# Patient Record
Sex: MALE | Race: WHITE | NOT HISPANIC OR LATINO | Employment: FULL TIME | URBAN - METROPOLITAN AREA
[De-identification: names, ages, dates, MRNs, and addresses within clinical notes are randomized per-mention and may not be internally consistent; named-entity substitution may affect disease eponyms.]

---

## 2020-11-30 ENCOUNTER — OFFICE VISIT - HEALTHEAST (OUTPATIENT)
Dept: TELEHEALTH | Facility: CLINIC | Age: 50
End: 2020-11-30

## 2020-12-16 ENCOUNTER — OFFICE VISIT - HEALTHEAST (OUTPATIENT)
Dept: FAMILY MEDICINE | Facility: CLINIC | Age: 50
End: 2020-12-16

## 2020-12-16 ENCOUNTER — COMMUNICATION - HEALTHEAST (OUTPATIENT)
Dept: FAMILY MEDICINE | Facility: CLINIC | Age: 50
End: 2020-12-16

## 2020-12-16 DIAGNOSIS — I10 HYPERTENSION, UNSPECIFIED TYPE: ICD-10-CM

## 2020-12-16 DIAGNOSIS — Z23 NEED FOR VACCINATION: ICD-10-CM

## 2020-12-16 DIAGNOSIS — D12.6 BENIGN NEOPLASM OF COLON, UNSPECIFIED PART OF COLON: ICD-10-CM

## 2020-12-16 DIAGNOSIS — K76.0 FATTY LIVER: ICD-10-CM

## 2020-12-16 DIAGNOSIS — R06.02 SHORTNESS OF BREATH: ICD-10-CM

## 2020-12-16 DIAGNOSIS — N52.9 MALE ERECTILE DISORDER: ICD-10-CM

## 2020-12-16 DIAGNOSIS — F41.1 ANXIETY STATE: ICD-10-CM

## 2020-12-16 DIAGNOSIS — E11.69 TYPE 2 DIABETES MELLITUS WITH OTHER SPECIFIED COMPLICATION, WITHOUT LONG-TERM CURRENT USE OF INSULIN (H): ICD-10-CM

## 2020-12-16 DIAGNOSIS — E78.5 DYSLIPIDEMIA: ICD-10-CM

## 2020-12-16 DIAGNOSIS — K62.89 ANAL OR RECTAL PAIN: ICD-10-CM

## 2020-12-16 LAB
ANION GAP SERPL CALCULATED.3IONS-SCNC: 14 MMOL/L (ref 5–18)
BUN SERPL-MCNC: 10 MG/DL (ref 8–22)
CALCIUM SERPL-MCNC: 9.8 MG/DL (ref 8.5–10.5)
CHLORIDE BLD-SCNC: 94 MMOL/L (ref 98–107)
CHOLEST SERPL-MCNC: 257 MG/DL
CO2 SERPL-SCNC: 26 MMOL/L (ref 22–31)
CREAT SERPL-MCNC: 1.05 MG/DL (ref 0.7–1.3)
FASTING STATUS PATIENT QL REPORTED: YES
GFR SERPL CREATININE-BSD FRML MDRD: >60 ML/MIN/1.73M2
GLUCOSE BLD-MCNC: 345 MG/DL (ref 70–125)
HBA1C MFR BLD: 11.9 %
HDLC SERPL-MCNC: 42 MG/DL
LDLC SERPL CALC-MCNC: 122 MG/DL
LDLC SERPL CALC-MCNC: ABNORMAL MG/DL
POTASSIUM BLD-SCNC: 4 MMOL/L (ref 3.5–5)
SODIUM SERPL-SCNC: 134 MMOL/L (ref 136–145)
TRIGL SERPL-MCNC: 1063 MG/DL

## 2020-12-16 ASSESSMENT — PATIENT HEALTH QUESTIONNAIRE - PHQ9: SUM OF ALL RESPONSES TO PHQ QUESTIONS 1-9: 23

## 2020-12-18 ENCOUNTER — COMMUNICATION - HEALTHEAST (OUTPATIENT)
Dept: FAMILY MEDICINE | Facility: CLINIC | Age: 50
End: 2020-12-18

## 2020-12-18 ENCOUNTER — AMBULATORY - HEALTHEAST (OUTPATIENT)
Dept: FAMILY MEDICINE | Facility: CLINIC | Age: 50
End: 2020-12-18

## 2020-12-18 DIAGNOSIS — E78.5 DYSLIPIDEMIA: ICD-10-CM

## 2020-12-18 DIAGNOSIS — F41.1 ANXIETY STATE: ICD-10-CM

## 2020-12-21 ENCOUNTER — AMBULATORY - HEALTHEAST (OUTPATIENT)
Dept: FAMILY MEDICINE | Facility: CLINIC | Age: 50
End: 2020-12-21

## 2020-12-21 DIAGNOSIS — I10 HYPERTENSION, UNSPECIFIED TYPE: ICD-10-CM

## 2021-01-08 ENCOUNTER — HOSPITAL ENCOUNTER (OUTPATIENT)
Dept: CARDIOLOGY | Facility: CLINIC | Age: 51
Discharge: HOME | End: 2021-01-08
Attending: FAMILY MEDICINE

## 2021-01-08 ENCOUNTER — AMBULATORY - HEALTHEAST (OUTPATIENT)
Dept: FAMILY MEDICINE | Facility: CLINIC | Age: 51
End: 2021-01-08

## 2021-01-08 ENCOUNTER — COMMUNICATION - HEALTHEAST (OUTPATIENT)
Dept: FAMILY MEDICINE | Facility: CLINIC | Age: 51
End: 2021-01-08

## 2021-01-08 DIAGNOSIS — R06.02 SHORTNESS OF BREATH: ICD-10-CM

## 2021-01-08 DIAGNOSIS — F41.1 ANXIETY STATE: ICD-10-CM

## 2021-01-08 DIAGNOSIS — Z12.11 SCREEN FOR COLON CANCER: ICD-10-CM

## 2021-01-08 DIAGNOSIS — I10 HYPERTENSION, UNSPECIFIED TYPE: ICD-10-CM

## 2021-01-08 LAB
CV STRESS CURRENT BP HE: NORMAL
CV STRESS CURRENT HR HE: 100
CV STRESS CURRENT HR HE: 104
CV STRESS CURRENT HR HE: 105
CV STRESS CURRENT HR HE: 105
CV STRESS CURRENT HR HE: 106
CV STRESS CURRENT HR HE: 107
CV STRESS CURRENT HR HE: 109
CV STRESS CURRENT HR HE: 112
CV STRESS CURRENT HR HE: 113
CV STRESS CURRENT HR HE: 116
CV STRESS CURRENT HR HE: 117
CV STRESS CURRENT HR HE: 119
CV STRESS CURRENT HR HE: 127
CV STRESS CURRENT HR HE: 127
CV STRESS CURRENT HR HE: 128
CV STRESS CURRENT HR HE: 128
CV STRESS CURRENT HR HE: 131
CV STRESS CURRENT HR HE: 132
CV STRESS CURRENT HR HE: 137
CV STRESS CURRENT HR HE: 137
CV STRESS CURRENT HR HE: 141
CV STRESS CURRENT HR HE: 144
CV STRESS CURRENT HR HE: 144
CV STRESS CURRENT HR HE: 145
CV STRESS CURRENT HR HE: 145
CV STRESS CURRENT HR HE: 146
CV STRESS CURRENT HR HE: 94
CV STRESS DEVIATION TIME HE: NORMAL
CV STRESS ECHO PERCENT HR HE: NORMAL
CV STRESS EXERCISE STAGE HE: NORMAL
CV STRESS FINAL RESTING BP HE: NORMAL
CV STRESS FINAL RESTING HR HE: 106
CV STRESS MAX HR HE: 146
CV STRESS MAX TREADMILL GRADE HE: 14
CV STRESS MAX TREADMILL SPEED HE: 3.4
CV STRESS PEAK DIA BP HE: NORMAL
CV STRESS PEAK SYS BP HE: NORMAL
CV STRESS PHASE HE: NORMAL
CV STRESS PROTOCOL HE: NORMAL
CV STRESS RESTING PT POSITION HE: NORMAL
CV STRESS RESTING PT POSITION HE: NORMAL
CV STRESS ST DEVIATION AMOUNT HE: NORMAL
CV STRESS ST DEVIATION ELEVATION HE: NORMAL
CV STRESS ST EVELATION AMOUNT HE: NORMAL
CV STRESS TEST TYPE HE: NORMAL
CV STRESS TOTAL STAGE TIME MIN 1 HE: NORMAL
ECHO EJECTION FRACTION ESTIMATED: 60 %
RATE PRESSURE PRODUCT: NORMAL
STRESS ECHO BASELINE DIASTOLIC HE: 104
STRESS ECHO BASELINE HR: 94
STRESS ECHO BASELINE SYSTOLIC BP: 150
STRESS ECHO CALCULATED PERCENT HR: 87 %
STRESS ECHO LAST STRESS DIASTOLIC BP: 92
STRESS ECHO LAST STRESS HR: 145
STRESS ECHO LAST STRESS SYSTOLIC BP: 164
STRESS ECHO POST ESTIMATED WORKLOAD: 8.7
STRESS ECHO POST EXERCISE DUR MIN: 7
STRESS ECHO POST EXERCISE DUR SEC: 15
STRESS ECHO TARGET HR: 168

## 2021-01-12 ENCOUNTER — OFFICE VISIT - HEALTHEAST (OUTPATIENT)
Dept: FAMILY MEDICINE | Facility: CLINIC | Age: 51
End: 2021-01-12

## 2021-01-12 DIAGNOSIS — Z12.11 SCREEN FOR COLON CANCER: ICD-10-CM

## 2021-01-12 DIAGNOSIS — I10 HYPERTENSION, UNSPECIFIED TYPE: ICD-10-CM

## 2021-01-12 DIAGNOSIS — E11.69 TYPE 2 DIABETES MELLITUS WITH OTHER SPECIFIED COMPLICATION, WITHOUT LONG-TERM CURRENT USE OF INSULIN (H): ICD-10-CM

## 2021-01-12 DIAGNOSIS — F41.1 ANXIETY STATE: ICD-10-CM

## 2021-02-03 ENCOUNTER — AMBULATORY - HEALTHEAST (OUTPATIENT)
Dept: FAMILY MEDICINE | Facility: CLINIC | Age: 51
End: 2021-02-03

## 2021-02-03 ENCOUNTER — OFFICE VISIT - HEALTHEAST (OUTPATIENT)
Dept: FAMILY MEDICINE | Facility: CLINIC | Age: 51
End: 2021-02-03

## 2021-02-03 DIAGNOSIS — F41.1 ANXIETY STATE: ICD-10-CM

## 2021-02-03 DIAGNOSIS — I10 HYPERTENSION, UNSPECIFIED TYPE: ICD-10-CM

## 2021-02-14 ENCOUNTER — COMMUNICATION - HEALTHEAST (OUTPATIENT)
Dept: FAMILY MEDICINE | Facility: CLINIC | Age: 51
End: 2021-02-14

## 2021-02-14 DIAGNOSIS — E11.69 TYPE 2 DIABETES MELLITUS WITH OTHER SPECIFIED COMPLICATION, WITHOUT LONG-TERM CURRENT USE OF INSULIN (H): ICD-10-CM

## 2021-03-05 ENCOUNTER — COMMUNICATION - HEALTHEAST (OUTPATIENT)
Dept: FAMILY MEDICINE | Facility: CLINIC | Age: 51
End: 2021-03-05

## 2021-03-05 DIAGNOSIS — F41.1 ANXIETY STATE: ICD-10-CM

## 2021-03-22 ENCOUNTER — COMMUNICATION - HEALTHEAST (OUTPATIENT)
Dept: FAMILY MEDICINE | Facility: CLINIC | Age: 51
End: 2021-03-22

## 2021-03-25 ENCOUNTER — AMBULATORY - HEALTHEAST (OUTPATIENT)
Dept: NURSING | Facility: CLINIC | Age: 51
End: 2021-03-25

## 2021-04-01 ENCOUNTER — COMMUNICATION - HEALTHEAST (OUTPATIENT)
Dept: FAMILY MEDICINE | Facility: CLINIC | Age: 51
End: 2021-04-01

## 2021-04-01 DIAGNOSIS — F41.1 ANXIETY STATE: ICD-10-CM

## 2021-04-01 DIAGNOSIS — I10 HYPERTENSION, UNSPECIFIED TYPE: ICD-10-CM

## 2021-04-09 ENCOUNTER — OFFICE VISIT - HEALTHEAST (OUTPATIENT)
Dept: FAMILY MEDICINE | Facility: CLINIC | Age: 51
End: 2021-04-09

## 2021-04-09 DIAGNOSIS — R11.0 NAUSEA: ICD-10-CM

## 2021-04-09 DIAGNOSIS — I10 HYPERTENSION, UNSPECIFIED TYPE: ICD-10-CM

## 2021-04-09 DIAGNOSIS — R06.02 SHORTNESS OF BREATH: ICD-10-CM

## 2021-04-09 DIAGNOSIS — F41.1 ANXIETY STATE: ICD-10-CM

## 2021-04-12 ENCOUNTER — COMMUNICATION - HEALTHEAST (OUTPATIENT)
Dept: FAMILY MEDICINE | Facility: CLINIC | Age: 51
End: 2021-04-12

## 2021-04-12 DIAGNOSIS — E11.69 TYPE 2 DIABETES MELLITUS WITH OTHER SPECIFIED COMPLICATION, WITHOUT LONG-TERM CURRENT USE OF INSULIN (H): ICD-10-CM

## 2021-04-13 RX ORDER — METFORMIN HCL 500 MG
TABLET, EXTENDED RELEASE 24 HR ORAL
Qty: 60 TABLET | Refills: 1 | Status: SHIPPED | OUTPATIENT
Start: 2021-04-13 | End: 2021-08-13

## 2021-04-14 ENCOUNTER — COMMUNICATION - HEALTHEAST (OUTPATIENT)
Dept: FAMILY MEDICINE | Facility: CLINIC | Age: 51
End: 2021-04-14

## 2021-04-15 ENCOUNTER — AMBULATORY - HEALTHEAST (OUTPATIENT)
Dept: NURSING | Facility: CLINIC | Age: 51
End: 2021-04-15

## 2021-04-28 ENCOUNTER — COMMUNICATION - HEALTHEAST (OUTPATIENT)
Dept: FAMILY MEDICINE | Facility: CLINIC | Age: 51
End: 2021-04-28

## 2021-04-28 DIAGNOSIS — I10 HYPERTENSION, UNSPECIFIED TYPE: ICD-10-CM

## 2021-04-30 RX ORDER — LISINOPRIL 40 MG/1
TABLET ORAL
Qty: 90 TABLET | Refills: 2 | Status: SHIPPED | OUTPATIENT
Start: 2021-04-30 | End: 2022-05-01

## 2021-05-01 ENCOUNTER — COMMUNICATION - HEALTHEAST (OUTPATIENT)
Dept: FAMILY MEDICINE | Facility: CLINIC | Age: 51
End: 2021-05-01

## 2021-05-01 DIAGNOSIS — F41.1 ANXIETY STATE: ICD-10-CM

## 2021-05-03 RX ORDER — QUETIAPINE FUMARATE 25 MG/1
TABLET, FILM COATED ORAL
Qty: 90 TABLET | Refills: 0 | Status: SHIPPED | OUTPATIENT
Start: 2021-05-03 | End: 2021-08-23

## 2021-05-26 ASSESSMENT — PATIENT HEALTH QUESTIONNAIRE - PHQ9: SUM OF ALL RESPONSES TO PHQ QUESTIONS 1-9: 23

## 2021-05-31 ENCOUNTER — RECORDS - HEALTHEAST (OUTPATIENT)
Dept: ADMINISTRATIVE | Facility: CLINIC | Age: 51
End: 2021-05-31

## 2021-06-01 ENCOUNTER — RECORDS - HEALTHEAST (OUTPATIENT)
Dept: ADMINISTRATIVE | Facility: CLINIC | Age: 51
End: 2021-06-01

## 2021-06-05 VITALS
SYSTOLIC BLOOD PRESSURE: 127 MMHG | HEART RATE: 92 BPM | BODY MASS INDEX: 25.23 KG/M2 | DIASTOLIC BLOOD PRESSURE: 91 MMHG | WEIGHT: 165.9 LBS

## 2021-06-05 VITALS
HEART RATE: 113 BPM | SYSTOLIC BLOOD PRESSURE: 167 MMHG | OXYGEN SATURATION: 99 % | WEIGHT: 163.2 LBS | BODY MASS INDEX: 24.81 KG/M2 | DIASTOLIC BLOOD PRESSURE: 122 MMHG

## 2021-06-05 VITALS
HEART RATE: 96 BPM | DIASTOLIC BLOOD PRESSURE: 82 MMHG | BODY MASS INDEX: 25.36 KG/M2 | OXYGEN SATURATION: 100 % | WEIGHT: 166.8 LBS | SYSTOLIC BLOOD PRESSURE: 118 MMHG

## 2021-06-05 VITALS
DIASTOLIC BLOOD PRESSURE: 119 MMHG | OXYGEN SATURATION: 98 % | SYSTOLIC BLOOD PRESSURE: 186 MMHG | BODY MASS INDEX: 25.54 KG/M2 | WEIGHT: 168 LBS | HEART RATE: 90 BPM

## 2021-06-13 NOTE — PROGRESS NOTES
ASSESSMENT:  1. Hypertension  Suboptimal control.  - lisinopriL (PRINIVIL,ZESTRIL) 10 MG tablet; Take 1 tablet (10 mg total) by mouth daily.  Dispense: 30 tablet; Refill: 1    2. Anxiety  Longstanding history, probably generalized anxiety disorder.  - FLUoxetine (PROZAC) 20 MG capsule; Take 1 capsule daily for anxiety.  Dispense: 30 capsule; Refill: 1    3. Type 2 diabetes mellitus (H)  New diagnosis, A1c 11.9.  - Basic Metabolic Panel  - Glycosylated Hemoglobin A1c  - Lipid Cascade  - metFORMIN (GLUCOPHAGE-XR) 500 MG 24 hr tablet; Take 1 tablet before breakfast each morning for 1 week and then 2 tablets before breakfast daily for diabetes.  Dispense: 60 tablet; Refill: 1    4. Fatty liver  Presumed metabolic.    5. Proctalgia  Standing issue prior conservative management.    6. Benign neoplasm of colon, unspecified part of colon  Noted on prior colonoscopy.    7. Male erectile disorder  Probably secondary to both diabetes mellitus and hypertension as well as anxiety.    8. Dyslipidemia  Tendency towards elevated triglycerides and low HDL.    9. Shortness of breath  Unclear etiology this could be secondary to deconditioning other medical issues as above, a cardiac cause is possible.  - Echo Stress Exercise; Future    10. Need for vaccination     - Tdap vaccine,  8yo or older,  IM  - Influenza, Recombinant, Inj, Quadrivalent, PF, 18+YRS        PLAN:  1.  Tdap and influenza vaccines.  2.  Discontinue amlodipine, begin lisinopril 10 mg daily.  3.  Begin fluoxetine 20 mg daily  4.  Begin Metformin extended release 500 mg daily x1 week and then 1000 mg daily  5.  Stress echocardiogram.  6.  Laboratory studies as above.  7.  1 month follow-up.    Orders Placed This Encounter   Procedures     Tdap vaccine,  8yo or older,  IM     Influenza, Recombinant, Inj, Quadrivalent, PF, 18+YRS     Basic Metabolic Panel     Glycosylated Hemoglobin A1c     Lipid Cascade     Order Specific Question:   Fasting is required?     Answer:    Yes     Medications Discontinued During This Encounter   Medication Reason     HYDROcodone-acetaminophen 5-325 mg per tablet Therapy completed     amLODIPine (NORVASC) 5 MG tablet Alternate therapy       No follow-ups on file.    CHIEF COMPLAINT:  Chief Complaint   Patient presents with     Follow-up     f/u ER with chest pain on 11/25, still shortness of breath, pain in the shoulders, left arms is getting numb, tingling in the low legs, COVID is negative 1 month ago     Hypertension     the highest BP was 2 days ago was 190/105 and pain in the jaw       SUBJECTIVE:  Drew is a 50 y.o. male patient has not been seen by myself in about 4 years he is here for multiple medical issues.    Patient was just seen in the emergency room on November 25 he was therefore chest pain, he had a fairly extensive work-up which did not reveal any obvious cause and was discharged with a diagnosis of musculoskeletal chest pain which the patient does not believe was the actual cause.  He does believe that there potentially is something going on with his heart.  He also mentions for the past 6 months or so that he does get more short of breath with activity or exertion, given this I do think we do need to do further cardiac testing.    When the patient was in the emergency room he had a random sugar of 385, I told the patient he does have underlying diabetes mellitus, he was not informed of this, he is also been losing weight recently which would be consistent with a new diagnosis of diabetes.    His blood pressure is elevated as well he is on 5 mg of amlodipine which he does not think is helpful.  I reviewed his allergies which I think are incorrect, I am going to restart him on lisinopril which she was on previously.    He also has underlying history of anxiety, this goes back quite some time.  I told the patient I do think anxiety is a factor in some of his symptoms and that, he has been on sertraline in the past which he did not  think was helpful, but I do think he needs to be on a medication for what I think is quite severe anxiety.    Patient is not in a relationship at this time he is  but he also mentions he is not been able to have an erection for the past year he actually had Cialis but that was not helpful.  I told the patient if we can get his blood pressure and diabetes under better control this might make a difference.        REVIEW OF SYSTEMS:      All other systems are negative.    PFSH:  Immunization History   Administered Date(s) Administered     INFLUENZA,RECOMBINANT,INJ,PF QUADRIVALENT 18+YRS 12/16/2020     Influenza, Seasonal, Inj PF IIV3 11/06/2010, 02/07/2012     Influenza, inj, historic,unspecified 10/25/2008, 10/25/2016     Influenza,seasonal, Inj IIV3 10/25/2008     Tdap 08/26/2009, 12/16/2020     Social History     Socioeconomic History     Marital status:      Spouse name: Not on file     Number of children: 1     Years of education: Not on file     Highest education level: Not on file   Occupational History     Occupation: Loading   Social Needs     Financial resource strain: Not on file     Food insecurity     Worry: Not on file     Inability: Not on file     Transportation needs     Medical: Not on file     Non-medical: Not on file   Tobacco Use     Smoking status: Former Smoker     Types: Cigarettes     Smokeless tobacco: Never Used     Tobacco comment: College   Substance and Sexual Activity     Alcohol use: No     Drug use: Yes     Comment: Cannabis     Sexual activity: Not on file   Lifestyle     Physical activity     Days per week: Not on file     Minutes per session: Not on file     Stress: Not on file   Relationships     Social connections     Talks on phone: Not on file     Gets together: Not on file     Attends Rastafarian service: Not on file     Active member of club or organization: Not on file     Attends meetings of clubs or organizations: Not on file     Relationship status: Not on  file     Intimate partner violence     Fear of current or ex partner: Not on file     Emotionally abused: Not on file     Physically abused: Not on file     Forced sexual activity: Not on file   Other Topics Concern     Not on file   Social History Narrative    Diet-        Exercise-work is physical     Past Medical History:   Diagnosis Date     Closed Fracture Of The Fifth Left Metatarsal At [place]se[place] With Displacement           Family History   Problem Relation Age of Onset     Stroke Mother        MEDICATIONS:  Current Outpatient Medications   Medication Sig Dispense Refill     FLUoxetine (PROZAC) 20 MG capsule Take 1 capsule daily for anxiety. 30 capsule 1     lisinopriL (PRINIVIL,ZESTRIL) 10 MG tablet Take 1 tablet (10 mg total) by mouth daily. 30 tablet 1     metFORMIN (GLUCOPHAGE-XR) 500 MG 24 hr tablet Take 1 tablet before breakfast each morning for 1 week and then 2 tablets before breakfast daily for diabetes. 60 tablet 1     naproxen sodium (ALEVE) 220 MG tablet Take 440 mg by mouth every 12 (twelve) hours as needed for pain.       No current facility-administered medications for this visit.        TOBACCO USE:  Social History     Tobacco Use   Smoking Status Former Smoker     Types: Cigarettes   Smokeless Tobacco Never Used   Tobacco Comment    [place]       VITALS:  Vitals:    12/16/20 1541   BP: (!) 167/122   Pulse: (!) 113   SpO2: 99%   Weight: 163 lb 3.2 oz (74 kg)     Wt Readings from Last 3 Encounters:   12/16/20 163 lb 3.2 oz (74 kg)   11/25/20 189 lb (85.7 kg)   04/06/17 179 lb (81.2 kg)       PHYSICAL EXAM:  Constitutional:   Reveals a male who appears his stated age.  Vitals: per nursing notes.  HEENT:  Ears:  External canals, TMs clear.    Eyes:  EOMs full, PERRL.  Lungs: Clear to A&P without rales or wheezes.  Respiratory effort normal.  Cardiac:   Regular rate and rhythm, normal S1, S2, no murmur or gallop.  Musculoskeletal: No peripheral swelling.  Neuro:  Alert and  oriented. Cranial nerves, motor, sensory exams are intact.  No gross focal deficits.  Psychiatric:  Memory intact, mood appropriate.    QUALITY MEASURES:      DATA REVIEWED:  ER note from November 25, 2020 reviewed, CT scan of the chest did not reveal any acute cardiopulmonary process, random sugar 385, troponin negative.

## 2021-06-14 ENCOUNTER — COMMUNICATION - HEALTHEAST (OUTPATIENT)
Dept: FAMILY MEDICINE | Facility: CLINIC | Age: 51
End: 2021-06-14

## 2021-06-14 DIAGNOSIS — E78.5 DYSLIPIDEMIA: ICD-10-CM

## 2021-06-14 RX ORDER — FENOFIBRATE 145 MG/1
TABLET, COATED ORAL
Qty: 90 TABLET | Refills: 1 | Status: SHIPPED | OUTPATIENT
Start: 2021-06-14 | End: 2022-09-12

## 2021-06-14 NOTE — TELEPHONE ENCOUNTER
Dr. Farmer I'm not able to pend this medication. Are you able to send in refills for his seroquel and lisinopril?

## 2021-06-14 NOTE — PROGRESS NOTES
ASSESSMENT:  1. Screen for colon cancer  Patient needs colorectal cancer screening, but I would defer for now we do need to get the diabetes under better control.    2. Hypertension  Currently well controlled.    3. Type 2 diabetes mellitus (H)  Recent diagnosis with dramatic elevation of A1c.    4. Anxiety  Longstanding, has been difficult to manage.  - QUEtiapine (SEROQUEL) 25 MG tablet; Take one tablet twice daily for anxiety  Dispense: 60 tablet; Refill: 0        PLAN:  1.  Discontinue fluoxetine, begin Seroquel 25 mg twice daily  2.  Referral to psychiatry is a potential future option.  3.  Patient is going to purchase a home glucose monitoring system  4.  Defer colorectal cancer screening for now.  5.  Follow-up in 1 month.    No orders of the defined types were placed in this encounter.    Medications Discontinued During This Encounter   Medication Reason     FLUoxetine (PROZAC) 20 MG capsule Alternate therapy       No follow-ups on file.    CHIEF COMPLAINT:  Chief Complaint   Patient presents with     Blood Pressure Check       SUBJECTIVE:  Drew is a 50 y.o. male patient comes in for follow-up of a number of medical issues.  Patient has a history of anxiety I presume generalized anxiety disorder I tried him on several different medications most recently fluoxetine he does not think that that is been helpful at all.  I discussed various options one of the limitations is that he does not have health insurance currently so a psychiatrist might not be possible at this point, we discussed that could go up on the fluoxetine but I would like to try him on an atypical to see if this does not cause some improvement.    Patient has a recent diagnosis of diabetes mellitus he is on Metformin appears to be tolerating this well, I told the patient I like him to check his blood sugars he is going to buy an over-the-counter meter for this.    He does have underlying hypertension now well controlled.    He mentions also  that he is having erection difficulties, I suspect that this is multifactorial, including diabetes blood pressure and anxiety.    He continues to have a subjective sensation of shortness of breath he had a recent exercise stress echocardiogram which was essentially normal, he had a CT scan of the chest abdomen pelvis back in November as well as numerous laboratory studies no evidence of any cardiac or pulmonary disease, I told the patient I do think this is a combination of deconditioning and anxiety.            REVIEW OF SYSTEMS:      All other systems are negative.    PFSH:  Immunization History   Administered Date(s) Administered     INFLUENZA,RECOMBINANT,INJ,PF QUADRIVALENT 18+YRS 12/16/2020     Influenza, Seasonal, Inj PF IIV3 11/06/2010, 02/07/2012     Influenza, inj, historic,unspecified 10/25/2008, 10/25/2016     Influenza,seasonal, Inj IIV3 10/25/2008     Tdap 08/26/2009, 12/16/2020     Social History     Socioeconomic History     Marital status:      Spouse name: Not on file     Number of children: 1     Years of education: Not on file     Highest education level: Not on file   Occupational History     Occupation: Loading   Social Needs     Financial resource strain: Not on file     Food insecurity     Worry: Not on file     Inability: Not on file     Transportation needs     Medical: Not on file     Non-medical: Not on file   Tobacco Use     Smoking status: Former Smoker     Types: Cigarettes     Smokeless tobacco: Never Used     Tobacco comment: College   Substance and Sexual Activity     Alcohol use: No     Drug use: Yes     Comment: Cannabis     Sexual activity: Not on file   Lifestyle     Physical activity     Days per week: Not on file     Minutes per session: Not on file     Stress: Not on file   Relationships     Social connections     Talks on phone: Not on file     Gets together: Not on file     Attends Roman Catholic service: Not on file     Active member of club or organization: Not on file      Attends meetings of clubs or organizations: Not on file     Relationship status: Not on file     Intimate partner violence     Fear of current or ex partner: Not on file     Emotionally abused: Not on file     Physically abused: Not on file     Forced sexual activity: Not on file   Other Topics Concern     Not on file   Social History Narrative    Diet-        Exercise-work is physical     Past Medical History:   Diagnosis Date     Closed Fracture Of The Fifth Left Metatarsal At The Metaphyseal[place]diaphyseal Junction With Displacement           Headache     Frontal-?migraine Triggers: no obvious,  excedrin light senstive Chiro      Nephrolithiasis     at least 4 episodes most recent 2012 once surgically removed Stones present both kidneys       Family History   Problem Relation Age of Onset     Stroke Mother      Aneurysm Father        MEDICATIONS:  Current Outpatient Medications   Medication Sig Dispense Refill     fenofibrate (TRICOR) 145 MG tablet Take 1 tablet (145 mg total) by mouth daily. 90 tablet 1     lisinopriL (PRINIVIL,ZESTRIL) 10 MG tablet Take two pills daily for high blood pressure 90 tablet 1     metFORMIN (GLUCOPHAGE-XR) 500 MG 24 hr tablet Take 1 tablet before breakfast each morning for 1 week and then 2 tablets before breakfast daily for diabetes. 60 tablet 1     naproxen sodium (ALEVE) 220 MG tablet Take 440 mg by mouth every 12 (twelve) hours as needed for pain.       QUEtiapine (SEROQUEL) 25 MG tablet Take one tablet twice daily for anxiety 60 tablet 0     No current facility-administered medications for this visit.        TOBACCO USE:  Social History     Tobacco Use   Smoking Status Former Smoker     Types: Cigarettes   Smokeless Tobacco Never Used   Tobacco Comment    [place]       VITALS:  Vitals:    01/12/21 1553   BP: (!) 127/91   Pulse: 92   Weight: 165 lb 14.4 oz (75.3 kg)     Wt Readings from Last 3 Encounters:   01/12/21 165 lb 14.4 oz (75.3 kg)   12/16/20 163 lb 3.2 oz (74 kg)    11/25/20 189 lb (85.7 kg)       PHYSICAL EXAM:  Constitutional:   Reveals a young male who appears overall healthy.  Vitals: per nursing notes.  HEENT:  Ears:  External canals, TMs clear.    Eyes:  EOMs full, PERRL.  Musculoskeletal: No peripheral swelling.  Neuro:  Alert and oriented. Cranial nerves, motor, sensory exams are intact.  No gross focal deficits.  Psychiatric:  Memory intact, mood appropriate.    QUALITY MEASURES:      DATA REVIEWED:  Reviewed the exercise stress test from January 8 which does not reveal any evidence of cardiac ischemia

## 2021-06-15 NOTE — PROGRESS NOTES
ASSESSMENT:  1. Anxiety  Appears to be not optimally controlled at all at this time.  - QUEtiapine (SEROQUEL) 25 MG tablet; Take one tablet each morning and two each evening for anxiety  Dispense: 90 tablet; Refill: 0    2. Hypertension  Suboptimal control  - lisinopriL (PRINIVIL,ZESTRIL) 40 MG tablet; Take one pill daily for high blood pressure  Dispense: 90 tablet; Refill: 1        PLAN:  1.  Increase Seroquel from 25 mg twice daily to 25 mg in the morning and 50 mg in the evening new prescription faxed in.  2.  Increase lisinopril from 20 mg to 40 mg daily, new prescription  3.  Urged patient to do what he can to get health insurance as he is behind on preventive maintenance issues.  4.  Patient is due for follow-up in about 2 months for his comorbidities.    No orders of the defined types were placed in this encounter.    Medications Discontinued During This Encounter   Medication Reason     lisinopriL (PRINIVIL,ZESTRIL) 10 MG tablet Therapy completed     lisinopriL (PRINIVIL,ZESTRIL) 20 MG tablet Therapy completed     QUEtiapine (SEROQUEL) 25 MG tablet        No follow-ups on file.    CHIEF COMPLAINT:  Chief Complaint   Patient presents with     Hypertension       SUBJECTIVE:  Drew is a 50 y.o. male who comes in for follow-up anxiety blood pressure or other issues.    The patient on January 12, we discontinued fluoxetine, I started him on Seroquel, he is not sure if that is helping or not, in the morning he does get a little dizzy after he takes the medication, in the evening does not seem like it is helping him sleep.  I discussed multiple potential options including dose adjustment, trying a different medication or referral to psychiatry, a limiting factor is is that he has no health insurance at this time and his psychiatrist is probably prohibitively expensive and he already has a lot of medical bills.    Patient's blood pressure is quite significantly elevated, he is currently on 20 mg of lisinopril we  need to go up on this.    Patient reports that he has ongoing intermittent rectal bleeding, we do need a colonoscopy which she has never had, but again with no health insurance the cost would be quite prohibitive.    He also states that he is not able to at this time get and maintain an erection, I suspect diabetes blood pressure anxiety probably playing at least some role in this.    I discussed with the patient that I think he should at least look into the possibility of being a candidate for subsidized insurance through [employer] another source.    REVIEW OF SYSTEMS:      All other systems are negative.    PFSH:  Immunization History   Administered Date(s) Administered     INFLUENZA,RECOMBINANT,INJ,PF QUADRIVALENT 18+YRS 12/16/2020     Influenza, Seasonal, Inj PF IIV3 11/06/2010, 02/07/2012     Influenza, inj, historic,unspecified 10/25/2008, 10/25/2016     Influenza,seasonal, Inj IIV3 10/25/2008     Tdap 08/26/2009, 12/16/2020     Social History     Socioeconomic History     Marital status:      Spouse name: Not on file     Number of children: 1     Years of education: Not on file     Highest education level: Not on file   Occupational History     Occupation: Loading   Social Needs     Financial resource strain: Not on file     Food insecurity     Worry: Not on file     Inability: Not on file     Transportation needs     Medical: Not on file     Non-medical: Not on file   Tobacco Use     Smoking status: Former Smoker     Types: Cigarettes     Smokeless tobacco: Never Used     Tobacco comment: College   Substance and Sexual Activity     Alcohol use: No     Drug use: Yes     Comment: Cannabis     Sexual activity: Not on file   Lifestyle     Physical activity     Days per week: Not on file     Minutes per session: Not on file     Stress: Not on file   Relationships     Social connections     Talks on phone: Not on file     Gets together: Not on file     Attends Judaism service: Not on file     Active member  of club or organization: Not on file     Attends meetings of clubs or organizations: Not on file     Relationship status: Not on file     Intimate partner violence     Fear of current or ex partner: Not on file     Emotionally abused: Not on file     Physically abused: Not on file     Forced sexual activity: Not on file   Other Topics Concern     Not on file   Social History Narrative    Diet-        Exercise-work is physical     Past Medical History:   Diagnosis Date     Closed Fracture Of The Fifth Left Metatarsal At The Metaphyseal[place]diaphyseal Junction With Displacement           Headache     Frontal-?migraine Triggers: no obvious,  excedrin light senstive Chiro      Nephrolithiasis     at least 4 episodes most recent 2012 once surgically removed Stones present both kidneys       Family History   Problem Relation Age of Onset     Stroke Mother      Aneurysm Father        MEDICATIONS:  Current Outpatient Medications   Medication Sig Dispense Refill     fenofibrate (TRICOR) 145 MG tablet Take 1 tablet (145 mg total) by mouth daily. 90 tablet 1     metFORMIN (GLUCOPHAGE-XR) 500 MG 24 hr tablet Take 1 tablet before breakfast each morning for 1 week and then 2 tablets before breakfast daily for diabetes. 60 tablet 1     naproxen sodium (ALEVE) 220 MG tablet Take 440 mg by mouth every 12 (twelve) hours as needed for pain.       QUEtiapine (SEROQUEL) 25 MG tablet Take one tablet each morning and two each evening for anxiety 90 tablet 0     lisinopriL (PRINIVIL,ZESTRIL) 40 MG tablet Take one pill daily for high blood pressure 90 tablet 1     No current facility-administered medications for this visit.        TOBACCO USE:  Social History     Tobacco Use   Smoking Status Former Smoker     Types: Cigarettes   Smokeless Tobacco Never Used   Tobacco Comment    [place]       VITALS:  Vitals:    02/03/21 1716 02/03/21 1731   BP: (!) 173/112 (!) 186/119   Pulse: 90    SpO2: 98%    Weight: 168 lb (76.2 kg)      Wt Readings from  Last 3 Encounters:   02/03/21 168 lb (76.2 kg)   01/12/21 165 lb 14.4 oz (75.3 kg)   12/16/20 163 lb 3.2 oz (74 kg)       PHYSICAL EXAM:  Constitutional:   Reveals a male who seems anxious but pleasant and cooperative.  Vitals: per nursing notes.  Eyes:  EOMs full, PERRL.  Musculoskeletal: No peripheral swelling.  Neuro:  Alert and oriented. Cranial nerves, motor, sensory exams are intact.  No gross focal deficits.  Psychiatric:  Memory intact, mood appropriate.    QUALITY MEASURES:      DATA REVIEWED:

## 2021-06-15 NOTE — TELEPHONE ENCOUNTER
RN cannot approve Refill Request    RN can NOT refill this medication med is not covered by policy/route to provider. Last office visit: 2/3/2021 Haroon Farmer MD Last Physical: Visit date not found Last MTM visit: Visit date not found Last visit same specialty: 2/3/2021 Haroon Farmer MD.  Next visit within 3 mo: Visit date not found  Next physical within 3 mo: Visit date not found      [facility] Best, [facility] Triage/Med Refill 3/5/2021    Requested Prescriptions   Pending Prescriptions Disp Refills     QUEtiapine (SEROQUEL) 25 MG tablet 90 tablet 0     Sig: Take one tablet each morning and two each evening for anxiety       There is no refill protocol information for this order

## 2021-06-15 NOTE — TELEPHONE ENCOUNTER
RN cannot approve Refill Request    RN can NOT refill this medication PCP messaged that patient is overdue for Labs. Last office visit: 2/3/2021 Haroon Farmer MD Last Physical: Visit date not found Last MTM visit: Visit date not found Last visit same specialty: 2/3/2021 Haroon Farmer MD.  Next visit within 3 mo: Visit date not found  Next physical within 3 mo: Visit date not found      Maki Best, [facility] Triage/Med Refill 2/14/2021    Requested Prescriptions   Pending Prescriptions Disp Refills     metFORMIN (GLUCOPHAGE-XR) 500 MG 24 hr tablet [Pharmacy Med Name: METFORMIN HCL  MG TABLET] 60 tablet 1     Sig: TAKE 1 TAB BY MOUTH DAILY BEFORE BREAKFAST FOR 1 WEEK THEN INCREASE TO 2 TABS DAILY BEFORE BREAKFAST       Metformin Refill Protocol Failed - 2/14/2021  9:37 AM        Failed - LFT or AST or ALT in last 12 months     No results found for: ALBUMIN, BILITOT, BILIDIR, ALKPHOS, AST, ALT, PROT             Failed - Microalbumin in last year      No results found for: MICROALBUR               Passed - Blood pressure in last 12 months     BP Readings from Last 1 Encounters:   02/03/21 (!) 186/119             Passed - GFR or Serum Creatinine in last 6 months     GFR MDRD Non Af Amer   Date Value Ref Range Status   12/16/2020 >60 >60 mL/min/1.73m2 Final     GFR MDRD Af Amer   Date Value Ref Range Status   12/16/2020 >60 >60 mL/min/1.73m2 Final             Passed - Visit with PCP or prescribing provider visit in last 6 months or next 3 months     Last office visit with prescriber/PCP: 2/3/2021 OR same dept: 2/3/2021 Haroon Farmer MD OR same specialty: 2/3/2021 Haroon Farmer MD Last physical: Visit date not found Last MTM visit: Visit date not found         Next appt within 3 mo: Visit date not found  Next physical within 3 mo: Visit date not found  Prescriber OR PCP: Haroon Farmer MD  Last diagnosis associated with med order: 1. Type 2 diabetes mellitus with other specified  complication, without long-term current use of insulin (H)  - metFORMIN (GLUCOPHAGE-XR) 500 MG 24 hr tablet [Pharmacy Med Name: METFORMIN HCL  MG TABLET]; TAKE 1 TAB BY MOUTH DAILY BEFORE BREAKFAST FOR 1 WEEK THEN INCREASE TO 2 TABS DAILY BEFORE BREAKFAST  Dispense: 60 tablet; Refill: 1     If protocol passes may refill for 12 months if within 3 months of last provider visit (or a total of 15 months).           Passed - A1C in last 6 months     Hemoglobin A1c   Date Value Ref Range Status   12/16/2020 11.9 (H) <=5.6 % Final

## 2021-06-16 NOTE — PROGRESS NOTES
ASSESSMENT:  1. Anxiety  Patient with a quite significant and severe amount of anxiety, which I believe is the underlying growth of many of his symptoms as below.  - Ambulatory referral to Psychiatry    2. Hypertension  Currently very well controlled.    3. Shortness of breath  Patient has had an extensive work-up for this, no evidence of any lung or heart disease, I suspect anxiety perhaps deconditioning is playing a role.    4. Nausea  There may be some underlying reflux but I also think anxiety is the main  of this.        PLAN:  1.  Referral to psychiatry  2.  For now I am not changing any the patient's medications.  3.  Patient is due for follow-up of his diabetes and other comorbidities in the next several months.       Orders Placed This Encounter   Procedures     Ambulatory referral to Psychiatry     Referral Priority:   Routine     Referral Type:   Psychiatric     Referral Reason:   Evaluation and Treatment     Requested Specialty:   Psychiatry     Number of Visits Requested:   1     Medications Discontinued During This Encounter   Medication Reason     QUEtiapine (SEROQUEL) 25 MG tablet Duplicate order       No follow-ups on file.    CHIEF COMPLAINT:  Chief Complaint   Patient presents with     Hypertension     f/u     Abdominal Burning Sensation     patient get's nausea with that.       SUBJECTIVE:  Drew is a 50 y.o. male who comes in for follow-up of various symptoms.  Of note the patient does have I presume a diagnosis of generalized anxiety disorder, he has been on different medications most recently Seroquel, he is not clear if this medication is helping or not.  Patient has a sensation of shortness of breath particularly with activity or after exertion, he has had a stress echocardiogram which is completely normal, he also has had a CT scan of the chest abdomen and pelvis which did not show any evidence of pulmonic disease.    After his first Covid vaccine he developed nausea, he is  scheduled to get the second 1 soon.  He does not have a documented Covid infection but he wonders if he could be a long-haul or.    I told the patient that I do believe that his symptoms are likely related to underlying anxiety, of note he does not have health insurance and apparently is not eligible to remain sure, but he is also aware that he could be fired because his boss is worried that he is having a heart attack when he is feeling short of breath.    I discussed with the patient that I do think him getting better management of his anxiety would make a big difference, even though he may have to pay out-of-pocket I think the benefits of a psychiatrist are quite significant and he is agreeable.    Also think he would benefit from therapy however this is probably going to be financially prohibitive at this time.    Incidentally his blood pressure is now extremely well controlled.  For the nausea he is taking some stomach acid medication which is over-the-counter, but he does report that after taking the Seroquel the nausea does improve, which lends my theory that a lot of his symptoms are anxiety related.    REVIEW OF SYSTEMS:      All other systems are negative.    PFSH:  Immunization History   Administered Date(s) Administered     COVID-19,PF,Pfizer 03/25/2021     INFLUENZA,RECOMBINANT,INJ,PF QUADRIVALENT 18+YRS 12/16/2020     Influenza, Seasonal, Inj PF IIV3 11/06/2010, 02/07/2012     Influenza, inj, historic,unspecified 10/25/2008, 10/25/2016     Influenza,seasonal, Inj IIV3 10/25/2008     Tdap 08/26/2009, 12/16/2020     Social History     Socioeconomic History     Marital status:      Spouse name: Not on file     Number of children: 1     Years of education: Not on file     Highest education level: Not on file   Occupational History     Occupation: Loading   Social Needs     Financial resource strain: Not on file     Food insecurity     Worry: Not on file     Inability: Not on file     Transportation  needs     Medical: Not on file     Non-medical: Not on file   Tobacco Use     Smoking status: Former Smoker     Types: Cigarettes     Smokeless tobacco: Never Used     Tobacco comment: College   Substance and Sexual Activity     Alcohol use: No     Drug use: Yes     Comment: Cannabis     Sexual activity: Not on file   Lifestyle     Physical activity     Days per week: Not on file     Minutes per session: Not on file     Stress: Not on file   Relationships     Social connections     Talks on phone: Not on file     Gets together: Not on file     Attends Taoist service: Not on file     Active member of club or organization: Not on file     Attends meetings of clubs or organizations: Not on file     Relationship status: Not on file     Intimate partner violence     Fear of current or ex partner: Not on file     Emotionally abused: Not on file     Physically abused: Not on file     Forced sexual activity: Not on file   Other Topics Concern     Not on file   Social History Narrative    Diet-        Exercise-work is physical     Past Medical History:   Diagnosis Date     Closed Fracture Of The Fifth Left Metatarsal At The Metaphyseal-diaphyseal Junction With Displacement           Headache     Frontal-?migraine Triggers: no obvious,  excedrin light senstive Chiro      Nephrolithiasis     at least 4 episodes most recent 2012 once surgically removed Stones present both kidneys       Family History   Problem Relation Age of Onset     Stroke Mother      Aneurysm Father        MEDICATIONS:  Current Outpatient Medications   Medication Sig Dispense Refill     fenofibrate (TRICOR) 145 MG tablet Take 1 tablet (145 mg total) by mouth daily. 90 tablet 1     lisinopriL (PRINIVIL,ZESTRIL) 40 MG tablet Take one pill daily for high blood pressure 30 tablet 0     metFORMIN (GLUCOPHAGE-XR) 500 MG 24 hr tablet TAKE 2 TABS DAILY BEFORE BREAKFAST FOR DIABETES 60 tablet 1     naproxen sodium (ALEVE) 220 MG tablet Take 440 mg by mouth every  12 (twelve) hours as needed for pain.       QUEtiapine (SEROQUEL) 25 MG tablet Take one tablet each morning and two each evening for anxiety 90 tablet 0     No current facility-administered medications for this visit.        TOBACCO USE:  Social History     Tobacco Use   Smoking Status Former Smoker     Types: Cigarettes   Smokeless Tobacco Never Used   Tobacco Comment    College       VITALS:  Vitals:    04/09/21 0926   BP: 118/82   Pulse: 96   SpO2: 100%   Weight: 166 lb 12.8 oz (75.7 kg)     Wt Readings from Last 3 Encounters:   04/09/21 166 lb 12.8 oz (75.7 kg)   02/03/21 168 lb (76.2 kg)   01/12/21 165 lb 14.4 oz (75.3 kg)       PHYSICAL EXAM:  Constitutional:   Reveals a who appears the stated age.  Vitals: per nursing notes.  Eyes:  EOMs full, PERRL.  Musculoskeletal: No peripheral swelling.  Neuro:  Alert and oriented. Cranial nerves, motor, sensory exams are intact.  No gross focal deficits.  Psychiatric:  Memory intact, mood appropriate.    QUALITY MEASURES:      DATA REVIEWED:

## 2021-06-16 NOTE — TELEPHONE ENCOUNTER
RN cannot approve Refill Request    RN can NOT refill this medication PCP messaged that patient is overdue for Labs. Last office visit: 4/9/2021 Haroon Farmer MD Last Physical: Visit date not found Last MTM visit: Visit date not found Last visit same specialty: 4/9/2021 Haroon Farmer MD.  Next visit within 3 mo: Visit date not found  Next physical within 3 mo: Visit date not found      Salina Holliday, [facility] Triage/Med Refill 4/12/2021    Requested Prescriptions   Pending Prescriptions Disp Refills     metFORMIN (GLUCOPHAGE-XR) 500 MG 24 hr tablet [Pharmacy Med Name: METFORMIN HCL  MG TABLET] 60 tablet 1     Sig: TAKE 2 TABLETS BY MOUTH DAILY BEFORE BREAKFAST FOR DIABETES       Metformin Refill Protocol Failed - 4/12/2021 12:29 AM        Failed - LFT or AST or ALT in last 12 months     No results found for: ALBUMIN, BILITOT, BILIDIR, ALKPHOS, AST, ALT, PROT             Failed - Microalbumin in last year      No results found for: MICROALBUR               Passed - Blood pressure in last 12 months     BP Readings from Last 1 Encounters:   04/09/21 118/82             Passed - GFR or Serum Creatinine in last 6 months     GFR MDRD Non Af Amer   Date Value Ref Range Status   12/16/2020 >60 >60 mL/min/1.73m2 Final     GFR MDRD Af Amer   Date Value Ref Range Status   12/16/2020 >60 >60 mL/min/1.73m2 Final             Passed - Visit with PCP or prescribing provider visit in last 6 months or next 3 months     Last office visit with prescriber/PCP: 4/9/2021 OR same dept: 4/9/2021 Haroon Farmer MD OR same specialty: 4/9/2021 Haroon Farmer MD Last physical: Visit date not found Last MTM visit: Visit date not found         Next appt within 3 mo: Visit date not found  Next physical within 3 mo: Visit date not found  Prescriber OR PCP: Haroon Farmer MD  Last diagnosis associated with med order: 1. Type 2 diabetes mellitus with other specified complication, without long-term current use of insulin  (H)  - metFORMIN (GLUCOPHAGE-XR) 500 MG 24 hr tablet [Pharmacy Med Name: METFORMIN HCL  MG TABLET]; TAKE 2 TABLETS BY MOUTH DAILY BEFORE BREAKFAST FOR DIABETES  Dispense: 60 tablet; Refill: 1     If protocol passes may refill for 12 months if within 3 months of last provider visit (or a total of 15 months).           Passed - A1C in last 6 months     Hemoglobin A1c   Date Value Ref Range Status   12/16/2020 11.9 (H) <=5.6 % Final

## 2021-06-16 NOTE — TELEPHONE ENCOUNTER
RN cannot approve Refill Request    RN can NOT refill this medication med is not covered by policy/route to provider. Last office visit: 2/3/2021 Haroon Farmer MD Last Physical: Visit date not found Last MTM visit: Visit date not found Last visit same specialty: 2/3/2021 Haroon Farmer MD.  Next visit within 3 mo: Visit date not found  Next physical within 3 mo: Visit date not found      Maki Best, [facility] Triage/Med Refill 4/2/2021    Requested Prescriptions   Pending Prescriptions Disp Refills     QUEtiapine (SEROQUEL) 25 MG tablet 90 tablet 0     Sig: Take one tablet each morning and two each evening for anxiety       There is no refill protocol information for this order

## 2021-06-16 NOTE — TELEPHONE ENCOUNTER
Refill Request  Medication name:   Requested Prescriptions     Pending Prescriptions Disp Refills     lisinopriL (PRINIVIL,ZESTRIL) 40 MG tablet 90 tablet 1     Sig: Take one pill daily for high blood pressure     QUEtiapine (SEROQUEL) 25 MG tablet 90 tablet 0     Sig: Take one tablet each morning and two each evening for anxiety     Who prescribed the medication: Haroon Farmer MD  Last refill on medication: 03/05/2021  Requested Pharmacy: [facility]  Last appointment with PCP: 02/03/2021  Next appointment: Not due

## 2021-06-17 NOTE — TELEPHONE ENCOUNTER
RN cannot approve Refill Request    RN can NOT refill this medication med is not covered by policy/route to provider. Last office visit: 4/9/2021 Haroon Farmer MD Last Physical: Visit date not found Last MTM visit: Visit date not found Last visit same specialty: 4/9/2021 Haroon Farmer MD.  Next visit within 3 mo: Visit date not found  Next physical within 3 mo: Visit date not found      Salina Holliday, [facility] Triage/Med Refill 5/1/2021    Requested Prescriptions   Pending Prescriptions Disp Refills     QUEtiapine (SEROQUEL) 25 MG tablet [Pharmacy Med Name: QUETIAPINE FUMARATE 25 MG TAB] 90 tablet 0     Sig: TAKE ONE TABLET EACH MORNING AND TWO EACH EVENING FOR ANXIETY       There is no refill protocol information for this order

## 2021-06-17 NOTE — TELEPHONE ENCOUNTER
Refill Approved    Rx renewed per Medication Renewal Policy. Medication was last renewed on 4/2/21.    Arturo Jalloh, [facility] Triage/Med Refill 4/30/2021     Requested Prescriptions   Pending Prescriptions Disp Refills     lisinopriL (PRINIVIL,ZESTRIL) 40 MG tablet 30 tablet 0     Sig: Take one pill daily for high blood pressure       Ace Inhibitors Refill Protocol Passed - 4/28/2021 11:09 AM        Passed - PCP or prescribing provider visit in past 12 months       Last office visit with prescriber/PCP: 4/9/2021 Haroon Farmer MD OR same dept: 4/9/2021 Haroon Farmer MD OR same specialty: 4/9/2021 Haroon Farmer MD  Last physical: Visit date not found Last MTM visit: Visit date not found   Next visit within 3 mo: Visit date not found  Next physical within 3 mo: Visit date not found  Prescriber OR PCP: Haroon Farmer MD  Last diagnosis associated with med order: 1. Hypertension, unspecified type  - lisinopriL (PRINIVIL,ZESTRIL) 40 MG tablet; Take one pill daily for high blood pressure  Dispense: 30 tablet; Refill: 0    If protocol passes may refill for 12 months if within 3 months of last provider visit (or a total of 15 months).             Passed - Serum Potassium in past 12 months     Lab Results   Component Value Date    Potassium 4.0 12/16/2020             Passed - Blood pressure filed in past 12 months     BP Readings from Last 1 Encounters:   04/09/21 118/82             Passed - Serum Creatinine in past 12 months     Creatinine   Date Value Ref Range Status   12/16/2020 1.05 0.70 - 1.30 mg/dL Final

## 2021-06-18 ENCOUNTER — COMMUNICATION - HEALTHEAST (OUTPATIENT)
Dept: FAMILY MEDICINE | Facility: CLINIC | Age: 51
End: 2021-06-18

## 2021-06-18 DIAGNOSIS — F41.1 ANXIETY STATE: ICD-10-CM

## 2021-06-18 RX ORDER — QUETIAPINE FUMARATE 25 MG/1
TABLET, FILM COATED ORAL
Qty: 90 TABLET | Refills: 0 | Status: SHIPPED | OUTPATIENT
Start: 2021-06-18 | End: 2021-08-23

## 2021-06-21 NOTE — LETTER
Letter by Haroon Farmer MD at      Author: Haroon Farmer MD Service: -- Author Type: --    Filed:  Encounter Date: 12/18/2020 Status: (Other)         Drew Monsivais  [id number] [place] [id number]             December 18, 2020         Dear Mr. Monsivais,    Below are the results from your recent visit: Sugar is quite high at 300 and 5, also the A1c is extremely elevated at 11.9 which would indicate that your blood sugars are running quite high.  I started you on the Metformin to lower your blood sugar values.  Also your triglycerides were extremely high at over 1000, we need to start you on a medication right away to help lower this, is call [facility] I faxed this into your pharmacy as well.    Resulted Orders   Basic Metabolic Panel   Result Value Ref Range    Sodium 134 (L) 136 - 145 mmol/L    Potassium 4.0 3.5 - 5.0 mmol/L    Chloride 94 (L) 98 - 107 mmol/L    CO2 26 22 - 31 mmol/L    Anion Gap, Calculation 14 5 - 18 mmol/L    Glucose 345 (H) 70 - 125 mg/dL    Calcium 9.8 8.5 - 10.5 mg/dL    BUN 10 8 - 22 mg/dL    Creatinine 1.05 0.70 - 1.30 mg/dL    GFR MDRD Af Amer >60 >60 mL/min/1.73m2    GFR MDRD Non Af Amer >60 >60 mL/min/1.73m2    Narrative    Fasting Glucose reference range is 70-99 mg/dL per  American Diabetes Association (ADA) guidelines.   Glycosylated Hemoglobin A1c   Result Value Ref Range    Hemoglobin A1c 11.9 (H) <=5.6 %   Lipid Cascade   Result Value Ref Range    Cholesterol 257 (H) <=199 mg/dL    Triglycerides 1,063 (H) <=149 mg/dL    HDL Cholesterol 42 >=40 mg/dL    LDL Calculated        Comment:      Invalid, Triglycerides >400    Patient Fasting > 8hrs? Yes    Custom LDL Cholesterol, Direct   Result Value Ref Range    Direct  <=129 mg/dl            Please call with questions or contact us using [employer].    Sincerely,        Electronically signed by Haroon Farmer MD

## 2021-06-21 NOTE — LETTER
Letter by Haroon Farmer MD at      Author: Haroon Farmer MD Service: -- Author Type: --    Filed:  Encounter Date: 1/8/2021 Status: (Other)         Drew Monsivais  [address] [place]  [facility] [id number]             January 8, 2021         Dear Mr. Monsivais,    Below are the results from your recent visit: As discussed, the stress test on your heart is completely normal no evidence of any heart disease or heart problems.    Resulted Orders   Echo Stress Exercise   Result Value Ref Range    Pharmacologic Protocol  Stress Echo     Test Type Treadmill     Baseline HR 94     Baseline Systolic      Baseline Diastolic      Last Stress      Last Stress Systolic      Last Stress Diastolic BP 92     Target      PERCENT HR 85%     Exercise duration (min) 7     Exercise duration (sec) 15     Estimated workload 8.7     ST Deviation Elevation II 1.1mm     Deviation Time V3 -0.6mm     ST Elevation Amount V3 2.6mm     ST Deviation Amount he aVR -1.7mm     Final Resting /92     Final Resting      Max Treadmill Speed 3.4     Max Treadmill Grade 14.0     Peak Systolic /78     Peak Diastolic /95     Max      Stress Phase Resting     Stress Resting Pt Position STANDING     Current HR 94     Current /104     Stress Phase Resting     Stress Resting Pt Position MANUAL EVENT     Current      Current /92     Stress Phase Stress     Stage Minute EXE 00:00     Exercise Stage STAGE 1     Current      Current /102     Stress Phase Stress     Stage Minute EXE 01:00     Exercise Stage STAGE 1     Current      Current /102     Stress Phase Stress     Stage Minute EXE 01:52     Exercise Stage STAGE 1     Current      Current /92     Stress Phase Stress     Stage Minute EXE 02:00     Exercise Stage STAGE 1     Current      Current /92     Stress Phase Stress     Stage Minute EXE 02:06     Exercise Stage  STAGE 1     Current      Current /92     Stress Phase Stress     Stage Minute EXE 03:00     Exercise Stage STAGE 2     Current      Current /92     Stress Phase Stress     Stage Minute EXE 04:00     Exercise Stage STAGE 2     Current      Current /92     Stress Phase Stress     Stage Minute EXE 04:59     Exercise Stage STAGE 2     Current      Current /92     Stress Phase Stress     Stage Minute EXE 05:00     Exercise Stage STAGE 2     Current      Current /92     Stress Phase Stress     Stage Minute EXE 06:00     Exercise Stage STAGE 3     Current      Current /92     Stress Phase Stress     Stage Minute EXE 06:27     Exercise Stage STAGE 3     Current      Current /92     Stress Phase Stress     Stage Minute EXE 07:00     Exercise Stage STAGE 3     Current      Current /92     Stress Phase Stress     Stage Minute EXE 07:15     Exercise Stage STAGE 3     Current      Current /92     Stress Phase Recovery     Stage Minute REC 00:00     Exercise Stage Recovery     Current      Current /92     Stress Phase Recovery     Stage Minute REC 00:44     Exercise Stage Recovery     Current      Current /92     Stress Phase Recovery     Stage Minute REC 01:12     Exercise Stage Recovery     Current      Current /78     Stress Phase Recovery     Stage Minute REC 01:36     Exercise Stage Recovery     Current      Current /90     Stress Phase Recovery     Stage Minute REC 01:44     Exercise Stage Recovery     Current      Current /90     Stress Phase Recovery     Stage Minute REC 02:44     Exercise Stage Recovery     Current      Current /90     Stress Phase Recovery     Stage Minute REC 03:38     Exercise Stage Recovery     Current      Current /95     Stress Phase Recovery     Stage Minute REC 03:44     Exercise Stage Recovery     Current       Current /95     Stress Phase Recovery     Stage Minute REC 04:44     Exercise Stage Recovery     Current      Current /95     Stress Phase Recovery     Stage Minute REC 05:00     Exercise Stage Recovery     Current      Current /92     Stress Phase Recovery     Stage Minute REC 05:44     Exercise Stage Recovery     Current      Current /92     Stress Phase Recovery     Stage Minute REC 06:04     Exercise Stage Recovery     Current      Current /92     Calculated Percent HR 87 %    Rate Pressure Product 23,944.0     Echo LVEF Estimated 60 %    Narrative      The stress echocardiogram is negative for inducible ischemia.    The stress electrocardiogram is negative for inducible ischemic EKG   changes.    The estimated left ventricular ejection fraction is 60%.    The patient's exercise tolerance is mildly impaired for age.    No significant valvular abnormalities are noted on screening Doppler   exam.    No previous study for comparison.               Please call with questions or contact us using [employer].    Sincerely,        Electronically signed by Haroon Farmer MD

## 2021-06-25 NOTE — TELEPHONE ENCOUNTER
RN cannot approve Refill Request    RN can NOT refill this medication PCP messaged that patient is overdue for Labs. Last office visit: 4/9/2021 Haroon Farmer MD Last Physical: Visit date not found Last MTM visit: Visit date not found Last visit same specialty: 4/9/2021 Haroon Farmer MD.  Next visit within 3 mo: Visit date not found  Next physical within 3 mo: Visit date not found      Maki Best, [facility] Triage/Med Refill 6/14/2021    Requested Prescriptions   Pending Prescriptions Disp Refills     fenofibrate (TRICOR) 145 MG tablet [Pharmacy Med Name: FENOFIBRATE 145 MG TABLET] 90 tablet 1     Sig: TAKE 1 TABLET BY MOUTH EVERY DAY       Fenofibrate Refill Protocol Failed - 6/14/2021 12:19 AM        Failed - Fasting lipid cascade in last 12 months     Cholesterol   Date Value Ref Range Status   12/16/2020 257 (H) <=199 mg/dL Final     Triglycerides   Date Value Ref Range Status   12/16/2020 1,063 (H) <=149 mg/dL Final     HDL Cholesterol   Date Value Ref Range Status   12/16/2020 42 >=40 mg/dL Final     LDL Calculated   Date Value Ref Range Status   12/16/2020   Final     Comment:     Invalid, Triglycerides >400     Patient Fasting > 8hrs?   Date Value Ref Range Status   12/16/2020 Yes  Final             Failed - AST or ALT in last 12 months     No results found for: AST, ALT            Passed - Renal status in last 6 months     Creatinine   Date Value Ref Range Status   12/16/2020 1.05 0.70 - 1.30 mg/dL Final             Passed - PCP or prescribing provider visit in past 12 months       Last office visit with prescriber/PCP: 4/9/2021 Haroon Farmer MD OR same dept: 4/9/2021 Haroon Farmer MD OR same specialty: 4/9/2021 Haroon Farmer MD  Last physical: Visit date not found Last MTM visit: Visit date not found   Next visit within 3 mo: Visit date not found  Next physical within 3 mo: Visit date not found  Prescriber OR PCP: Haroon Farmer MD  Last diagnosis associated with med  order: 1. Dyslipidemia  - fenofibrate (TRICOR) 145 MG tablet [Pharmacy Med Name: FENOFIBRATE 145 MG TABLET]; TAKE 1 TABLET BY MOUTH EVERY DAY  Dispense: 90 tablet; Refill: 1    If protocol passes may refill for 12 months if within 3 months of last provider visit (or a total of 15 months).

## 2021-06-26 ENCOUNTER — HEALTH MAINTENANCE LETTER (OUTPATIENT)
Age: 51
End: 2021-06-26

## 2021-06-26 NOTE — TELEPHONE ENCOUNTER
RN cannot approve Refill Request    RN can NOT refill this medication med is not covered by policy/route to provider. Last office visit: 4/9/2021 Haroon Farmer MD Last Physical: Visit date not found Last MTM visit: Visit date not found Last visit same specialty: 4/9/2021 Haroon Farmer MD.  Next visit within 3 mo: Visit date not found  Next physical within 3 mo: Visit date not found      Lynn Sandoval, [facility] Triage/Med Refill 6/18/2021    Requested Prescriptions   Pending Prescriptions Disp Refills     QUEtiapine (SEROQUEL) 25 MG tablet [Pharmacy Med Name: QUETIAPINE FUMARATE 25 MG TAB] 90 tablet 0     Sig: TAKE ONE TABLET EACH MORNING AND TWO EACH EVENING FOR ANXIETY       There is no refill protocol information for this order

## 2021-07-06 ENCOUNTER — COMMUNICATION - HEALTHEAST (OUTPATIENT)
Dept: FAMILY MEDICINE | Facility: CLINIC | Age: 51
End: 2021-07-06

## 2021-07-06 DIAGNOSIS — F41.1 ANXIETY STATE: ICD-10-CM

## 2021-08-13 ENCOUNTER — OFFICE VISIT (OUTPATIENT)
Dept: FAMILY MEDICINE | Facility: CLINIC | Age: 51
End: 2021-08-13

## 2021-08-13 ENCOUNTER — ANCILLARY PROCEDURE (OUTPATIENT)
Dept: GENERAL RADIOLOGY | Facility: CLINIC | Age: 51
End: 2021-08-13
Attending: FAMILY MEDICINE

## 2021-08-13 VITALS
OXYGEN SATURATION: 96 % | BODY MASS INDEX: 24.74 KG/M2 | WEIGHT: 162.7 LBS | SYSTOLIC BLOOD PRESSURE: 118 MMHG | HEART RATE: 80 BPM | DIASTOLIC BLOOD PRESSURE: 80 MMHG

## 2021-08-13 DIAGNOSIS — R53.83 OTHER FATIGUE: ICD-10-CM

## 2021-08-13 DIAGNOSIS — F41.1 ANXIETY STATE: ICD-10-CM

## 2021-08-13 DIAGNOSIS — R10.84 ABDOMINAL PAIN, GENERALIZED: Primary | ICD-10-CM

## 2021-08-13 DIAGNOSIS — E11.69 TYPE 2 DIABETES MELLITUS WITH OTHER SPECIFIED COMPLICATION, WITHOUT LONG-TERM CURRENT USE OF INSULIN (H): ICD-10-CM

## 2021-08-13 DIAGNOSIS — R10.84 ABDOMINAL PAIN, GENERALIZED: ICD-10-CM

## 2021-08-13 LAB
ANION GAP SERPL CALCULATED.3IONS-SCNC: 11 MMOL/L (ref 5–18)
BUN SERPL-MCNC: 10 MG/DL (ref 8–22)
CALCIUM SERPL-MCNC: 10.3 MG/DL (ref 8.5–10.5)
CHLORIDE BLD-SCNC: 95 MMOL/L (ref 98–107)
CO2 SERPL-SCNC: 27 MMOL/L (ref 22–31)
CREAT SERPL-MCNC: 1.22 MG/DL (ref 0.7–1.3)
ERYTHROCYTE [DISTWIDTH] IN BLOOD BY AUTOMATED COUNT: 11.7 % (ref 10–15)
GFR SERPL CREATININE-BSD FRML MDRD: 67 ML/MIN/1.73M2
GLUCOSE BLD-MCNC: 294 MG/DL (ref 70–125)
HBA1C MFR BLD: 11.3 % (ref 0–5.6)
HCT VFR BLD AUTO: 45.3 % (ref 40–53)
HGB BLD-MCNC: 16.3 G/DL (ref 13.3–17.7)
MCH RBC QN AUTO: 28.1 PG (ref 26.5–33)
MCHC RBC AUTO-ENTMCNC: 36 G/DL (ref 31.5–36.5)
MCV RBC AUTO: 78 FL (ref 78–100)
PLATELET # BLD AUTO: 262 10E3/UL (ref 150–450)
POTASSIUM BLD-SCNC: 5.2 MMOL/L (ref 3.5–5)
RBC # BLD AUTO: 5.81 10E6/UL (ref 4.4–5.9)
SODIUM SERPL-SCNC: 133 MMOL/L (ref 136–145)
WBC # BLD AUTO: 7 10E3/UL (ref 4–11)

## 2021-08-13 PROCEDURE — 74019 RADEX ABDOMEN 2 VIEWS: CPT | Mod: TC | Performed by: RADIOLOGY

## 2021-08-13 PROCEDURE — 99214 OFFICE O/P EST MOD 30 MIN: CPT | Performed by: FAMILY MEDICINE

## 2021-08-13 PROCEDURE — 36415 COLL VENOUS BLD VENIPUNCTURE: CPT | Performed by: FAMILY MEDICINE

## 2021-08-13 PROCEDURE — 80048 BASIC METABOLIC PNL TOTAL CA: CPT | Performed by: FAMILY MEDICINE

## 2021-08-13 PROCEDURE — U0003 INFECTIOUS AGENT DETECTION BY NUCLEIC ACID (DNA OR RNA); SEVERE ACUTE RESPIRATORY SYNDROME CORONAVIRUS 2 (SARS-COV-2) (CORONAVIRUS DISEASE [COVID-19]), AMPLIFIED PROBE TECHNIQUE, MAKING USE OF HIGH THROUGHPUT TECHNOLOGIES AS DESCRIBED BY CMS-2020-01-R: HCPCS | Performed by: FAMILY MEDICINE

## 2021-08-13 PROCEDURE — 85027 COMPLETE CBC AUTOMATED: CPT | Performed by: FAMILY MEDICINE

## 2021-08-13 PROCEDURE — 83036 HEMOGLOBIN GLYCOSYLATED A1C: CPT | Performed by: FAMILY MEDICINE

## 2021-08-13 PROCEDURE — U0005 INFEC AGEN DETEC AMPLI PROBE: HCPCS | Performed by: FAMILY MEDICINE

## 2021-08-13 ASSESSMENT — ENCOUNTER SYMPTOMS
ABDOMINAL PAIN: 1
ABDOMINAL DISTENTION: 1
UNEXPECTED WEIGHT CHANGE: 0
FEVER: 0
HEMATOCHEZIA: 1
APPETITE CHANGE: 1
DIZZINESS: 1
CHEST TIGHTNESS: 0
FATIGUE: 1
COUGH: 1

## 2021-08-13 NOTE — PROGRESS NOTES
"    Assessment & Plan     Abdominal pain, generalized  - omeprazole (PRILOSEC) 20 MG DR capsule  Dispense: 14 capsule; Refill: 0  - magnesium citrate solution  Dispense: 296 mL; Refill: 0  - XR Abdomen 2 Views    Type 2 diabetes mellitus with other specified complication, without long-term current use of insulin (H)  - Hemoglobin A1c  - Care Management / Social Work IP Consult  - Basic metabolic panel  (Ca, Cl, CO2, Creat, Gluc, K, Na, BUN)    Anxiety state  - Care Management / Social Work IP Consult    Other fatigue  - Asymptomatic COVID-19 Virus (Coronavirus) by PCR  - CBC with platelets    I discussed with him that I do not think that he has a bleeding ulcer.  But I think that he has some constipation and that is causing the nausea and vomiting as well as having acid reflux or dyspepsia.  Patient that I have also started him on medication and will want to get labs drawn.  I will want to check his A1c which he is complaining of not having enough money to get his medications.  Discussed anxiety as well and he does not want to go back to the Seroquel that he has in the past wanting to try using over-the-counter medications which he noted is not very helpful.  I did not send in omeprazole that he will take 30 minutes before eating every day for 2 weeks and follow-up with his primary care provider to see you how he is doing.  I also did give him magnesium citrate that he can take to see if that will help him with having better bowel movement.     BMI:   Estimated body mass index is 24.74 kg/m  as calculated from the following:    Height as of 11/25/20: 1.727 m (5' 8\").    Weight as of this encounter: 73.8 kg (162 lb 11.2 oz).         No follow-ups on file.    Antonio Payne MD  [facility]    Roderick Russell is a 51 year old who presents for the following health issues     HPI   51 years old gentleman who comes in today with concerns that included abdominal pain and noted in " the supraumbilical region with episode of nausea and vomiting.  Noted some dizziness as well as abdominal cramps and fatigue.  Noted that vomiting was copious and he had some possible streaks of blood.  Noted burning pain around the epigastric region intermittently.  Feels bloated and having a lot of gas.  He had vomited at work and had told him to go home and get checked out.  He also does have a history of diabetes mellitus and has not been seen for diabetes since this year.  Last A1c was in December and that was about 11%.  He noted having difficulty sleeping and has been using Seroquel which he noted he is unable to get and because of that he help to wean himself off of the medicine and does not want to get back on it again.  And noted that he is not sleeping very well.  Thinks that he has also bleeding.  He does note intermittent constipation not going to the bathroom for about a week or more but had gone to the bathroom today with very small bowel movement.    Family History   Problem Relation Age of Onset     Cerebrovascular Disease Mother      Aneurysm Father       Social History     Socioeconomic History     Marital status:      Spouse name: Not on file     Number of children: 1     Years of education: Not on file     Highest education level: Not on file   Occupational History     Not on file   Tobacco Use     Smoking status: Former Smoker     Types: Cigarettes, Cigarettes     Smokeless tobacco: Never Used     Tobacco comment: College   Substance and Sexual Activity     Alcohol use: No     Drug use: Yes     Comment: Drug use: Cannabis     Sexual activity: Not on file   Other Topics Concern     Not on file   Social History Narrative    Diet-    Exercise-work is physical       Social Determinants of Health     Financial Resource Strain:      Difficulty of Paying Living Expenses:    Food Insecurity:      Worried About Running Out of Food in the Last Year:      Ran Out of Food in the Last Year:     Transportation Needs:      Lack of Transportation (Medical):      Lack of Transportation (Non-Medical):    Physical Activity:      Days of Exercise per Week:      Minutes of Exercise per Session:    Stress:      Feeling of Stress :    Social Connections:      Frequency of Communication with Friends and Family:      Frequency of Social Gatherings with Friends and Family:      Attends Rastafarian Services:      Active Member of Clubs or Organizations:      Attends Club or Organization Meetings:      Marital Status:    Intimate Partner Violence:      Fear of Current or Ex-Partner:      Emotionally Abused:      Physically Abused:      Sexually Abused:       No past surgical history on file.   Past Medical History:   Diagnosis Date     Calculus of kidney     at least 4 episodes most recent 2012 once surgically removed Stones present both kidneys       Closed fracture of metatarsal bone(s)           Headache     Frontal-?migraine Triggers: no obvious,  excedrin light senstive Chiro         Review of Systems   Constitutional: Positive for appetite change and fatigue. Negative for fever and unexpected weight change.   HENT: Negative.    Respiratory: Positive for cough. Negative for chest tightness.    Gastrointestinal: Positive for abdominal distention, abdominal pain and hematochezia.   Genitourinary: Negative.    Neurological: Positive for dizziness.            Objective    /80 (BP Location: Left arm, Patient Position: Sitting, Cuff Size: Adult Regular)   Pulse 80   Wt 73.8 kg (162 lb 11.2 oz)   SpO2 96%   BMI 24.74 kg/m    Body mass index is 24.74 kg/m .  Physical Exam  Constitutional:       Appearance: Normal appearance. He is normal weight.   Cardiovascular:      Rate and Rhythm: Normal rate and regular rhythm.      Pulses: Normal pulses.   Pulmonary:      Effort: Pulmonary effort is normal.      Breath sounds: Normal breath sounds.   Abdominal:      Tenderness: There is abdominal tenderness.      Comments:  He does have some mild tenderness noted on the left lower abdominal quadrant.  No rebound.  There is also mild right generalized tenderness.   Neurological:      Mental Status: He is alert.   Psychiatric:      Comments: His mood does appear normal and thought content as well but it appears to be moving from one topic to the other.

## 2021-08-14 LAB — SARS-COV-2 RNA RESP QL NAA+PROBE: NEGATIVE

## 2021-08-23 ENCOUNTER — OFFICE VISIT (OUTPATIENT)
Dept: FAMILY MEDICINE | Facility: CLINIC | Age: 51
End: 2021-08-23

## 2021-08-23 VITALS
DIASTOLIC BLOOD PRESSURE: 62 MMHG | WEIGHT: 160.38 LBS | HEART RATE: 94 BPM | OXYGEN SATURATION: 96 % | SYSTOLIC BLOOD PRESSURE: 100 MMHG | BODY MASS INDEX: 24.38 KG/M2

## 2021-08-23 DIAGNOSIS — E11.69 TYPE 2 DIABETES MELLITUS WITH OTHER SPECIFIED COMPLICATION, WITHOUT LONG-TERM CURRENT USE OF INSULIN (H): Primary | ICD-10-CM

## 2021-08-23 DIAGNOSIS — K59.00 CONSTIPATION, UNSPECIFIED CONSTIPATION TYPE: ICD-10-CM

## 2021-08-23 DIAGNOSIS — F41.1 ANXIETY STATE: ICD-10-CM

## 2021-08-23 DIAGNOSIS — E78.5 DYSLIPIDEMIA: ICD-10-CM

## 2021-08-23 PROCEDURE — 99214 OFFICE O/P EST MOD 30 MIN: CPT | Performed by: FAMILY MEDICINE

## 2021-08-23 RX ORDER — QUETIAPINE FUMARATE 50 MG/1
TABLET, FILM COATED ORAL
Qty: 90 TABLET | Refills: 0 | Status: SHIPPED | OUTPATIENT
Start: 2021-08-23 | End: 2021-11-09

## 2021-08-23 NOTE — PROGRESS NOTES
ASSESSMENT:  (E11.69) Type 2 diabetes mellitus (H)  (primary encounter diagnosis)  Comment: Poorly controlled A1c at 11.3   Plan: metFORMIN (GLUCOPHAGE) 500 MG tablet             (F41.1) Anxiety  Comment: Chronic and longstanding Seroquel has been the most helpful of all of his medications  Plan: QUEtiapine (SEROQUEL) 50 MG tablet             (K59.00) Constipation  Comment: Unclear whether this is related to withdrawal from Seroquel functional or other causes.  Plan:      (E78.5) Dyslipidemia  Comment: Patient with a history of significant elevation of triglycerides on TriCor   Plan:          PLAN:  1.  Increase Metformin from 500 mg twice daily to 1000 mg twice daily, of note he will use up his existing prescription.  2.  Reinstitution of Seroquel 50 mg nightly  3.  Patient would benefit from psychiatry referral most likely, however given that he has no health insurance I do not think a referral is affordable at this time.  4.  Patient is also going to try to make some improvements in diet, and he should be seen again in December.    No orders of the defined types were placed in this encounter.    Medications Discontinued During This Encounter   Medication Reason     QUEtiapine (SEROQUEL) 25 MG tablet Medication Reconciliation Clean Up     naproxen sodium (ALEVE) 220 MG tablet Medication Reconciliation Clean Up     metFORMIN (GLUCOPHAGE) 500 MG tablet        No follow-ups on file.    CHIEF COMPLAINT:  chief complaint    SUBJECTIVE:  Drew is a 51 year old male who comes in with a variety of concerns.  Patient does not have health insurance this is a major impediment to good medical care, of note I referred him to a psychiatrist however patient reports that he is behind on his medical bills as is, and thinks probably correctly that any psychiatric visit would be quite expensive and therefore unaffordable.    He has this history of severe underlying anxiety, of all the medications that he has been tried on Seroquel  has been the most helpful however it is also expensive he has been out of it for about a month but is the only thing that works, after discussion we will refax that in.    Patient had trouble affording other medications as well including his Metformin he was off of that for a month perhaps slightly longer he was seen by another provider on August 13 his A1c was 11.3 though he does admit he is made some improvements just recently to his diet, he is on 500 mg twice a day, I told the patient he needs to go up on this dose.    Patient also has been constipated for perhaps a month he thinks this is related in part to going off of the Seroquel, he has been taking MiraLAX a tablespoon a day does not seem to be making any difference I told the patient he can go up the dose of this.    Patient also mentions that he has not had an erection in a year or so though he is not currently dating.      REVIEW OF SYSTEMS:      All other systems are negative.    PFSH:  Immunization History   Administered Date(s) Administered     COVID-19,PF,Pfizer 03/25/2021, 04/15/2021     FLU 6-35 months 11/06/2010, 02/07/2012     Flu, Unspecified 10/25/2008, 10/25/2016     Influenza (IIV3) PF 10/25/2008     Influenza Quad, Recombinant, p-free (RIV4) 12/16/2020     Tdap (Adacel,Boostrix) 08/26/2009, 12/16/2020     Social History     Socioeconomic History     Marital status:      Spouse name: Not on file     Number of children: 1     Years of education: Not on file     Highest education level: Not on file   Occupational History     Occupation: Unload trucks   Tobacco Use     Smoking status: Former Smoker     Types: Cigarettes, Cigarettes     Smokeless tobacco: Never Used     Tobacco comment: College   Substance and Sexual Activity     Alcohol use: No     Drug use: Yes     Comment: Drug use: Cannabis     Sexual activity: Yes     Partners: Female   Other Topics Concern     Not on file   Social History Narrative    Diet-            Exercise-work  is physical       Social Determinants of Health     Financial Resource Strain:      Difficulty of Paying Living Expenses:    Food Insecurity:      Worried About Running Out of Food in the Last Year:      Ran Out of Food in the Last Year:    Transportation Needs:      Lack of Transportation (Medical):      Lack of Transportation (Non-Medical):    Physical Activity:      Days of Exercise per Week:      Minutes of Exercise per Session:    Stress:      Feeling of Stress :    Social Connections:      Frequency of Communication with Friends and Family:      Frequency of Social Gatherings with Friends and Family:      Attends Caodaism Services:      Active Member of Clubs or Organizations:      Attends Club or Organization Meetings:      Marital Status:    Intimate Partner Violence:      Fear of Current or Ex-Partner:      Emotionally Abused:      Physically Abused:      Sexually Abused:      Past Medical History:   Diagnosis Date     Calculus of kidney     at least 4 episodes most recent 2012 once surgically removed Stones present both kidneys       Closed fracture of metatarsal bone(s)           Headache     Frontal-?migraine Triggers: no obvious,  excedrin light senstive Chiro      Family History   Problem Relation Age of Onset     Cerebrovascular Disease Mother      Aneurysm Father        MEDICATIONS:  Current Outpatient Medications   Medication Sig Dispense Refill     lisinopriL (PRINIVIL,ZESTRIL) 40 MG tablet [LISINOPRIL (PRINIVIL,ZESTRIL) 40 MG TABLET] Take one pill daily for high blood pressure 90 tablet 2     metFORMIN (GLUCOPHAGE) 500 MG tablet Take two tablets twice daily 180 tablet 1     omeprazole (PRILOSEC) 20 MG DR capsule Take 1 capsule (20 mg) by mouth daily 14 capsule 0     QUEtiapine (SEROQUEL) 50 MG tablet Take one tablet at night for anxiety 90 tablet 0     fenofibrate (TRICOR) 145 MG tablet [FENOFIBRATE (TRICOR) 145 MG TABLET] TAKE 1 TABLET BY MOUTH EVERY DAY (Patient not taking: Reported on  8/23/2021) 90 tablet 1     QUEtiapine (SEROQUEL) 25 MG tablet [QUETIAPINE (SEROQUEL) 25 MG TABLET] TAKE ONE TABLET EACH MORNING AND TWO EACH EVENING FOR ANXIETY 90 tablet 0       TOBACCO USE:  History   Smoking Status     Former Smoker     Types: Cigarettes, Cigarettes   Smokeless Tobacco     Never Used     Comment: Lewis       VITALS:  Vitals:    08/23/21 1304   BP: 100/62   BP Location: Right arm   Patient Position: Sitting   Cuff Size: Adult Regular   Pulse: 94   SpO2: 96%   Weight: 72.7 kg (160 lb 6 oz)     Wt Readings from Last 3 Encounters:   08/23/21 72.7 kg (160 lb 6 oz)   08/13/21 73.8 kg (162 lb 11.2 oz)   04/09/21 75.7 kg (166 lb 12.8 oz)       PHYSICAL EXAM:  Constitutional:   Reveals a male who appears his stated age.  Vitals: per nursing notes.  Eyes:  EOMs full, PERRL.  Musculoskeletal: No peripheral swelling.  Neuro:  Alert and oriented. Cranial nerves, motor, sensory exams are intact.  No gross focal deficits.  Psychiatric:  Memory intact, mood appropriate.    QUALITY MEASURES:      DATA REVIEWED:

## 2021-09-03 ENCOUNTER — OFFICE VISIT (OUTPATIENT)
Dept: FAMILY MEDICINE | Facility: CLINIC | Age: 51
End: 2021-09-03

## 2021-09-03 VITALS
WEIGHT: 162 LBS | OXYGEN SATURATION: 98 % | DIASTOLIC BLOOD PRESSURE: 97 MMHG | RESPIRATION RATE: 15 BRPM | HEART RATE: 93 BPM | BODY MASS INDEX: 24.63 KG/M2 | TEMPERATURE: 99.4 F | SYSTOLIC BLOOD PRESSURE: 152 MMHG

## 2021-09-03 DIAGNOSIS — I10 BENIGN ESSENTIAL HYPERTENSION: ICD-10-CM

## 2021-09-03 DIAGNOSIS — H61.21 IMPACTED CERUMEN OF RIGHT EAR: Primary | ICD-10-CM

## 2021-09-03 PROCEDURE — 99213 OFFICE O/P EST LOW 20 MIN: CPT | Mod: 25 | Performed by: PHYSICIAN ASSISTANT

## 2021-09-03 PROCEDURE — 69209 REMOVE IMPACTED EAR WAX UNI: CPT | Performed by: PHYSICIAN ASSISTANT

## 2021-09-03 NOTE — PROGRESS NOTES
Assessment & Plan:      Problem List Items Addressed This Visit     None      Visit Diagnoses     Impacted cerumen of right ear    -  Primary    Relevant Orders    Patient care order    Benign essential hypertension            Medical Decision Making  Patient presents with acute onset plugged right ear.  Examination showed cerumen impaction.  Clinical assistant successfully irrigated ear.  Evaluation afterwards showed a normal outer ear canal with some mild mucoid fluid and pressure of the right TM.  No signs of otitis media.  Patient was also found to be slightly hypertensive here today's visit.  Recommend patient follow-up in 1 to 2 weeks for blood pressure recheck.  Provided education materials and discussed proper ear care.  Discussed signs of worsening symptoms and when to follow-up with PCP if no symptom improvement.     Subjective:      Drew Monsivais is a 51 year old male here for evaluation of plugged right ear.  Onset of symptoms was 3 to 4 days ago.  Patient otherwise denies ear pains.  Patient notes that he developed an episode of night sweats and thinks the moisture then causes her to become plugged.  He states he gets these night sweats everywhere 1 to 2 weeks chronically.  Otherwise no fatigue or unintentional weight loss.     The following portions of the patient's history were reviewed and updated as appropriate: allergies, current medications, and problem list.     Review of Systems  Pertinent items are noted in HPI.    Allergies  No Known Allergies    Family History   Problem Relation Age of Onset     Cerebrovascular Disease Mother      Aneurysm Father        Social History     Tobacco Use     Smoking status: Former Smoker     Types: Cigarettes, Cigarettes     Smokeless tobacco: Never Used     Tobacco comment: College   Substance Use Topics     Alcohol use: No        Objective:      BP (!) 152/97   Pulse 93   Temp 99.4  F (37.4  C)   Resp 15   Wt 73.5 kg (162 lb)   SpO2 98%   BMI  24.63 kg/m    General appearance - alert, well appearing, and in no distress and non-toxic  Ears - Right: Unable to visualize TM due to cerumen impaction, external ear otherwise appears normal, no tenderness to tragus or auricle palpation.  Left: TM intact with no fluid, bulging, or erythema, external ear appears normal.

## 2021-09-03 NOTE — PATIENT INSTRUCTIONS
You were seen today for ear wax (also known as cerumen) impaction of the ear(s).    Symptom management:  - If symptoms reoccur, may try over-the-counter mineral oil ear drops  - While showering, use your hand to cup the water and gently pour into the ear for a few seconds before allowing to drain    If no improvement after trial of ear drops, follow-up with your primary care provider.    Cerumen Impaction  The structures of the external ear canal secrete a waxy substance known as cerumen. Excess cerumen can build up in the ear canal, causing a condition known as cerumen impaction. Cerumen impaction can cause ear pain and disrupt the function of the ear.  The rate of cerumen production differs for each individual. In certain individuals, the configuration of the ear canal may decrease his or her ability to naturally remove cerumen.  CAUSES  Cerumen impaction is caused by excessive cerumen production or buildup.  RISK FACTORS    Frequent use of swabs to clean ears.    Having narrow ear canals.    Having eczema.    Being dehydrated.  SIGNS AND SYMPTOMS    Diminished hearing.    Ear drainage.    Ear pain.    Ear itch.  TREATMENT  Treatment may involve:    Over-the-counter or prescription ear drops to soften the cerumen.    Removal of cerumen by a health care provider. This may be done with:    Irrigation with warm water. This is the most common method of removal.    Ear curettes and other instruments.    Surgery. This may be done in severe cases.  HOME CARE INSTRUCTIONS    Take medicines only as directed by your health care provider.    Do not insert objects into the ear with the intent of cleaning the ear.  PREVENTION    Do not insert objects into the ear, even with the intent of cleaning the ear. Removing cerumen as a part of normal hygiene is not necessary, and the use of swabs in the ear canal is not recommended.    Drink enough water to keep your urine clear or pale yellow.    Control your eczema if you have  it.  SEEK MEDICAL CARE IF:    You develop ear pain.    You develop bleeding from the ear.    The cerumen does not clear after you use ear drops as directed.     This information is not intended to replace advice given to you by your health care provider. Make sure you discuss any questions you have with your health care provider.     Document Released: 01/25/2006 Document Revised: 10/06/2015 Document Reviewed: 03/17/2011  [facility]  Patient Information  2015 [employer], [facility].

## 2021-10-16 ENCOUNTER — HEALTH MAINTENANCE LETTER (OUTPATIENT)
Age: 51
End: 2021-10-16

## 2021-11-09 ENCOUNTER — IMMUNIZATION (OUTPATIENT)
Dept: NURSING | Facility: CLINIC | Age: 51
End: 2021-11-09
Payer: OTHER GOVERNMENT

## 2021-11-09 ENCOUNTER — MYC MEDICAL ADVICE (OUTPATIENT)
Dept: FAMILY MEDICINE | Facility: CLINIC | Age: 51
End: 2021-11-09

## 2021-11-09 DIAGNOSIS — F41.1 ANXIETY STATE: ICD-10-CM

## 2021-11-09 PROCEDURE — 91300 PR COVID VAC PFIZER DIL RECON 30 MCG/0.3 ML IM: CPT

## 2021-11-09 PROCEDURE — 0004A PR COVID VAC PFIZER DIL RECON 30 MCG/0.3 ML IM: CPT

## 2021-11-09 RX ORDER — QUETIAPINE FUMARATE 50 MG/1
TABLET, FILM COATED ORAL
Qty: 90 TABLET | Refills: 0 | Status: SHIPPED | OUTPATIENT
Start: 2021-11-09 | End: 2022-02-16

## 2021-12-11 ENCOUNTER — IMMUNIZATION (OUTPATIENT)
Dept: FAMILY MEDICINE | Facility: CLINIC | Age: 51
End: 2021-12-11

## 2021-12-11 ENCOUNTER — HEALTH MAINTENANCE LETTER (OUTPATIENT)
Age: 51
End: 2021-12-11

## 2021-12-11 PROCEDURE — 90682 RIV4 VACC RECOMBINANT DNA IM: CPT

## 2021-12-11 PROCEDURE — 90471 IMMUNIZATION ADMIN: CPT

## 2021-12-20 ENCOUNTER — NURSE TRIAGE (OUTPATIENT)
Dept: NURSING | Facility: CLINIC | Age: 51
End: 2021-12-20

## 2022-01-06 ENCOUNTER — OFFICE VISIT (OUTPATIENT)
Dept: FAMILY MEDICINE | Facility: CLINIC | Age: 52
End: 2022-01-06

## 2022-01-06 VITALS
OXYGEN SATURATION: 99 % | DIASTOLIC BLOOD PRESSURE: 100 MMHG | TEMPERATURE: 98.4 F | SYSTOLIC BLOOD PRESSURE: 130 MMHG | WEIGHT: 156 LBS | BODY MASS INDEX: 23.72 KG/M2 | HEART RATE: 117 BPM

## 2022-01-06 DIAGNOSIS — R11.2 NAUSEA AND VOMITING, INTRACTABILITY OF VOMITING NOT SPECIFIED, UNSPECIFIED VOMITING TYPE: Primary | ICD-10-CM

## 2022-01-06 DIAGNOSIS — I10 HYPERTENSION, UNSPECIFIED TYPE: ICD-10-CM

## 2022-01-06 DIAGNOSIS — E11.69 TYPE 2 DIABETES MELLITUS WITH OTHER SPECIFIED COMPLICATION, WITHOUT LONG-TERM CURRENT USE OF INSULIN (H): ICD-10-CM

## 2022-01-06 LAB
ALBUMIN SERPL-MCNC: 4.6 G/DL (ref 3.5–5)
ALBUMIN UR-MCNC: 30 MG/DL
ALP SERPL-CCNC: 88 U/L (ref 45–120)
ALT SERPL W P-5'-P-CCNC: 18 U/L (ref 0–45)
ANION GAP SERPL CALCULATED.3IONS-SCNC: 10 MMOL/L (ref 5–18)
APPEARANCE UR: CLEAR
AST SERPL W P-5'-P-CCNC: 14 U/L (ref 0–40)
BILIRUB SERPL-MCNC: 0.6 MG/DL (ref 0–1)
BILIRUB UR QL STRIP: NEGATIVE
BUN SERPL-MCNC: 19 MG/DL (ref 8–22)
CALCIUM SERPL-MCNC: 10 MG/DL (ref 8.5–10.5)
CHLORIDE BLD-SCNC: 99 MMOL/L (ref 98–107)
CO2 SERPL-SCNC: 25 MMOL/L (ref 22–31)
COLOR UR AUTO: YELLOW
CREAT SERPL-MCNC: 1.31 MG/DL (ref 0.7–1.3)
ERYTHROCYTE [DISTWIDTH] IN BLOOD BY AUTOMATED COUNT: 11.8 % (ref 10–15)
FLUAV AG SPEC QL IA: NEGATIVE
FLUBV AG SPEC QL IA: NEGATIVE
GFR SERPL CREATININE-BSD FRML MDRD: 65 ML/MIN/1.73M2
GLUCOSE BLD-MCNC: 214 MG/DL (ref 70–125)
GLUCOSE UR STRIP-MCNC: 250 MG/DL
HBA1C MFR BLD: 8.2 % (ref 0–5.6)
HCT VFR BLD AUTO: 46.3 % (ref 40–53)
HGB BLD-MCNC: 16.4 G/DL (ref 13.3–17.7)
HGB UR QL STRIP: ABNORMAL
KETONES UR STRIP-MCNC: NEGATIVE MG/DL
LEUKOCYTE ESTERASE UR QL STRIP: NEGATIVE
MCH RBC QN AUTO: 27.6 PG (ref 26.5–33)
MCHC RBC AUTO-ENTMCNC: 35.4 G/DL (ref 31.5–36.5)
MCV RBC AUTO: 78 FL (ref 78–100)
MUCOUS THREADS #/AREA URNS LPF: PRESENT /LPF
NITRATE UR QL: NEGATIVE
PH UR STRIP: 5.5 [PH] (ref 5–8)
PLATELET # BLD AUTO: 282 10E3/UL (ref 150–450)
POTASSIUM BLD-SCNC: 4.6 MMOL/L (ref 3.5–5)
PROT SERPL-MCNC: 8 G/DL (ref 6–8)
RBC # BLD AUTO: 5.95 10E6/UL (ref 4.4–5.9)
RBC #/AREA URNS AUTO: ABNORMAL /HPF
SARS-COV-2 RNA RESP QL NAA+PROBE: NEGATIVE
SODIUM SERPL-SCNC: 134 MMOL/L (ref 136–145)
SP GR UR STRIP: 1.02 (ref 1–1.03)
UROBILINOGEN UR STRIP-ACNC: 0.2 E.U./DL
WBC # BLD AUTO: 9 10E3/UL (ref 4–11)
WBC #/AREA URNS AUTO: ABNORMAL /HPF

## 2022-01-06 PROCEDURE — 83036 HEMOGLOBIN GLYCOSYLATED A1C: CPT | Performed by: NURSE PRACTITIONER

## 2022-01-06 PROCEDURE — 99214 OFFICE O/P EST MOD 30 MIN: CPT | Performed by: NURSE PRACTITIONER

## 2022-01-06 PROCEDURE — 87804 INFLUENZA ASSAY W/OPTIC: CPT | Performed by: NURSE PRACTITIONER

## 2022-01-06 PROCEDURE — 85027 COMPLETE CBC AUTOMATED: CPT | Performed by: NURSE PRACTITIONER

## 2022-01-06 PROCEDURE — U0005 INFEC AGEN DETEC AMPLI PROBE: HCPCS | Performed by: NURSE PRACTITIONER

## 2022-01-06 PROCEDURE — 36415 COLL VENOUS BLD VENIPUNCTURE: CPT | Performed by: NURSE PRACTITIONER

## 2022-01-06 PROCEDURE — 80053 COMPREHEN METABOLIC PANEL: CPT | Performed by: NURSE PRACTITIONER

## 2022-01-06 PROCEDURE — U0003 INFECTIOUS AGENT DETECTION BY NUCLEIC ACID (DNA OR RNA); SEVERE ACUTE RESPIRATORY SYNDROME CORONAVIRUS 2 (SARS-COV-2) (CORONAVIRUS DISEASE [COVID-19]), AMPLIFIED PROBE TECHNIQUE, MAKING USE OF HIGH THROUGHPUT TECHNOLOGIES AS DESCRIBED BY CMS-2020-01-R: HCPCS | Performed by: NURSE PRACTITIONER

## 2022-01-06 PROCEDURE — 81001 URINALYSIS AUTO W/SCOPE: CPT | Performed by: NURSE PRACTITIONER

## 2022-01-06 RX ORDER — ONDANSETRON 8 MG/1
8 TABLET, FILM COATED ORAL EVERY 8 HOURS PRN
Qty: 30 TABLET | Refills: 1 | Status: SHIPPED | OUTPATIENT
Start: 2022-01-06 | End: 2022-03-09

## 2022-01-06 RX ORDER — AMLODIPINE BESYLATE 5 MG/1
1 TABLET ORAL DAILY
COMMUNITY
Start: 2020-12-01 | End: 2022-02-16

## 2022-01-06 NOTE — PATIENT INSTRUCTIONS
You need to start taking your omeprazole every single day.  It is best if you take this 30 minutes before breakfast.    You need to start taking your Metformin.  Your diabetes is not well controlled.  Improving your diet is only going to improve your diabetes so much; you are dependent on your diabetes medications.    We are going to check a variety of labs today, including influenza and Covid.    My suspicion is that your symptoms are due to a nonspecific viral gastroenteritis superimposed on uncontrolled diabetes as well as medication noncompliance.    I sent in a prescription for Zofran to your pharmacy.  This will help with your nausea.  Take 1 tablet every 8 hours.    Please follow-up with your PCP in 2 weeks to recheck your symptoms after you restart your medications    You need to get health insurance as soon as possible

## 2022-01-06 NOTE — PROGRESS NOTES
Assessment & Plan     Nausea and vomiting  He has chronic nausea but is not taking his omeprazole.  In the meantime, I will send in some Zofran for him.  Suspect he is dealing with a viral gastroenteritis    - Symptomatic; Unknown COVID-19 Virus (Coronavirus) by PCR  - Influenza A & B Antigen - Clinic Collect  - CBC with platelets; Future  - Comprehensive metabolic panel; Future  - Hemoglobin A1c; Future  - UA Macro with Reflex to Micro and Culture - lab collect; Future    Hypertension, unspecified type  His blood pressure is elevated today.  He tells me that he is taking his lisinopril.    Type 2 diabetes mellitus with other specified complication, without long-term current use of insulin (H)  Unfortunately he does not have medical insurance.  It sounds like he is not taking his Metformin.  He is under the impression that he can control his diabetes with diet changes alone.  I explained to him that he is dependent on his diabetes medications.    Patient Instructions   You need to start taking your omeprazole every single day.  It is best if you take this 30 minutes before breakfast.    You need to start taking your Metformin.  Your diabetes is not well controlled.  Improving your diet is only going to improve your diabetes so much; you are dependent on your diabetes medications.    We are going to check a variety of labs today, including influenza and Covid.    My suspicion is that your symptoms are due to a nonspecific viral gastroenteritis superimposed on uncontrolled diabetes as well as medication noncompliance.    I sent in a prescription for Zofran to your pharmacy.  This will help with your nausea.  Take 1 tablet every 8 hours.    Please follow-up with your PCP in 2 weeks to recheck your symptoms after you restart your medications    You need to get health insurance as soon as possible        Prescription drug management  28 minutes spent on the date of the encounter doing chart review, history and exam,  documentation and further activities per the note     See Patient Instructions    Return in about 2 weeks (around 1/20/2022) for Follow up.    Gino Mcneal, CNP  [facility]    Roderick Russell is a 51 year old who presents for the following health issues     HPI     Patient comes to the clinic today because he has been feeling nauseous for the better part of 6 months.    Over the last few days, he has had intermittent night sweats with some vomiting.    Patient does have severe anxiety.  On Seroquel.    History of type 2 diabetes, hemoglobin A1c historically greater than 11.  On Metformin.  Tells me that he is not taking his Metformin and has not for at least the last couple of months.  Was under the impression that he could control his diabetes with diet alone.    No abdominal pain.    Intermittent diarrhea.    Able to eat and drink normally at this time.    No fevers.    Fully vaccinated for COVID-19.  Had his flu shot this year.      Review of Systems   Constitutional, HEENT, cardiovascular, pulmonary, gi and gu systems are negative, except as otherwise noted.      Objective    BP (!) 130/100 (BP Location: Right arm, Patient Position: Sitting, Cuff Size: Adult Regular)   Pulse 117   Temp 98.4  F (36.9  C) (Oral)   Wt 70.8 kg (156 lb)   SpO2 99%   BMI 23.72 kg/m    Body mass index is 23.72 kg/m .  Physical Exam     Anxious appearing.  Heart rate is regular with no murmur rub or gallop.  Lungs are clear to auscultation.  No pain with palpation to all 4 abdominal quadrants.  No cervical adenopathy appreciated.  TMs slightly occluded with cerumen

## 2022-01-08 ENCOUNTER — MYC MEDICAL ADVICE (OUTPATIENT)
Dept: FAMILY MEDICINE | Facility: CLINIC | Age: 52
End: 2022-01-08

## 2022-01-10 ENCOUNTER — VIRTUAL VISIT (OUTPATIENT)
Dept: FAMILY MEDICINE | Facility: CLINIC | Age: 52
End: 2022-01-10

## 2022-01-10 DIAGNOSIS — F41.8 DEPRESSION WITH ANXIETY: ICD-10-CM

## 2022-01-10 DIAGNOSIS — R10.13 DYSPEPSIA: ICD-10-CM

## 2022-01-10 DIAGNOSIS — E11.69 TYPE 2 DIABETES MELLITUS WITH OTHER SPECIFIED COMPLICATION, WITHOUT LONG-TERM CURRENT USE OF INSULIN (H): Primary | ICD-10-CM

## 2022-01-10 PROCEDURE — 99214 OFFICE O/P EST MOD 30 MIN: CPT | Mod: 95 | Performed by: FAMILY MEDICINE

## 2022-01-10 NOTE — PROGRESS NOTES
Drew is a 51 year old who is being evaluated via a billable video visit.      How would you like to obtain your AVS? MyChart  If the video visit is dropped, the invitation should be resent by: Text to cell phone: [phone]  Will anyone else be joining your video visit? No     Video Start Time: 4:33 PM    Assessment & Plan     Type 2 diabetes mellitus with other specified complication, without long-term current use of insulin (H)  A1c at 8.6 so this does actually indicate better control    Depression with anxiety  Longstanding the patient's depression and anxiety have I believe worsened significantly  - sertraline (ZOLOFT) 50 MG tablet; Take half tablet (25mg) daily for mood    Dyspepsia  Acute onset of GI symptoms which I think is likely a manifestation at least in part of some mental health issues as above    PLAN:  1.  Begin the process of weaning off of Seroquel he is now on 50 mg daily, take 25 mg daily, then when he has about 7 pills left to take 25 mg every other day and then discontinue.  2.  Begin sertraline 25 mg daily  3.  Recommend over-the-counter omeprazole daily for 14 days  4.  Follow-up in 1 month which could be a video visit     No follow-ups on file.    Haroon Farmer MD  [facility]   Drew is a 51 year old who presents for the following health issues.  Patient has a history of anxiety though he probably has both depression and anxiety he has been on Seroquel and other medications, he initially thought Seroquel was helpful but does not seem to think it is helpful now.    He thought sertraline in the past made his anxiety worse but he also adds that he probably did not give it enough time to see if it was helpful or effective.  He reports that he is waking up in the morning crying, which I do think is a classic sign of depression.  He has also had a history of clear underlying anxiety as well.    Patient reports that he also was feeling  nauseous and queasy and stomach upset, I think this is likely physical manifestation of some underlying anxiety with depression he was seen by a colleague who was given a nausea pill but it was unaffordable.  I told the patient I would like him to try over-the-counter omeprazole.    Patient has underlying type 2 diabetes mellitus his most recent A1c was 8.6 actually this is an improvement and he is trying to eat better.    Significant limiting factor in the patient's ability to maintain his health is that he does not have any health insurance but he is expecting to get health insurance in the fairly near future.       Review of Systems   Constitutional, HEENT, cardiovascular, pulmonary, gi and gu systems are negative, except as otherwise noted.      Objective           Vitals:  No vitals were obtained today due to virtual visit.    Physical Exam   GENERAL: Healthy, alert and no distress  EYES: Eyes grossly normal to inspection.  No discharge or erythema, or obvious scleral/conjunctival abnormalities.  RESP: No audible wheeze, cough, or visible cyanosis.  No visible retractions or increased work of breathing.    SKIN: Visible skin clear. No significant rash, abnormal pigmentation or lesions.  NEURO: Cranial nerves grossly intact.  Mentation and speech appropriate for age.  PSYCH: Mentation appears normal, affect normal/bright, judgement and insight intact, normal speech and appearance well-groomed.        Video-Visit Details    Type of service:  Video Visit    Video End Time:4:06 PM    Originating Location (pt. Location): Home    Distant Location (provider location):  [facility]     Platform used for Video Visit: Unable to complete video visit

## 2022-02-09 DIAGNOSIS — E11.69 TYPE 2 DIABETES MELLITUS WITH OTHER SPECIFIED COMPLICATION, WITHOUT LONG-TERM CURRENT USE OF INSULIN (H): ICD-10-CM

## 2022-02-16 ENCOUNTER — VIRTUAL VISIT (OUTPATIENT)
Dept: FAMILY MEDICINE | Facility: CLINIC | Age: 52
End: 2022-02-16

## 2022-02-16 DIAGNOSIS — Z11.59 NEED FOR HEPATITIS C SCREENING TEST: ICD-10-CM

## 2022-02-16 DIAGNOSIS — Z12.11 SCREEN FOR COLON CANCER: ICD-10-CM

## 2022-02-16 DIAGNOSIS — Z13.220 SCREENING FOR HYPERLIPIDEMIA: ICD-10-CM

## 2022-02-16 DIAGNOSIS — F41.8 DEPRESSION WITH ANXIETY: Primary | ICD-10-CM

## 2022-02-16 DIAGNOSIS — I10 HYPERTENSION, UNSPECIFIED TYPE: ICD-10-CM

## 2022-02-16 DIAGNOSIS — Z11.4 SCREENING FOR HIV (HUMAN IMMUNODEFICIENCY VIRUS): ICD-10-CM

## 2022-02-16 DIAGNOSIS — G47.00 INSOMNIA, UNSPECIFIED TYPE: ICD-10-CM

## 2022-02-16 PROCEDURE — 96127 BRIEF EMOTIONAL/BEHAV ASSMT: CPT | Mod: 95 | Performed by: FAMILY MEDICINE

## 2022-02-16 PROCEDURE — 99214 OFFICE O/P EST MOD 30 MIN: CPT | Mod: 95 | Performed by: FAMILY MEDICINE

## 2022-02-16 RX ORDER — TRAZODONE HYDROCHLORIDE 50 MG/1
TABLET, FILM COATED ORAL
Qty: 60 TABLET | Refills: 0 | Status: SHIPPED | OUTPATIENT
Start: 2022-02-16 | End: 2022-03-11

## 2022-02-16 RX ORDER — AMLODIPINE BESYLATE 5 MG/1
TABLET ORAL
Qty: 90 TABLET | Refills: 0 | Status: SHIPPED | OUTPATIENT
Start: 2022-02-16 | End: 2022-03-09

## 2022-02-16 ASSESSMENT — PATIENT HEALTH QUESTIONNAIRE - PHQ9: SUM OF ALL RESPONSES TO PHQ QUESTIONS 1-9: 21

## 2022-02-16 NOTE — PROGRESS NOTES
Drew is a 51 year old who is being evaluated via a billable telephone visit.      What phone number would you like to be contacted at? [phone]  How would you like to obtain your AVS? MyChart    Assessment & Plan         Depression with anxiety  Chronic and longstanding has not been optimally controlled  - sertraline (ZOLOFT) 50 MG tablet; Take two pills daily for mood    Insomnia  Presume this is secondary to underlying anxiety  - traZODone (DESYREL) 50 MG tablet; Take one to two pills nightly as needed for sleep    Hypertension  Suboptimal control  - amLODIPine (NORVASC) 5 MG tablet; Take 1 tablet by mouth daily for high blood pressure    PLAN:  1.  Increase sertraline from 50 mg to 100 mg daily  2.  Trazodone 50 to 100 mg at night  3.  Restart amlodipine 5 mg daily  4.  Strongly encourage the patient to look into and obtain health insurance  5.  I discussed with the patient that if he is feeling suicidal he needs to go to the emergency room  6.  Follow-up in 3 to 4 weeks presumably by telephone again   6}     Depression Screening Follow Up    PHQ 2/16/2022   PHQ-9 Total Score 21   Q9: Thoughts of better off dead/self-harm past 2 weeks Nearly every day     Last PHQ-9 2/16/2022   1.  Little interest or pleasure in doing things 3   2.  Feeling down, depressed, or hopeless 3   3.  Trouble falling or staying asleep, or sleeping too much 2   4.  Feeling tired or having little energy 3   5.  Poor appetite or overeating 0   6.  Feeling bad about yourself 3   7.  Trouble concentrating 3   8.  Moving slowly or restless 1   Q9: Thoughts of better off dead/self-harm past 2 weeks 3   PHQ-9 Total Score 21   Difficulty at work, home, or with people Extremely dIfficult          No flowsheet data found.         No follow-ups on file.    Haroon Farmer MD  [facility]   Drew is a 51 year old who presents for the following health issues  The patient has a history of  depression and anxiety, this is been a longstanding issue has been difficult to control.  One of the limiting factors is that the patient has no health insurance and therefore I have been unable to get him into see a psychiatrist or psychiatric nurse practitioner additionally therapy tends to be expensive.    He reports that he is not sleeping well at all, we did wean him off of Seroquel recently started him on sertraline, he reports that he that the Seroquel was helping him to sleep but he did not think it was doing much else for mood, I discussed with the patient that Seroquel is not a good sleep medication I can try him on something more specifically related to sleep but I think we need to go up on the sertraline as well.    Patient has underlying hypertension he reports he is getting some elevated blood pressure values at home such as systolics 160s.    Patient reports that he oftentimes is sweaty he is having what he describes as discomfort in the left chest area this is been going on for some time, I told the patient is not possible for me to evaluate what is going on, he also mentions at times he is suicidal but he does not have any plan or intent, I told the patient that if he feels this way it is always an option to go to the emergency room, he would again be concerned about the cost of any treatment.    I strongly emphasized that I cannot take care of him adequately if he does not have health insurance.    I also cannot fully evaluate any symptoms and certainly not over the phone.       Review of Systems   Constitutional, HEENT, cardiovascular, pulmonary, gi and gu systems are negative, except as otherwise noted.      Objective           Vitals:  No vitals were obtained today due to virtual visit.    Physical Exam   healthy, alert and no distress  PSYCH: Alert and oriented times 3; coherent speech, normal   rate and volume, able to articulate logical thoughts, able   to abstract reason, no tangential  thoughts, no hallucinations   or delusions  His affect is normal  RESP: No cough, no audible wheezing, able to talk in full sentences  Remainder of exam unable to be completed due to telephone visits           Phone call duration: 20 minutes

## 2022-03-09 ENCOUNTER — OFFICE VISIT (OUTPATIENT)
Dept: FAMILY MEDICINE | Facility: CLINIC | Age: 52
End: 2022-03-09

## 2022-03-09 VITALS
OXYGEN SATURATION: 98 % | BODY MASS INDEX: 24.18 KG/M2 | HEART RATE: 78 BPM | DIASTOLIC BLOOD PRESSURE: 82 MMHG | WEIGHT: 159 LBS | SYSTOLIC BLOOD PRESSURE: 140 MMHG

## 2022-03-09 DIAGNOSIS — N52.9 MALE ERECTILE DISORDER: Primary | ICD-10-CM

## 2022-03-09 DIAGNOSIS — F41.0 PANIC ATTACK: ICD-10-CM

## 2022-03-09 DIAGNOSIS — F41.9 ANXIETY: ICD-10-CM

## 2022-03-09 DIAGNOSIS — I10 HYPERTENSION, UNSPECIFIED TYPE: Primary | ICD-10-CM

## 2022-03-09 DIAGNOSIS — R05.9 COUGH: ICD-10-CM

## 2022-03-09 DIAGNOSIS — R61 NIGHT SWEATS: ICD-10-CM

## 2022-03-09 DIAGNOSIS — E11.69 TYPE 2 DIABETES MELLITUS WITH OTHER SPECIFIED COMPLICATION, WITHOUT LONG-TERM CURRENT USE OF INSULIN (H): ICD-10-CM

## 2022-03-09 LAB
ALBUMIN SERPL-MCNC: 4.2 G/DL (ref 3.5–5)
ALP SERPL-CCNC: 84 U/L (ref 45–120)
ALT SERPL W P-5'-P-CCNC: 11 U/L (ref 0–45)
ANION GAP SERPL CALCULATED.3IONS-SCNC: 13 MMOL/L (ref 5–18)
AST SERPL W P-5'-P-CCNC: 13 U/L (ref 0–40)
ATRIAL RATE - MUSE: 75 BPM
BILIRUB SERPL-MCNC: 1.2 MG/DL (ref 0–1)
BUN SERPL-MCNC: 13 MG/DL (ref 8–22)
CALCIUM SERPL-MCNC: 9.8 MG/DL (ref 8.5–10.5)
CHLORIDE BLD-SCNC: 98 MMOL/L (ref 98–107)
CO2 SERPL-SCNC: 25 MMOL/L (ref 22–31)
CREAT SERPL-MCNC: 1.15 MG/DL (ref 0.7–1.3)
DIASTOLIC BLOOD PRESSURE - MUSE: NORMAL MMHG
GFR SERPL CREATININE-BSD FRML MDRD: 76 ML/MIN/1.73M2
GLUCOSE BLD-MCNC: 168 MG/DL (ref 70–125)
INTERPRETATION ECG - MUSE: NORMAL
P AXIS - MUSE: 60 DEGREES
POTASSIUM BLD-SCNC: 4.4 MMOL/L (ref 3.5–5)
PR INTERVAL - MUSE: 178 MS
PROT SERPL-MCNC: 7.6 G/DL (ref 6–8)
QRS DURATION - MUSE: 88 MS
QT - MUSE: 366 MS
QTC - MUSE: 408 MS
R AXIS - MUSE: -77 DEGREES
SODIUM SERPL-SCNC: 136 MMOL/L (ref 136–145)
SYSTOLIC BLOOD PRESSURE - MUSE: NORMAL MMHG
T AXIS - MUSE: 49 DEGREES
TSH SERPL DL<=0.005 MIU/L-ACNC: 0.83 UIU/ML (ref 0.3–5)
VENTRICULAR RATE- MUSE: 75 BPM

## 2022-03-09 PROCEDURE — 84443 ASSAY THYROID STIM HORMONE: CPT | Performed by: FAMILY MEDICINE

## 2022-03-09 PROCEDURE — 93010 ELECTROCARDIOGRAM REPORT: CPT | Performed by: INTERNAL MEDICINE

## 2022-03-09 PROCEDURE — 99214 OFFICE O/P EST MOD 30 MIN: CPT | Performed by: FAMILY MEDICINE

## 2022-03-09 PROCEDURE — 93005 ELECTROCARDIOGRAM TRACING: CPT | Performed by: FAMILY MEDICINE

## 2022-03-09 PROCEDURE — 36415 COLL VENOUS BLD VENIPUNCTURE: CPT | Performed by: FAMILY MEDICINE

## 2022-03-09 PROCEDURE — 80053 COMPREHEN METABOLIC PANEL: CPT | Performed by: FAMILY MEDICINE

## 2022-03-09 RX ORDER — AMLODIPINE BESYLATE 10 MG/1
10 TABLET ORAL DAILY
Qty: 30 TABLET | Refills: 11 | Status: SHIPPED | OUTPATIENT
Start: 2022-03-09 | End: 2022-04-04

## 2022-03-09 RX ORDER — SILDENAFIL CITRATE 20 MG/1
TABLET ORAL
Qty: 30 TABLET | Refills: 0 | Status: SHIPPED | OUTPATIENT
Start: 2022-03-09 | End: 2022-09-12 | Stop reason: ALTCHOICE

## 2022-03-09 RX ORDER — HYDROXYZINE PAMOATE 50 MG/1
50 CAPSULE ORAL 3 TIMES DAILY PRN
Qty: 60 CAPSULE | Refills: 0 | Status: SHIPPED | OUTPATIENT
Start: 2022-03-09 | End: 2022-09-12

## 2022-03-09 NOTE — PATIENT INSTRUCTIONS
Patient Education     Panic Attack  A panic attack is an extreme fear reaction that comes on for no clear reason. There is often a fear that something terrible will happen or that you may die. The attack may last a few minutes up to a few hours. Between attacks, things will seem quite normal. This condition has a psychological cause and can be treated with the help of a therapist or psychiatrist. Medicine can be very helpful for this problem.  Panic attacks usually come on suddenly, reaches a peak within minutes, and includes at least 4 of these symptoms:    Palpitations, pounding heart, or accelerated heart rate    Sweating    Chills or heat sensations    Trembling or shaking    Sensations of shortness of breath or smothering    Feelings of choking    Chest pain or discomfort    Nausea or abdominal distress    Feeling dizzy, unsteady, light-headed, or faint    Numbness or tingling sensations    Fear of dying    Fear of going crazy or of losing control    Feelings of unreality, strangeness, or detachment from the environment  Many of these symptoms can be linked to physical problems, so it is sometimes necessary to rule out conditions like thyroid disorders, heart disease, gastrointestinal problems, and others. They can also start as physical symptoms, but psychologically we may react to them in a fearful way, worsening the way we react and feel.  Home care    Try to find the sources of stress in your life. They may not be obvious. These may include:  ? Daily hassles of life which pile up (traffic jams, missed appointments, car troubles).  ? Major life changes, both good (new baby, job promotion) and bad (loss of job, loss of loved one).  ? Feeling that you have too many responsibilities and can't take care of everything at once.  ? Helplessness: feeling like your problems are too much for you to handle.    Notice how your body reacts to stress. Learn to listen to your body signals so that you can take action before  the stress becomes severe.    Try to be aware of what you were doing before the reaction started; this may give you clues to things that can trigger a reaction. It may be situations in your life, or what you were doing at the time.    When possible, avoid or reduce the cause of stress. Avoid hassles, limit the amount of change that is happening in your life at one time or take a break when you feel overloaded.    Unfortunately, you can't stay away from many stressful situations. So you need to learn how to manage stress better. Many proven methods will reduce your anxiety. These include simple things like exercise, good nutrition, and adequate rest. Also, there are certain techniques that are helpful: relaxation and breathing exercises, visualization, biofeedback, meditation, or simply taking time-out to clear your mind. For more information about this, ask your doctor or go to a local bookstore and review the many books and tapes available on this subject.  Follow-up care  Follow-up with your healthcare provider, or as advised.  Call 911  Call 911 if you:    Have suicidal thoughts, a suicide plan, and the means to carry out the plan    Have serious thoughts of hurting someone else    Have trouble breathing    Are very confused    Feel very drowsy or have trouble awakening    Faint or lose consciousness    Have new chest pain that becomes more severe, lasts longer, or spreads into your shoulder, arm, neck, jaw, or back    Have a very rapid or irregular heartbeat    Have a seizure  When to seek medical advice  Call your healthcare provider right away if any of these occur:    Worsening of your symptoms to the point of feeling out-of-control    Feeling that you may try to harm yourself or another    Can't sleep or eat for 3 days in a row    Increased pain with breathing    Increasing feeling of weakness or dizziness    Cough with dark colored sputum (phlegm) or blood    Fever of 100.4 F (38 C) or higher, or as  directed by your healthcare provider    Swelling, pain, or redness in one leg    Requests by family or friends for you to seek help for your symptoms  Lana last reviewed this educational content on 3/1/2018    0400-2941 [employer]. All rights reserved. This information is not intended as a substitute for professional medical care. Always follow your healthcare professional's instructions.

## 2022-03-09 NOTE — PROGRESS NOTES
Assessment & Plan     (I10) Hypertension, unspecified type  (primary encounter diagnosis)  Comment: 51 year old male with HTN for years and takes medication. bp not well controlled. Reviewed the chart bp 130/100 at 1/6/2022. Will increase amlodipine to 10 mg daily, he is on 5 mg. Continue other medication.   Plan: Comprehensive metabolic panel (BMP + Alb, Alk         Phos, ALT, AST, Total. Bili, TP), EKG 12-lead,         tracing only, amLODIPine (NORVASC) 10 MG tablet        Strongly advise monitor bp at home tid and follow-up in one week.     (E11.69) Type 2 diabetes mellitus with other specified complication, without long-term current use of insulin (H)  Comment: poor control but improved.  He does not check bs at home.  Plan: Comprehensive metabolic panel (BMP + Alb, Alk         Phos, ALT, AST, Total. Bili, TP), EKG 12-lead,         tracing only        Follow-up in one month to recheck a1c. If a1c still high will  Consider add glipizide or injectable trulicity. Advise regular exercise and diet control.    (R61) Night sweats  Comment: for several month he has night sweats. No fever and weight loss. Will follow-up thyroid test.   Plan: TSH with free T4 reflex            (F41.9) Anxiety  Comment: ok  controlled with medication. No side effect  Plan: TSH with free T4 reflex        Continue zoloft.     (R05.9) Cough  Comment: bad cough this am. He state he is 100 % sure he does not have covid. He finished covid vaccine x 3 times. No fever, sob, wheezing  Plan: advise covid test and he declined.     (F41.0) Panic attack  Comment: often panic attack. He state he does not know what trigger it. He was not stress yesterday night but woke up this am with panic attack. He state he is 100 % sure it was panic attack. He had stress echo at 1/2021, CTA at 11/25/2020 and not remarkable and was told his symptoms were due to panic attack.     Plan: hydrOXYzine (VISTARIL) 50 MG capsule        Consider increase the dose of zoloft  to 100 mg if he continue have often panic attack.          Return if symptoms worsen or fail to improve.    Aubree Farah MD  [facility] JASON Russell is a 51 year old who presents for the following health issues     HPI     1) HTN/DM : takes medication as instructed and no side effect. He check bp only when he dose not feel good. This am bp 200/100. He was cold sweat and did not feel good. No cp, sob, headache and vision change. He drove to clinic without problem. He dose not check bs at home.     2) anxiety and panic attack. He takes  zoloft that helps with anxiety but often he has panic attack. He is sure this am he had severe  panic attack with sweat and shaking. Gradually he calm down and felt better now. Denies depression. Denies smoke, drugs and alcohol.     3) night sweats often for several month. No fever, weight loss.     4) he had bad cough morning. No sob and wheezing, fever. He finished covid vaccine x 3.         Review of Systems   Constitutional, HEENT, cardiovascular, pulmonary, gi and gu systems are negative, except as otherwise noted.      Objective    BP (!) 140/82   Pulse 78   Wt 72.1 kg (159 lb)   SpO2 98%   BMI 24.18 kg/m    Body mass index is 24.18 kg/m .  Physical Exam   GENERAL: healthy, alert and no distress     NECK: no adenopathy, no asymmetry, masses, or scars and thyroid normal to palpation  RESP: lungs clear to auscultation - no rales, rhonchi or wheezes  CV: regular rate and rhythm, normal S1 S2, no S3 or S4, no murmur, click or rub, no peripheral edema and peripheral pulses strong  ABDOMEN: soft, nontender, no hepatosplenomegaly, no masses and bowel sounds normal  MS: no gross musculoskeletal defects noted, no edema    EKG - Reviewed and interpreted by me appears normal, NSR, left axis, normal intervals, no acute ST/T changes c/w ischemia, no LVH by voltage criteria,               Answers for HPI/ROS submitted by the patient on 3/9/2022  Do you  check your blood pressure regularly outside of the clinic?: Yes  Are your blood pressures ever more than 140 on the top number (systolic) OR more than 90 on the bottom number (diastolic)? (For example, greater than 140/90): Yes  Are you following a low salt diet?: No  How many servings of fruits and vegetables do you eat daily?: 0-1  On average, how many sweetened beverages do you drink each day (Examples: soda, juice, sweet tea, etc.  Do NOT count diet or artificially sweetened beverages)?: 0  How many minutes a day do you exercise enough to make your heart beat faster?: 9 or less  How many days a week do you exercise enough to make your heart beat faster?: 3 or less  How many days per week do you miss taking your medication?: 0

## 2022-03-10 DIAGNOSIS — F41.8 DEPRESSION WITH ANXIETY: ICD-10-CM

## 2022-03-10 DIAGNOSIS — G47.00 INSOMNIA, UNSPECIFIED TYPE: ICD-10-CM

## 2022-03-10 NOTE — TELEPHONE ENCOUNTER
"Routing refill request to provider for review/approval because:  Provider input to determine length of treatment.     Last Written Prescription Date:  2/16/22  Last Fill Quantity: 60,  # refills: 0   Last office visit provider: 3/9/22       Last Written Prescription Date:  3/7/22   Last Fill Quantity: 30,  # refills: 0   Denied Rx Sertraline, request already responded to by other means.     Requested Prescriptions   Pending Prescriptions Disp Refills     traZODone (DESYREL) 50 MG tablet [Pharmacy Med Name: TRAZODONE 50 MG TABLET] 60 tablet 0     Sig: TAKE ONE TO TWO TAB BY MOUTH NIGHTLY AS NEEDED FOR SLEEP       Serotonin Modulators Passed - 3/10/2022 12:33 PM        Passed - Recent (12 mo) or future (30 days) visit within the authorizing provider's specialty     Patient has had an office visit with the authorizing provider or a provider within the authorizing providers department within the previous 12 mos or has a future within next 30 days. See \"Patient Info\" tab in inbasket, or \"Choose Columns\" in Meds & Orders section of the refill encounter.              Passed - Medication is active on med list        Passed - Patient is age 18 or older           sertraline (ZOLOFT) 50 MG tablet [Pharmacy Med Name: SERTRALINE HCL 50 MG TABLET] 60 tablet      Sig: TAKE 2 TAB BY MOUITH DAILY FOR MOOD       SSRIs Protocol Failed - 3/10/2022 12:33 PM        Failed - PHQ-9 score less than 5 in past 6 months     Please review last PHQ-9 score.           Passed - Medication is active on med list        Passed - Patient is age 18 or older        Passed - Recent (6 mo) or future (30 days) visit within the authorizing provider's specialty     Patient had office visit in the last 6 months or has a visit in the next 30 days with authorizing provider or within the authorizing provider's specialty.  See \"Patient Info\" tab in inbasket, or \"Choose Columns\" in Meds & Orders section of the refill encounter.                 Maki COVARRUBIAS. " COREY Best 03/10/22 3:25 PM

## 2022-03-11 RX ORDER — TRAZODONE HYDROCHLORIDE 50 MG/1
TABLET, FILM COATED ORAL
Qty: 60 TABLET | Refills: 0 | Status: SHIPPED | OUTPATIENT
Start: 2022-03-11 | End: 2022-04-06

## 2022-04-05 DIAGNOSIS — G47.00 INSOMNIA, UNSPECIFIED TYPE: ICD-10-CM

## 2022-04-06 RX ORDER — TRAZODONE HYDROCHLORIDE 50 MG/1
TABLET, FILM COATED ORAL
Qty: 60 TABLET | Refills: 0 | Status: SHIPPED | OUTPATIENT
Start: 2022-04-06 | End: 2022-05-01

## 2022-04-06 NOTE — TELEPHONE ENCOUNTER
"Routing refill request to provider for review/approval because:  Drug interaction warning. Trazodone and sertraline.    Last Written Prescription Date:  3/11/2022  Last Fill Quantity: 60,  # refills: 0   Last office visit provider:  2/16/2022     Requested Prescriptions   Pending Prescriptions Disp Refills     traZODone (DESYREL) 50 MG tablet [Pharmacy Med Name: TRAZODONE 50 MG TABLET] 60 tablet 0     Sig: TAKE 1 TO 2 TABLETS BY MOUTH NIGHTLY AS NEEDED FOR SLEEP       Serotonin Modulators Passed - 4/6/2022 11:01 AM        Passed - Recent (12 mo) or future (30 days) visit within the authorizing provider's specialty     Patient has had an office visit with the authorizing provider or a provider within the authorizing providers department within the previous 12 mos or has a future within next 30 days. See \"Patient Info\" tab in inbasket, or \"Choose Columns\" in Meds & Orders section of the refill encounter.              Passed - Medication is active on med list        Passed - Patient is age 18 or older             Kristy Anders RN 04/06/22 11:04 AM  "

## 2022-04-28 DIAGNOSIS — G47.00 INSOMNIA, UNSPECIFIED TYPE: ICD-10-CM

## 2022-05-01 DIAGNOSIS — F41.8 DEPRESSION WITH ANXIETY: ICD-10-CM

## 2022-05-01 RX ORDER — TRAZODONE HYDROCHLORIDE 50 MG/1
TABLET, FILM COATED ORAL
Qty: 180 TABLET | Refills: 3 | Status: ON HOLD | OUTPATIENT
Start: 2022-05-01 | End: 2023-01-06

## 2022-05-01 NOTE — TELEPHONE ENCOUNTER
"Last Written Prescription Date:  4/6/22  Last Fill Quantity: 60,  # refills: 0   Last office visit provider:  3/9/22     Requested Prescriptions   Pending Prescriptions Disp Refills     traZODone (DESYREL) 50 MG tablet [Pharmacy Med Name: TRAZODONE 50 MG TABLET] 60 tablet 0     Sig: TAKE 1 TO 2 TABLETS BY MOUTH NIGHTLY AS NEEDED FOR SLEEP       Serotonin Modulators Passed - 5/1/2022 10:25 AM        Passed - Recent (12 mo) or future (30 days) visit within the authorizing provider's specialty     Patient has had an office visit with the authorizing provider or a provider within the authorizing providers department within the previous 12 mos or has a future within next 30 days. See \"Patient Info\" tab in inbasket, or \"Choose Columns\" in Meds & Orders section of the refill encounter.              Passed - Medication is active on med list        Passed - Patient is age 18 or older             Arturo Jalloh RN 05/01/22 10:25 AM    "

## 2022-05-04 NOTE — TELEPHONE ENCOUNTER
"Routing refill request to provider for review/approval because:  Labs out of range:  PHQ9    Last Written Prescription Date:  3/7/22  Last Fill Quantity: 30,  # refills: 0   Last office visit provider: 3/9/22     Requested Prescriptions   Pending Prescriptions Disp Refills     sertraline (ZOLOFT) 50 MG tablet [Pharmacy Med Name: SERTRALINE HCL 50 MG TABLET] 30 tablet 0     Sig: TAKE 1/2 TABLET (25MG) BY MOUTH DAILY FOR MOOD       SSRIs Protocol Failed - 5/1/2022  8:31 AM        Failed - PHQ-9 score less than 5 in past 6 months     Please review last PHQ-9 score.           Passed - Medication is active on med list        Passed - Patient is age 18 or older        Passed - Recent (6 mo) or future (30 days) visit within the authorizing provider's specialty     Patient had office visit in the last 6 months or has a visit in the next 30 days with authorizing provider or within the authorizing provider's specialty.  See \"Patient Info\" tab in inbasket, or \"Choose Columns\" in Meds & Orders section of the refill encounter.                 Maki Best RN 05/04/22 10:36 AM    "

## 2022-05-22 ENCOUNTER — HEALTH MAINTENANCE LETTER (OUTPATIENT)
Age: 52
End: 2022-05-22

## 2022-05-23 ENCOUNTER — IMMUNIZATION (OUTPATIENT)
Dept: NURSING | Facility: CLINIC | Age: 52
End: 2022-05-23

## 2022-05-23 PROCEDURE — 91305 COVID-19,PF,PFIZER (12+ YRS): CPT

## 2022-05-23 PROCEDURE — 0054A COVID-19,PF,PFIZER (12+ YRS): CPT

## 2022-05-25 DIAGNOSIS — I10 HYPERTENSION, UNSPECIFIED TYPE: ICD-10-CM

## 2022-05-26 NOTE — TELEPHONE ENCOUNTER
"Routing refill request to provider for review/approval because:  bp out of range    Last Written Prescription Date:  5/1/22  Last Fill Quantity: 30,  # refills: 0   Last office visit provider:  3/9/22     Requested Prescriptions   Pending Prescriptions Disp Refills     lisinopril (ZESTRIL) 40 MG tablet [Pharmacy Med Name: LISINOPRIL 40 MG TABLET] 30 tablet 0     Sig: TAKE 1 TABLET BY MOUTH EVERY DAY FOR HIGH BLOOD PRESSURE       ACE Inhibitors (Including Combos) Protocol Failed - 5/25/2022  1:31 PM        Failed - Blood pressure under 140/90 in past 12 months     BP Readings from Last 3 Encounters:   03/09/22 (!) 140/82   01/06/22 (!) 130/100   09/03/21 (!) 152/97                 Passed - Recent (12 mo) or future (30 days) visit within the authorizing provider's specialty     Patient has had an office visit with the authorizing provider or a provider within the authorizing providers department within the previous 12 mos or has a future within next 30 days. See \"Patient Info\" tab in inbasket, or \"Choose Columns\" in Meds & Orders section of the refill encounter.              Passed - Medication is active on med list        Passed - Patient is age 18 or older        Passed - Normal serum creatinine on file in past 12 months     Recent Labs   Lab Test 03/09/22  1202   CR 1.15       Ok to refill medication if creatinine is low          Passed - Normal serum potassium on file in past 12 months     Recent Labs   Lab Test 03/09/22  1202   POTASSIUM 4.4                amLODIPine (NORVASC) 10 MG tablet [Pharmacy Med Name: AMLODIPINE BESYLATE 10 MG TAB] 30 tablet 0     Sig: TAKE 1 TABLET (10 MG) BY MOUTH DAILY.       Calcium Channel Blockers Protocol  Failed - 5/25/2022  1:31 PM        Failed - Blood pressure under 140/90 in past 12 months     BP Readings from Last 3 Encounters:   03/09/22 (!) 140/82   01/06/22 (!) 130/100   09/03/21 (!) 152/97                 Passed - Recent (12 mo) or future (30 days) visit within the " "authorizing provider's specialty     Patient has had an office visit with the authorizing provider or a provider within the authorizing providers department within the previous 12 mos or has a future within next 30 days. See \"Patient Info\" tab in inbasket, or \"Choose Columns\" in Meds & Orders section of the refill encounter.              Passed - Medication is active on med list        Passed - Patient is age 18 or older        Passed - Normal serum creatinine on file in past 12 months     Recent Labs   Lab Test 03/09/22  1202   CR 1.15       Ok to refill medication if creatinine is low               Asiya Malin, RN 05/26/22 5:50 PM  "

## 2022-05-27 RX ORDER — AMLODIPINE BESYLATE 10 MG/1
10 TABLET ORAL DAILY
Qty: 30 TABLET | Refills: 0 | Status: SHIPPED | OUTPATIENT
Start: 2022-05-27 | End: 2022-06-10

## 2022-05-27 RX ORDER — LISINOPRIL 40 MG/1
TABLET ORAL
Qty: 30 TABLET | Refills: 0 | Status: SHIPPED | OUTPATIENT
Start: 2022-05-27 | End: 2022-06-10

## 2022-05-27 NOTE — TELEPHONE ENCOUNTER
Please call patient.    Looks like this medication was recently changed.  He needs to schedule a follow up appointment with Dr. Farmer.    Bekah Fleming NP

## 2022-06-07 NOTE — PROGRESS NOTES
Assessment & Plan     Drew was seen today for medication check and refills .    Diagnoses and all orders for this visit:    Hypertension, unspecified type, well-controlled on Lisinopril and Amlodipine.       - lisinopril (ZESTRIL) 40 MG tablet; Take 1 tablet (40 mg) by mouth daily  Dispense: 90 tablet; Refill: 0  - amLODIPine (NORVASC) 10 MG tablet; Take 1 tablet (10 mg) by mouth daily  Dispense: 90 tablet; Refill: 0    Type 2 diabetes mellitus with other specified complication, without long-term current use of insulin (H).  Uncontrolled on Metformin, with current A1C 9.1.  Patient started a Medrol Dosepak 2 days ago.    - FOOT EXAM  - Hemoglobin A1c  - Albumin Random Urine Quantitative with Creat Ratio  - glipiZIDE (GLUCOTROL XL) 5 MG 24 hr tablet; Take 1 tablet (5 mg) by mouth daily  Dispense: 90 tablet; Refill: 0    Dyslipidemia, on Tricor.  Patient is not on a statin.    - Lipid Profile (Chol, Trig, HDL, LDL calc)    Depression with anxiety, reasonably well-controlled on Zoloft 50 mg daily.  Patient uses Vistaril sparingly.    Need for hepatitis C screening test    - Hepatitis C Screen Reflex to HCV RNA Quant and Genotype    Discussed risks and benefits of treatment strategies.    Patient Instructions   -Continue Metformin.  -Add Glipizide.  -Follow-up in 3 months.  -Follow-up sooner, as needed.    Return in about 3 months (around 9/9/2022).    -Repeat A1C and BMP at follow up visit.   -Discuss cholesterol results at follow up visit.   -Consider a Care Coordination Consultation.    Addendum 6/10/22:  Patient verified that he is taking Zoloft 50 mg daily.  See the 6/9/2022 refill request for details.  Zoloft was refilled by the patient's primary provider.    Sanam Barlow MD  [facility]   Drew is a 51 year old male, who presents to clinic today for the following health issues:  Chief Complaint   Patient presents with     Medication check and  "refills      History of Present Illness       Hypertension: He presents for follow up of hypertension.  He does not check blood pressure  regularly outside of the clinic. Outside blood pressures have been over 140/90. He follows a low salt diet.      Today's PHQ-9         PHQ-9 Total Score: 4    PHQ-9 Q9 Thoughts of better off dead/self-harm past 2 weeks :   Not at all    How difficult have these problems made it for you to do your work, take care of things at home, or get along with other people: Not difficult at all    Medication Check    The patient is a 51-year-old male, who was last evaluated in clinic 3/9/2022.  Note was reviewed in Epic.    History of hypertension.  Recent blood pressures have been elevated (per patient), likely due to his recent dental pain and procedures.  Amlodipine was increased from 5 to 10 mg daily 3/9/2022, due to his borderline elevated blood pressure.  Patient is taking Amlodipine 10 mg daily and Lisinopril 40 mg daily (\"I'm taking a 10 mg and 40 mg pill.\"), as last refilled 5/27/2022.  Blood pressure is well-controlled in clinic today.  Patient requests refills on his blood pressure medications.  CMP was within normal limits 3/9/2022, with exception of the elevated glucose (168) and Total Bilirubin (1.2).    Last Creatinine:    Recent Labs   Lab Test 03/09/22  1202   CR 1.15     Patient is taking Amoxicillin 500 mg TID (course almost completed) and an unspecified narcotic (likely Norco), post recent dental procedures.  Last dental procedure was 2 days ago, at which time he was prescribed a Medrol Dosepak.  Patient is concerned that his dental health is affecting his overall health.    History of dyslipidemia, on Tricor.  Patient is not on a statin.  Last lipid panel was 12/2020.  Patient is fasting today.    History of diabetes mellitus type 2, on Metformin.  Patient was recommended to have a follow-up A1c in 1 month at the time of his 3/9/2022 visit.  Provider considered Glipizide " "or injectable Trulicity if the patient's A1c remained elevated.  Patient states that he would prefer an oral medication.  Patient does not check his blood sugars.  He does not have a glucometer or medical insurance.  Patient reports some \"healing issues\" related to his recent dental procedures.  Some nausea yesterday, but the patient denies fever, chills, or vomiting.      Hemoglobin A1C (%)   Date Value   01/06/2022 8.2 (H)   08/13/2021 11.3 (H)     History of anxiety and panic attacks, on Zoloft and Trazodone.  Patient was given a prescription for Vistaril at the time of his 3/9/2022 visit, but his Zoloft dose was \"not adjusted\".  Patient states that he does not look at the names of his medications (just their dose and indication), but he thinks that he is taking Zoloft 50 mg daily currently.  Higher doses of Zoloft were considered previously, per chart review.  Patient takes Vistaril approximately every 2 weeks, as needed.  He feels the Vistaril makes him drowsy or \"spacey\".  Patient states he prefers to use \"edibles\" as needed.  TSH was within normal limits 3/9/2022, checked due to his history of night sweats.     PHQ-9 score:    PHQ 6/9/2022   PHQ-9 Total Score 4   Q9: Thoughts of better off dead/self-harm past 2 weeks Not at all     MARIA DOLORES-7 SCORE 6/9/2022   Total Score 0     Patient states he is not able to schedule a colonoscopy at this time (due to lack of medical insurance), but he has rectal pain at times.  Rare rectal bleeding.    ROS:  Calves were painful this morning.  No current chest pain or shortness of breath, but patient has chest pain with anxiety occasionally.  Intermittent paresthesias involving his right hand.  No paresthesias involving his feet.        Objective    /88   Pulse 78   Wt 76.9 kg (169 lb 9.6 oz)   SpO2 99%   BMI 25.79 kg/m    Physical Exam   GENERAL APPEARANCE:  Awake, alert, and in no acute distress.  PSYCHIATRIC: Odd, anxious affect.  Tangential historian.  HEENT:  " Sclera anicteric.  No conjunctivitis.  PERRLA.  Extraocular movements are intact.  Bilateral TM's and canals are within normal limits.  No obvious nasal congestion.  No trismus.  Mucous membranes moist.  Multiple discolored teeth.  NECK:  Spontaneous full range of motion.  No thyromegaly or mass.  1 cm anterior cervical lymphadenopathy noted on the left.  HEART:  Normal S1, S2.  Regular rate and rhythm.  No murmurs, rubs, or gallops.  LUNGS:  No respiratory distress.  No wheezes, rales, or rhonchi.  ABDOMEN:  Not distended.  Soft.  Not tender.  No mass.  EXTREMITIES:  Moves 4 extremities.   No calf erythema, edema, asymmetry, or tenderness.  Distal pulses intact.  NEUROLOGIC:  No facial droop or acute neurologic deficits.  SKIN:  No rash or diaphoresis.  Diabetic foot exam: Normal DP and PT pulses.  No trophic changes.  No ulcerative lesions.  Normal sensory exam per Monofilament testing.    Hemoglobin A1C (%)   Date Value   06/09/2022 9.1 (H)   01/06/2022 8.2 (H)

## 2022-06-09 ENCOUNTER — OFFICE VISIT (OUTPATIENT)
Dept: FAMILY MEDICINE | Facility: CLINIC | Age: 52
End: 2022-06-09

## 2022-06-09 VITALS
DIASTOLIC BLOOD PRESSURE: 88 MMHG | WEIGHT: 169.6 LBS | SYSTOLIC BLOOD PRESSURE: 128 MMHG | OXYGEN SATURATION: 99 % | BODY MASS INDEX: 25.79 KG/M2 | HEART RATE: 78 BPM

## 2022-06-09 DIAGNOSIS — I10 HYPERTENSION, UNSPECIFIED TYPE: Primary | ICD-10-CM

## 2022-06-09 DIAGNOSIS — Z11.59 NEED FOR HEPATITIS C SCREENING TEST: ICD-10-CM

## 2022-06-09 DIAGNOSIS — E11.69 TYPE 2 DIABETES MELLITUS WITH OTHER SPECIFIED COMPLICATION, WITHOUT LONG-TERM CURRENT USE OF INSULIN (H): ICD-10-CM

## 2022-06-09 DIAGNOSIS — E78.5 DYSLIPIDEMIA: ICD-10-CM

## 2022-06-09 DIAGNOSIS — F41.8 DEPRESSION WITH ANXIETY: ICD-10-CM

## 2022-06-09 LAB
CHOLEST SERPL-MCNC: 260 MG/DL
CREAT UR-MCNC: 157 MG/DL
FASTING STATUS PATIENT QL REPORTED: YES
HBA1C MFR BLD: 9.1 % (ref 0–5.6)
HDLC SERPL-MCNC: 41 MG/DL
LDLC SERPL CALC-MCNC: 163 MG/DL
MICROALBUMIN UR-MCNC: 1.97 MG/DL (ref 0–1.99)
MICROALBUMIN/CREAT UR: 12.5 MG/G CR
TRIGL SERPL-MCNC: 278 MG/DL

## 2022-06-09 PROCEDURE — 99214 OFFICE O/P EST MOD 30 MIN: CPT | Performed by: FAMILY MEDICINE

## 2022-06-09 PROCEDURE — 83036 HEMOGLOBIN GLYCOSYLATED A1C: CPT | Performed by: FAMILY MEDICINE

## 2022-06-09 PROCEDURE — 80061 LIPID PANEL: CPT | Performed by: FAMILY MEDICINE

## 2022-06-09 PROCEDURE — 82043 UR ALBUMIN QUANTITATIVE: CPT | Performed by: FAMILY MEDICINE

## 2022-06-09 PROCEDURE — 99207 PR FOOT EXAM NO CHARGE: CPT | Performed by: FAMILY MEDICINE

## 2022-06-09 PROCEDURE — 36415 COLL VENOUS BLD VENIPUNCTURE: CPT | Performed by: FAMILY MEDICINE

## 2022-06-09 PROCEDURE — 86803 HEPATITIS C AB TEST: CPT | Performed by: FAMILY MEDICINE

## 2022-06-09 ASSESSMENT — ANXIETY QUESTIONNAIRES
4. TROUBLE RELAXING: NOT AT ALL
5. BEING SO RESTLESS THAT IT IS HARD TO SIT STILL: NOT AT ALL
1. FEELING NERVOUS, ANXIOUS, OR ON EDGE: NOT AT ALL
2. NOT BEING ABLE TO STOP OR CONTROL WORRYING: NOT AT ALL
1. FEELING NERVOUS, ANXIOUS, OR ON EDGE: NOT AT ALL
2. NOT BEING ABLE TO STOP OR CONTROL WORRYING: NOT AT ALL
GAD7 TOTAL SCORE: 0
3. WORRYING TOO MUCH ABOUT DIFFERENT THINGS: NOT AT ALL
GAD7 TOTAL SCORE: 0
6. BECOMING EASILY ANNOYED OR IRRITABLE: NOT AT ALL
3. WORRYING TOO MUCH ABOUT DIFFERENT THINGS: NOT AT ALL
7. FEELING AFRAID AS IF SOMETHING AWFUL MIGHT HAPPEN: NOT AT ALL
GAD7 TOTAL SCORE: 0
6. BECOMING EASILY ANNOYED OR IRRITABLE: NOT AT ALL
5. BEING SO RESTLESS THAT IT IS HARD TO SIT STILL: NOT AT ALL
IF YOU CHECKED OFF ANY PROBLEMS ON THIS QUESTIONNAIRE, HOW DIFFICULT HAVE THESE PROBLEMS MADE IT FOR YOU TO DO YOUR WORK, TAKE CARE OF THINGS AT HOME, OR GET ALONG WITH OTHER PEOPLE: NOT DIFFICULT AT ALL
4. TROUBLE RELAXING: NOT AT ALL
7. FEELING AFRAID AS IF SOMETHING AWFUL MIGHT HAPPEN: NOT AT ALL
IF YOU CHECKED OFF ANY PROBLEMS ON THIS QUESTIONNAIRE, HOW DIFFICULT HAVE THESE PROBLEMS MADE IT FOR YOU TO DO YOUR WORK, TAKE CARE OF THINGS AT HOME, OR GET ALONG WITH OTHER PEOPLE: NOT DIFFICULT AT ALL

## 2022-06-09 ASSESSMENT — PATIENT HEALTH QUESTIONNAIRE - PHQ9
10. IF YOU CHECKED OFF ANY PROBLEMS, HOW DIFFICULT HAVE THESE PROBLEMS MADE IT FOR YOU TO DO YOUR WORK, TAKE CARE OF THINGS AT HOME, OR GET ALONG WITH OTHER PEOPLE: NOT DIFFICULT AT ALL
SUM OF ALL RESPONSES TO PHQ QUESTIONS 1-9: 4
SUM OF ALL RESPONSES TO PHQ QUESTIONS 1-9: 4

## 2022-06-10 LAB — HCV AB SERPL QL IA: NONREACTIVE

## 2022-06-10 RX ORDER — GLIPIZIDE 5 MG/1
5 TABLET, FILM COATED, EXTENDED RELEASE ORAL DAILY
Qty: 90 TABLET | Refills: 0 | Status: SHIPPED | OUTPATIENT
Start: 2022-06-10 | End: 2022-09-12

## 2022-06-10 RX ORDER — LISINOPRIL 40 MG/1
40 TABLET ORAL DAILY
Qty: 90 TABLET | Refills: 0 | Status: SHIPPED | OUTPATIENT
Start: 2022-06-10 | End: 2022-08-23

## 2022-06-10 RX ORDER — AMLODIPINE BESYLATE 10 MG/1
10 TABLET ORAL DAILY
Qty: 90 TABLET | Refills: 0 | Status: ON HOLD | OUTPATIENT
Start: 2022-06-10 | End: 2022-12-29

## 2022-06-28 DIAGNOSIS — F41.8 DEPRESSION WITH ANXIETY: ICD-10-CM

## 2022-07-17 ENCOUNTER — HEALTH MAINTENANCE LETTER (OUTPATIENT)
Age: 52
End: 2022-07-17

## 2022-08-05 ENCOUNTER — OFFICE VISIT (OUTPATIENT)
Dept: FAMILY MEDICINE | Facility: CLINIC | Age: 52
End: 2022-08-05

## 2022-08-05 VITALS
BODY MASS INDEX: 26.08 KG/M2 | SYSTOLIC BLOOD PRESSURE: 138 MMHG | TEMPERATURE: 98.4 F | WEIGHT: 171.5 LBS | HEART RATE: 68 BPM | DIASTOLIC BLOOD PRESSURE: 80 MMHG

## 2022-08-05 DIAGNOSIS — F41.8 DEPRESSION WITH ANXIETY: ICD-10-CM

## 2022-08-05 DIAGNOSIS — Z87.442 H/O RENAL CALCULI: ICD-10-CM

## 2022-08-05 DIAGNOSIS — E11.00 TYPE 2 DIABETES MELLITUS WITH HYPEROSMOLARITY WITHOUT COMA, WITHOUT LONG-TERM CURRENT USE OF INSULIN (H): Primary | ICD-10-CM

## 2022-08-05 DIAGNOSIS — R10.9 FLANK PAIN: ICD-10-CM

## 2022-08-05 DIAGNOSIS — K62.5 RECTAL BLEEDING: ICD-10-CM

## 2022-08-05 LAB
ALBUMIN UR-MCNC: NEGATIVE MG/DL
APPEARANCE UR: CLEAR
BILIRUB UR QL STRIP: NEGATIVE
COLOR UR AUTO: YELLOW
GLUCOSE UR STRIP-MCNC: >=1000 MG/DL
HGB UR QL STRIP: ABNORMAL
KETONES UR STRIP-MCNC: NEGATIVE MG/DL
LEUKOCYTE ESTERASE UR QL STRIP: NEGATIVE
NITRATE UR QL: NEGATIVE
PH UR STRIP: 6 [PH] (ref 5–8)
RBC #/AREA URNS AUTO: ABNORMAL /HPF
SP GR UR STRIP: 1.02 (ref 1–1.03)
SQUAMOUS #/AREA URNS AUTO: ABNORMAL /LPF
UROBILINOGEN UR STRIP-ACNC: 1 E.U./DL
WBC #/AREA URNS AUTO: ABNORMAL /HPF

## 2022-08-05 PROCEDURE — 81001 URINALYSIS AUTO W/SCOPE: CPT | Performed by: FAMILY MEDICINE

## 2022-08-05 PROCEDURE — 99214 OFFICE O/P EST MOD 30 MIN: CPT | Performed by: FAMILY MEDICINE

## 2022-08-05 RX ORDER — TAMSULOSIN HYDROCHLORIDE 0.4 MG/1
0.4 CAPSULE ORAL DAILY
Qty: 20 CAPSULE | Refills: 1 | Status: SHIPPED | OUTPATIENT
Start: 2022-08-05 | End: 2022-09-26

## 2022-08-05 NOTE — PROGRESS NOTES
Assessment & Plan     Patient presents with:  Urinary Problem: Has history of kidney stone, has had symptoms for 2-3 days.  Denies fever, but does suffer nausea daily, no vomiting.   Rectal Problem: Rectal pain and bleeding that flares up and has had for years; however, lately has been more pronounced.        Drew was seen today for urinary problem and rectal problem.    Diagnoses and all orders for this visit:    Type 2 diabetes mellitus with hyperosmolarity without coma, without long-term current use of insulin (H)  Comments:  taking both metformin and glipizide per patient not following any diabetic diet.  Today urine has more than 1000 mg of sugar  Advised to follow-up with the primary care provider for continued diabetic care  Orders:  -     Hemoglobin A1c; Future    Flank pain  Comments:  pain at suprpubic area and urethra , feel like passing the stone. trace blood in urine , presume stone will start flomax and give tylenol #3 .  As patient has no insurance he like to wait on CT scan and urology referral  Orders:  -     UA macro with reflex to Microscopic and Culture - Clinc Collect  -     Urine Microscopic  -     tamsulosin (FLOMAX) 0.4 MG capsule; Take 1 capsule (0.4 mg) by mouth daily  -     acetaminophen-codeine (TYLENOL #3) 300-30 MG tablet; Take 1 tablet by mouth every 6 hours as needed for severe pain    Rectal bleeding  -Patient not able to do the colonoscopy as does not have enough money to do it.  As he is self-pay  External hemorrhoid advised on sitz bath take Aleve or Tylenol for the pain.  Also add a stool softener and Metamucil for more prevention       Adult GI  Referral - Procedure Only; Future    Depression with anxiety  Comments:  little more anxious .  Overall feeling medication working well    H/O renal calculi  -     tamsulosin (FLOMAX) 0.4 MG capsule; Take 1 capsule (0.4 mg) by mouth daily  -     acetaminophen-codeine (TYLENOL #3) 300-30 MG tablet; Take 1 tablet by mouth  every 6 hours as needed for severe pain                No follow-ups on file.      Subjective   Drew Nairty 51 year old who presents for the following health issues     HPI   Patient with chief complaint of flank pain which now moved to the suprapubic bladder area.  Feeling constant pressure denies any passage of stone yet but he now whenever he passed the stone similar symptom happened in the past.  Had seen neurologist in the past including cystoscopy and lithotripsy in 2018    Rectal plain/hemorrhoids :started after he had a very hard bowel movement.  Hard to sit on the bottom because it hurts  Diabetes Follow-up      How often are you checking your blood sugar? Not at all    What concerns do you have today about your diabetes? None     Do you have any of these symptoms? (Select all that apply)  No numbness or tingling in feet.  No redness, sores or blisters on feet.  No complaints of excessive thirst.  No reports of blurry vision.  No significant changes to weight.    Have you had a diabetic eye exam in the last 12 months? No        BP Readings from Last 2 Encounters:   08/05/22 138/80   06/09/22 128/88     Hemoglobin A1C (%)   Date Value   06/09/2022 9.1 (H)   01/06/2022 8.2 (H)     LDL Cholesterol Calculated   Date Value   06/09/2022 163 mg/dL (H)   12/16/2020      Comment:     Invalid, Triglycerides >400     LDL Cholesterol Direct   Date Value   12/16/2020 122 mg/dl   03/26/2012 118.0 mg/dL         How many servings of fruits and vegetables do you eat daily?  0-1    On average, how many sweetened beverages do you drink each day (Examples: soda, juice, sweet tea, etc.  Do NOT count diet or artificially sweetened beverages)?   1    How many days per week do you exercise enough to make your heart beat faster? 3 or less    How many minutes a day do you exercise enough to make your heart beat faster? 10 - 19    How many days per week do you miss taking your medication? 0      Patient Active Problem List    Diagnosis     Depression with anxiety     Proctalgia     Dyslipidemia     Benign Adenomatous Polyp Of The Large Intestine     Hypertension     Diabetes mellitus, type 2 (H)     Fatty liver     Male erectile disorder        Current Outpatient Medications   Medication     acetaminophen-codeine (TYLENOL #3) 300-30 MG tablet     amLODIPine (NORVASC) 10 MG tablet     fenofibrate (TRICOR) 145 MG tablet     glipiZIDE (GLUCOTROL XL) 5 MG 24 hr tablet     lisinopril (ZESTRIL) 40 MG tablet     metFORMIN (GLUCOPHAGE) 500 MG tablet     sertraline (ZOLOFT) 50 MG tablet     sildenafil (REVATIO) 20 MG tablet     tamsulosin (FLOMAX) 0.4 MG capsule     traZODone (DESYREL) 50 MG tablet     hydrOXYzine (VISTARIL) 50 MG capsule     No current facility-administered medications for this visit.          Social Determinants of Health     Tobacco Use: Medium Risk     Smoking Tobacco Use: Former Smoker     Smokeless Tobacco Use: Never Used   Alcohol Use: Not on file   Financial Resource Strain: Not on file   Food Insecurity: Not on file   Transportation Needs: Not on file   Physical Activity: Not on file   Stress: Not on file   Social Connections: Not on file   Intimate Partner Violence: Not on file   Depression: Not at risk     PHQ-2 Score: 0   Housing Stability: Not on file        Review of Systems   Constitutional, HEENT, cardiovascular, pulmonary, GI, , musculoskeletal, neuro, skin, endocrine and psych systems are negative, except as otherwise noted.      Objective    /80 (BP Location: Left arm, Patient Position: Sitting, Cuff Size: Adult Regular)   Pulse 68   Temp 98.4  F (36.9  C) (Oral)   Wt 77.8 kg (171 lb 8 oz)   BMI 26.08 kg/m     LMP 06/01/2011   There is no height or weight on file to calculate BMI.  Physical Exam   GENERAL: healthy, alert and no distress.  Look slightly uncomfortable  NECK: no adenopathy, no asymmetry, masses, or scars and thyroid normal to palpation  RESP: lungs clear to auscultation - no rales,  rhonchi or wheezes  MS: no gross musculoskeletal defects noted, no edema    Ying Hernandez MD   [facility].  [phone]

## 2022-08-15 ENCOUNTER — TRANSFERRED RECORDS (OUTPATIENT)
Dept: HEALTH INFORMATION MANAGEMENT | Facility: CLINIC | Age: 52
End: 2022-08-15

## 2022-08-20 ENCOUNTER — MYC MEDICAL ADVICE (OUTPATIENT)
Dept: FAMILY MEDICINE | Facility: CLINIC | Age: 52
End: 2022-08-20

## 2022-08-20 DIAGNOSIS — I10 HYPERTENSION, UNSPECIFIED TYPE: ICD-10-CM

## 2022-08-22 RX ORDER — LISINOPRIL 40 MG/1
40 TABLET ORAL DAILY
Qty: 90 TABLET | Refills: 0 | OUTPATIENT
Start: 2022-08-22

## 2022-08-22 NOTE — TELEPHONE ENCOUNTER
1st attempt: Called and left message for patient to return call to clinic.    If patient return call to clinic, please inform patient that a office visit is needed before refill is given.  Grace Wilson, CMA

## 2022-08-22 NOTE — TELEPHONE ENCOUNTER
Dr Farmer,    Please see MyChart message from patient and advise.    CARLYLE SalterN, RN  [facility]

## 2022-08-23 RX ORDER — LISINOPRIL 40 MG/1
40 TABLET ORAL DAILY
Qty: 30 TABLET | Refills: 0 | Status: ON HOLD | OUTPATIENT
Start: 2022-08-23 | End: 2022-12-29

## 2022-09-12 ENCOUNTER — OFFICE VISIT (OUTPATIENT)
Dept: FAMILY MEDICINE | Facility: CLINIC | Age: 52
End: 2022-09-12

## 2022-09-12 VITALS
OXYGEN SATURATION: 98 % | BODY MASS INDEX: 26.61 KG/M2 | HEART RATE: 83 BPM | WEIGHT: 175 LBS | SYSTOLIC BLOOD PRESSURE: 132 MMHG | DIASTOLIC BLOOD PRESSURE: 80 MMHG

## 2022-09-12 DIAGNOSIS — E78.5 DYSLIPIDEMIA: ICD-10-CM

## 2022-09-12 DIAGNOSIS — Z23 NEED FOR VACCINATION: ICD-10-CM

## 2022-09-12 DIAGNOSIS — E11.00 TYPE 2 DIABETES MELLITUS WITH HYPEROSMOLARITY WITHOUT COMA, WITHOUT LONG-TERM CURRENT USE OF INSULIN (H): ICD-10-CM

## 2022-09-12 DIAGNOSIS — N52.9 MALE ERECTILE DISORDER: ICD-10-CM

## 2022-09-12 DIAGNOSIS — F41.8 DEPRESSION WITH ANXIETY: Primary | ICD-10-CM

## 2022-09-12 DIAGNOSIS — E11.69 TYPE 2 DIABETES MELLITUS WITH OTHER SPECIFIED COMPLICATION, WITHOUT LONG-TERM CURRENT USE OF INSULIN (H): ICD-10-CM

## 2022-09-12 LAB
ALBUMIN SERPL BCG-MCNC: 4.4 G/DL (ref 3.5–5.2)
ALP SERPL-CCNC: 71 U/L (ref 40–129)
ALT SERPL W P-5'-P-CCNC: 18 U/L (ref 10–50)
ANION GAP SERPL CALCULATED.3IONS-SCNC: 8 MMOL/L (ref 7–15)
AST SERPL W P-5'-P-CCNC: 20 U/L (ref 10–50)
BILIRUB SERPL-MCNC: 0.5 MG/DL
BUN SERPL-MCNC: 17.6 MG/DL (ref 6–20)
CALCIUM SERPL-MCNC: 9.8 MG/DL (ref 8.6–10)
CHLORIDE SERPL-SCNC: 98 MMOL/L (ref 98–107)
CHOLEST SERPL-MCNC: 210 MG/DL
CREAT SERPL-MCNC: 1.24 MG/DL (ref 0.67–1.17)
DEPRECATED HCO3 PLAS-SCNC: 30 MMOL/L (ref 22–29)
GFR SERPL CREATININE-BSD FRML MDRD: 69 ML/MIN/1.73M2
GLUCOSE SERPL-MCNC: 223 MG/DL (ref 70–99)
HBA1C MFR BLD: 7.7 % (ref 0–5.6)
HDLC SERPL-MCNC: 40 MG/DL
LDLC SERPL CALC-MCNC: 92 MG/DL
NONHDLC SERPL-MCNC: 170 MG/DL
POTASSIUM SERPL-SCNC: 4.8 MMOL/L (ref 3.4–5.3)
PROT SERPL-MCNC: 7 G/DL (ref 6.4–8.3)
SODIUM SERPL-SCNC: 136 MMOL/L (ref 136–145)
TRIGL SERPL-MCNC: 390 MG/DL

## 2022-09-12 PROCEDURE — 80053 COMPREHEN METABOLIC PANEL: CPT | Performed by: FAMILY MEDICINE

## 2022-09-12 PROCEDURE — 83036 HEMOGLOBIN GLYCOSYLATED A1C: CPT | Performed by: FAMILY MEDICINE

## 2022-09-12 PROCEDURE — 90471 IMMUNIZATION ADMIN: CPT | Performed by: FAMILY MEDICINE

## 2022-09-12 PROCEDURE — 99214 OFFICE O/P EST MOD 30 MIN: CPT | Mod: 25 | Performed by: FAMILY MEDICINE

## 2022-09-12 PROCEDURE — 90682 RIV4 VACC RECOMBINANT DNA IM: CPT | Performed by: FAMILY MEDICINE

## 2022-09-12 PROCEDURE — 36415 COLL VENOUS BLD VENIPUNCTURE: CPT | Performed by: FAMILY MEDICINE

## 2022-09-12 PROCEDURE — 80061 LIPID PANEL: CPT | Performed by: FAMILY MEDICINE

## 2022-09-12 RX ORDER — TADALAFIL 5 MG/1
TABLET ORAL
Qty: 30 TABLET | Refills: 0 | Status: SHIPPED | OUTPATIENT
Start: 2022-09-12 | End: 2024-05-12

## 2022-09-12 RX ORDER — GLIPIZIDE 5 MG/1
TABLET, FILM COATED, EXTENDED RELEASE ORAL
Qty: 180 TABLET | Refills: 1 | Status: ON HOLD | OUTPATIENT
Start: 2022-09-12 | End: 2022-12-29

## 2022-09-12 NOTE — PROGRESS NOTES
Assessment & Plan     (F41.8) Depression with anxiety  (primary encounter diagnosis)  Comment: Chronic and longstanding probably suboptimal control  Plan:      (N52.9) Male erectile disorder  Comment: Patient has not had good response to prior medications.  Plan: tadalafil (CIALIS) 5 MG tablet, Adult Urology          Referral             (Z23) Need for vaccination  Comment:    Plan: INFLUENZA QUAD, PF (RIV4) (FLUBLOK)             (E78.5) Dyslipidemia  Comment: Patient with a history of both elevated total and particularly triglycerides.  Plan: Lipid panel reflex to direct LDL Fasting,         Comprehensive metabolic panel             (E11.69) Type 2 diabetes mellitus  (H)  Comment: Better though still not optimal control with A1c at 7.7  Plan: glipiZIDE (GLUCOTROL XL) 5 MG 24 hr tablet             PLAN:  1.  Influenza vaccine  2.  Laboratory studies as above  3.  Patient is A1c is still elevated at 7.7, I am increasing glipizide from 5 mg daily to 5 mg twice daily.  4.  Trial of Cialis 5 mg 1-4 as needed  5.  Urology referral  6.  6-month follow-up                   No follow-ups on file.    Haroon Farmer MD  [facility]    Roderick Russell is a 52 year old, presenting for the following health issues:    Patient comes in for follow-up of his comorbidities.  Patient has a history of poorly controlled type 2 diabetes mellitus he is on glipizide and metformin for this he readily admits that his diet has been suboptimal although he thinks he will be better controlled.    Patient has a history of dyslipidemia I did start him on Tricor last year because of an elevation of triglycerides however he is not taking any cholesterol medication at this time.    Patient's main concern at this time is actually male sexual difficulties.  He reports he has not had an erection in years, I tried him on generic Viagra which has not been helpful.  I discussed that we have several options  we can try him on a different medication and we can also refer to urology, after discussion he would like to do both, start the Cialis see if that is helpful or not and also have a urology referral.    Patient currently does not have any health insurance his work apparently does not provided and he is gone to the [employer] website and apparently is unaffordable for him.    Patient did recently have a colonoscopy apparently they did find some hemorrhoids and polyps I do not have that report available for review.        Recheck Medication      History of Present Illness       Vascular Disease:  He presents for follow up of vascular disease.  He never takes nitroglycerin. He is not taking daily aspirin.    He eats 0-1 servings of fruits and vegetables daily.He consumes 0 sweetened beverage(s) daily.He exercises with enough effort to increase his heart rate 9 or less minutes per day.  He exercises with enough effort to increase his heart rate 3 or less days per week. He is missing 2 dose(s) of medications per week.             Review of Systems         Objective    /80 (BP Location: Right arm, Patient Position: Sitting, Cuff Size: Adult Large)   Pulse 83   Wt 79.4 kg (175 lb)   SpO2 98%   BMI 26.61 kg/m    Body mass index is 26.61 kg/m .  Physical Exam

## 2022-09-12 NOTE — LETTER
September 12, 2022      Drew Monsivais  [address]  [place] [facility] [address]        Dear ,    We are writing to inform you of your test results.  The A1c is better at 7.7 but we like this a little closer to 7, I am increasing the glipizide from 5 mg once a day to 5 mg twice daily and I did fax in a new prescription.  1 kidney test creatinine is just barely elevated this is probably not significant.  Cholesterol is elevated, however its not nearly as elevated as it has in the past, I am not restarting you on any cholesterol medication at this time, but I want you to focus is much as possible good diet and exercise.    See me again in the next 3 to 6 months.      Resulted Orders   Lipid panel reflex to direct LDL Fasting   Result Value Ref Range    Cholesterol 210 (H) <200 mg/dL    Triglycerides 390 (H) <150 mg/dL    Direct Measure HDL 40 >=40 mg/dL    LDL Cholesterol Calculated 92 <=100 mg/dL    Non HDL Cholesterol 170 (H) <130 mg/dL    Narrative    Cholesterol  Desirable:  <200 mg/dL    Triglycerides  Normal:  Less than 150 mg/dL  Borderline High:  150-199 mg/dL  High:  200-499 mg/dL  Very High:  Greater than or equal to 500 mg/dL    Direct Measure HDL  Female:  Greater than or equal to 50 mg/dL   Male:  Greater than or equal to 40 mg/dL    LDL Cholesterol  Desirable:  <100mg/dL  Above Desirable:  100-129 mg/dL   Borderline High:  130-159 mg/dL   High:  160-189 mg/dL   Very High:  >= 190 mg/dL    Non HDL Cholesterol  Desirable:  130 mg/dL  Above Desirable:  130-159 mg/dL  Borderline High:  160-189 mg/dL  High:  190-219 mg/dL  Very High:  Greater than or equal to 220 mg/dL   Hemoglobin A1c   Result Value Ref Range    Hemoglobin A1C 7.7 (H) 0.0 - 5.6 %      Comment:      Normal <5.7%   Prediabetes 5.7-6.4%    Diabetes 6.5% or higher     Note: Adopted from ADA consensus guidelines.   Comprehensive metabolic panel   Result Value Ref Range    Sodium 136 136 - 145 mmol/L    Potassium 4.8 3.4 - 5.3  mmol/L    Creatinine 1.24 (H) 0.67 - 1.17 mg/dL    Urea Nitrogen 17.6 6.0 - 20.0 mg/dL    Chloride 98 98 - 107 mmol/L    Carbon Dioxide (CO2) 30 (H) 22 - 29 mmol/L    Anion Gap 8 7 - 15 mmol/L    Glucose 223 (H) 70 - 99 mg/dL    Calcium 9.8 8.6 - 10.0 mg/dL    Protein Total 7.0 6.4 - 8.3 g/dL    Albumin 4.4 3.5 - 5.2 g/dL    Bilirubin Total 0.5 <=1.2 mg/dL    Alkaline Phosphatase 71 40 - 129 U/L    AST 20 10 - 50 U/L    ALT 18 10 - 50 U/L    GFR Estimate 69 >60 mL/min/1.73m2      Comment:      Effective December 21, 2021 eGFRcr in adults is calculated using the 2021 CKD-EPI creatinine equation which includes age and gender (Zak et al., NEJM, DOI: 10.1056/AJJXxl0424921)       If you have any questions or concerns, please call the clinic at the number listed above.       Sincerely,      Haroon Farmer MD

## 2022-09-20 ENCOUNTER — NURSE TRIAGE (OUTPATIENT)
Dept: NURSING | Facility: CLINIC | Age: 52
End: 2022-09-20

## 2022-09-20 ENCOUNTER — OFFICE VISIT (OUTPATIENT)
Dept: FAMILY MEDICINE | Facility: CLINIC | Age: 52
End: 2022-09-20

## 2022-09-20 VITALS
TEMPERATURE: 98.7 F | WEIGHT: 172 LBS | RESPIRATION RATE: 16 BRPM | BODY MASS INDEX: 26.15 KG/M2 | HEART RATE: 86 BPM | OXYGEN SATURATION: 99 % | SYSTOLIC BLOOD PRESSURE: 138 MMHG | DIASTOLIC BLOOD PRESSURE: 80 MMHG

## 2022-09-20 DIAGNOSIS — R13.10 DYSPHAGIA, UNSPECIFIED TYPE: ICD-10-CM

## 2022-09-20 DIAGNOSIS — E11.00 TYPE 2 DIABETES MELLITUS WITH HYPEROSMOLARITY WITHOUT COMA, WITHOUT LONG-TERM CURRENT USE OF INSULIN (H): ICD-10-CM

## 2022-09-20 DIAGNOSIS — R11.2 NON-INTRACTABLE VOMITING WITH NAUSEA, UNSPECIFIED VOMITING TYPE: ICD-10-CM

## 2022-09-20 DIAGNOSIS — I10 HYPERTENSION, UNSPECIFIED TYPE: ICD-10-CM

## 2022-09-20 DIAGNOSIS — K30 STOMACH BURNING: Primary | ICD-10-CM

## 2022-09-20 LAB
ERYTHROCYTE [DISTWIDTH] IN BLOOD BY AUTOMATED COUNT: 11.9 % (ref 10–15)
HCT VFR BLD AUTO: 42.5 % (ref 40–53)
HGB BLD-MCNC: 15.1 G/DL (ref 13.3–17.7)
MCH RBC QN AUTO: 28.2 PG (ref 26.5–33)
MCHC RBC AUTO-ENTMCNC: 35.5 G/DL (ref 31.5–36.5)
MCV RBC AUTO: 79 FL (ref 78–100)
PLATELET # BLD AUTO: 220 10E3/UL (ref 150–450)
RBC # BLD AUTO: 5.35 10E6/UL (ref 4.4–5.9)
WBC # BLD AUTO: 8.4 10E3/UL (ref 4–11)

## 2022-09-20 PROCEDURE — 99214 OFFICE O/P EST MOD 30 MIN: CPT | Performed by: FAMILY MEDICINE

## 2022-09-20 PROCEDURE — 36415 COLL VENOUS BLD VENIPUNCTURE: CPT | Performed by: FAMILY MEDICINE

## 2022-09-20 PROCEDURE — 85027 COMPLETE CBC AUTOMATED: CPT | Performed by: FAMILY MEDICINE

## 2022-09-20 NOTE — PROGRESS NOTES
OUTPATIENT VISIT NOTE                                                   Date of Visit: 9/20/2022     Chief Complaint   Patient presents with:  Vomiting: X4 days    abdominal pain: X4 days              History of Present Illness   Drew Monsivais is a 52 year old male c/o burning stomach for the last 4-5 days.  Has had vomiting in the morning every morning for the last 4 days.  Has been having trouble swallowing for over a couple of months.  With solids and liquids. Sometimes regurgitates food.  Has had nausea intermittently for over a month.  Hasn't had much heartburn.  THC edibles seem to help.  Hasn't lost weight.  Quit smoking 30 years--smoked 1/2-1 ppd.  Doesn't use alcohol--now alcohol makes him feel nauseous.    Has had diabetes for 2-3 years.       MEDICATIONS   Current Outpatient Medications   Medication     amLODIPine (NORVASC) 10 MG tablet     glipiZIDE (GLUCOTROL XL) 5 MG 24 hr tablet     lisinopril (ZESTRIL) 40 MG tablet     metFORMIN (GLUCOPHAGE) 500 MG tablet     omeprazole (PRILOSEC) 20 MG DR capsule     sertraline (ZOLOFT) 50 MG tablet     tamsulosin (FLOMAX) 0.4 MG capsule     traZODone (DESYREL) 50 MG tablet     tadalafil (CIALIS) 5 MG tablet     No current facility-administered medications for this visit.         SOCIAL HISTORY   Social History     Tobacco Use     Smoking status: Former Smoker     Types: Cigarettes, Cigarettes     Smokeless tobacco: Never Used     Tobacco comment: College   Substance Use Topics     Alcohol use: No           Physical Exam   Vitals:    09/20/22 1210 09/20/22 1247   BP: (!) 166/105 138/80   BP Location: Right arm Right arm   Patient Position: Sitting Sitting   Cuff Size: Adult Large Adult Large   Pulse: 86    Resp: 16    Temp: 98.7  F (37.1  C)    TempSrc: Oral    SpO2: 99%    Weight: 78 kg (172 lb)         GENERAL:   Alert. Oriented.  EYES: Clear  HENT:  Ears: R TM pearly gray. Normal landmarks. L TM pearly gray.  Normal landmarks  Nose: Clear.  Sinuses:  Nontender.  Oropharynx:  No erythema. No exudate.  NECK: Supple. No adenopathy.  LUNGS: Clear to ascultation.  No crackles.  No wheezing  HEART: RRR  SKIN:  No rash.   ABDOMEN:  +BS. Moderate epigastric tenderness. Soft, no guarding, rebound, rigidity,mass, or organomegaly. No CVA tenderness       Diagnostic Studies   LABS:  Results for orders placed or performed in visit on 09/20/22   CBC with platelets     Status: Normal   Result Value Ref Range    WBC Count 8.4 4.0 - 11.0 10e3/uL    RBC Count 5.35 4.40 - 5.90 10e6/uL    Hemoglobin 15.1 13.3 - 17.7 g/dL    Hematocrit 42.5 40.0 - 53.0 %    MCV 79 78 - 100 fL    MCH 28.2 26.5 - 33.0 pg    MCHC 35.5 31.5 - 36.5 g/dL    RDW 11.9 10.0 - 15.0 %    Platelet Count 220 150 - 450 10e3/uL            Assessment and Plan     Stomach burning  - CBC with platelets  - Adult GI  Referral - Procedure Only  - omeprazole (PRILOSEC) 20 MG DR capsule  Dispense: 60 capsule; Refill: 11  - CBC with platelets  Non-intractable vomiting with nausea, unspecified vomiting type  - CBC with platelets  - Adult GI  Referral - Procedure Only  - omeprazole (PRILOSEC) 20 MG DR capsule  Dispense: 60 capsule; Refill: 11  - CBC with platelets  Dysphagia, unspecified type  - Adult GI  Referral - Procedure Only    Symptoms consistent with acid reflux--consider gastritis, ulcer.  Start bid omeprazole.  With dysphagia, needs upper gi sooner  Follow up within 4 weeks or before if worsneing.      Type 2 diabetes mellitus with hyperosmolarity without coma, without long-term current use of insulin (H)  Discussed possiblity of gastroparesis  - Hemoglobin A1c    Hypertension, unspecified type  Continue medications.        Type 2 diabetes mellitus with hyperosmolarity without coma, without long-term current use of insulin (H)    - Hemoglobin A1c                   Discussed signs / symptoms that warrant urgent / emergent medical attention.     Recheck if worsening or not improving.        Garry Walker MD          Pertinent History     The following portions of the patient's history were reviewed and updated as appropriate: allergies, current medications, past family history, past medical history, past social history, past surgical history and problem list.

## 2022-09-20 NOTE — TELEPHONE ENCOUNTER
Stomach burning and vomited few times.    Has a lot of burping. Bad since this weekend.    Was seen a year ago for acid reflux.  Tried tums but no improvement.    Excruciating pain now.  Want to lay down and dizzy.    cLEAR FLUIDS ONLY TILL SEEN.    Per protocol needs to go to ER.    He plans on trying the UC first as it is less expensive.    Rosa Wyman RN  [facility] Nurse Advisor        Reason for Disposition    SEVERE abdominal pain (e.g., excruciating)    Additional Information    Negative: Passed out (i.e., fainted, collapsed and was not responding)    Negative: Shock suspected (e.g., cold/pale/clammy skin, too weak to stand, low BP, rapid pulse)    Negative: Sounds like a life-threatening emergency to the triager    Protocols used: ABDOMINAL PAIN - MALE-A-OH

## 2022-09-20 NOTE — PATIENT INSTRUCTIONS
Take omeprazole as prescribed.  Take liquid antacid four times a day.    Continue your other medications.    I will notify you if there is any problem with the lab work.    Schedule an appointment with Dr. Farmer in about four weeks.    Somebody should be call you about scheduling the upper endoscopy within a few days.

## 2022-09-30 ENCOUNTER — TELEPHONE (OUTPATIENT)
Dept: SURGERY | Facility: CLINIC | Age: 52
End: 2022-09-30

## 2022-09-30 ENCOUNTER — PREP FOR PROCEDURE (OUTPATIENT)
Dept: SURGERY | Facility: CLINIC | Age: 52
End: 2022-09-30

## 2022-09-30 DIAGNOSIS — R13.10 DYSPHAGIA: Primary | ICD-10-CM

## 2022-09-30 DIAGNOSIS — K30 STOMACH BURNING: ICD-10-CM

## 2022-09-30 NOTE — TELEPHONE ENCOUNTER
Spoke with patient today regarding surgery scheduling     Went over details/instructions.    Surgery Letter sent via [employer]

## 2022-09-30 NOTE — LETTER
We've received instruction to get you scheduled for and EGD with Dr Person. We have that arranged as follows:     Pre-op Physical:  Dr. Person will do your pre op physical the day of surgery    Surgery Date: 11/8/2022     Location: [place] [address]    Approximate Arrival Time: 8:30 am  (Unless instructed differently by the pre-op call nurse)     Prep Tasks and Info:     1. Review your medications with your primary care or prescribing physician; they will advise you which meds to stop and when, and when you can resume taking.  Certain medications like blood thinners, need to be stopped in advance of surgery to proceed safely.    2. You must get tested for COVID-19, even if you are vaccinated.    Outpatient Surgery:  If you are going home the same day of surgery, a home rapid antigen Covid-19 test is required 1-2 days before surgery- regardless of your vaccination status.  Take a photo of the negative result and show to the nurse on the day of surgery. If you test positive, contact our office right away to reschedule surgery. You can buy a home Covid-19 Rapid Antigen test at many local pharmacies, or you can order for free at covid.gov/tests.      3. Please shower the evening before and morning of surgery with Hibiclens or Exidine soap.  This can be found at your local pharmacy.    4. Fasting instructions will be provided by the pre-op nurse who will call you 1-3 days before surgery.  Typically we advise normal food up to 8 hours before surgery then clear liquids only up to 2 hours before surgery then nothing at all by mouth for 2 hours including no gum or candy.  The nurse will review your specific instructions with you at the call.      5. Smoking impacts your body's ability to heal properly.  If you are a smoker, we strongly urge you to stop smoking 4-6 weeks before surgery.    6. You will need an adult to drive you home and stay with you 24 hours  after surgery. Public transportation or Medical Van Services are not permitted.    7. You may have one family member wait in the lobby at the surgery center during your surgery. Visitor restrictions are subject to change, please verify with the pre-op nurse when they call.    8. If the community sees a new COVID19 surge, your procedure may need to be postponed. We will contact you if this happens. You will be screened for high-risk exposure to Covid-19 during the pre-op call.  We encourage you to quarantine yourself away from any Covid-19 people for 10 days before surgery to avoid possible last minute cancellations.   When you arrive to the surgery center, you will again be screened for COVID19 symptoms. If you screen positive, your surgery will need to be postponed.    9. We always encourage you to notify your insurance any time you have medical tests or procedures scheduled including surgery. The number is usually right on the back of your insurance card. Please call [facility] Cost of Care at [phone] if you'd like a surgery quote.       Call our office if you have any questions! Thank you!

## 2022-10-03 ENCOUNTER — HOSPITAL ENCOUNTER (OUTPATIENT)
Facility: AMBULATORY SURGERY CENTER | Age: 52
End: 2022-10-03
Attending: SURGERY

## 2022-10-03 DIAGNOSIS — K30 STOMACH BURNING: ICD-10-CM

## 2022-10-03 DIAGNOSIS — R13.10 DYSPHAGIA: ICD-10-CM

## 2022-10-05 DIAGNOSIS — E11.69 TYPE 2 DIABETES MELLITUS WITH OTHER SPECIFIED COMPLICATION, WITHOUT LONG-TERM CURRENT USE OF INSULIN (H): ICD-10-CM

## 2022-10-05 NOTE — TELEPHONE ENCOUNTER
"Last Written Prescription Date:  3/25/22  Last Fill Quantity: 180,  # refills: 3   Last office visit provider:  9/12/22     Requested Prescriptions   Pending Prescriptions Disp Refills     metFORMIN (GLUCOPHAGE) 500 MG tablet [Pharmacy Med Name: METFORMIN  MG TABLET] 360 tablet 1     Sig: TAKE 2 TABLETS BY MOUTH TWICE A DAY       Biguanide Agents Passed - 10/5/2022 11:06 AM        Passed - Patient is age 10 or older        Passed - Patient has documented A1c within the specified period of time.     If HgbA1C is 8 or greater, it needs to be on file within the past 3 months.  If less than 8, must be on file within the past 6 months.     Recent Labs   Lab Test 09/12/22  1106   A1C 7.7*             Passed - Patient's CR is NOT>1.4 OR Patient's EGFR is NOT<45 within past 12 mos.     Recent Labs   Lab Test 09/12/22  1106 08/13/21  0905 12/16/20  1625   GFRESTIMATED 69   < > >60   GFRESTBLACK  --   --  >60    < > = values in this interval not displayed.       Recent Labs   Lab Test 09/12/22  1106   CR 1.24*             Passed - Patient does NOT have a diagnosis of CHF.        Passed - Medication is active on med list        Passed - Recent (6 mo) or future (30 days) visit within the authorizing provider's specialty     Patient had office visit in the last 6 months or has a visit in the next 30 days with authorizing provider or within the authorizing provider's specialty.  See \"Patient Info\" tab in inbasket, or \"Choose Columns\" in Meds & Orders section of the refill encounter.                 Arturo Jalloh RN 10/05/22 11:07 AM  "

## 2022-10-12 DIAGNOSIS — R11.2 NON-INTRACTABLE VOMITING WITH NAUSEA: ICD-10-CM

## 2022-10-12 DIAGNOSIS — K30 STOMACH BURNING: ICD-10-CM

## 2022-11-09 ENCOUNTER — OFFICE VISIT (OUTPATIENT)
Dept: FAMILY MEDICINE | Facility: CLINIC | Age: 52
End: 2022-11-09

## 2022-11-09 VITALS
WEIGHT: 169 LBS | BODY MASS INDEX: 25.7 KG/M2 | SYSTOLIC BLOOD PRESSURE: 135 MMHG | HEART RATE: 86 BPM | TEMPERATURE: 99.1 F | OXYGEN SATURATION: 98 % | DIASTOLIC BLOOD PRESSURE: 89 MMHG

## 2022-11-09 DIAGNOSIS — J20.9 ACUTE BRONCHITIS, UNSPECIFIED ORGANISM: Primary | ICD-10-CM

## 2022-11-09 PROCEDURE — 99213 OFFICE O/P EST LOW 20 MIN: CPT | Performed by: PHYSICIAN ASSISTANT

## 2022-11-09 RX ORDER — PREDNISONE 20 MG/1
40 TABLET ORAL DAILY
Qty: 10 TABLET | Refills: 0 | Status: SHIPPED | OUTPATIENT
Start: 2022-11-09 | End: 2022-11-14

## 2022-11-09 RX ORDER — BENZONATATE 100 MG/1
100 CAPSULE ORAL 3 TIMES DAILY PRN
Qty: 21 CAPSULE | Refills: 0 | Status: ON HOLD | OUTPATIENT
Start: 2022-11-09 | End: 2022-12-29

## 2022-11-09 RX ORDER — CODEINE PHOSPHATE/GUAIFENESIN 10-100MG/5
10 LIQUID (ML) ORAL
Qty: 70 ML | Refills: 0 | Status: ON HOLD | OUTPATIENT
Start: 2022-11-09 | End: 2022-12-29

## 2022-11-09 ASSESSMENT — ENCOUNTER SYMPTOMS
FEVER: 0
SHORTNESS OF BREATH: 0
DIAPHORESIS: 1
WHEEZING: 0
CHILLS: 1
COUGH: 1
VOMITING: 1

## 2022-11-09 NOTE — PROGRESS NOTES
Patient presents with:  Cough: Has had cough for 1 week no appetite no fever had 2 negative COVID test      Clinical Decision Making: Patient experiencing sick symptoms for the past 1 week.  COVID test have been negative at home.  Lungs are CTA and patient is vitally stable.  He denies any chest pains.  Suspect viral bronchitis.  Patient started on Tessalon Perles, guaifenesin AC, and prednisone for treatment.  He was instructed to follow-up if symptoms fail to improve.      ICD-10-CM    1. Acute bronchitis, unspecified organism  J20.9 benzonatate (TESSALON) 100 MG capsule     predniSONE (DELTASONE) 20 MG tablet     guaiFENesin-codeine (GUAIFENESIN AC) 100-10 MG/5ML syrup          Patient Instructions   There were no signs of pneumonia.    Your symptoms are most likely due to acute bronchitis.  This is inflammation of the tubes leading into the lungs, most often due to a viral infection and an antibiotic will not help this.    May take Tessalon Perles as needed for cough.  May also Guaifenesin AC for night time use.     Take Prednisone in the morning and with food.     Please monitor symptoms carefully.  If developing chest pain, shortness of breath, fever, coughing up blood, extreme fatigue, or any other new, concerning symptoms, come back to clinic or go to ER immediately.  Otherwise, if no improvement in symptoms in one week, follow-up with your primary care provider.        HPI:  Drew Monsivais is a 52 year old male with PMHx of DM2 who presents today complaining of cough for the past 1 week.  Patient has not had any fevers.  Patient has had negative at-home COVID test twice. Patient has been taking Robitussin without improvement. He has also been drinking Pedialyte.     History obtained from the patient.    Problem List:  2022-10: Dysphagia  2022-10: Stomach burning  2020-12: Diabetes mellitus, type 2 (H)  2020-12: Fatty liver  2020-12: Male erectile disorder  2015-01: Hypertension  Depression with  anxiety  Proctalgia  Dyslipidemia  Benign Adenomatous Polyp Of The Large Intestine      Past Medical History:   Diagnosis Date     Calculus of kidney     at least 4 episodes most recent 2012 once surgically removed Stones present both kidneys       Closed fracture of metatarsal bone(s)           Headache     Frontal-?migraine Triggers: no obvious,  excedrin light senstive Chiro        Social History     Tobacco Use     Smoking status: Former     Types: Cigarettes     Smokeless tobacco: Never     Tobacco comments:     College   Substance Use Topics     Alcohol use: No       Review of Systems   Constitutional: Positive for chills and diaphoresis. Negative for fever.   HENT: Positive for congestion.    Respiratory: Positive for cough. Negative for shortness of breath and wheezing.    Cardiovascular: Negative for chest pain.   Gastrointestinal: Positive for vomiting (secondary to cough).       Vitals:    11/09/22 1010   BP: 135/89   Pulse: 86   Temp: 99.1  F (37.3  C)   TempSrc: Oral   SpO2: 98%   Weight: 76.7 kg (169 lb)       Physical Exam  Vitals and nursing note reviewed.   Constitutional:       General: He is not in acute distress.     Appearance: He is diaphoretic. He is not toxic-appearing.   HENT:      Head: Normocephalic and atraumatic.      Right Ear: External ear normal.      Left Ear: External ear normal.   Eyes:      Conjunctiva/sclera: Conjunctivae normal.   Cardiovascular:      Rate and Rhythm: Normal rate and regular rhythm.      Heart sounds: No murmur heard.  Pulmonary:      Effort: Pulmonary effort is normal. No respiratory distress.      Breath sounds: No stridor. No wheezing, rhonchi or rales.   Neurological:      Mental Status: He is alert.   Psychiatric:         Mood and Affect: Mood normal.         Behavior: Behavior normal.         Thought Content: Thought content normal.         Judgment: Judgment normal.         At the end of the encounter, I discussed results, diagnosis, medications.  Discussed red flags for immediate return to clinic/ER, as well as indications for follow up if no improvement. Patient understood and agreed to plan. Patient was stable for discharge.

## 2022-11-09 NOTE — PATIENT INSTRUCTIONS
There were no signs of pneumonia.    Your symptoms are most likely due to acute bronchitis.  This is inflammation of the tubes leading into the lungs, most often due to a viral infection and an antibiotic will not help this.    May take Tessalon Perles as needed for cough.  May also Guaifenesin AC for night time use.     Take Prednisone in the morning and with food.     Please monitor symptoms carefully.  If developing chest pain, shortness of breath, fever, coughing up blood, extreme fatigue, or any other new, concerning symptoms, come back to clinic or go to ER immediately.  Otherwise, if no improvement in symptoms in one week, follow-up with your primary care provider.     Yes

## 2022-11-10 ENCOUNTER — OFFICE VISIT (OUTPATIENT)
Dept: INTERNAL MEDICINE | Facility: CLINIC | Age: 52
End: 2022-11-10

## 2022-11-10 ENCOUNTER — TELEPHONE (OUTPATIENT)
Dept: FAMILY MEDICINE | Facility: CLINIC | Age: 52
End: 2022-11-10

## 2022-11-10 ENCOUNTER — ANCILLARY PROCEDURE (OUTPATIENT)
Dept: GENERAL RADIOLOGY | Facility: CLINIC | Age: 52
End: 2022-11-10
Attending: NURSE PRACTITIONER

## 2022-11-10 VITALS
HEIGHT: 68 IN | WEIGHT: 168 LBS | OXYGEN SATURATION: 96 % | SYSTOLIC BLOOD PRESSURE: 138 MMHG | TEMPERATURE: 98.3 F | DIASTOLIC BLOOD PRESSURE: 88 MMHG | HEART RATE: 93 BPM | BODY MASS INDEX: 25.46 KG/M2

## 2022-11-10 DIAGNOSIS — R05.9 COUGH, UNSPECIFIED TYPE: Primary | ICD-10-CM

## 2022-11-10 DIAGNOSIS — R05.9 COUGH, UNSPECIFIED TYPE: ICD-10-CM

## 2022-11-10 LAB — SARS-COV-2 RNA RESP QL NAA+PROBE: NEGATIVE

## 2022-11-10 PROCEDURE — 71046 X-RAY EXAM CHEST 2 VIEWS: CPT | Mod: TC | Performed by: RADIOLOGY

## 2022-11-10 PROCEDURE — U0003 INFECTIOUS AGENT DETECTION BY NUCLEIC ACID (DNA OR RNA); SEVERE ACUTE RESPIRATORY SYNDROME CORONAVIRUS 2 (SARS-COV-2) (CORONAVIRUS DISEASE [COVID-19]), AMPLIFIED PROBE TECHNIQUE, MAKING USE OF HIGH THROUGHPUT TECHNOLOGIES AS DESCRIBED BY CMS-2020-01-R: HCPCS | Performed by: NURSE PRACTITIONER

## 2022-11-10 PROCEDURE — U0005 INFEC AGEN DETEC AMPLI PROBE: HCPCS | Performed by: NURSE PRACTITIONER

## 2022-11-10 PROCEDURE — 99214 OFFICE O/P EST MOD 30 MIN: CPT | Mod: CS | Performed by: NURSE PRACTITIONER

## 2022-11-10 NOTE — PROGRESS NOTES
"  Assessment & Plan     Cough, unspecified type  Here for follow-up on cough.  Reassured him that this is likely a viral upper respiratory infection.    Chest x-ray personally interpreted as unremarkable.    COVID-19 PCR swab today    - XR Chest 2 Views; Future  - Symptomatic; Unknown COVID-19 Virus (Coronavirus) by PCR Nose    Patient Instructions   Take your prednisone first thing in the morning with food.    Increase trazodone to 3 tablets before bed.  You can also add melatonin 5 mg in the evening to help with sleep.  Melatonin is available over-the-counter.    Chest x-ray today to look for pneumonia.  I will call you later with results.    COVID-19 PCR test to check for COVID infection.  These results will come through on [employer].    It will take time for your body to heal from this illness.  Try to get adequate rest, fluids, etc.        Ordering of each unique test  Prescription drug management  15 minutes spent on the date of the encounter doing chart review, history and exam, documentation and further activities per the note     BMI:   Estimated body mass index is 25.54 kg/m  as calculated from the following:    Height as of this encounter: 1.727 m (5' 8\").    Weight as of this encounter: 76.2 kg (168 lb).       Return in about 3 months (around 2/10/2023).    [facility]   Drew is a 52 year old, presenting for the following health issues:  URI (Cough, congestion, vomit this AM from coughing, tightness in chest and back - was seen at [facility] yesterday )      URI    History of Present Illness       Reason for visit:  Acute bronchitis    He eats 0-1 servings of fruits and vegetables daily.He consumes 0 sweetened beverage(s) daily.He exercises with enough effort to increase his heart rate 9 or less minutes per day.  He exercises with enough effort to increase his heart rate 3 or less days per week. He is missing 2 dose(s) of medications per " "week.  He is not taking prescribed medications regularly due to remembering to take.       Review of Systems   Constitutional, HEENT, cardiovascular, pulmonary, gi and gu systems are negative, except as otherwise noted.      Objective    /88 (BP Location: Right arm, Patient Position: Sitting, Cuff Size: Adult Regular)   Pulse 93   Temp 98.3  F (36.8  C) (Oral)   Ht 1.727 m (5' 8\")   Wt 76.2 kg (168 lb)   SpO2 96%   BMI 25.54 kg/m    Body mass index is 25.54 kg/m .  Physical Exam     Lungs clear to auscultation bilaterally      "

## 2022-11-10 NOTE — TELEPHONE ENCOUNTER
Patient was cold transferred to TC.  They thought they were being transferred to Triage.  Writer is transferring to Triage.

## 2022-11-10 NOTE — PATIENT INSTRUCTIONS
Take your prednisone first thing in the morning with food.    Increase trazodone to 3 tablets before bed.  You can also add melatonin 5 mg in the evening to help with sleep.  Melatonin is available over-the-counter.    Chest x-ray today to look for pneumonia.  I will call you later with results.    COVID-19 PCR test to check for COVID infection.  These results will come through on [employer].    It will take time for your body to heal from this illness.  Try to get adequate rest, fluids, etc.

## 2022-11-10 NOTE — TELEPHONE ENCOUNTER
Coughing so bad that he threw up this morning and chest hurts from coughing.     Takes prescriptions for sleeping, but not able to sleep and feels like he is getting worst since yesterday.    Denied chest pain, shortness of breath, fever, or coughing up blood. Patient stated his chest pain is from just coughing a lot.    Still having sweating, congestions, and cough. Dry cough. Interest in food is minimal.    Patient thinks that he is having some sort of infection and would like to get some antibiotics for it. Patient stated that no chest X-ray was done yesterday at the [facility].    Informed pt that per provider, patient can be seen today at 11 AM, but it is not guaranteed he will be given an antibiotic if not warranted. Patient verbalized understanding and stated he just want to get better and whatever the provider needs to do. Patient add to provider Jace's schedule at 11 AM for today.    Skylar Leal, CARLYLEN, RN  [facility]

## 2022-11-10 NOTE — TELEPHONE ENCOUNTER
Reason for Call:  Other call back    Detailed comments: patient called and was seen at [facility] and diagnosed with Bronchiitis.    Patient was give steriod with caffne and has not been sleeping.    Patient is asking for an antibodic.    Patient is concerned as has a history of high BP and Diabetes.    Patient has been transferred to [facility]NA triage.    Please contact patient this AM.  Thank you.    Phone Number Patient can be reached at: Cell number on file:    Telephone Information:   Mobile [phone]       Best Time: any    Can we leave a detailed message on this number? YES    Call taken on 11/10/2022 at 7:16 AM by Na Herrmann

## 2022-11-22 ENCOUNTER — OFFICE VISIT (OUTPATIENT)
Dept: ONCOLOGY | Facility: CLINIC | Age: 52
End: 2022-11-22
Attending: FAMILY MEDICINE

## 2022-11-22 VITALS
BODY MASS INDEX: 26.42 KG/M2 | OXYGEN SATURATION: 98 % | HEART RATE: 90 BPM | HEIGHT: 68 IN | WEIGHT: 174.3 LBS | DIASTOLIC BLOOD PRESSURE: 95 MMHG | RESPIRATION RATE: 18 BRPM | SYSTOLIC BLOOD PRESSURE: 160 MMHG

## 2022-11-22 DIAGNOSIS — E78.5 DYSLIPIDEMIA: ICD-10-CM

## 2022-11-22 DIAGNOSIS — F41.8 DEPRESSION WITH ANXIETY: ICD-10-CM

## 2022-11-22 DIAGNOSIS — N52.9 ERECTILE DYSFUNCTION, UNSPECIFIED ERECTILE DYSFUNCTION TYPE: Primary | ICD-10-CM

## 2022-11-22 DIAGNOSIS — N48.6 PEYRONIE'S DISEASE: ICD-10-CM

## 2022-11-22 DIAGNOSIS — E11.9 TYPE 2 DIABETES MELLITUS WITHOUT COMPLICATION, WITHOUT LONG-TERM CURRENT USE OF INSULIN (H): ICD-10-CM

## 2022-11-22 DIAGNOSIS — I10 HYPERTENSION, UNSPECIFIED TYPE: ICD-10-CM

## 2022-11-22 PROCEDURE — 99204 OFFICE O/P NEW MOD 45 MIN: CPT | Performed by: STUDENT IN AN ORGANIZED HEALTH CARE EDUCATION/TRAINING PROGRAM

## 2022-11-22 PROCEDURE — G0463 HOSPITAL OUTPT CLINIC VISIT: HCPCS

## 2022-11-22 ASSESSMENT — PAIN SCALES - GENERAL: PAINLEVEL: NO PAIN (0)

## 2022-11-22 NOTE — PROGRESS NOTES
"Urology Rooming Note    November 22, 2022 11:05 AM   Drew Monsivais is a 52 year old male who presents for:    Chief Complaint   Patient presents with     Urology     ED     Initial Vitals: BP (!) 160/95   Pulse 90   Resp 18   Ht 1.727 m (5' 8\")   Wt 79.1 kg (174 lb 4.8 oz)   SpO2 98%   BMI 26.50 kg/m   Estimated body mass index is 26.5 kg/m  as calculated from the following:    Height as of this encounter: 1.727 m (5' 8\").    Weight as of this encounter: 79.1 kg (174 lb 4.8 oz). Body surface area is 1.95 meters squared.  No Pain (0) Comment: Data Unavailable     Allergies reviewed: Yes  Medications reviewed: Yes    Medications: Medication refills not needed today.  Pharmacy name entered into [employer]: [id number] - [place], [place] - [address] AT [facility] [address]      Valarie Barnett LPN  "

## 2022-11-22 NOTE — LETTER
"    11/22/2022         RE: Drew Monsivais  [address] [address] [facility] [address]        Dear Colleague,    Thank you for referring your patient, Drew Monsivais, to the [facility]. Please see a copy of my visit note below.    Urology Rooming Note    November 22, 2022 11:05 AM   Drew Monsivais is a 52 year old male who presents for:    Chief Complaint   Patient presents with     Urology     ED     Initial Vitals: BP (!) 160/95   Pulse 90   Resp 18   Ht 1.727 m (5' 8\")   Wt 79.1 kg (174 lb 4.8 oz)   SpO2 98%   BMI 26.50 kg/m   Estimated body mass index is 26.5 kg/m  as calculated from the following:    Height as of this encounter: 1.727 m (5' 8\").    Weight as of this encounter: 79.1 kg (174 lb 4.8 oz). Body surface area is 1.95 meters squared.  No Pain (0) Comment: Data Unavailable     Allergies reviewed: Yes  Medications reviewed: Yes    Medications: Medication refills not needed today.  Pharmacy name entered into [employer]: [id number] - [place], [place] - [address]. [facility] [address]      Valarie Barnett LPN          Chief Complaint:   Erectile dysfunction           Consult or Referral:     Mr. Drew Monsivais is a 52 year old male seen at the request of Dr. Farmer.         History of Present Illness:     Drew Monsivais is a 52 year old male being seen for erectile dysfunction.  Duration of problem: 4 years  Previous treatments: Viagra and Cialis      Reviewed previous notes from Dr. Farmer    Drew presents today for evaluation of ongoing issues with the ED  He has several ongoing medical issues including diabetes mellitus, hypertension, hypercholesterolemia as well as depression with anxiety and he is on medications for all of these  He tells me that he has been having issues with erection for quite some time and has tried multiple drugs with little help  Is very concerned about his social life and is worried " about its prospects if he continues to have these issues  He tells me he is ED started after his  surgery for kidney stones  He has not had any evaluations for the same.  His diabetes was not well controlled up until a few months ago and his HbA1c ran in the teens at that time  He has been told by his primary care that most of his issues are probably related to his diabetes and hypertension as well as anxiety             Past Medical History:     Past Medical History:   Diagnosis Date     Calculus of kidney     at least 4 episodes most recent 2012 once surgically removed Stones present both kidneys       Closed fracture of metatarsal bone(s)           Headache     Frontal-?migraine Triggers: no obvious,  excedrin light senstive Chiro             Past Surgical History:   No past surgical history on file.         Medications     Current Outpatient Medications   Medication     amLODIPine (NORVASC) 10 MG tablet     benzonatate (TESSALON) 100 MG capsule     glipiZIDE (GLUCOTROL XL) 5 MG 24 hr tablet     guaiFENesin-codeine (GUAIFENESIN AC) 100-10 MG/5ML syrup     lisinopril (ZESTRIL) 40 MG tablet     metFORMIN (GLUCOPHAGE) 500 MG tablet     omeprazole (PRILOSEC) 20 MG DR capsule     sertraline (ZOLOFT) 50 MG tablet     tadalafil (CIALIS) 5 MG tablet     tamsulosin (FLOMAX) 0.4 MG capsule     traZODone (DESYREL) 50 MG tablet     No current facility-administered medications for this visit.            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   Tobacco Use     Smoking status: Former     Types: Cigarettes     Smokeless tobacco: Never     Tobacco comments:      "College   Vaping Use     Vaping Use: Never used   Substance and Sexual Activity     Alcohol use: No     Drug use: Yes     Comment: Drug use: Cannabis     Sexual activity: Yes     Partners: Female   Other Topics Concern     Not on file   Social History Narrative    Diet-            Exercise-work is physical          No health insurance     Social Determinants of Health     Financial Resource Strain: Not on file   Food Insecurity: Not on file   Transportation Needs: Not on file   Physical Activity: Not on file   Stress: Not on file   Social Connections: Not on file   Intimate Partner Violence: Not on file   Housing Stability: Not on file            Allergies:   Patient has no known allergies.         Review of Systems:  From intake questionnaire     Skin: negative  Eyes: negative  Ears/Nose/Throat: negative  Respiratory: No shortness of breath, dyspnea on exertion, cough, or hemoptysis  Cardiovascular: No chest pain or palpitations  Gastrointestinal: negative; no nausea/vomiting, constipation or diarrhea  Genitourinary: as per HPI  Musculoskeletal: negative  Neurologic: negative  Psychiatric: negative  Hematologic/Lymphatic/Immunologic: negative  Endocrine: negative         Physical Exam:     Patient is a 52 year old  male   Vitals: Blood pressure (!) 160/95, pulse 90, resp. rate 18, height 1.727 m (5' 8\"), weight 79.1 kg (174 lb 4.8 oz), SpO2 98 %.  Constitutional: Body mass index is 26.5 kg/m .  Alert, no acute distress, oriented, conversant  Eyes: no scleral icterus; extraocular muscles intact, moist conjunctivae  Neck: trachea midline, no thyromegaly  Ears/nose/mouth: throat/mouth:normal, good dentition  Respiratory: no respiratory distress, or pursed lip breathing  Cardiovascular: pulses strong and intact; no obvious jugular venous distension present  Gastrointestinal: soft, nontender, no organomegaly or masses,   Lymphatics: No inguinal adenopathy  Musculoskeletal: extremities normal, no peripheral edema  Skin: " no suspicious lesions or rashes  Neuro: Alert, oriented, speech and mentation normal  Psych: affect and mood normal, alert and oriented to person, place and time  Gait: Normal  : Palpable plaque at the base of the penis on the dorsal side      Labs and Pathology:    The following labs were reviewed by me and discussed with the patient:    Significant for   Lab Results   Component Value Date    CR 1.24 09/12/2022    CR 1.15 03/09/2022    CR 1.31 01/06/2022    CR 1.22 08/13/2021    CR 1.05 12/16/2020    CR 1.22 11/25/2020     No results found for: PSA          Imaging:    The following imaging exams were independently viewed and interpreted by me and discussed with patient:                   Assessment and Plan:     Erectile dysfunction, unspecified erectile dysfunction type  Persistent ED despite adequate medical management  Recommend getting a testosterone testing done additionally I also discussed with him about penile Doppler ultrasound and ICI as a treatment for ED  We will have him scheduled for a penile Doppler and then follow-up with Dr. Layton at the [facility] to discuss about strategies of management for his ED.    - Adult Urology  Referral  - Testosterone Free and Total [[id number]]  - US Penile Duplex Complete; Future    Type 2 diabetes mellitus without complication, without long-term current use of insulin (H)  He was agreeable for the same long-term diabetes prior had improved control  Currently HbA1c is better controlled  Dyslipidemia  On medical management for the same    Hypertension, unspecified type  On hypertensives    Depression with anxiety  On antidepressants    Peyronie's disease  Possible Peyronie's disease in view of penile plaque palpable on the dorsum of the penis  Has not noticed any penile curve, not sure of it considering the fact that he does not have good erections        Plan:  Follow-up with Dr. Layton at the [facility] clinic    Orders  Orders Placed This Encounter   Procedures      US Penile Duplex Complete     Testosterone Free and Total [[id number]]       Ivan Rivera MD  [facility]      ==========================    Additional Billing and Coding Information:  Review of external notes as documented above   Review of the result(s) of each unique test - Creatinine    Independent interpretation of a test performed by another physician/other qualified health care professional (not separately reported) -       Discussion of management or test interpretation with external physician/other qualified healthcare professional/appropriate source -           30 minutes spent on the date of the encounter doing chart review, review of test results, interpretation of tests, patient visit and documentation     ==========================      Again, thank you for allowing me to participate in the care of your patient.        Sincerely,        Ivan Rivera MD

## 2022-11-22 NOTE — PATIENT INSTRUCTIONS
PLease set up [facility]  Serum testosterone to be done as a morning blood draw.

## 2022-11-22 NOTE — PROGRESS NOTES
Chief Complaint:   Erectile dysfunction           Consult or Referral:     Mr. Drew Monsivais is a 52 year old male seen at the request of Dr. Farmer.         History of Present Illness:     Drew Monsivais is a 52 year old male being seen for erectile dysfunction.  Duration of problem: 4 years  Previous treatments: Viagra and Cialis      Reviewed previous notes from Dr. Farmer    Drew presents today for evaluation of ongoing issues with the ED  He has several ongoing medical issues including diabetes mellitus, hypertension, hypercholesterolemia as well as depression with anxiety and he is on medications for all of these  He tells me that he has been having issues with erection for quite some time and has tried multiple drugs with little help  Is very concerned about his social life and is worried about its prospects if he continues to have these issues  He tells me he is ED started after his  surgery for kidney stones  He has not had any evaluations for the same.  His diabetes was not well controlled up until a few months ago and his HbA1c ran in the teens at that time  He has been told by his primary care that most of his issues are probably related to his diabetes and hypertension as well as anxiety             Past Medical History:     Past Medical History:   Diagnosis Date     Calculus of kidney     at least 4 episodes most recent 2012 once surgically removed Stones present both kidneys       Closed fracture of metatarsal bone(s)           Headache     Frontal-?migraine Triggers: no obvious,  excedrin light senstive Chiro             Past Surgical History:   No past surgical history on file.         Medications     Current Outpatient Medications   Medication     amLODIPine (NORVASC) 10 MG tablet     benzonatate (TESSALON) 100 MG capsule     glipiZIDE (GLUCOTROL XL) 5 MG 24 hr tablet     guaiFENesin-codeine (GUAIFENESIN AC) 100-10 MG/5ML syrup     lisinopril (ZESTRIL) 40 MG tablet     metFORMIN  (GLUCOPHAGE) 500 MG tablet     omeprazole (PRILOSEC) 20 MG DR capsule     sertraline (ZOLOFT) 50 MG tablet     tadalafil (CIALIS) 5 MG tablet     tamsulosin (FLOMAX) 0.4 MG capsule     traZODone (DESYREL) 50 MG tablet     No current facility-administered medications for this visit.            Family History:     Family History   Problem Relation Age of Onset     Cerebrovascular Disease Mother      Aneurysm Father             Social History:     Social History     Socioeconomic History     Marital status:      Spouse name: Not on file     Number of children: 1     Years of education: Not on file     Highest education level: Not on file   Occupational History     Occupation: [employer]   Tobacco Use     Smoking status: Former     Types: Cigarettes     Smokeless tobacco: Never     Tobacco comments:     College   Vaping Use     Vaping Use: Never used   Substance and Sexual Activity     Alcohol use: No     Drug use: Yes     Comment: Drug use: Cannabis     Sexual activity: Yes     Partners: Female   Other Topics Concern     Not on file   Social History Narrative    Diet-            Exercise-work is physical          No health insurance     Social Determinants of Health     Financial Resource Strain: Not on file   Food Insecurity: Not on file   Transportation Needs: Not on file   Physical Activity: Not on file   Stress: Not on file   Social Connections: Not on file   Intimate Partner Violence: Not on file   Housing Stability: Not on file            Allergies:   Patient has no known allergies.         Review of Systems:  From intake questionnaire     Skin: negative  Eyes: negative  Ears/Nose/Throat: negative  Respiratory: No shortness of breath, dyspnea on exertion, cough, or hemoptysis  Cardiovascular: No chest pain or palpitations  Gastrointestinal: negative; no nausea/vomiting, constipation or diarrhea  Genitourinary: as per HPI  Musculoskeletal: negative  Neurologic: negative  Psychiatric:  "negative  Hematologic/Lymphatic/Immunologic: negative  Endocrine: negative         Physical Exam:     Patient is a 52 year old  male   Vitals: Blood pressure (!) 160/95, pulse 90, resp. rate 18, height 1.727 m (5' 8\"), weight 79.1 kg (174 lb 4.8 oz), SpO2 98 %.  Constitutional: Body mass index is 26.5 kg/m .  Alert, no acute distress, oriented, conversant  Eyes: no scleral icterus; extraocular muscles intact, moist conjunctivae  Neck: trachea midline, no thyromegaly  Ears/nose/mouth: throat/mouth:normal, good dentition  Respiratory: no respiratory distress, or pursed lip breathing  Cardiovascular: pulses strong and intact; no obvious jugular venous distension present  Gastrointestinal: soft, nontender, no organomegaly or masses,   Lymphatics: No inguinal adenopathy  Musculoskeletal: extremities normal, no peripheral edema  Skin: no suspicious lesions or rashes  Neuro: Alert, oriented, speech and mentation normal  Psych: affect and mood normal, alert and oriented to person, place and time  Gait: Normal  : Palpable plaque at the base of the penis on the dorsal side      Labs and Pathology:    The following labs were reviewed by me and discussed with the patient:    Significant for   Lab Results   Component Value Date    CR 1.24 09/12/2022    CR 1.15 03/09/2022    CR 1.31 01/06/2022    CR 1.22 08/13/2021    CR 1.05 12/16/2020    CR 1.22 11/25/2020     No results found for: PSA          Imaging:    The following imaging exams were independently viewed and interpreted by me and discussed with patient:                   Assessment and Plan:     Erectile dysfunction, unspecified erectile dysfunction type  Persistent ED despite adequate medical management  Recommend getting a testosterone testing done additionally I also discussed with him about penile Doppler ultrasound and ICI as a treatment for ED  We will have him scheduled for a penile Doppler and then follow-up with Dr. Layton at the  of  to discuss about " strategies of management for his ED.    - Adult Urology  Referral  - Testosterone Free and Total [[id number]]  - US Penile Duplex Complete; Future    Type 2 diabetes mellitus without complication, without long-term current use of insulin (H)  He was agreeable for the same long-term diabetes prior had improved control  Currently HbA1c is better controlled  Dyslipidemia  On medical management for the same    Hypertension, unspecified type  On hypertensives    Depression with anxiety  On antidepressants    Peyronie's disease  Possible Peyronie's disease in view of penile plaque palpable on the dorsum of the penis  Has not noticed any penile curve, not sure of it considering the fact that he does not have good erections        Plan:  Follow-up with Dr. Layton at the [facility] clinic    Orders  Orders Placed This Encounter   Procedures     US Penile Duplex Complete     Testosterone Free and Total [[id number]]       Ivan Rivera MD  [facility]      ==========================    Additional Billing and Coding Information:  Review of external notes as documented above   Review of the result(s) of each unique test - Creatinine    Independent interpretation of a test performed by another physician/other qualified health care professional (not separately reported) -       Discussion of management or test interpretation with external physician/other qualified healthcare professional/appropriate source -           30 minutes spent on the date of the encounter doing chart review, review of test results, interpretation of tests, patient visit and documentation     ==========================

## 2022-11-23 ENCOUNTER — TELEPHONE (OUTPATIENT)
Dept: GASTROENTEROLOGY | Facility: CLINIC | Age: 52
End: 2022-11-23

## 2022-11-23 ENCOUNTER — TELEPHONE (OUTPATIENT)
Dept: UROLOGY | Facility: CLINIC | Age: 52
End: 2022-11-23

## 2022-11-23 NOTE — TELEPHONE ENCOUNTER
"[facility] Call Center    Phone Message    May a detailed message be left on voicemail: yes     Reason for Call: Other: . pt is calling, he stated he received a VM at 7pm last night and was told to call right away at 7am today to speak to avani in regards to a penile US. He was told that US has to be with avani only, writer tried backline, and avani was \"away\" on teams, please reach out to lola santiago, as he was supposed to have an imaging appt at 10am today, and took off work, but now its cancelled and has to be with avani only- per the VM that was left for him, please call Drew santiago, thank you     Action Taken: Message routed to:  [facility] uro    Travel Screening: Not Applicable                                                                      "

## 2022-11-23 NOTE — TELEPHONE ENCOUNTER
Screening Questions  BLUE  KIND OF PREP RED  LOCATION [review exclusion criteria] GREEN  SEDATION TYPE        Y Are you active on mychart?       Garry Walker MD   Ordering/Referring Provider?        SELF PAY What type of coverage do you have?      N Have you had a positive covid test in the last 14 days?     26.5 1. BMI  [BMI 40+ - review exclusion criteria]    Did not complete screening questions. Patient is self pay and states he has not spoken with financial staff. Upon further review patient was previously scheduled at  [facility]. [place] double checking with [place] if we can schedule into [facility] snap board. Will call patient back with further information.

## 2022-11-28 NOTE — TELEPHONE ENCOUNTER
Spoke to the patient and convinced him to discuss with Dr Layton if he needs to get Dus or not. This writer scheduled his penile injection same same. Explain he can not have both the same day    Misti Lee RN, BSN  Care Coordinator Urology

## 2022-12-01 ENCOUNTER — OFFICE VISIT (OUTPATIENT)
Dept: UROLOGY | Facility: CLINIC | Age: 52
End: 2022-12-01

## 2022-12-01 VITALS
BODY MASS INDEX: 26.37 KG/M2 | HEART RATE: 76 BPM | WEIGHT: 174 LBS | DIASTOLIC BLOOD PRESSURE: 97 MMHG | SYSTOLIC BLOOD PRESSURE: 153 MMHG | HEIGHT: 68 IN

## 2022-12-01 DIAGNOSIS — N48.89 PENILE PAIN: ICD-10-CM

## 2022-12-01 DIAGNOSIS — N52.9 ED (ERECTILE DYSFUNCTION): Primary | ICD-10-CM

## 2022-12-01 DIAGNOSIS — N48.6 PEYRONIE'S DISEASE: ICD-10-CM

## 2022-12-01 DIAGNOSIS — N52.8 OTHER MALE ERECTILE DYSFUNCTION: Primary | ICD-10-CM

## 2022-12-01 DIAGNOSIS — E11.40: ICD-10-CM

## 2022-12-01 DIAGNOSIS — N52.1: ICD-10-CM

## 2022-12-01 PROCEDURE — 99214 OFFICE O/P EST MOD 30 MIN: CPT | Performed by: UROLOGY

## 2022-12-01 PROCEDURE — 99211 OFF/OP EST MAY X REQ PHY/QHP: CPT

## 2022-12-01 RX ORDER — IBUPROFEN 200 MG
400 TABLET ORAL ONCE
Status: COMPLETED | OUTPATIENT
Start: 2022-12-01 | End: 2022-12-01

## 2022-12-01 RX ADMIN — Medication 400 MG: at 11:13

## 2022-12-01 NOTE — NURSING NOTE
Chief Complaint   Patient presents with     Clinic Care Coordination - Face To Face     Injection teaching       Patient Active Problem List   Diagnosis     Depression with anxiety     Proctalgia     Dyslipidemia     Benign Adenomatous Polyp Of The Large Intestine     Hypertension     Diabetes mellitus, type 2 (H)     Fatty liver     Male erectile disorder     Dysphagia     Stomach burning       No Known Allergies    Current Outpatient Medications   Medication Sig Dispense Refill     amLODIPine (NORVASC) 10 MG tablet Take 1 tablet (10 mg) by mouth daily 90 tablet 0     benzonatate (TESSALON) 100 MG capsule Take 1 capsule (100 mg) by mouth 3 times daily as needed for cough 21 capsule 0     glipiZIDE (GLUCOTROL XL) 5 MG 24 hr tablet Take 1 tablet twice daily for diabetes 180 tablet 1     guaiFENesin-codeine (GUAIFENESIN AC) 100-10 MG/5ML syrup Take 10 mLs by mouth nightly as needed for cough 70 mL 0     lisinopril (ZESTRIL) 40 MG tablet Take 1 tablet (40 mg) by mouth daily 30 tablet 0     metFORMIN (GLUCOPHAGE) 500 MG tablet TAKE 2 TABLETS BY MOUTH TWICE A  tablet 1     omeprazole (PRILOSEC) 20 MG DR capsule Take 1 capsule (20 mg) by mouth 2 times daily 60 capsule 11     sertraline (ZOLOFT) 50 MG tablet Take 1 pill daily for mood 90 tablet 1     tadalafil (CIALIS) 5 MG tablet Take one to four pills daily as needed for sex 30 tablet 0     tamsulosin (FLOMAX) 0.4 MG capsule TAKE 1 CAPSULE BY MOUTH EVERY DAY 90 capsule 0     traZODone (DESYREL) 50 MG tablet TAKE 1 TO 2 TABLETS BY MOUTH NIGHTLY AS NEEDED FOR SLEEP 180 tablet 3       Social History     Tobacco Use     Smoking status: Former     Types: Cigarettes     Smokeless tobacco: Never     Tobacco comments:     College   Vaping Use     Vaping Use: Never used   Substance Use Topics     Alcohol use: No     Drug use: Yes     Comment: Drug use: Cannabis       Drew E Loi comes into clinic today at the request of Dr. Hayes Layton for intracavernosal  injection teaching.    Diagnosis: erectile dysfunction    This service provided today was under the direct supervision of Dr. Hayes Layton, who was available if needed.    Drew Monsivais presents to clinic for Edex injection teaching.  Edex informational patient packet was read and reviewed in clinic by patient.  Reviewed usage and possible side effects.  Questions answered appropriately.  Patient injected 10 mcg with without assistance.  After 20 minutes, patient reassessed.  Patient rates his erection a 0/10. Patient stated that it would not be firm enough for penetration.  Patient educated on alternating sites for injection, left and right side of shaft. Also discussed not to use more than 3 times per week, at least 24 hours between each injection. Also discussed prolonged erections and need to go to ER if that happens.  Patient verbalized understanding. No further questions.  Patient to call if he has any questions or concerns.  Prescription for Edex no prescription given.  The following medication was given:     MEDICATION: alprostadil  ROUTE: intracavity  SITE: left side  DOSE: 10 mcg  LOT #: [id number]  : EXPIRATION DATE: 08/2023  NDC#: 03369-644  Was there drug waste? No    Prior to injection, verified patient identity using patient's name and date of birth.  Due to injection administration, patient instructed to remain in clinic for 15 minutes  afterwards, and to report any adverse reaction to me immediately.      Misti Lee RN,BSN  12/1/2022  5:29 PM

## 2022-12-01 NOTE — NURSING NOTE
"Chief Complaint   Patient presents with     Erectile Dysfunction       Height 1.727 m (5' 8\"), weight 78.9 kg (174 lb). Body mass index is 26.46 kg/m .    Patient Active Problem List   Diagnosis     Depression with anxiety     Proctalgia     Dyslipidemia     Benign Adenomatous Polyp Of The Large Intestine     Hypertension     Diabetes mellitus, type 2 (H)     Fatty liver     Male erectile disorder     Dysphagia     Stomach burning       No Known Allergies    Current Outpatient Medications   Medication Sig Dispense Refill     amLODIPine (NORVASC) 10 MG tablet Take 1 tablet (10 mg) by mouth daily 90 tablet 0     benzonatate (TESSALON) 100 MG capsule Take 1 capsule (100 mg) by mouth 3 times daily as needed for cough 21 capsule 0     glipiZIDE (GLUCOTROL XL) 5 MG 24 hr tablet Take 1 tablet twice daily for diabetes 180 tablet 1     guaiFENesin-codeine (GUAIFENESIN AC) 100-10 MG/5ML syrup Take 10 mLs by mouth nightly as needed for cough 70 mL 0     lisinopril (ZESTRIL) 40 MG tablet Take 1 tablet (40 mg) by mouth daily 30 tablet 0     metFORMIN (GLUCOPHAGE) 500 MG tablet TAKE 2 TABLETS BY MOUTH TWICE A  tablet 1     omeprazole (PRILOSEC) 20 MG DR capsule Take 1 capsule (20 mg) by mouth 2 times daily 60 capsule 11     sertraline (ZOLOFT) 50 MG tablet Take 1 pill daily for mood 90 tablet 1     tadalafil (CIALIS) 5 MG tablet Take one to four pills daily as needed for sex 30 tablet 0     tamsulosin (FLOMAX) 0.4 MG capsule TAKE 1 CAPSULE BY MOUTH EVERY DAY 90 capsule 0     traZODone (DESYREL) 50 MG tablet TAKE 1 TO 2 TABLETS BY MOUTH NIGHTLY AS NEEDED FOR SLEEP 180 tablet 3       Social History     Tobacco Use     Smoking status: Former     Types: Cigarettes     Smokeless tobacco: Never     Tobacco comments:     College   Vaping Use     Vaping Use: Never used   Substance Use Topics     Alcohol use: No     Drug use: Yes     Comment: Drug use: Erin Borja EMT  12/1/2022  9:25 AM  "

## 2022-12-01 NOTE — LETTER
"12/1/2022       RE: Drew Monsivais  [id number] [facility] [address] [facility] [address]     Dear Colleague,    Thank you for referring your patient, Drew Monsivais, to the [facility] UROLOGY CLINIC [place] at [facility]. Please see a copy of my visit note below.    HPI:  Drew Monsivais is a 52 year old male being seen for follow-up of ED.    Previous pt of Dr. Rivera-   Ritchie started 4  Years ago- bend injury during intercourse associated with pain.    ED/Vascular disease risk factors:  HTN: yes, treated   Hyperlipidemia: yes, treated   Smoking: no   DM: yes, type 2.  Not on insulin.  Cardiovascular disease: None  known  Meds associated with ED that he's taking: SSRI maybe, BP medication maybe  Anxiety/anger/depression: yes, treated.  Penile Plaques or curvature: yes, dorsal plaque per pt.     Has tried Cialis, up to 25mg and this just caused him side effects- teeth chatter.  Same side effects with internet \"Viagra\" type medication he tried previously also.    Since the penile injury has not been able to get good erections.  PDE5i worked well before his penile injury.  Testosterone level pending.      Current Outpatient Medications   Medication     amLODIPine (NORVASC) 10 MG tablet     benzonatate (TESSALON) 100 MG capsule     glipiZIDE (GLUCOTROL XL) 5 MG 24 hr tablet     guaiFENesin-codeine (GUAIFENESIN AC) 100-10 MG/5ML syrup     lisinopril (ZESTRIL) 40 MG tablet     metFORMIN (GLUCOPHAGE) 500 MG tablet     omeprazole (PRILOSEC) 20 MG DR capsule     sertraline (ZOLOFT) 50 MG tablet     tadalafil (CIALIS) 5 MG tablet     tamsulosin (FLOMAX) 0.4 MG capsule     traZODone (DESYREL) 50 MG tablet     No current facility-administered medications for this visit.          Exam:  Physical Exam  BP (!) 153/97   Pulse 76   Ht 1.727 m (5' 8\")   Wt 78.9 kg (174 lb)   BMI 26.46 kg/m      General: Alert, oriented, nad.  Pleasant and conversant.  Eyes: anicteric, " EOMI.  Pulse: regular  Resps: normal, non-labored.  Abdomen:  Nondistended.  Neurological - no tremors  Skin - no discoloration/ lesions noted   exam   Phallus circumcised.  dorsal septal plaque in the mid penis.  Testes ++  TOM: deferred.    Review of Imaging:  The following imaging exams were independently viewed and interpreted by me and discussed with patient:  N/A     Review of Labs:  The following labs were reviewed by me and discussed with the patient:  Recent Results (from the past 720 hour(s))   Symptomatic; Unknown COVID-19 Virus (Coronavirus) by PCR Nose    Collection Time: 11/10/22 11:26 AM    Specimen: Nose; Swab   Result Value Ref Range    SARS CoV2 PCR Negative Negative         Assessment:    Type 2 diabetes mellitus without complication, without long-term current use of insulin (H)  He was agreeable for the same long-term diabetes prior had improved control  Currently HbA1c is better controlled    Dyslipidemia  On medical management.     Hypertension, unspecified type  Treated with hypertensives     Depression with anxiety  Treated with antidepressants     Peyronie's disease associated with erectile dysfunction.  Penile plaque palpable on the dorsum septum, midshaft of the penis    ED secondary to DM, organic factors, Peyronie's disease.      Plan   -Erectile Dysfunction (ED): discussed options including VCD, ICI, PDE5, and IPP with Mr. Monsivais . He is not interested in surgery at this time, he does not have insurance.    -Check testosterone level today    The risks of injection therapy, including pain, hematomas , PRIAPISM, and penile scar were explained.    He will have Edex teaching, 10mcg starting dose.    Hayes Layton MD  [facility] UROLOGY CLINIC [place]      ==========================  Additional Coding Information:    Problems:  4 -- one or more chronic illnesses with exacerbation or side effects    Data Reviewed  3 or more studies reviewed, as listed above.  A1c's all  elevated.    Tests ordered: testosterone level pending.    Level of risk:  4 -- prescription drug management    Time spent:  24 minutes spent on the date of the encounter doing chart review, history and exam, documentation and further activities per the note

## 2022-12-01 NOTE — PROGRESS NOTES
See nurse note for details of nurse visit today. Used  code today    Misti Lee RN, BSN  Care Coordinator Urology

## 2022-12-01 NOTE — PROGRESS NOTES
"HPI:  Drew Monsivais is a 52 year old male being seen for follow-up of ED.    Previous pt of Dr. Rivera-   Issue started 4  Years ago- bend injury during intercourse associated with pain.    ED/Vascular disease risk factors:  HTN: yes, treated   Hyperlipidemia: yes, treated   Smoking: no   DM: yes, type 2.  Not on insulin.  Cardiovascular disease: None  known  Meds associated with ED that he's taking: SSRI maybe, BP medication maybe  Anxiety/anger/depression: yes, treated.  Penile Plaques or curvature: yes, dorsal plaque per pt.     Has tried Cialis, up to 25mg and this just caused him side effects- teeth chatter.  Same side effects with internet \"Viagra\" type medication he tried previously also.    Since the penile injury has not been able to get good erections.  PDE5i worked well before his penile injury.  Testosterone level pending.      Current Outpatient Medications   Medication     amLODIPine (NORVASC) 10 MG tablet     benzonatate (TESSALON) 100 MG capsule     glipiZIDE (GLUCOTROL XL) 5 MG 24 hr tablet     guaiFENesin-codeine (GUAIFENESIN AC) 100-10 MG/5ML syrup     lisinopril (ZESTRIL) 40 MG tablet     metFORMIN (GLUCOPHAGE) 500 MG tablet     omeprazole (PRILOSEC) 20 MG DR capsule     sertraline (ZOLOFT) 50 MG tablet     tadalafil (CIALIS) 5 MG tablet     tamsulosin (FLOMAX) 0.4 MG capsule     traZODone (DESYREL) 50 MG tablet     No current facility-administered medications for this visit.          Exam:  Physical Exam  BP (!) 153/97   Pulse 76   Ht 1.727 m (5' 8\")   Wt 78.9 kg (174 lb)   BMI 26.46 kg/m      General: Alert, oriented, nad.  Pleasant and conversant.  Eyes: anicteric, EOMI.  Pulse: regular  Resps: normal, non-labored.  Abdomen:  Nondistended.  Neurological - no tremors  Skin - no discoloration/ lesions noted   exam   Phallus circumcised.  dorsal septal plaque in the mid penis.  Testes ++  TOM: deferred.    Review of Imaging:  The following imaging exams were independently viewed and " interpreted by me and discussed with patient:  N/A     Review of Labs:  The following labs were reviewed by me and discussed with the patient:  Recent Results (from the past 720 hour(s))   Symptomatic; Unknown COVID-19 Virus (Coronavirus) by PCR Nose    Collection Time: 11/10/22 11:26 AM    Specimen: Nose; Swab   Result Value Ref Range    SARS CoV2 PCR Negative Negative         Assessment:    Type 2 diabetes mellitus without complication, without long-term current use of insulin (H)  He was agreeable for the same long-term diabetes prior had improved control  Currently HbA1c is better controlled    Dyslipidemia  On medical management.     Hypertension, unspecified type  Treated with hypertensives     Depression with anxiety  Treated with antidepressants     Peyronie's disease associated with erectile dysfunction.  Penile plaque palpable on the dorsum septum, midshaft of the penis    ED secondary to DM, organic factors, Peyronie's disease.      Plan   -Erectile Dysfunction (ED): discussed options including VCD, ICI, PDE5, and IPP with Mr. Monsivais . He is not interested in surgery at this time, he does not have insurance.    -Check testosterone level today    The risks of injection therapy, including pain, hematomas , PRIAPISM, and penile scar were explained.    He will have Edex teaching, 10mcg starting dose.    Hayes Layton MD  [facility] UROLOGY CLINIC [place]      ==========================  Additional Coding Information:    Problems:  4 -- one or more chronic illnesses with exacerbation or side effects    Data Reviewed  3 or more studies reviewed, as listed above.  A1c's all elevated.    Tests ordered: testosterone level pending.    Level of risk:  4 -- prescription drug management    Time spent:  24 minutes spent on the date of the encounter doing chart review, history and exam, documentation and further activities per the note

## 2022-12-05 DIAGNOSIS — F41.8 DEPRESSION WITH ANXIETY: ICD-10-CM

## 2022-12-06 NOTE — TELEPHONE ENCOUNTER
"Last Written Prescription Date:  6/10/2022  Last Fill Quantity: 90,  # refills: 1   Last office visit provider:  11/10/2022     Requested Prescriptions   Pending Prescriptions Disp Refills     sertraline (ZOLOFT) 50 MG tablet [Pharmacy Med Name: SERTRALINE HCL 50 MG TABLET] 90 tablet 1     Sig: TAKE ONE TABLET BY MOUTH DAILY FOR MOOD       SSRIs Protocol Passed - 12/5/2022 12:27 AM        Passed - PHQ-9 score less than 5 in past 6 months     Please review last PHQ-9 score.           Passed - Medication is active on med list        Passed - Patient is age 18 or older        Passed - Recent (6 mo) or future (30 days) visit within the authorizing provider's specialty     Patient had office visit in the last 6 months or has a visit in the next 30 days with authorizing provider or within the authorizing provider's specialty.  See \"Patient Info\" tab in inbasket, or \"Choose Columns\" in Meds & Orders section of the refill encounter.                 Claire Condon RN 12/05/22 10:12 PM  "

## 2022-12-27 ENCOUNTER — APPOINTMENT (OUTPATIENT)
Dept: CT IMAGING | Facility: CLINIC | Age: 52
DRG: 440 | End: 2022-12-27
Attending: EMERGENCY MEDICINE

## 2022-12-27 ENCOUNTER — HOSPITAL ENCOUNTER (INPATIENT)
Facility: CLINIC | Age: 52
LOS: 2 days | Discharge: HOME | DRG: 440 | End: 2022-12-29
Attending: EMERGENCY MEDICINE | Admitting: FAMILY MEDICINE

## 2022-12-27 DIAGNOSIS — N52.9 VASCULOGENIC ERECTILE DYSFUNCTION, UNSPECIFIED VASCULOGENIC ERECTILE DYSFUNCTION TYPE: ICD-10-CM

## 2022-12-27 DIAGNOSIS — I10 HYPERTENSION, UNSPECIFIED TYPE: ICD-10-CM

## 2022-12-27 DIAGNOSIS — R10.9 FLANK PAIN: ICD-10-CM

## 2022-12-27 DIAGNOSIS — R52 INTRACTABLE PAIN: ICD-10-CM

## 2022-12-27 DIAGNOSIS — K85.90 ACUTE PANCREATITIS, UNSPECIFIED COMPLICATION STATUS, UNSPECIFIED PANCREATITIS TYPE: ICD-10-CM

## 2022-12-27 DIAGNOSIS — Z87.442 H/O RENAL CALCULI: ICD-10-CM

## 2022-12-27 DIAGNOSIS — F41.8 DEPRESSION WITH ANXIETY: ICD-10-CM

## 2022-12-27 DIAGNOSIS — N52.9 ED (ERECTILE DYSFUNCTION): ICD-10-CM

## 2022-12-27 DIAGNOSIS — R11.2 INTRACTABLE NAUSEA AND VOMITING: ICD-10-CM

## 2022-12-27 DIAGNOSIS — E11.69 TYPE 2 DIABETES MELLITUS WITH OTHER SPECIFIED COMPLICATION, WITHOUT LONG-TERM CURRENT USE OF INSULIN (H): ICD-10-CM

## 2022-12-27 DIAGNOSIS — K30 STOMACH BURNING: ICD-10-CM

## 2022-12-27 DIAGNOSIS — N52.9 ERECTILE DYSFUNCTION, UNSPECIFIED ERECTILE DYSFUNCTION TYPE: Primary | ICD-10-CM

## 2022-12-27 DIAGNOSIS — G47.00 INSOMNIA, UNSPECIFIED TYPE: ICD-10-CM

## 2022-12-27 LAB
ALBUMIN SERPL-MCNC: 4.3 G/DL (ref 3.5–5)
ALP SERPL-CCNC: 71 U/L (ref 45–120)
ALT SERPL W P-5'-P-CCNC: 20 U/L (ref 0–45)
ANION GAP SERPL CALCULATED.3IONS-SCNC: 11 MMOL/L (ref 5–18)
AST SERPL W P-5'-P-CCNC: 19 U/L (ref 0–40)
ATRIAL RATE - MUSE: 94 BPM
BASOPHILS # BLD AUTO: 0.1 10E3/UL (ref 0–0.2)
BASOPHILS NFR BLD AUTO: 1 %
BILIRUB DIRECT SERPL-MCNC: 0.2 MG/DL
BILIRUB SERPL-MCNC: 0.7 MG/DL (ref 0–1)
BUN SERPL-MCNC: 12 MG/DL (ref 8–22)
CALCIUM SERPL-MCNC: 9.6 MG/DL (ref 8.5–10.5)
CHLORIDE BLD-SCNC: 100 MMOL/L (ref 98–107)
CO2 SERPL-SCNC: 27 MMOL/L (ref 22–31)
CREAT SERPL-MCNC: 1.33 MG/DL (ref 0.7–1.3)
DIASTOLIC BLOOD PRESSURE - MUSE: NORMAL MMHG
EOSINOPHIL # BLD AUTO: 0.1 10E3/UL (ref 0–0.7)
EOSINOPHIL NFR BLD AUTO: 1 %
ERYTHROCYTE [DISTWIDTH] IN BLOOD BY AUTOMATED COUNT: 12.4 % (ref 10–15)
GFR SERPL CREATININE-BSD FRML MDRD: 64 ML/MIN/1.73M2
GLUCOSE BLD-MCNC: 224 MG/DL (ref 70–125)
GLUCOSE BLDC GLUCOMTR-MCNC: 212 MG/DL (ref 70–99)
GLUCOSE BLDC GLUCOMTR-MCNC: 243 MG/DL (ref 70–99)
GLUCOSE BLDC GLUCOMTR-MCNC: 245 MG/DL (ref 70–99)
HBA1C MFR BLD: 8.9 %
HCT VFR BLD AUTO: 41.4 % (ref 40–53)
HGB BLD-MCNC: 14.9 G/DL (ref 13.3–17.7)
IMM GRANULOCYTES # BLD: 0.1 10E3/UL
IMM GRANULOCYTES NFR BLD: 1 %
INTERPRETATION ECG - MUSE: NORMAL
LIPASE SERPL-CCNC: 582 U/L (ref 0–52)
LYMPHOCYTES # BLD AUTO: 1.9 10E3/UL (ref 0.8–5.3)
LYMPHOCYTES NFR BLD AUTO: 20 %
MCH RBC QN AUTO: 27.9 PG (ref 26.5–33)
MCHC RBC AUTO-ENTMCNC: 36 G/DL (ref 31.5–36.5)
MCV RBC AUTO: 77 FL (ref 78–100)
MONOCYTES # BLD AUTO: 0.6 10E3/UL (ref 0–1.3)
MONOCYTES NFR BLD AUTO: 6 %
NEUTROPHILS # BLD AUTO: 7.1 10E3/UL (ref 1.6–8.3)
NEUTROPHILS NFR BLD AUTO: 71 %
NRBC # BLD AUTO: 0 10E3/UL
NRBC BLD AUTO-RTO: 0 /100
P AXIS - MUSE: 56 DEGREES
PLATELET # BLD AUTO: 237 10E3/UL (ref 150–450)
POTASSIUM BLD-SCNC: 4.3 MMOL/L (ref 3.5–5)
PR INTERVAL - MUSE: 180 MS
PROT SERPL-MCNC: 7.4 G/DL (ref 6–8)
QRS DURATION - MUSE: 88 MS
QT - MUSE: 346 MS
QTC - MUSE: 432 MS
R AXIS - MUSE: -79 DEGREES
RBC # BLD AUTO: 5.35 10E6/UL (ref 4.4–5.9)
SARS-COV-2 RNA RESP QL NAA+PROBE: NEGATIVE
SODIUM SERPL-SCNC: 138 MMOL/L (ref 136–145)
SYSTOLIC BLOOD PRESSURE - MUSE: NORMAL MMHG
T AXIS - MUSE: 73 DEGREES
TROPONIN I SERPL-MCNC: <0.01 NG/ML (ref 0–0.29)
VENTRICULAR RATE- MUSE: 94 BPM
WBC # BLD AUTO: 9.9 10E3/UL (ref 4–11)

## 2022-12-27 PROCEDURE — 96361 HYDRATE IV INFUSION ADD-ON: CPT

## 2022-12-27 PROCEDURE — 96376 TX/PRO/DX INJ SAME DRUG ADON: CPT

## 2022-12-27 PROCEDURE — 83690 ASSAY OF LIPASE: CPT | Performed by: EMERGENCY MEDICINE

## 2022-12-27 PROCEDURE — 80053 COMPREHEN METABOLIC PANEL: CPT | Performed by: PHYSICIAN ASSISTANT

## 2022-12-27 PROCEDURE — 36415 COLL VENOUS BLD VENIPUNCTURE: CPT | Performed by: PHYSICIAN ASSISTANT

## 2022-12-27 PROCEDURE — 85014 HEMATOCRIT: CPT | Performed by: PHYSICIAN ASSISTANT

## 2022-12-27 PROCEDURE — 74177 CT ABD & PELVIS W/CONTRAST: CPT

## 2022-12-27 PROCEDURE — U0005 INFEC AGEN DETEC AMPLI PROBE: HCPCS | Performed by: STUDENT IN AN ORGANIZED HEALTH CARE EDUCATION/TRAINING PROGRAM

## 2022-12-27 PROCEDURE — 250N000011 HC RX IP 250 OP 636: Performed by: STUDENT IN AN ORGANIZED HEALTH CARE EDUCATION/TRAINING PROGRAM

## 2022-12-27 PROCEDURE — 250N000011 HC RX IP 250 OP 636

## 2022-12-27 PROCEDURE — 99221 1ST HOSP IP/OBS SF/LOW 40: CPT | Mod: GC

## 2022-12-27 PROCEDURE — 250N000012 HC RX MED GY IP 250 OP 636 PS 637: Performed by: STUDENT IN AN ORGANIZED HEALTH CARE EDUCATION/TRAINING PROGRAM

## 2022-12-27 PROCEDURE — 82248 BILIRUBIN DIRECT: CPT | Performed by: PHYSICIAN ASSISTANT

## 2022-12-27 PROCEDURE — 120N000001 HC R&B MED SURG/OB

## 2022-12-27 PROCEDURE — C9803 HOPD COVID-19 SPEC COLLECT: HCPCS

## 2022-12-27 PROCEDURE — 99285 EMERGENCY DEPT VISIT HI MDM: CPT | Mod: 25

## 2022-12-27 PROCEDURE — 96375 TX/PRO/DX INJ NEW DRUG ADDON: CPT

## 2022-12-27 PROCEDURE — 250N000011 HC RX IP 250 OP 636: Performed by: EMERGENCY MEDICINE

## 2022-12-27 PROCEDURE — 93005 ELECTROCARDIOGRAM TRACING: CPT | Performed by: PHYSICIAN ASSISTANT

## 2022-12-27 PROCEDURE — 258N000003 HC RX IP 258 OP 636: Performed by: EMERGENCY MEDICINE

## 2022-12-27 PROCEDURE — C9113 INJ PANTOPRAZOLE SODIUM, VIA: HCPCS | Performed by: EMERGENCY MEDICINE

## 2022-12-27 PROCEDURE — 84484 ASSAY OF TROPONIN QUANT: CPT | Performed by: PHYSICIAN ASSISTANT

## 2022-12-27 PROCEDURE — 250N000013 HC RX MED GY IP 250 OP 250 PS 637

## 2022-12-27 PROCEDURE — 96374 THER/PROPH/DIAG INJ IV PUSH: CPT

## 2022-12-27 PROCEDURE — 83036 HEMOGLOBIN GLYCOSYLATED A1C: CPT | Performed by: STUDENT IN AN ORGANIZED HEALTH CARE EDUCATION/TRAINING PROGRAM

## 2022-12-27 PROCEDURE — 250N000011 HC RX IP 250 OP 636: Performed by: PHYSICIAN ASSISTANT

## 2022-12-27 PROCEDURE — 250N000013 HC RX MED GY IP 250 OP 250 PS 637: Performed by: EMERGENCY MEDICINE

## 2022-12-27 RX ORDER — NICOTINE POLACRILEX 4 MG
15-30 LOZENGE BUCCAL
Status: DISCONTINUED | OUTPATIENT
Start: 2022-12-27 | End: 2022-12-29 | Stop reason: HOSPADM

## 2022-12-27 RX ORDER — IOPAMIDOL 755 MG/ML
100 INJECTION, SOLUTION INTRAVASCULAR ONCE
Status: COMPLETED | OUTPATIENT
Start: 2022-12-27 | End: 2022-12-27

## 2022-12-27 RX ORDER — PROCHLORPERAZINE MALEATE 10 MG
10 TABLET ORAL EVERY 6 HOURS PRN
Status: DISCONTINUED | OUTPATIENT
Start: 2022-12-27 | End: 2022-12-29 | Stop reason: HOSPADM

## 2022-12-27 RX ORDER — HYDRALAZINE HYDROCHLORIDE 10 MG/1
10 TABLET, FILM COATED ORAL 4 TIMES DAILY PRN
Status: DISCONTINUED | OUTPATIENT
Start: 2022-12-27 | End: 2022-12-29 | Stop reason: HOSPADM

## 2022-12-27 RX ORDER — LORAZEPAM 2 MG/ML
0.5 INJECTION INTRAMUSCULAR ONCE
Status: COMPLETED | OUTPATIENT
Start: 2022-12-27 | End: 2022-12-27

## 2022-12-27 RX ORDER — PANTOPRAZOLE SODIUM 20 MG/1
40 TABLET, DELAYED RELEASE ORAL 2 TIMES DAILY
Status: DISCONTINUED | OUTPATIENT
Start: 2022-12-27 | End: 2022-12-29 | Stop reason: HOSPADM

## 2022-12-27 RX ORDER — HYDROMORPHONE HYDROCHLORIDE 1 MG/ML
0.3 INJECTION, SOLUTION INTRAMUSCULAR; INTRAVENOUS; SUBCUTANEOUS EVERY 4 HOURS PRN
Status: DISCONTINUED | OUTPATIENT
Start: 2022-12-27 | End: 2022-12-28

## 2022-12-27 RX ORDER — ONDANSETRON 4 MG/1
4 TABLET, ORALLY DISINTEGRATING ORAL EVERY 6 HOURS PRN
Status: DISCONTINUED | OUTPATIENT
Start: 2022-12-27 | End: 2022-12-29 | Stop reason: HOSPADM

## 2022-12-27 RX ORDER — ONDANSETRON 2 MG/ML
4 INJECTION INTRAMUSCULAR; INTRAVENOUS EVERY 6 HOURS PRN
Status: DISCONTINUED | OUTPATIENT
Start: 2022-12-27 | End: 2022-12-29 | Stop reason: HOSPADM

## 2022-12-27 RX ORDER — MORPHINE SULFATE 4 MG/ML
4 INJECTION, SOLUTION INTRAMUSCULAR; INTRAVENOUS
Status: DISCONTINUED | OUTPATIENT
Start: 2022-12-27 | End: 2022-12-27

## 2022-12-27 RX ORDER — TRAZODONE HYDROCHLORIDE 50 MG/1
50-100 TABLET, FILM COATED ORAL
Status: DISCONTINUED | OUTPATIENT
Start: 2022-12-27 | End: 2022-12-29 | Stop reason: HOSPADM

## 2022-12-27 RX ORDER — LISINOPRIL 20 MG/1
40 TABLET ORAL DAILY
Status: DISCONTINUED | OUTPATIENT
Start: 2022-12-27 | End: 2022-12-29 | Stop reason: HOSPADM

## 2022-12-27 RX ORDER — HYDROXYZINE HYDROCHLORIDE 25 MG/1
25 TABLET, FILM COATED ORAL EVERY 6 HOURS PRN
Status: DISCONTINUED | OUTPATIENT
Start: 2022-12-27 | End: 2022-12-28

## 2022-12-27 RX ORDER — SODIUM CHLORIDE 9 MG/ML
INJECTION, SOLUTION INTRAVENOUS CONTINUOUS
Status: CANCELLED | OUTPATIENT
Start: 2022-12-27

## 2022-12-27 RX ORDER — ONDANSETRON 2 MG/ML
4 INJECTION INTRAMUSCULAR; INTRAVENOUS EVERY 30 MIN PRN
Status: DISCONTINUED | OUTPATIENT
Start: 2022-12-27 | End: 2022-12-27

## 2022-12-27 RX ORDER — TAMSULOSIN HYDROCHLORIDE 0.4 MG/1
0.4 CAPSULE ORAL DAILY
Status: DISCONTINUED | OUTPATIENT
Start: 2022-12-27 | End: 2022-12-29 | Stop reason: HOSPADM

## 2022-12-27 RX ORDER — PROCHLORPERAZINE 25 MG
25 SUPPOSITORY, RECTAL RECTAL EVERY 12 HOURS PRN
Status: DISCONTINUED | OUTPATIENT
Start: 2022-12-27 | End: 2022-12-29 | Stop reason: HOSPADM

## 2022-12-27 RX ORDER — LIDOCAINE 40 MG/G
CREAM TOPICAL
Status: DISCONTINUED | OUTPATIENT
Start: 2022-12-27 | End: 2022-12-29 | Stop reason: HOSPADM

## 2022-12-27 RX ORDER — HYDROXYZINE HYDROCHLORIDE 25 MG/1
50 TABLET, FILM COATED ORAL EVERY 6 HOURS PRN
Status: DISCONTINUED | OUTPATIENT
Start: 2022-12-27 | End: 2022-12-28

## 2022-12-27 RX ORDER — AMLODIPINE BESYLATE 5 MG/1
10 TABLET ORAL ONCE
Status: COMPLETED | OUTPATIENT
Start: 2022-12-27 | End: 2022-12-27

## 2022-12-27 RX ORDER — SODIUM CHLORIDE, SODIUM LACTATE, POTASSIUM CHLORIDE, CALCIUM CHLORIDE 600; 310; 30; 20 MG/100ML; MG/100ML; MG/100ML; MG/100ML
INJECTION, SOLUTION INTRAVENOUS CONTINUOUS
Status: DISCONTINUED | OUTPATIENT
Start: 2022-12-27 | End: 2022-12-29 | Stop reason: HOSPADM

## 2022-12-27 RX ORDER — DEXTROSE MONOHYDRATE 25 G/50ML
25-50 INJECTION, SOLUTION INTRAVENOUS
Status: DISCONTINUED | OUTPATIENT
Start: 2022-12-27 | End: 2022-12-29 | Stop reason: HOSPADM

## 2022-12-27 RX ORDER — AMLODIPINE BESYLATE 10 MG/1
10 TABLET ORAL DAILY
Status: DISCONTINUED | OUTPATIENT
Start: 2022-12-28 | End: 2022-12-29 | Stop reason: HOSPADM

## 2022-12-27 RX ORDER — ONDANSETRON 2 MG/ML
4 INJECTION INTRAMUSCULAR; INTRAVENOUS ONCE
Status: COMPLETED | OUTPATIENT
Start: 2022-12-27 | End: 2022-12-27

## 2022-12-27 RX ADMIN — SODIUM CHLORIDE, POTASSIUM CHLORIDE, SODIUM LACTATE AND CALCIUM CHLORIDE: 600; 310; 30; 20 INJECTION, SOLUTION INTRAVENOUS at 23:22

## 2022-12-27 RX ADMIN — ONDANSETRON 4 MG: 2 INJECTION INTRAMUSCULAR; INTRAVENOUS at 15:31

## 2022-12-27 RX ADMIN — SODIUM CHLORIDE, POTASSIUM CHLORIDE, SODIUM LACTATE AND CALCIUM CHLORIDE: 600; 310; 30; 20 INJECTION, SOLUTION INTRAVENOUS at 15:31

## 2022-12-27 RX ADMIN — ONDANSETRON 4 MG: 2 INJECTION INTRAMUSCULAR; INTRAVENOUS at 13:23

## 2022-12-27 RX ADMIN — SODIUM CHLORIDE, POTASSIUM CHLORIDE, SODIUM LACTATE AND CALCIUM CHLORIDE 1000 ML: 600; 310; 30; 20 INJECTION, SOLUTION INTRAVENOUS at 12:39

## 2022-12-27 RX ADMIN — LISINOPRIL 40 MG: 20 TABLET ORAL at 22:14

## 2022-12-27 RX ADMIN — PANTOPRAZOLE SODIUM 40 MG: 40 INJECTION, POWDER, FOR SOLUTION INTRAVENOUS at 14:29

## 2022-12-27 RX ADMIN — HYDROMORPHONE HYDROCHLORIDE 0.3 MG: 1 INJECTION, SOLUTION INTRAMUSCULAR; INTRAVENOUS; SUBCUTANEOUS at 22:14

## 2022-12-27 RX ADMIN — INSULIN ASPART 3 UNITS: 100 INJECTION, SOLUTION INTRAVENOUS; SUBCUTANEOUS at 22:24

## 2022-12-27 RX ADMIN — SODIUM CHLORIDE, POTASSIUM CHLORIDE, SODIUM LACTATE AND CALCIUM CHLORIDE 1000 ML: 600; 310; 30; 20 INJECTION, SOLUTION INTRAVENOUS at 14:30

## 2022-12-27 RX ADMIN — HYDROMORPHONE HYDROCHLORIDE 0.3 MG: 1 INJECTION, SOLUTION INTRAMUSCULAR; INTRAVENOUS; SUBCUTANEOUS at 17:54

## 2022-12-27 RX ADMIN — TAMSULOSIN HYDROCHLORIDE 0.4 MG: 0.4 CAPSULE ORAL at 22:22

## 2022-12-27 RX ADMIN — MORPHINE SULFATE 4 MG: 4 INJECTION, SOLUTION INTRAMUSCULAR; INTRAVENOUS at 14:30

## 2022-12-27 RX ADMIN — SERTRALINE HYDROCHLORIDE 50 MG: 50 TABLET, FILM COATED ORAL at 22:14

## 2022-12-27 RX ADMIN — PROCHLORPERAZINE EDISYLATE 10 MG: 5 INJECTION INTRAMUSCULAR; INTRAVENOUS at 22:15

## 2022-12-27 RX ADMIN — IOPAMIDOL 100 ML: 755 INJECTION, SOLUTION INTRAVENOUS at 13:42

## 2022-12-27 RX ADMIN — PANTOPRAZOLE SODIUM 40 MG: 20 TABLET, DELAYED RELEASE ORAL at 22:14

## 2022-12-27 RX ADMIN — LORAZEPAM 0.5 MG: 2 INJECTION INTRAMUSCULAR; INTRAVENOUS at 15:31

## 2022-12-27 RX ADMIN — ONDANSETRON 4 MG: 2 INJECTION INTRAMUSCULAR; INTRAVENOUS at 12:34

## 2022-12-27 RX ADMIN — AMLODIPINE BESYLATE 10 MG: 5 TABLET ORAL at 15:31

## 2022-12-27 RX ADMIN — ONDANSETRON 4 MG: 2 INJECTION INTRAMUSCULAR; INTRAVENOUS at 20:39

## 2022-12-27 ASSESSMENT — ACTIVITIES OF DAILY LIVING (ADL)
FALL_HISTORY_WITHIN_LAST_SIX_MONTHS: NO
WEAR_GLASSES_OR_BLIND: NO
ADLS_ACUITY_SCORE: 35
ADLS_ACUITY_SCORE: 35
CONCENTRATING,_REMEMBERING_OR_MAKING_DECISIONS_DIFFICULTY: YES
ADLS_ACUITY_SCORE: 35
CHANGE_IN_FUNCTIONAL_STATUS_SINCE_ONSET_OF_CURRENT_ILLNESS/INJURY: NO
DIFFICULTY_EATING/SWALLOWING: NO
DOING_ERRANDS_INDEPENDENTLY_DIFFICULTY: NO
WALKING_OR_CLIMBING_STAIRS_DIFFICULTY: NO
ADLS_ACUITY_SCORE: 35
DRESSING/BATHING_DIFFICULTY: NO
TOILETING_ISSUES: NO

## 2022-12-27 ASSESSMENT — ENCOUNTER SYMPTOMS
VOMITING: 1
LIGHT-HEADEDNESS: 1
DIARRHEA: 0
ABDOMINAL PAIN: 1

## 2022-12-27 NOTE — ED NOTES
"ED SIGNOUT  Date/Time:12/27/2022 2:10 PM    Patient signed out to me by my colleague, Dr. Acosta.  Please see their note for complete history and physical. Plan to follow up on admission.     Briefly, Drew Monsivais is a 52 year old male with a history of noninsulin dependent DM II, HTN, and HLD, who presents with onset of persistent vomiting that began this morning with associated burning abdominal pain, chest pain, and lightheadedness. Denies diarrhea.       The creation of this record is based on the scribe s observations of the work being performed by Robyn Delcid MD and the provider s statements to them. It was created on their behalf by Sandrita Sandoval, a trained medical scribe. This document has been checked and approved by the attending provider.      REMAINING ED WORKUP:    Vitals:  BP (!) 212/123 (BP Location: Right arm, Patient Position: Semi-Singer's, Cuff Size: Adult Regular)   Pulse 94   Temp 98.3  F (36.8  C) (Oral)   Resp 23   Ht 1.727 m (5' 8\")   Wt 79.6 kg (175 lb 6.4 oz)   SpO2 99%   BMI 26.67 kg/m        Pertinent labs results reviewed   Results for orders placed or performed during the hospital encounter of 12/27/22   CT Chest/Abdomen/Pelvis w Contrast    Impression    IMPRESSION:  1.  No acute process in the chest, abdomen or pelvis.  2.  Hepatic steatosis.  3.  Bilateral nonobstructive nephrolithiasis.   Basic metabolic panel   Result Value Ref Range    Sodium 138 136 - 145 mmol/L    Potassium 4.3 3.5 - 5.0 mmol/L    Chloride 100 98 - 107 mmol/L    Carbon Dioxide (CO2) 27 22 - 31 mmol/L    Anion Gap 11 5 - 18 mmol/L    Urea Nitrogen 12 8 - 22 mg/dL    Creatinine 1.33 (H) 0.70 - 1.30 mg/dL    Calcium 9.6 8.5 - 10.5 mg/dL    Glucose 224 (H) 70 - 125 mg/dL    GFR Estimate 64 >60 mL/min/1.73m2   Troponin I (now)   Result Value Ref Range    Troponin I <0.01 0.00 - 0.29 ng/mL   Hepatic function panel   Result Value Ref Range    Bilirubin Total 0.7 0.0 - 1.0 mg/dL    Bilirubin Direct 0.2 " <=0.5 mg/dL    Protein Total 7.4 6.0 - 8.0 g/dL    Albumin 4.3 3.5 - 5.0 g/dL    Alkaline Phosphatase 71 45 - 120 U/L    AST 19 0 - 40 U/L    ALT 20 0 - 45 U/L   Result Value Ref Range    Lipase 582 (H) 0 - 52 U/L   CBC with platelets and differential   Result Value Ref Range    WBC Count 9.9 4.0 - 11.0 10e3/uL    RBC Count 5.35 4.40 - 5.90 10e6/uL    Hemoglobin 14.9 13.3 - 17.7 g/dL    Hematocrit 41.4 40.0 - 53.0 %    MCV 77 (L) 78 - 100 fL    MCH 27.9 26.5 - 33.0 pg    MCHC 36.0 31.5 - 36.5 g/dL    RDW 12.4 10.0 - 15.0 %    Platelet Count 237 150 - 450 10e3/uL    % Neutrophils 71 %    % Lymphocytes 20 %    % Monocytes 6 %    % Eosinophils 1 %    % Basophils 1 %    % Immature Granulocytes 1 %    NRBCs per 100 WBC 0 <1 /100    Absolute Neutrophils 7.1 1.6 - 8.3 10e3/uL    Absolute Lymphocytes 1.9 0.8 - 5.3 10e3/uL    Absolute Monocytes 0.6 0.0 - 1.3 10e3/uL    Absolute Eosinophils 0.1 0.0 - 0.7 10e3/uL    Absolute Basophils 0.1 0.0 - 0.2 10e3/uL    Absolute Immature Granulocytes 0.1 <=0.4 10e3/uL    Absolute NRBCs 0.0 10e3/uL   ECG 12-LEAD WITH MUSE (LHE)   Result Value Ref Range    Systolic Blood Pressure  mmHg    Diastolic Blood Pressure  mmHg    Ventricular Rate 94 BPM    Atrial Rate 94 BPM    IN Interval 180 ms    QRS Duration 88 ms     ms    QTc 432 ms    P Axis 56 degrees    R AXIS -79 degrees    T Axis 73 degrees    Interpretation ECG       Sinus rhythm  Possible Left atrial enlargement  Left axis deviation  Septal infarct , age undetermined  Abnormal ECG  When compared with ECG of 09-MAR-2022 11:37,  Septal infarct is now Present  Confirmed by SEE ED PROVIDER NOTE FOR, ECG INTERPRETATION ([id number]),  CIARA MONTIEL ([id number]) on 12/27/2022 12:16:06 PM         Pertinent imaging reviewed   Please see official radiology report.  CT Chest/Abdomen/Pelvis w Contrast   Final Result   IMPRESSION:   1.  No acute process in the chest, abdomen or pelvis.   2.  Hepatic steatosis.   3.  Bilateral nonobstructive  nephrolithiasis.           Interventions  Medications   lactated ringers BOLUS 1,000 mL (0 mLs Intravenous Stopped 12/27/22 1418)     Followed by   lactated ringers BOLUS 1,000 mL (1,000 mLs Intravenous New Bag 12/27/22 1430)     Followed by   lactated ringers infusion (has no administration in time range)   ondansetron (ZOFRAN) injection 4 mg (4 mg Intravenous Given 12/27/22 1323)   morphine (PF) injection 4 mg (4 mg Intravenous Given 12/27/22 1430)   ondansetron (ZOFRAN) injection 4 mg (has no administration in time range)   HYDROmorphone (DILAUDID) injection 1 mg (has no administration in time range)   amLODIPine (NORVASC) tablet 10 mg (has no administration in time range)   lidocaine (viscous) (XYLOCAINE) 2 % 15 mL, alum & mag hydroxide-simethicone (MAALOX) 15 mL GI Cocktail (30 mLs Oral Not Given 12/27/22 1437)   ondansetron (ZOFRAN) injection 4 mg (4 mg Intravenous Given 12/27/22 1234)   iopamidol (ISOVUE-370) solution 100 mL (100 mLs Intravenous Given 12/27/22 1342)   pantoprazole (PROTONIX) IV push injection 40 mg (40 mg Intravenous Given 12/27/22 1429)        ED Course/MDM:  2:05 PM Signout accepted from Dr. Acosta.  Prior records were reviewed.  Diagnostics from this visit are reviewed.  2:54 PM I spoke with the resident hospitalist who accepts the patient for admission.        ED Course as of 12/27/22 1501   Tue Dec 27, 2022   1212 The patient had recurrent emesis last night followed by chest pain.  Certainly consider the possibility of Boerhaave esophageal rupture and or tear but as he does not appear septic I think superficial more Ivet-Montenegro style tear is more likely.  His abdomen is soft and actually nontender but with recurrent emesis overnight with the upper abdominal pain we will do labs to look at the possibility of cholecystitis pancreatitis and/or gastritis.  However, this may also end up being foodborne, gastroenteritis, and or related to his more chronic problems with intermittent nausea  and vomiting.  However, this patient's episode today is much more intense so we will do CT imaging as mentioned.  His blood pressure is quite elevated but he is also quite anxious.  No signs of encephalopathy or cardiomyopathy as result of this elevated blood pressure today.   1214 Initial EKG was reviewed without ischemic changes   1347 Patient's lipase is a bit elevated.  Uncear etiology as he does not drink alcohol, and he has no elevations in his lft's to suggest cholelithiasis but we will see what ct shows.  Based on his pain currently and ongoing nausea here (just called in rn for pain and nausea), will likely need admission for pancreatitis.   1406 Pt signed out to me by daytime ED MD Dr GAUTAM pending CT abdomen (done, awaiting read) with nausea/vomiting since yesterday nonbloody material with overnight chest pain. Troponin negative, EKG reassuring, BMP and CBC and LFTs WNL, patient locates pain in epigastrum, lipase found to be elevated and zofran/IVF/protonix/morphine given, no EtOH history, patient with Shanta 0 or Cal[place] 1 acute pancreatitis low mortality risk overall, plan to reassess after imaging   1434 CT reassuringly without abnormalities in chest, abdomen or pelvis. Will reassess now.   1437 After signout, patient seen again by Dr Acosta who signed out plan to admit with intractable pain, nausea/vomiting.He declined transfer for financial reasons, plan to admit with pancreatitis and intractable symptoms   1438 I discussed the possibility of transfer with the patient as per her current hospital protocols because we are fall, but the patient is not able to transfer due to concerns that are financial with transfer.   1455 Pt endorsed to [facility] resident to med surg           1. Acute pancreatitis, unspecified complication status, unspecified pancreatitis type    2. Intractable nausea and vomiting    3. Intractable pain          Robyn Delcid MD  [facility]         Robyn Delcid  MD SAL  12/27/22 1505

## 2022-12-27 NOTE — PHARMACY-ADMISSION MEDICATION HISTORY
Pharmacy Note - Admission Medication History    Pertinent Provider Information:     Patient said he hasn't taken his medications in 2 weeks.  He didn't seem to know his medications very well.  I asked if he takes his medications regularly and he said it depends.     ______________________________________________________________________    Prior To Admission (PTA) med list completed and updated in EMR.       Current Facility-Administered Medications for the 12/27/22 encounter (Hospital Encounter)   Medication     [COMPLETED] alprostadil (EDEX) kit 10 mcg     PTA Med List   Medication Sig Note Last Dose     amLODIPine (NORVASC) 10 MG tablet Take 1 tablet (10 mg) by mouth daily 12/27/2022: Last filled 6/10/22 for 90 day supply.  Unknown     benzonatate (TESSALON) 100 MG capsule Take 1 capsule (100 mg) by mouth 3 times daily as needed for cough       glipiZIDE (GLUCOTROL XL) 5 MG 24 hr tablet Take 1 tablet twice daily for diabetes  2 weeks ago     guaiFENesin-codeine (GUAIFENESIN AC) 100-10 MG/5ML syrup Take 10 mLs by mouth nightly as needed for cough       lisinopril (ZESTRIL) 40 MG tablet Take 1 tablet (40 mg) by mouth daily  2 weeks ago     metFORMIN (GLUCOPHAGE) 500 MG tablet TAKE 2 TABLETS BY MOUTH TWICE A DAY 12/27/2022: Last filled 6/26/22 for 90 day supply Unknown     omeprazole (PRILOSEC) 20 MG DR capsule Take 1 capsule (20 mg) by mouth 2 times daily  2 weeks ago     sertraline (ZOLOFT) 50 MG tablet TAKE ONE TABLET BY MOUTH DAILY FOR MOOD  2 weeks ago     tadalafil (CIALIS) 5 MG tablet Take one to four pills daily as needed for sex  2 weeks ago     tamsulosin (FLOMAX) 0.4 MG capsule TAKE 1 CAPSULE BY MOUTH EVERY DAY  2 weeks ago     traZODone (DESYREL) 50 MG tablet TAKE 1 TO 2 TABLETS BY MOUTH NIGHTLY AS NEEDED FOR SLEEP         Information source(s): Patient and CareEverywhere/SureScripts  Method of interview communication: in-person    Summary of Changes to PTA Med List  New: none  Discontinued:  none  Changed: none    Patient was asked about OTC/herbal products specifically.  PTA med list reflects this.    In the past week, patient estimated taking medication this percent of the time:  less than 50% due to per patient.    Allergies were reviewed, assessed, and updated with the patient.      Patient does not use any multi-dose medications prior to admission.    The information provided in this note is only as accurate as the sources available at the time of the update(s).    Thank you for the opportunity to participate in the care of this patient.    Miley Guillen RPH  12/27/2022 4:52 PM

## 2022-12-27 NOTE — H&P
[facility]    History and Physical - Hospitalist Service       Date of Admission:  12/27/2022    Assessment & Plan      Drew Monsivais is a 52 year old male admitted on 12/27/2022. He has a PMH including DMII, HTN, GERD, ED, renal stones, dyslipidemia, depression and anxiety who presents with acute on chronic nausea and vomiting, found to have pancreatitis.    Acute on chronic nausea and vomiting  Chest pain  Abdominal pain  Suspected pancreatitis  Anxiety  Hx of GERD  Presented to the ED with intractable nausea and vomiting and subsequent chest pain that began about 12 hours ago. In ED, was hypertensive, afebrile, other VS WNL. Cr 1.33, glucose 224, BMP, CBC, and LFTs otherwise normal, EKG unremarkable, troponin negative, lipase 582. CT CAP showing no acute processes, hepatic steatosis, bilateral nonobstructive nephrolithiasis. Received Zofran, IV fluids, protonix, and morphine. Low suspicion for ACS for chest pain as it was reproducible on exam and troponin negative. Per chart review, was seen for dysphagia, intermittent nausea vomiting, and heartburn at a clinic visit in September.  Was prescribed omeprazole and referred to GI for EGD, but this was not completed.  Current etiology of intractable acute on chronic nausea and vomiting could be pancreatitis versus GERD versus ulcer.  Suspect anxiety contributing. Could also consider cyclic vomiting 2/2 THC use. Has a hx of dyslipidemia; will recheck lipid panel to assess if this is contributing to pancreatitis.  - NPO except ice chips and meds  -  ml/hr  - Dilaudid 0.3 mg q4h PRN for pain  - hydroxyzine PRN for anxiety; restart PTA sertraline and trazodone once med rec completed  - Zofran for nausea  - COVID swab  - AM BMP, CBC, LFTs, lipid panel  - restart daily PPI pending med rec    CKDII  Baseline creatinine around 1.1-1.2. Cr 1.33 on admission.  - Monitor    Hypertension  Hypertensive urgency  Elevated blood pressures on  admission likely 2/2 pain and n/v. Pt has also been out of medications for 1 week.  - restart PTA amlodipine and lisinopril  - hydralazine PRN    Dyslipidemia  Last panel 3 months ago showed cholesterol 210, triglycerides 390, LDL 92. Not on statin.  - Recheck lipid panel    Diabetes mellitus type 2  Not on insulin at home. Last A1c 7.7 in 9/2022; had been improving in glucose control in the past year.  - medium sliding scale insulin  - hold PTA glipizide and metformin  - recheck A1c    Chronic conditions:  Depression: continue PTA sertraline and trazodone once med rec completed  H/o nephrolithiasis: continue PTA flomax     Diet: NPO for Medical/Clinical Reasons Except for: Ice Chips, Meds NPO except ice chips and meds  DVT Prophylaxis: Pneumatic Compression Devices  Still Catheter: Not present  Fluids:  ml/hr  Central Lines: None  Cardiac Monitoring: None  Code Status:  Full code for now (did not ask patient), will ask tomorrow    Disposition Plan      Expected Discharge Date: 12/29/2022                The patient's care was discussed with the Attending Physician, Dr. Wagner.    Estefania Villalobos MD PGY-1  [facility] Family Medicine Resident Team  [facility]  Securely message with the Vocera Web Console (learn more here)  Text page via [employer] Paging/Directory     ______________________________________________________________________    Chief Complaint   Intractable nausea and vomiting with subsequent chest pain, abdominal pain    History is obtained from the patient    History of Present Illness   Drew Monsivais is a 52 year old male with PMH including DMII, HTN, GERD, ED, renal stones, dyslipidemia, anxiety and depression who presents with intractable nausea and vomiting.    Pt reports vomiting since 0300 this morning and subsequent midline chest pain.  Reports having a history of intermittent epigastric pain and was supposed to get an EGD done, but this was not done as he did not have  insurance. Had light red tinge in his vomit this morning; but had red-colored Crystal Lite the night before.  No red tinge in vomitus since.  Has diarrhea at baseline; last bowel movement was yesterday and was soft/watery.  Epigastric and midline chest pain do not radiate.  Denies fever, does note sweating and chills due to vomiting.  Feels like something is caught in his throat.    Has chronic nausea. Takes THC gummies daily (50 mg); last took gummies yesterday. Smokes marijuana occasionally. No alcohol use. Quit smoking; previously smoked 0.5-1ppd. Ran out of all of his medications 1 week ago.    Reports significant anxiety with being in an unfamiliar room without windows and any family members around.    In ED, was hypertensive, afebrile, other VS WNL. Cr 1.33, glucose 224, BMP, CBC, and LFTs otherwise normal, EKG unremarkable, troponin negative, lipase 582. CT CAP showing no acute processes, hepatic steatosis, bilateral nonobstructive nephrolithiasis. Received Zofran, IV fluids, protonix, and morphine.    Review of Systems    CONSTITUTIONAL: NEGATIVE for fever  EYES: NEGATIVE for vision changes or irritation  RESP: NEGATIVE for significant cough or SOB  CV: POSITIVE for chest pain, NEGATIVE for peripheral edema  GI: POSITIVE for nausea, abdominal pain. NEGATIVE for change in bowel habits.  : NEGATIVE for dysuria  NEURO: NEGATIVE for headache  HEME: NEGATIVE for bleeding problems  PSYCHIATRIC: POSITIVE for anxiety    Past Medical History    I have reviewed this patient's medical history and updated it with pertinent information if needed.   Past Medical History:   Diagnosis Date     Calculus of kidney     at least 4 episodes most recent 2012 once surgically removed Stones present both kidneys       Closed fracture of metatarsal bone(s)           Headache     Frontal-?migraine Triggers: no obvious,  excedrin light senstive Chiro    Per chart review, has history of diabetes mellitus type 2, hypertension, GERD,  erectile dysfunction, depression, and anxiety.    Past Surgical History   I have reviewed this patient's surgical history and updated it with pertinent information if needed.  History reviewed. No pertinent surgical history.     Social History   I have reviewed this patient's social history and updated it with pertinent information if needed. Drew Monsivais  reports that he has quit smoking. His smoking use included cigarettes. He has never used smokeless tobacco. He reports current drug use. He reports that he does not drink alcohol. Used to smoke 1/2-1ppd for a long time. Occasional marijuana smoking. Lives with son at home.    Family History   I have reviewed this patient's family history and updated it with pertinent information if needed.  Family History   Problem Relation Age of Onset     Cerebrovascular Disease Mother      Aneurysm Father    Father's aneurysm that led to death occurred around pt's current age.    Prior to Admission Medications   Prior to Admission Medications   Prescriptions Last Dose Informant Patient Reported? Taking?   amLODIPine (NORVASC) 10 MG tablet Unknown  No Yes   Sig: Take 1 tablet (10 mg) by mouth daily   benzonatate (TESSALON) 100 MG capsule   No Yes   Sig: Take 1 capsule (100 mg) by mouth 3 times daily as needed for cough   glipiZIDE (GLUCOTROL XL) 5 MG 24 hr tablet 2 weeks ago  No Yes   Sig: Take 1 tablet twice daily for diabetes   guaiFENesin-codeine (GUAIFENESIN AC) 100-10 MG/5ML syrup   No Yes   Sig: Take 10 mLs by mouth nightly as needed for cough   lisinopril (ZESTRIL) 40 MG tablet 2 weeks ago  No Yes   Sig: Take 1 tablet (40 mg) by mouth daily   metFORMIN (GLUCOPHAGE) 500 MG tablet Unknown  No Yes   Sig: TAKE 2 TABLETS BY MOUTH TWICE A DAY   omeprazole (PRILOSEC) 20 MG DR capsule 2 weeks ago  No Yes   Sig: Take 1 capsule (20 mg) by mouth 2 times daily   sertraline (ZOLOFT) 50 MG tablet 2 weeks ago  No Yes   Sig: TAKE ONE TABLET BY MOUTH DAILY FOR MOOD   tadalafil  (CIALIS) 5 MG tablet 2 weeks ago  No Yes   Sig: Take one to four pills daily as needed for sex   tamsulosin (FLOMAX) 0.4 MG capsule 2 weeks ago  No Yes   Sig: TAKE 1 CAPSULE BY MOUTH EVERY DAY   traZODone (DESYREL) 50 MG tablet   No Yes   Sig: TAKE 1 TO 2 TABLETS BY MOUTH NIGHTLY AS NEEDED FOR SLEEP      Facility-Administered Medications Last Administration Doses Remaining   alprostadil (EDEX) kit 10 mcg 12/27/2022  9:54 AM 0        Allergies   No Known Allergies    Physical Exam   Vital Signs: Temp: 98  F (36.7  C) Temp src: Oral BP: (!) 180/108 Pulse: 69   Resp: 18 SpO2: 97 % O2 Device: None (Room air)    Weight: 174 lbs 3 oz  Constitutional: alert, very anxious and moderately distressed  Eyes: Lids and lashes normal, pupils equal and round, extra ocular muscles intact, sclera clear, conjunctiva normal  ENT: Normocephalic, without obvious abnormality, atraumatic, oral pharynx with moist mucous membranes, gums normal and poor dentition.  Hematologic / Lymphatic: no cervical lymphadenopathy and no supraclavicular lymphadenopathy  Respiratory: No increased work of breathing, good air exchange, clear to auscultation bilaterally, no crackles or wheezing. Reproducible chest pain with palpation of lower mid-chest, near base of sternum.  Cardiovascular: Regular rate and rhythm, normal S1 and S2, no S3 or S4, and no murmur noted  GI: Normal bowel sounds, distended, RUQ and epigastrium tender to palpation, no masses palpated  Skin: normal skin color, texture, turgor, no redness, warmth, or swelling and no jaundice  Musculoskeletal:  Motor strength is 5 out of 5 all extremities bilaterally.  Tone is normal.  no lower extremity pitting edema present  full range of motion noted  Neurologic: Awake, alert, oriented to name, place and time.  Cranial nerves II-XII are grossly intact.  Motor is 5 out of 5 bilaterally.  Neuropsychiatric: General: anxious and normal eye contact  Level of consciousness: alert / normal  Affect:  anxious    Data   Data reviewed today: I reviewed all medications, new labs and imaging results over the last 24 hours.    Recent Labs   Lab 12/27/22  1702 12/27/22  1233   WBC  --  9.9   HGB  --  14.9   MCV  --  77*   PLT  --  237   NA  --  138   POTASSIUM  --  4.3   CHLORIDE  --  100   CO2  --  27   BUN  --  12   CR  --  1.33*   ANIONGAP  --  11   LEISA  --  9.6   * 224*   ALBUMIN  --  4.3   PROTTOTAL  --  7.4   BILITOTAL  --  0.7   ALKPHOS  --  71   ALT  --  20   AST  --  19   LIPASE  --  582*     Recent Results (from the past 24 hour(s))   CT Chest/Abdomen/Pelvis w Contrast    Narrative    EXAM: CT CHEST/ABDOMEN/PELVIS W CONTRAST  LOCATION: [facility]  DATE/TIME: 12/27/2022 1:51 PM    INDICATION: patient having chest painafter throwing up all day yesterday.  COMPARISON: Chest radiographs 11/10/2022. CTA chest, abdomen, and pelvis 11/25/2020.  TECHNIQUE: CT scan of the chest, abdomen, and pelvis was performed following injection of IV contrast. Multiplanar reformats were obtained. Dose reduction techniques were used.   CONTRAST: 100ml Isovue 370    FINDINGS:   LUNGS AND PLEURA: Normal.    MEDIASTINUM/AXILLAE: Normal.    CORONARY ARTERY CALCIFICATION: Mild.    HEPATOBILIARY: Hepatic steatosis.    PANCREAS: Normal.    SPLEEN: Normal.    ADRENAL GLANDS: Normal.    KIDNEYS/BLADDER: Several nonobstructing stones in the collecting systems of both kidneys ranging in size from 2-4 mm on the right and 1-5 mm on the left.    BOWEL: Normal. No obstruction or inflammation. Normal appendix. No free fluid or gas.    LYMPH NODES: Normal.    VASCULATURE: Mild aortoiliac atherosclerosis. No aneurysm.    PELVIC ORGANS: Normal.    MUSCULOSKELETAL: Advanced degenerative disc disease at L5-S1.      Impression    IMPRESSION:  1.  No acute process in the chest, abdomen or pelvis.  2.  Hepatic steatosis.  3.  Bilateral nonobstructive nephrolithiasis.

## 2022-12-27 NOTE — ED PROVIDER NOTES
Emergency Department Encounter     Evaluation Date & Time:   2022 11:38 AM    CHIEF COMPLAINT:  Chest Pain and Nausea & Vomiting      Triage Note:  Patient presents to the ED with mid sternal chest pain, nausea, vomiting that started at 0300. Pain is described as tightness, did not take any medications due to nausea. Patient does admit to emesis x3 yesterday but nothing compared to what has been ongoing since 0300.       Triage Assessment     Row Name 22 1135       Triage Assessment (Adult)    Airway WDL WDL       Respiratory WDL    Respiratory WDL WDL       Skin Circulation/Temperature WDL    Skin Circulation/Temperature WDL WDL       Cardiac WDL    Cardiac WDL X  chest pain, nausea,vomtting since 030       Peripheral/Neurovascular WDL    Peripheral Neurovascular WDL WDL       Cognitive/Neuro/Behavioral WDL    Cognitive/Neuro/Behavioral WDL WDL                  FINAL IMPRESSION:  No diagnosis found.    Impression and Plan     ED COURSE & MEDICAL DECISION MAKIN:10 PM I initially met with the patient and conducted the physical exam.     ED Course as of 22 1439   Tue Dec 27, 2022   1212 The patient had recurrent emesis last night followed by chest pain.  Certainly consider the possibility of Boerhaave esophageal rupture and or tear but as he does not appear septic I think superficial more Ivet-Montenegro style tear is more likely.  His abdomen is soft and actually nontender but with recurrent emesis overnight with the upper abdominal pain we will do labs to look at the possibility of cholecystitis pancreatitis and/or gastritis.  However, this may also end up being foodborne, gastroenteritis, and or related to his more chronic problems with intermittent nausea and vomiting.  However, this patient's episode today is much more intense so we will do CT imaging as mentioned.  His blood pressure is quite elevated but he is also quite anxious.  No signs of encephalopathy or cardiomyopathy as result  of this elevated blood pressure today.   1214 Initial EKG was reviewed without ischemic changes   1347 Patient's lipase is a bit elevated.  Uncear etiology as he does not drink alcohol, and he has no elevations in his lft's to suggest cholelithiasis but we will see what ct shows.  Based on his pain currently and ongoing nausea here (just called in rn for pain and nausea), will likely need admission for pancreatitis.   1406 Pt signed out to me by daytime ED MD Dr GAUTAM pending CT abdomen (done, awaiting read) with nausea/vomiting since yesterday nonbloody material with overnight chest pain. Troponin negative, EKG reassuring, BMP and CBC and LFTs WNL, patient locates pain in epigastrum, lipase found to be elevated and zofran/IVF/protonix/morphine given, no EtOH history, patient with Shanta 0 or Shanta 1 acute pancreatitis low mortality risk overall, plan to reassess after imaging   1434 CT reassuringly without abnormalities in chest, abdomen or pelvis. Will reassess now.   1437 After signout, patient seen again by Dr Acosta who signed out plan to admit with intractable pain, nausea/vomiting.He declined transfer for financial reasons, plan to admit with pancreatitis and intractable symptoms   1438 I discussed the possibility of transfer with the patient as per her current hospital protocols because we are fall, but the patient is not able to transfer due to concerns that are financial with transfer.       At the conclusion of the encounter I discussed the results of all the tests and the disposition. The questions were answered. The patient or family acknowledged understanding and was agreeable with the care plan.          0 minutes of critical care time    Medical Decision Making    History:    Supplemental history from: n/a    External Record(s) reviewed: outpatient clinic visits and labwork    Work Up:    Chart documentation includes differential considered and any EKGs or imaging independently interpreted by  provider.    In additional to work up documented, I considered the following work up:ultrasound if ct is unremarkable    External consultation:  Discussion of management with another provider: dr hernandez - patient signed out to new ed provider next shift    Complicating factors:    Care impacted by chronic illness: n/a  Care affected by social determinants of health: n/a  Disposition considerations: will need admission for pain control and further evaluation.       MEDICATIONS GIVEN IN THE EMERGENCY DEPARTMENT:  Medications   lactated ringers BOLUS 1,000 mL (0 mLs Intravenous Stopped 12/27/22 1418)     Followed by   lactated ringers BOLUS 1,000 mL (1,000 mLs Intravenous New Bag 12/27/22 1430)     Followed by   lactated ringers infusion (has no administration in time range)   ondansetron (ZOFRAN) injection 4 mg (4 mg Intravenous Given 12/27/22 1323)   morphine (PF) injection 4 mg (4 mg Intravenous Given 12/27/22 1430)   ondansetron (ZOFRAN) injection 4 mg (has no administration in time range)   HYDROmorphone (DILAUDID) injection 1 mg (has no administration in time range)   amLODIPine (NORVASC) tablet 10 mg (has no administration in time range)   lidocaine (viscous) (XYLOCAINE) 2 % 15 mL, alum & mag hydroxide-simethicone (MAALOX) 15 mL GI Cocktail (30 mLs Oral Not Given 12/27/22 1437)   ondansetron (ZOFRAN) injection 4 mg (4 mg Intravenous Given 12/27/22 1234)   iopamidol (ISOVUE-370) solution 100 mL (100 mLs Intravenous Given 12/27/22 1342)   pantoprazole (PROTONIX) IV push injection 40 mg (40 mg Intravenous Given 12/27/22 1429)       NEW PRESCRIPTIONS STARTED AT TODAY'S ED VISIT:  New Prescriptions    No medications on file       HPI     HPI     Drew Monsivais is a 52 year old male with a pertinent history of  dyslipidemia, hypertension, and type II diabetes who presents to this ED for evaluation of chest pain, nausea, and vomiting.     Patient reports going to Urgent Care, and they told him to come to the ED.  "    The patient vomited yesterday morning and felt better afterwards. At 0300 today he woke up and vomited. He has vomited 9-10 times today or \"about every half an hour.\" He also has chest pain and tightness from the vomiting as well as burning abdominal pain. Early in the morning the emesis was pink from what he had drank but turned into a white foam. He has a lump in his throat from all of the vomiting. He thought at first that it was a heart attack, but the vomiting briefly make his symptoms better. He is lightheaded, but denies muscle cramps and diarrhea, but he has not gone in the last 24 hours.     He also denies drinking alcohol or any surgical al history. He had to cancel a scope of his stomach because of monetary issues. He was told to get one because he had previous vomiting issues that were limited to once per day in the morning.     REVIEW OF SYSTEMS:  Review of Systems   HENT:        Positive for lump in throat   Cardiovascular: Positive for chest pain (and tightness).   Gastrointestinal: Positive for abdominal pain (burning) and vomiting (9-10 times). Negative for diarrhea.   Musculoskeletal:        Negative for muscle cramps   Neurological: Positive for light-headedness.   All other systems reviewed and are negative.    remainder of systems are all otherwise negative.        Medical History     Past Medical History:   Diagnosis Date     Calculus of kidney      Closed fracture of metatarsal bone(s)      Headache        History reviewed. No pertinent surgical history.    Family History   Problem Relation Age of Onset     Cerebrovascular Disease Mother      Aneurysm Father        Social History     Tobacco Use     Smoking status: Former     Types: Cigarettes     Smokeless tobacco: Never     Tobacco comments:     College   Vaping Use     Vaping Use: Never used   Substance Use Topics     Alcohol use: No     Drug use: Yes     Comment: Drug use: Cannabis       amLODIPine (NORVASC) 10 MG tablet  benzonatate " "(TESSALON) 100 MG capsule  glipiZIDE (GLUCOTROL XL) 5 MG 24 hr tablet  guaiFENesin-codeine (GUAIFENESIN AC) 100-10 MG/5ML syrup  lisinopril (ZESTRIL) 40 MG tablet  metFORMIN (GLUCOPHAGE) 500 MG tablet  omeprazole (PRILOSEC) 20 MG DR capsule  sertraline (ZOLOFT) 50 MG tablet  tadalafil (CIALIS) 5 MG tablet  tamsulosin (FLOMAX) 0.4 MG capsule  traZODone (DESYREL) 50 MG tablet        Physical Exam     First Vitals:  Patient Vitals for the past 24 hrs:   BP Temp Temp src Pulse Resp SpO2 Height Weight   12/27/22 1134 (!) 212/123 98.3  F (36.8  C) Oral 94 23 99 % 1.727 m (5' 8\") 79.6 kg (175 lb 6.4 oz)       PHYSICAL EXAM:   Constitutional:    Patient was seen in a room of the ED while fully clothed. Patient was in a semi reclined position.     HENT:  Normocephalic, posterior pharynx wnl, mucous membranes moist and dark pink   Eyes:  PERRL, EOMI, Conjunctiva normal, No discharge, no scleral icterus.  Respiratory:  Breathing easily, Clear to auscultation  Cardiovascular:  Regular rate and rhythm nl s1s2 0 murmurs, rubs, or gallops.  Peripheral pulses dp, pt, and radial are wnl.  No peripheral edema   GI:  Bowel sounds normal, Soft, No tenderness, No flank tenderness, nondistended. No umbilical hernia.  :No CVA tenderness.   Musculoskeletal:  Moves all extremities.  No erythematous or swollen major joints,   Integument:  Skin is dry.  Lymphatic:  No cervical lymphadenopathy  Neurologic:  Alert & oriented x 3, Normal motor function, Normal sensory function, No focal deficits noted. Normal speech.  Psychiatric:  Affect normal, Judgment normal, Mood normal.     Results     LAB AND RADIOLOGY:  All pertinent labs reviewed and interpreted  Results for orders placed or performed during the hospital encounter of 12/27/22   CT Chest/Abdomen/Pelvis w Contrast     Status: None    Narrative    EXAM: CT CHEST/ABDOMEN/PELVIS W CONTRAST  LOCATION: [facility]  DATE/TIME: 12/27/2022 1:51 PM    INDICATION: patient " having chest painafter throwing up all day yesterday.  COMPARISON: Chest radiographs 11/10/2022. CTA chest, abdomen, and pelvis 11/25/2020.  TECHNIQUE: CT scan of the chest, abdomen, and pelvis was performed following injection of IV contrast. Multiplanar reformats were obtained. Dose reduction techniques were used.   CONTRAST: 100ml Isovue 370    FINDINGS:   LUNGS AND PLEURA: Normal.    MEDIASTINUM/AXILLAE: Normal.    CORONARY ARTERY CALCIFICATION: Mild.    HEPATOBILIARY: Hepatic steatosis.    PANCREAS: Normal.    SPLEEN: Normal.    ADRENAL GLANDS: Normal.    KIDNEYS/BLADDER: Several nonobstructing stones in the collecting systems of both kidneys ranging in size from 2-4 mm on the right and 1-5 mm on the left.    BOWEL: Normal. No obstruction or inflammation. Normal appendix. No free fluid or gas.    LYMPH NODES: Normal.    VASCULATURE: Mild aortoiliac atherosclerosis. No aneurysm.    PELVIC ORGANS: Normal.    MUSCULOSKELETAL: Advanced degenerative disc disease at L5-S1.      Impression    IMPRESSION:  1.  No acute process in the chest, abdomen or pelvis.  2.  Hepatic steatosis.  3.  Bilateral nonobstructive nephrolithiasis.   Basic metabolic panel     Status: Abnormal   Result Value Ref Range    Sodium 138 136 - 145 mmol/L    Potassium 4.3 3.5 - 5.0 mmol/L    Chloride 100 98 - 107 mmol/L    Carbon Dioxide (CO2) 27 22 - 31 mmol/L    Anion Gap 11 5 - 18 mmol/L    Urea Nitrogen 12 8 - 22 mg/dL    Creatinine 1.33 (H) 0.70 - 1.30 mg/dL    Calcium 9.6 8.5 - 10.5 mg/dL    Glucose 224 (H) 70 - 125 mg/dL    GFR Estimate 64 >60 mL/min/1.73m2   Troponin I (now)     Status: Normal   Result Value Ref Range    Troponin I <0.01 0.00 - 0.29 ng/mL   Hepatic function panel     Status: Normal   Result Value Ref Range    Bilirubin Total 0.7 0.0 - 1.0 mg/dL    Bilirubin Direct 0.2 <=0.5 mg/dL    Protein Total 7.4 6.0 - 8.0 g/dL    Albumin 4.3 3.5 - 5.0 g/dL    Alkaline Phosphatase 71 45 - 120 U/L    AST 19 0 - 40 U/L    ALT 20 0 - 45  U/L   Lipase     Status: Abnormal   Result Value Ref Range    Lipase 582 (H) 0 - 52 U/L   CBC with platelets and differential     Status: Abnormal   Result Value Ref Range    WBC Count 9.9 4.0 - 11.0 10e3/uL    RBC Count 5.35 4.40 - 5.90 10e6/uL    Hemoglobin 14.9 13.3 - 17.7 g/dL    Hematocrit 41.4 40.0 - 53.0 %    MCV 77 (L) 78 - 100 fL    MCH 27.9 26.5 - 33.0 pg    MCHC 36.0 31.5 - 36.5 g/dL    RDW 12.4 10.0 - 15.0 %    Platelet Count 237 150 - 450 10e3/uL    % Neutrophils 71 %    % Lymphocytes 20 %    % Monocytes 6 %    % Eosinophils 1 %    % Basophils 1 %    % Immature Granulocytes 1 %    NRBCs per 100 WBC 0 <1 /100    Absolute Neutrophils 7.1 1.6 - 8.3 10e3/uL    Absolute Lymphocytes 1.9 0.8 - 5.3 10e3/uL    Absolute Monocytes 0.6 0.0 - 1.3 10e3/uL    Absolute Eosinophils 0.1 0.0 - 0.7 10e3/uL    Absolute Basophils 0.1 0.0 - 0.2 10e3/uL    Absolute Immature Granulocytes 0.1 <=0.4 10e3/uL    Absolute NRBCs 0.0 10e3/uL   ECG 12-LEAD WITH MUSE (LHE)     Status: None   Result Value Ref Range    Systolic Blood Pressure  mmHg    Diastolic Blood Pressure  mmHg    Ventricular Rate 94 BPM    Atrial Rate 94 BPM    KS Interval 180 ms    QRS Duration 88 ms     ms    QTc 432 ms    P Axis 56 degrees    R AXIS -79 degrees    T Axis 73 degrees    Interpretation ECG       Sinus rhythm  Possible Left atrial enlargement  Left axis deviation  Septal infarct , age undetermined  Abnormal ECG  When compared with ECG of 09-MAR-2022 11:37,  Septal infarct is now Present  Confirmed by SEE ED PROVIDER NOTE FOR, ECG INTERPRETATION ([id number]),  CIARA MONTIEL ([id number]) on 12/27/2022 12:16:06 PM     CBC with platelets + differential     Status: Abnormal    Narrative    The following orders were created for panel order CBC with platelets + differential.  Procedure                               Abnormality         Status                     ---------                               -----------         ------                     CBC  with platelets and d...[[id number]]  Abnormal            Final result                 Please view results for these tests on the individual orders.         ECG:    Performed at: 1135  Impression:  nsr rate of 94, lae, lad, poor r wave progression in septal leads c/w septal infarct pattern age indeterminate.  No acute st changes.  Compared to 3/9/22 septal infarct pattern now present.      I have independently reviewed and interpreted the EKS(s) documented above           The creation of this record is based on the scribe s observations of the work being performed by [facility] and the provider s statements to them. This document has been checked and approved by MD Edwige Menchaca MD  Emergency Medicine  [facility] EMERGENCY ROOM       Edwige Acosta MD  12/27/22 1429       Edwige Acosta MD  12/27/22 1432       Edwige Acosta MD  12/27/22 1437

## 2022-12-27 NOTE — ED TRIAGE NOTES
Patient presents to the ED with mid sternal chest pain, nausea, vomiting that started at 0300. Pain is described as tightness, did not take any medications due to nausea. Patient does admit to emesis x3 yesterday but nothing compared to what has been ongoing since 0300.       Triage Assessment     Row Name 12/27/22 1135       Triage Assessment (Adult)    Airway WDL WDL       Respiratory WDL    Respiratory WDL WDL       Skin Circulation/Temperature WDL    Skin Circulation/Temperature WDL WDL       Cardiac WDL    Cardiac WDL X  chest pain, nausea,vomtting since 0300       Peripheral/Neurovascular WDL    Peripheral Neurovascular WDL WDL       Cognitive/Neuro/Behavioral WDL    Cognitive/Neuro/Behavioral WDL WDL

## 2022-12-28 LAB
ALBUMIN SERPL-MCNC: 4 G/DL (ref 3.5–5)
ALP SERPL-CCNC: 67 U/L (ref 45–120)
ALT SERPL W P-5'-P-CCNC: 16 U/L (ref 0–45)
ANION GAP SERPL CALCULATED.3IONS-SCNC: 14 MMOL/L (ref 5–18)
AST SERPL W P-5'-P-CCNC: 18 U/L (ref 0–40)
BILIRUB DIRECT SERPL-MCNC: 0.3 MG/DL
BILIRUB SERPL-MCNC: 1.2 MG/DL (ref 0–1)
BUN SERPL-MCNC: 11 MG/DL (ref 8–22)
CALCIUM SERPL-MCNC: 9.4 MG/DL (ref 8.5–10.5)
CHLORIDE BLD-SCNC: 97 MMOL/L (ref 98–107)
CHOLEST SERPL-MCNC: 196 MG/DL
CO2 SERPL-SCNC: 24 MMOL/L (ref 22–31)
CREAT SERPL-MCNC: 1.21 MG/DL (ref 0.7–1.3)
ERYTHROCYTE [DISTWIDTH] IN BLOOD BY AUTOMATED COUNT: 12.5 % (ref 10–15)
GFR SERPL CREATININE-BSD FRML MDRD: 71 ML/MIN/1.73M2
GLUCOSE BLD-MCNC: 266 MG/DL (ref 70–125)
GLUCOSE BLDC GLUCOMTR-MCNC: 147 MG/DL (ref 70–99)
GLUCOSE BLDC GLUCOMTR-MCNC: 185 MG/DL (ref 70–99)
GLUCOSE BLDC GLUCOMTR-MCNC: 192 MG/DL (ref 70–99)
GLUCOSE BLDC GLUCOMTR-MCNC: 207 MG/DL (ref 70–99)
GLUCOSE BLDC GLUCOMTR-MCNC: 222 MG/DL (ref 70–99)
GLUCOSE BLDC GLUCOMTR-MCNC: 228 MG/DL (ref 70–99)
GLUCOSE BLDC GLUCOMTR-MCNC: 250 MG/DL (ref 70–99)
HCT VFR BLD AUTO: 39 % (ref 40–53)
HDLC SERPL-MCNC: 37 MG/DL
HGB BLD-MCNC: 13.9 G/DL (ref 13.3–17.7)
LDLC SERPL CALC-MCNC: ABNORMAL MG/DL
MCH RBC QN AUTO: 28.5 PG (ref 26.5–33)
MCHC RBC AUTO-ENTMCNC: 35.6 G/DL (ref 31.5–36.5)
MCV RBC AUTO: 80 FL (ref 78–100)
PLATELET # BLD AUTO: 235 10E3/UL (ref 150–450)
POTASSIUM BLD-SCNC: 4.2 MMOL/L (ref 3.5–5)
PROT SERPL-MCNC: 6.7 G/DL (ref 6–8)
RBC # BLD AUTO: 4.87 10E6/UL (ref 4.4–5.9)
SODIUM SERPL-SCNC: 135 MMOL/L (ref 136–145)
TRIGL SERPL-MCNC: 408 MG/DL
WBC # BLD AUTO: 12.1 10E3/UL (ref 4–11)

## 2022-12-28 PROCEDURE — 250N000011 HC RX IP 250 OP 636

## 2022-12-28 PROCEDURE — 258N000003 HC RX IP 258 OP 636: Performed by: EMERGENCY MEDICINE

## 2022-12-28 PROCEDURE — 250N000009 HC RX 250

## 2022-12-28 PROCEDURE — 85027 COMPLETE CBC AUTOMATED: CPT | Performed by: STUDENT IN AN ORGANIZED HEALTH CARE EDUCATION/TRAINING PROGRAM

## 2022-12-28 PROCEDURE — 250N000013 HC RX MED GY IP 250 OP 250 PS 637

## 2022-12-28 PROCEDURE — 36415 COLL VENOUS BLD VENIPUNCTURE: CPT | Performed by: STUDENT IN AN ORGANIZED HEALTH CARE EDUCATION/TRAINING PROGRAM

## 2022-12-28 PROCEDURE — 80053 COMPREHEN METABOLIC PANEL: CPT | Performed by: STUDENT IN AN ORGANIZED HEALTH CARE EDUCATION/TRAINING PROGRAM

## 2022-12-28 PROCEDURE — 99232 SBSQ HOSP IP/OBS MODERATE 35: CPT | Mod: GC

## 2022-12-28 PROCEDURE — 250N000013 HC RX MED GY IP 250 OP 250 PS 637: Performed by: STUDENT IN AN ORGANIZED HEALTH CARE EDUCATION/TRAINING PROGRAM

## 2022-12-28 PROCEDURE — 120N000001 HC R&B MED SURG/OB

## 2022-12-28 PROCEDURE — 250N000011 HC RX IP 250 OP 636: Performed by: STUDENT IN AN ORGANIZED HEALTH CARE EDUCATION/TRAINING PROGRAM

## 2022-12-28 PROCEDURE — 80061 LIPID PANEL: CPT | Performed by: STUDENT IN AN ORGANIZED HEALTH CARE EDUCATION/TRAINING PROGRAM

## 2022-12-28 PROCEDURE — 82248 BILIRUBIN DIRECT: CPT | Performed by: STUDENT IN AN ORGANIZED HEALTH CARE EDUCATION/TRAINING PROGRAM

## 2022-12-28 RX ORDER — OXYCODONE HYDROCHLORIDE 5 MG/1
5 TABLET ORAL EVERY 6 HOURS
Status: DISCONTINUED | OUTPATIENT
Start: 2022-12-28 | End: 2022-12-29

## 2022-12-28 RX ORDER — METOCLOPRAMIDE HYDROCHLORIDE 5 MG/ML
10 INJECTION INTRAMUSCULAR; INTRAVENOUS ONCE
Status: COMPLETED | OUTPATIENT
Start: 2022-12-28 | End: 2022-12-28

## 2022-12-28 RX ORDER — CALCIUM CARBONATE 500 MG/1
500 TABLET, CHEWABLE ORAL 3 TIMES DAILY PRN
Status: DISCONTINUED | OUTPATIENT
Start: 2022-12-28 | End: 2022-12-29 | Stop reason: HOSPADM

## 2022-12-28 RX ORDER — HYDROXYZINE HYDROCHLORIDE 25 MG/1
50 TABLET, FILM COATED ORAL EVERY 6 HOURS
Status: DISCONTINUED | OUTPATIENT
Start: 2022-12-28 | End: 2022-12-29 | Stop reason: HOSPADM

## 2022-12-28 RX ORDER — HYDROMORPHONE HCL IN WATER/PF 6 MG/30 ML
0.2 PATIENT CONTROLLED ANALGESIA SYRINGE INTRAVENOUS EVERY 4 HOURS
Status: DISCONTINUED | OUTPATIENT
Start: 2022-12-28 | End: 2022-12-28

## 2022-12-28 RX ORDER — NALOXONE HYDROCHLORIDE 0.4 MG/ML
0.4 INJECTION, SOLUTION INTRAMUSCULAR; INTRAVENOUS; SUBCUTANEOUS
Status: DISCONTINUED | OUTPATIENT
Start: 2022-12-28 | End: 2022-12-29 | Stop reason: HOSPADM

## 2022-12-28 RX ORDER — NALOXONE HYDROCHLORIDE 0.4 MG/ML
0.2 INJECTION, SOLUTION INTRAMUSCULAR; INTRAVENOUS; SUBCUTANEOUS
Status: DISCONTINUED | OUTPATIENT
Start: 2022-12-28 | End: 2022-12-29 | Stop reason: HOSPADM

## 2022-12-28 RX ORDER — HYDROXYZINE HYDROCHLORIDE 25 MG/1
25 TABLET, FILM COATED ORAL EVERY 6 HOURS
Status: DISCONTINUED | OUTPATIENT
Start: 2022-12-28 | End: 2022-12-29 | Stop reason: HOSPADM

## 2022-12-28 RX ADMIN — HYDROXYZINE HYDROCHLORIDE 50 MG: 25 TABLET ORAL at 09:13

## 2022-12-28 RX ADMIN — PANTOPRAZOLE SODIUM 40 MG: 20 TABLET, DELAYED RELEASE ORAL at 09:13

## 2022-12-28 RX ADMIN — INSULIN ASPART 2 UNITS: 100 INJECTION, SOLUTION INTRAVENOUS; SUBCUTANEOUS at 00:58

## 2022-12-28 RX ADMIN — ALUMINUM HYDROXIDE, MAGNESIUM HYDROXIDE, AND DIMETHICONE 30 ML: 200; 20; 200 SUSPENSION ORAL at 16:26

## 2022-12-28 RX ADMIN — PROCHLORPERAZINE EDISYLATE 10 MG: 5 INJECTION INTRAMUSCULAR; INTRAVENOUS at 22:38

## 2022-12-28 RX ADMIN — METOCLOPRAMIDE HYDROCHLORIDE 10 MG: 5 INJECTION INTRAMUSCULAR; INTRAVENOUS at 23:48

## 2022-12-28 RX ADMIN — PROCHLORPERAZINE EDISYLATE 10 MG: 5 INJECTION INTRAMUSCULAR; INTRAVENOUS at 03:56

## 2022-12-28 RX ADMIN — PANTOPRAZOLE SODIUM 40 MG: 20 TABLET, DELAYED RELEASE ORAL at 20:46

## 2022-12-28 RX ADMIN — ONDANSETRON 4 MG: 2 INJECTION INTRAMUSCULAR; INTRAVENOUS at 20:54

## 2022-12-28 RX ADMIN — ALUMINUM HYDROXIDE, MAGNESIUM HYDROXIDE, AND DIMETHICONE 30 ML: 200; 20; 200 SUSPENSION ORAL at 21:37

## 2022-12-28 RX ADMIN — ONDANSETRON 4 MG: 2 INJECTION INTRAMUSCULAR; INTRAVENOUS at 02:33

## 2022-12-28 RX ADMIN — CALCIUM CARBONATE (ANTACID) CHEW TAB 500 MG 500 MG: 500 CHEW TAB at 23:48

## 2022-12-28 RX ADMIN — HYDROMORPHONE HYDROCHLORIDE 0.2 MG: 0.2 INJECTION, SOLUTION INTRAMUSCULAR; INTRAVENOUS; SUBCUTANEOUS at 09:13

## 2022-12-28 RX ADMIN — OXYCODONE HYDROCHLORIDE 5 MG: 5 TABLET ORAL at 16:32

## 2022-12-28 RX ADMIN — SODIUM CHLORIDE, POTASSIUM CHLORIDE, SODIUM LACTATE AND CALCIUM CHLORIDE: 600; 310; 30; 20 INJECTION, SOLUTION INTRAVENOUS at 16:48

## 2022-12-28 RX ADMIN — ONDANSETRON 4 MG: 2 INJECTION INTRAMUSCULAR; INTRAVENOUS at 09:41

## 2022-12-28 RX ADMIN — PROCHLORPERAZINE EDISYLATE 10 MG: 5 INJECTION INTRAMUSCULAR; INTRAVENOUS at 09:13

## 2022-12-28 RX ADMIN — AMLODIPINE BESYLATE 10 MG: 10 TABLET ORAL at 09:13

## 2022-12-28 RX ADMIN — HYDROMORPHONE HYDROCHLORIDE 0.2 MG: 0.2 INJECTION, SOLUTION INTRAMUSCULAR; INTRAVENOUS; SUBCUTANEOUS at 12:26

## 2022-12-28 RX ADMIN — HYDROXYZINE HYDROCHLORIDE 25 MG: 25 TABLET ORAL at 02:51

## 2022-12-28 RX ADMIN — LISINOPRIL 40 MG: 20 TABLET ORAL at 09:13

## 2022-12-28 RX ADMIN — TRAZODONE HYDROCHLORIDE 100 MG: 50 TABLET ORAL at 20:46

## 2022-12-28 RX ADMIN — HYDROXYZINE HYDROCHLORIDE 50 MG: 25 TABLET ORAL at 14:15

## 2022-12-28 RX ADMIN — SERTRALINE HYDROCHLORIDE 50 MG: 50 TABLET, FILM COATED ORAL at 09:13

## 2022-12-28 RX ADMIN — TAMSULOSIN HYDROCHLORIDE 0.4 MG: 0.4 CAPSULE ORAL at 17:58

## 2022-12-28 RX ADMIN — SODIUM CHLORIDE, POTASSIUM CHLORIDE, SODIUM LACTATE AND CALCIUM CHLORIDE: 600; 310; 30; 20 INJECTION, SOLUTION INTRAVENOUS at 08:12

## 2022-12-28 RX ADMIN — HYDROXYZINE HYDROCHLORIDE 25 MG: 25 TABLET ORAL at 20:46

## 2022-12-28 ASSESSMENT — ACTIVITIES OF DAILY LIVING (ADL)
ADLS_ACUITY_SCORE: 19

## 2022-12-28 NOTE — PLAN OF CARE
Problem: Nausea and Vomiting  Goal: Nausea and Vomiting Relief  Intervention: Prevent and Manage Nausea and Vomiting  Recent Flowsheet Documentation  Taken 12/28/2022 0113 by Trudy Sher, RN  Nausea/Vomiting Interventions:    antiemetic    stimuli minimized  Taken 12/27/2022 2039 by Trudy Sher, RN  Nausea/Vomiting Interventions:    antiemetic    stimuli minimized   Goal Outcome Evaluation:    VSS. C/O RUQ pain, PRN IV dilaudid given x1 w/ reported pain relief. C/O vomiting, zofran given x1 w/ no reported relief. Paged provider for compazine order, compazine given w/ reported relief. Atarax given x1 for anxiety. X1 PIV running MF. NPO except for ice chips and sips w/ meds. Up independently. Voiding spontaneously. Continue w/ POC.

## 2022-12-28 NOTE — PLAN OF CARE
Problem: Plan of Care - These are the overarching goals to be used throughout the patient stay.    Goal: Optimal Comfort and Wellbeing  Outcome: Progressing     Problem: Nausea and Vomiting  Goal: Nausea and Vomiting Relief  Outcome: Progressing     Problem: Pain Acute  Goal: Optimal Pain Control and Function  Outcome: Progressing   Goal Outcome Evaluation:  Pt A/O x4. Independent in room with sister at bedside. Pt was very anxious, scheduled medication allowed pt to be more relaxed and comforted. Pain control managed with scheduled dilaudid. PRN Zofran and Compazine admin with appropriate results to decrease nausea/dry heaving. No emesis this shift reported or observed. Insulin coverage admin per sliding scale parameters. K protocol; recheck in AM. Now, pt appears to be resting comfortably. Calls appropriately. NPO status maintained. PIV infusing cont. Fluids.        Pt stated he feels ready to try clear/full liquids and nausea has resolved at this time, paged [facility] residents to update and seek new orders if able.

## 2022-12-28 NOTE — PLAN OF CARE
Goal Outcome Evaluation:    Problem: Nausea and Vomiting  Goal: Nausea and Vomiting Relief  Outcome: Progressing  Intervention: Prevent and Manage Nausea and Vomiting  Recent Flowsheet Documentation  Taken 12/27/2022 1754 by Fabian Coughlin RN  Nausea/Vomiting Interventions:   antiemetic   stimuli minimized    Pt complains of intermittent nausea/dry heaves and abdominal pain, PRN Dilaudid given for pain, with relief. Pt is anxious, and stated he is scared to be alone. Glucose checks q4h, last was 212, pt refused insulin due to not feeling well at the moment, will continue to monitor, next one will be at 2000. Pt is NPO with ice chips. BP has been elevated, home BP medication given, will monitor. Afebrile.

## 2022-12-28 NOTE — PROGRESS NOTES
[facility]    Progress Note - Hospitalist Service       Date of Admission:  12/27/2022    Assessment & Plan   Drew Monsivais is a 52 year old male admitted on 12/27/2022. He has a PMH including DMII, HTN, GERD, ED, renal stones, dyslipidemia, depression and anxiety who presents with acute on chronic nausea and vomiting, found to have pancreatitis. Presented to the ED with intractable nausea and vomiting and subsequent chest pain x 12 hours. In ED, was hypertensive, afebrile, other VS WNL. Cr 1.33, glucose 224, BMP, CBC, and LFTs otherwise normal, EKG unremarkable, troponin negative, lipase 582, COVID negative. CT CAP showing no acute processes, hepatic steatosis, bilateral nonobstructive nephrolithiasis. Low suspicion for ACS for chest pain as it was reproducible on exam and troponin negative. Etiology of acute on chronic nausea and vomiting could be pancreatitis vs GERD vs ulcer; suspect anxiety contributing. Lipid panel today showing  - TGs not elevated enough to necessarily cause pancreatitis. Sx improving.    Acute on chronic nausea and vomiting  Chest pain  Abdominal pain  Pancreatitis  Anxiety  Pain and sx improving. Pt would like to try advancing diet today.  - ADAT from full liquids  -  ml/hr  - Dilaudid switched to PO oxycodone 5 mg q6h for pain  - hydroxyzine q6h, PTA sertraline for anxiety and trazodone for sleep  - Zofran and compazine for nausea  - AM BMP    Hx of GERD  Per chart review, was seen for dysphagia, intermittent nausea vomiting, and heartburn at a clinic visit in September.  Was prescribed omeprazole and referred to GI for EGD, but this was not completed. Pt with a lot of hiccupping and belching today; suspect due to GERD and being NPO.  - PTA daily PPI     CKDII  Baseline creatinine around 1.1-1.2. Cr 1.33 on admission; back to baseline today.  - Monitor     Hypertension  Hypertensive urgency  Elevated blood pressures on admission likely 2/2  pain and n/v. Pt has also been out of medications for 1 week. Blood pressure normalized with medications.  - PTA amlodipine and lisinopril  - hydralazine PRN    Dyslipidemia  Last panel 3 months ago showed cholesterol 210, triglycerides 390, LDL 92. Not on statin.  Lipid panel during admission with cholesterol 196, triglycerides 408.  - Start statin outpatient    Diabetes mellitus type 2  Not on insulin at home. Last A1c 7.7 in 9/2022; had been improving in glucose control in the past year.  A1c on admission 8.9%.  - medium sliding scale insulin  - hold PTA glipizide and metformin     Chronic conditions:  Depression: continue PTA sertraline  Insomnia: continue PTA trazodone PRN  H/o nephrolithiasis: continue PTA flomax      Diet: Advance Diet as Tolerated: Full Liquid Diet; Regular Diet Adult    DVT Prophylaxis: Pneumatic Compression Devices  Still Catheter: Not present  Fluids:  ml/hr  Central Lines: None  Cardiac Monitoring: None  Code Status: Full Code      Disposition Plan      Expected Discharge Date: 12/30/2022        Discharge Comments: adv diet        The patient's care was discussed with the Attending Physician, Dr. Wagner.    Estefania Villalobos MD PGY-1  [facility] Resident Team  [facility]  Securely message with the [employer] Web Console (learn more here)  Text page via [employer] Paging/Directory       ______________________________________________________________________    Interval History   No acute events overnight. Dilaudid helpful with pain, compazine given for nausea as Zofran not helpful, hydroxyzine given for anxiety. Pt reports chest pain and abdominal pain have both improved today.  Last vomited about 1 hour ago.  No blood in emesis.  Denies headache and dyspnea.  Has been hiccuping and belching a lot and this has caused him to have some difficulty falling asleep.  His sister is visiting him today.    Data reviewed today: I reviewed all medications, new labs and  imaging results over the last 24 hours.    Physical Exam   Vital Signs: Temp: 98.2  F (36.8  C) Temp src: Oral BP: 122/65 Pulse: 80   Resp: 18 SpO2: 96 % O2 Device: None (Room air)    Weight: 174 lbs 3 oz  General Appearance: Alert, in mild distress and anxious due to hiccuping, frequently changes positions between sitting up and lying down  Respiratory: Lungs clear to auscultation bilaterally.  Midline chest wall below sternum tender to palpation, but improved from yesterday.  Cardiovascular: Regular rate and rhythm, normal S1 and S2, no murmurs, no lower extremity edema bilaterally  GI: Soft, sounds present, right lower quadrant tender to palpation  Skin: No jaundice    Data   Recent Labs   Lab 12/28/22  1227 12/28/22  0928 12/28/22  0927 12/27/22  1702 12/27/22  1233   WBC  --   --  12.1*  --  9.9   HGB  --   --  13.9  --  14.9   MCV  --   --  80  --  77*   PLT  --   --  235  --  237   NA  --   --  135*  --  138   POTASSIUM  --   --  4.2  --  4.3   CHLORIDE  --   --  97*  --  100   CO2  --   --  24  --  27   BUN  --   --  11  --  12   CR  --   --  1.21  --  1.33*   ANIONGAP  --   --  14  --  11   LEISA  --   --  9.4  --  9.6   * 250* 266*   < > 224*   ALBUMIN  --   --  4.0  --  4.3   PROTTOTAL  --   --  6.7  --  7.4   BILITOTAL  --   --  1.2*  --  0.7   ALKPHOS  --   --  67  --  71   ALT  --   --  16  --  20   AST  --   --  18  --  19   LIPASE  --   --   --   --  582*    < > = values in this interval not displayed.     No results found for this or any previous visit (from the past 24 hour(s)).  Medications     lactated ringers 125 mL/hr at 12/28/22 0812     - MEDICATION INSTRUCTIONS -         amLODIPine  10 mg Oral Daily     hydrOXYzine  25 mg Oral Q6H    Or     hydrOXYzine  50 mg Oral Q6H     insulin aspart  1-6 Units Subcutaneous Q4H     lisinopril  40 mg Oral Daily     oxyCODONE  5 mg Oral Q6H     pantoprazole  40 mg Oral BID     sertraline  50 mg Oral Daily     sodium chloride (PF)  3 mL Intracatheter  Q8H     tamsulosin  0.4 mg Oral Daily

## 2022-12-28 NOTE — PROVIDER NOTIFICATION
Paged provider for IV compazine, nausea unrelieved by zofran.     Addendum: Spoke w/ Dr. Pressley and was told compazine order will be put in and to page again in an hour if symptoms are not improving.

## 2022-12-28 NOTE — PROVIDER NOTIFICATION
Paged provider about patient's nausea not being under control after given zofran. Also, patient vomited atarax med.     Addendum: Spoke w/ Dr. Pressley compazine ok to give early.

## 2022-12-28 NOTE — PROGRESS NOTES
Care Management Initial Consult    General Information  Assessment completed with:  ,    Type of CM/SW Visit: Chart Assessment    Communication Assessment  Patient's communication style: spoken language (English or Bilingual)    Hearing Difficulty or Deaf: no   Wear Glasses or Blind: no    Cognitive  Cognitive/Neuro/Behavioral: WDL                         Community Resources:    Equipment currently used at home: none  Supplies currently used at home:        Lifestyle & Psychosocial Needs:  Social Determinants of Health     Tobacco Use: Medium Risk     Smoking Tobacco Use: Former     Smokeless Tobacco Use: Never     Passive Exposure: Not on file   Alcohol Use: Not on file   Financial Resource Strain: Not on file   Food Insecurity: Not on file   Transportation Needs: Not on file   Physical Activity: Not on file   Stress: Not on file   Social Connections: Not on file   Intimate Partner Violence: Not on file   Depression: Not at risk     PHQ-2 Score: 2   Housing Stability: Not on file     Additional Information:  8:13 AM  Chart reviewed.  Anticipate discharge home, no needs.  Anticipate family transport.      KALLI Robles

## 2022-12-29 VITALS
TEMPERATURE: 98 F | HEART RATE: 93 BPM | SYSTOLIC BLOOD PRESSURE: 181 MMHG | HEIGHT: 68 IN | RESPIRATION RATE: 18 BRPM | DIASTOLIC BLOOD PRESSURE: 104 MMHG | OXYGEN SATURATION: 95 % | WEIGHT: 174.19 LBS | BODY MASS INDEX: 26.4 KG/M2

## 2022-12-29 DIAGNOSIS — E11.69 TYPE 2 DIABETES MELLITUS WITH OTHER SPECIFIED COMPLICATION, WITHOUT LONG-TERM CURRENT USE OF INSULIN (H): ICD-10-CM

## 2022-12-29 LAB
ANION GAP SERPL CALCULATED.3IONS-SCNC: 11 MMOL/L (ref 5–18)
BUN SERPL-MCNC: 11 MG/DL (ref 8–22)
CALCIUM SERPL-MCNC: 9 MG/DL (ref 8.5–10.5)
CHLORIDE BLD-SCNC: 95 MMOL/L (ref 98–107)
CO2 SERPL-SCNC: 25 MMOL/L (ref 22–31)
CREAT SERPL-MCNC: 1.11 MG/DL (ref 0.7–1.3)
GFR SERPL CREATININE-BSD FRML MDRD: 79 ML/MIN/1.73M2
GLUCOSE BLD-MCNC: 191 MG/DL (ref 70–125)
GLUCOSE BLDC GLUCOMTR-MCNC: 196 MG/DL (ref 70–99)
GLUCOSE BLDC GLUCOMTR-MCNC: 199 MG/DL (ref 70–99)
GLUCOSE BLDC GLUCOMTR-MCNC: 200 MG/DL (ref 70–99)
GLUCOSE BLDC GLUCOMTR-MCNC: 224 MG/DL (ref 70–99)
HOLD SPECIMEN: NORMAL
POTASSIUM BLD-SCNC: 4.2 MMOL/L (ref 3.5–5)
SODIUM SERPL-SCNC: 131 MMOL/L (ref 136–145)

## 2022-12-29 PROCEDURE — 250N000013 HC RX MED GY IP 250 OP 250 PS 637

## 2022-12-29 PROCEDURE — 250N000013 HC RX MED GY IP 250 OP 250 PS 637: Performed by: STUDENT IN AN ORGANIZED HEALTH CARE EDUCATION/TRAINING PROGRAM

## 2022-12-29 PROCEDURE — 36415 COLL VENOUS BLD VENIPUNCTURE: CPT

## 2022-12-29 PROCEDURE — 250N000011 HC RX IP 250 OP 636

## 2022-12-29 PROCEDURE — 99238 HOSP IP/OBS DSCHRG MGMT 30/<: CPT | Mod: GC

## 2022-12-29 PROCEDURE — 258N000003 HC RX IP 258 OP 636: Performed by: EMERGENCY MEDICINE

## 2022-12-29 PROCEDURE — 80048 BASIC METABOLIC PNL TOTAL CA: CPT

## 2022-12-29 RX ORDER — CALCIUM CARBONATE 500 MG/1
1 TABLET, CHEWABLE ORAL 3 TIMES DAILY PRN
Qty: 60 TABLET | Refills: 0 | Status: SHIPPED | OUTPATIENT
Start: 2022-12-29 | End: 2024-05-12

## 2022-12-29 RX ORDER — LORAZEPAM 0.5 MG/1
0.5 TABLET ORAL
Status: COMPLETED | OUTPATIENT
Start: 2022-12-29 | End: 2022-12-29

## 2022-12-29 RX ORDER — TAMSULOSIN HYDROCHLORIDE 0.4 MG/1
0.4 CAPSULE ORAL DAILY
Qty: 30 CAPSULE | Refills: 0 | Status: SHIPPED | OUTPATIENT
Start: 2022-12-29 | End: 2023-01-31

## 2022-12-29 RX ORDER — OXYCODONE HYDROCHLORIDE 5 MG/1
5-10 TABLET ORAL EVERY 6 HOURS PRN
Status: DISCONTINUED | OUTPATIENT
Start: 2022-12-29 | End: 2022-12-29 | Stop reason: HOSPADM

## 2022-12-29 RX ORDER — LISINOPRIL 40 MG/1
40 TABLET ORAL DAILY
Qty: 30 TABLET | Refills: 0 | Status: ON HOLD | OUTPATIENT
Start: 2022-12-29 | End: 2024-05-21

## 2022-12-29 RX ORDER — PROCHLORPERAZINE MALEATE 10 MG
10 TABLET ORAL EVERY 6 HOURS PRN
Qty: 30 TABLET | Refills: 0 | Status: ON HOLD | OUTPATIENT
Start: 2022-12-29 | End: 2023-01-06

## 2022-12-29 RX ORDER — GLIPIZIDE 5 MG/1
TABLET, FILM COATED, EXTENDED RELEASE ORAL
Qty: 60 TABLET | Refills: 0 | Status: SHIPPED | OUTPATIENT
Start: 2022-12-29 | End: 2023-03-27

## 2022-12-29 RX ORDER — AMLODIPINE BESYLATE 10 MG/1
10 TABLET ORAL DAILY
Qty: 30 TABLET | Refills: 0 | Status: ON HOLD | OUTPATIENT
Start: 2022-12-29 | End: 2024-05-21

## 2022-12-29 RX ORDER — ONDANSETRON 4 MG/1
4 TABLET, ORALLY DISINTEGRATING ORAL EVERY 6 HOURS PRN
Qty: 30 TABLET | Refills: 0 | Status: SHIPPED | OUTPATIENT
Start: 2022-12-29 | End: 2024-05-12

## 2022-12-29 RX ADMIN — SODIUM CHLORIDE, POTASSIUM CHLORIDE, SODIUM LACTATE AND CALCIUM CHLORIDE: 600; 310; 30; 20 INJECTION, SOLUTION INTRAVENOUS at 00:55

## 2022-12-29 RX ADMIN — PANTOPRAZOLE SODIUM 40 MG: 20 TABLET, DELAYED RELEASE ORAL at 09:07

## 2022-12-29 RX ADMIN — HYDROXYZINE HYDROCHLORIDE 25 MG: 25 TABLET ORAL at 09:07

## 2022-12-29 RX ADMIN — HYDROXYZINE HYDROCHLORIDE 50 MG: 25 TABLET ORAL at 03:43

## 2022-12-29 RX ADMIN — OXYCODONE HYDROCHLORIDE 5 MG: 5 TABLET ORAL at 03:43

## 2022-12-29 RX ADMIN — LORAZEPAM 0.5 MG: 0.5 TABLET ORAL at 01:43

## 2022-12-29 RX ADMIN — ONDANSETRON 4 MG: 4 TABLET, ORALLY DISINTEGRATING ORAL at 12:26

## 2022-12-29 RX ADMIN — AMLODIPINE BESYLATE 10 MG: 10 TABLET ORAL at 09:10

## 2022-12-29 RX ADMIN — LISINOPRIL 40 MG: 20 TABLET ORAL at 09:09

## 2022-12-29 RX ADMIN — ONDANSETRON 4 MG: 2 INJECTION INTRAMUSCULAR; INTRAVENOUS at 03:43

## 2022-12-29 RX ADMIN — SERTRALINE HYDROCHLORIDE 50 MG: 50 TABLET, FILM COATED ORAL at 09:07

## 2022-12-29 RX ADMIN — PROCHLORPERAZINE EDISYLATE 10 MG: 5 INJECTION INTRAMUSCULAR; INTRAVENOUS at 11:17

## 2022-12-29 RX ADMIN — SODIUM CHLORIDE, POTASSIUM CHLORIDE, SODIUM LACTATE AND CALCIUM CHLORIDE: 600; 310; 30; 20 INJECTION, SOLUTION INTRAVENOUS at 09:04

## 2022-12-29 ASSESSMENT — ACTIVITIES OF DAILY LIVING (ADL)
ADLS_ACUITY_SCORE: 19

## 2022-12-29 NOTE — PLAN OF CARE
Pt is A&Ox4. Pt rated pain 4-8/10 during shift thus far. Pt complaining of nausea and abdominal discomfort. Administered prn and scheduled medications. One small clear emesis episode, lots of dry heaving. Minimal relief from medications, pt declined compazine at 0520 stating he just wanted to sleep. Pt is independent in room. IVF infusing. Voiding adequately. Education on medication administration, alarms, and use of call-light to reduce risk for falls and injury. No further issues noted. CMS intact. VSS other than elevated BP (150s/90s).

## 2022-12-29 NOTE — DISCHARGE INSTRUCTIONS
- Please  your medications from [facility] pharmacy - we've re-prescribed your diabetes and high blood pressure medications. Please restart taking those. We've also prescribed anti-nausea medications and Tums.  - You can also buy Mylanta over the counter (at the drugstore) to help with hiccupping/burping. It's part of the GI cocktail we gave you in the hospital.  - Please make a follow up appointment to see a primary care provider in 1 week to check in on how you're doing.  - Try eating solid/regular foods and monitor your symptoms. You should be able to eat more of these foods and experience less symptoms over time.

## 2022-12-29 NOTE — PLAN OF CARE
Problem: Nausea and Vomiting  Goal: Nausea and Vomiting Relief  Outcome: Progressing  Intervention: Prevent and Manage Nausea and Vomiting  Flowsheets (Taken 12/28/2022 1941)  Fluid/Electrolyte Management: fluids provided  Nausea/Vomiting Interventions: nausea triggers minimized  Environmental Support: rest periods encouraged   Goal Outcome Evaluation:    A/Ox4. Given GI cocktail x2 for c/o acid reflux symptoms with good results. Pain managed with scheduled oxycodone.

## 2022-12-29 NOTE — CONSULTS
Integrative Therapy Consult    Healing PresenceYes  Essential Oils: Inhalation (EO/Topical oil)     Support Healing process blend - HC       Healing Music: TV/Care channel     Breathwork:       Guided Imagery:       Acupressure:       Oshibori:       Energy Therapy: Healing touch     Healing Touch:  (Stopped per patient)     Reiki:       Qi Gong:     Massage:        Targeted Massage:    Sleep Promotion:       Other Therapy:       Intervention Reason: Anxiety/Stress     Pre and Post Session Scores: Patient Desires Treatment: yes  Pre-Session Anxiety: 10                          Delivery:         Referrals:      Traci TORREZ Born    Pt suddenly not feeling well, and asked to end therapy.

## 2022-12-29 NOTE — PROGRESS NOTES
Care Management Follow Up    Length of Stay (days): 2    Expected Discharge Date: 12/30/2022     Concerns to be Addressed: all concerns addressed in this encounter, no discharge needs identified     Patient plan of care discussed at interdisciplinary rounds: Yes    Anticipated Discharge Disposition: Home     Anticipated Discharge Services: None  Anticipated Discharge DME: None    Patient/family educated on Medicare website which has current facility and service quality ratings: no  Education Provided on the Discharge Plan:    Patient/Family in Agreement with the Plan: yes    Referrals Placed by CM/SW:    Private pay costs discussed: Not applicable    Additional Information:  No needs anticipated from care management at discharge per pt; independent at baseline. Pt to follow up with PCP post discharge if needs arise. Family to transport home. Care management to follow through hospitalization for progression of care and potential needs.  8:12 AM    ZARA Maldonado  12/29/2022

## 2022-12-29 NOTE — PLAN OF CARE
Problem: Plan of Care - These are the overarching goals to be used throughout the patient stay.    Goal: Plan of Care Review  Description: The Plan of Care Review/Shift note should be completed every shift.  The Outcome Evaluation is a brief statement about your assessment that the patient is improving, declining, or no change.  This information will be displayed automatically on your shift note.  Outcome: Progressing  Flowsheets (Taken 12/29/2022 1524)  Outcome Evaluation: Continues on IV fluids, poor oral intake, tolerating wheat of wheat at this time. Patient wants to be discharged home today.  Plan of Care Reviewed With: patient  Overall Patient Progress: no change

## 2022-12-30 DIAGNOSIS — J20.9 ACUTE BRONCHITIS, UNSPECIFIED ORGANISM: Primary | ICD-10-CM

## 2022-12-30 RX ORDER — GLIPIZIDE 5 MG/1
TABLET, FILM COATED, EXTENDED RELEASE ORAL
Qty: 180 TABLET | Refills: 1 | OUTPATIENT
Start: 2022-12-30

## 2022-12-30 RX ORDER — CODEINE PHOSPHATE AND GUAIFENESIN 10; 100 MG/5ML; MG/5ML
1-2 SOLUTION ORAL EVERY 4 HOURS PRN
Qty: 236 ML | Refills: 0 | Status: SHIPPED | OUTPATIENT
Start: 2022-12-30 | End: 2023-01-03

## 2022-12-30 NOTE — DISCHARGE SUMMARY
[facility]  Discharge Summary - Medicine & Pediatrics       Date of Admission:  12/27/2022  Date of Discharge:  12/29/2022  4:42 PM  Discharging Provider: Estefania Villalobos MD  Discharge Service: Hospitalist Service    Discharge Diagnoses   Acute on chronic nausea and vomiting, improved  Chest pain, resolved  Abdominal pain, resolved  Acute pancreatitis  Anxiety  Hx of GERD  CKDII  Hypertension  Hypertensive urgency  Dyslipidemia  DMII    Follow-ups Needed After Discharge   Follow-up Appointments     Follow-up and recommended labs and tests       Follow up with primary care provider, Haroon Farmer, within 7 days for   hospital follow-up to see how you are doing.  No follow up labs or test   are needed.           Discharge Disposition   Discharged to home  Condition at discharge: Stable    Hospital Course   Drew Monsivais is a 52 year old male admitted on 12/27/2022. He has a PMH including DMII, HTN, GERD, ED, renal stones, dyslipidemia, depression and anxiety who presents with acute on chronic nausea and vomiting, found to have pancreatitis. Presented to the ED with intractable nausea and vomiting and subsequent chest pain x 12 hours. In ED, was hypertensive, afebrile, other VS WNL. Cr 1.33, glucose 224, BMP, CBC, and LFTs otherwise normal, EKG unremarkable, troponin negative, lipase 582, COVID negative. CT CAP showing no acute processes, hepatic steatosis, bilateral nonobstructive nephrolithiasis. Low suspicion for ACS for chest pain as it was reproducible on exam, troponin negative, and EKG unremarkable. Etiology of acute on chronic nausea and vomiting could be pancreatitis vs GERD; suspect anxiety contributing. Lipid panel during admission showing  - TGs not elevated enough to necessarily cause pancreatitis. Chest and abdominal pain resolved, n/v improving, and pt able to tolerate PO intake on day of discharge. Was thus discharged with plan to continue advancing diet at home (karis  foods back to regular). Pt had ran out of all medications 1 week prior to admission; many were restarted during admission and refilled to continue taking at discharge (including antihypertensives and diabetes medications).    Acute on chronic nausea and vomiting, improved  Chest pain, resolved  Abdominal pain, resolved  Acute pancreatitis  Anxiety  Tolerated cream of wheat. IV dilaudid switched to PO oxycodone yesterday and pt has not needed oxycodone since early morning.  - advance diet as tolerated - pt has many dietary restrictions d/t GERD and will eat bland foods for a period before going back to regular diet.  - Zofran and compazine for nausea    Hx of GERD  Per chart review, was seen for dysphagia, intermittent nausea vomiting, and heartburn at a clinic visit in September.  Was prescribed omeprazole and referred to GI for EGD, but this was not completed due to pt not having insurance. Continues to have hiccupping/burping, but improved.  - PTA daily PPI  - Tums  - Recommend OTC Mylanta if continued sx  - Zofran and compazine as above    Hypertension  Hypertensive urgency  Elevated blood pressures likely 2/2 pain, n/v, and anxiety. Pressures normalized during admission but would fluctuate.  - PTA amlodipine and lisinopril  - F/u outpatient    Dyslipidemia  Last panel 3 months ago showed cholesterol 210, triglycerides 390, LDL 92. Not on statin.  Lipid panel during admission with cholesterol 196, triglycerides 408.  - Recommend starting statin outpatient    Diabetes mellitus type 2  Not on insulin at home. Last A1c 7.7 in 9/2022; had been improving in glucose control in the past year.  A1c on admission 8.9%.  - continue PTA glipizide and metformin    CKDII  Baseline creatinine around 1.1-1.2. Cr 1.33 on admission and decreased back to baseline during admission.    Consultations This Hospital Stay   INTEGRATIVE THERAPIES CONSULT    Code Status    Full Code    The patient was discussed with Dr. Wagner.    Estefania  MD Wilfredo PGY-1  [facility] Family Medicine Resident Team  [facility] [address] [address]  Phone: [phone]  Fax: [phone]  ______________________________________________________________________    Physical Exam   Vital Signs: Temp: 98  F (36.7  C) Temp src: Oral BP: (!) 181/104 Pulse: 93   Resp: 18 SpO2: 95 % O2 Device: None (Room air)    Weight: 174 lbs 3 oz  General Appearance: Alert, occasional dry heaving, no acute distress, shifts positions between lying down and sitting up  Respiratory: Lungs clear to auscultation bilaterally.  No lower midline chest wall tenderness to palpation.  Cardiovascular: Regular rate and rhythm, normal S1 and S2, no murmurs, no lower extremity edema bilaterally  GI: Soft, nontender to palpation, bowel sounds present    Primary Care Physician   Haroon Farmer    Discharge Orders      Reason for your hospital stay    Drew was hospitalized for nausea and vomiting and pancreatitis.     Follow-up and recommended labs and tests     Follow up with primary care provider, Haroon Farmer, within 7 days for hospital follow-up to see how you are doing.  No follow up labs or test are needed.     Activity    Your activity upon discharge: activity as tolerated     Diet    Follow this diet upon discharge: Orders Placed This Encounter      Advance Diet as Tolerated: try eating solid/regular foods and monitor your nausea/vomiting.       Significant Results and Procedures   Most Recent 3 CBC's:  Recent Labs   Lab Test 12/28/22  0927 12/27/22  1233 09/20/22  1254   WBC 12.1* 9.9 8.4   HGB 13.9 14.9 15.1   MCV 80 77* 79    237 220     Most Recent 3 BMP's:  Recent Labs   Lab Test 12/29/22  1225 12/29/22  1059 12/29/22  0809 12/28/22  0928 12/28/22  0927 12/27/22  1702 12/27/22  1233   NA  --  131*  --   --  135*  --  138   POTASSIUM  --  4.2  --   --  4.2  --  4.3   CHLORIDE  --  95*  --   --  97*  --  100   CO2  --  25  --    --  24  --  27   BUN  --  11  --   --  11  --  12   CR  --  1.11  --   --  1.21  --  1.33*   ANIONGAP  --  11  --   --  14  --  11   LEISA  --  9.0  --   --  9.4  --  9.6   * 191* 200*   < > 266*   < > 224*    < > = values in this interval not displayed.     Most Recent 2 LFT's:  Recent Labs   Lab Test 12/28/22  0927 12/27/22  1233   AST 18 19   ALT 16 20   ALKPHOS 67 71   BILITOTAL 1.2* 0.7     Most Recent Cholesterol Panel:  Recent Labs   Lab Test 12/28/22  0927 09/12/22  1106   CHOL 196 210*   LDL  --  92   HDL 37* 40   TRIG 408* 390*     Most Recent Hemoglobin A1c:  Recent Labs   Lab Test 12/27/22  1233   A1C 8.9*     Most Recent 6 glucoses:  Recent Labs   Lab Test 12/29/22  1225 12/29/22  1059 12/29/22  0809 12/29/22  0348 12/28/22  2355 12/28/22  2049   * 191* 200* 224* 196* 147*     Troponin <0.01    Results for orders placed or performed during the hospital encounter of 12/27/22   CT Chest/Abdomen/Pelvis w Contrast    Narrative    EXAM: CT CHEST/ABDOMEN/PELVIS W CONTRAST  LOCATION: [facility]  DATE/TIME: 12/27/2022 1:51 PM    INDICATION: patient having chest painafter throwing up all day yesterday.  COMPARISON: Chest radiographs 11/10/2022. CTA chest, abdomen, and pelvis 11/25/2020.  TECHNIQUE: CT scan of the chest, abdomen, and pelvis was performed following injection of IV contrast. Multiplanar reformats were obtained. Dose reduction techniques were used.   CONTRAST: 100ml Isovue 370    FINDINGS:   LUNGS AND PLEURA: Normal.    MEDIASTINUM/AXILLAE: Normal.    CORONARY ARTERY CALCIFICATION: Mild.    HEPATOBILIARY: Hepatic steatosis.    PANCREAS: Normal.    SPLEEN: Normal.    ADRENAL GLANDS: Normal.    KIDNEYS/BLADDER: Several nonobstructing stones in the collecting systems of both kidneys ranging in size from 2-4 mm on the right and 1-5 mm on the left.    BOWEL: Normal. No obstruction or inflammation. Normal appendix. No free fluid or gas.    LYMPH NODES:  Normal.    VASCULATURE: Mild aortoiliac atherosclerosis. No aneurysm.    PELVIC ORGANS: Normal.    MUSCULOSKELETAL: Advanced degenerative disc disease at L5-S1.      Impression    IMPRESSION:  1.  No acute process in the chest, abdomen or pelvis.  2.  Hepatic steatosis.  3.  Bilateral nonobstructive nephrolithiasis.       Discharge Medications   Discharge Medication List as of 12/29/2022  4:29 PM      START taking these medications    Details   calcium carbonate (TUMS) 500 MG chewable tablet Take 1 tablet (500 mg) by mouth 3 times daily as needed for heartburn, Disp-60 tablet, R-0, E-Prescribe      ondansetron (ZOFRAN ODT) 4 MG ODT tab Take 1 tablet (4 mg) by mouth every 6 hours as needed for nausea or vomiting, Disp-30 tablet, R-0, E-Prescribe      prochlorperazine (COMPAZINE) 10 MG tablet Take 1 tablet (10 mg) by mouth every 6 hours as needed for vomiting, Disp-30 tablet, R-0, E-Prescribe         CONTINUE these medications which have CHANGED    Details   amLODIPine (NORVASC) 10 MG tablet Take 1 tablet (10 mg) by mouth daily, Disp-30 tablet, R-0, E-Prescribe      glipiZIDE (GLUCOTROL XL) 5 MG 24 hr tablet Take 1 tablet twice daily for diabetes, Disp-60 tablet, R-0, E-Prescribe      lisinopril (ZESTRIL) 40 MG tablet Take 1 tablet (40 mg) by mouth daily, Disp-30 tablet, R-0, E-Prescribe      metFORMIN (GLUCOPHAGE) 500 MG tablet Take 2 tablets (1,000 mg) by mouth 2 times daily (with meals) for 30 days, Disp-120 tablet, R-0, E-Prescribe      tamsulosin (FLOMAX) 0.4 MG capsule Take 1 capsule (0.4 mg) by mouth daily, Disp-30 capsule, R-0, E-Prescribe         CONTINUE these medications which have NOT CHANGED    Details   omeprazole (PRILOSEC) 20 MG DR capsule Take 1 capsule (20 mg) by mouth 2 times daily, Disp-60 capsule, R-11, E-PrescribeOr equivalent on formulary      sertraline (ZOLOFT) 50 MG tablet TAKE ONE TABLET BY MOUTH DAILY FOR MOOD, Disp-90 tablet, R-1, E-Prescribe      tadalafil (CIALIS) 5 MG tablet Take one to  four pills daily as needed for sex, Disp-30 tablet, R-0, E-Prescribe      traZODone (DESYREL) 50 MG tablet TAKE 1 TO 2 TABLETS BY MOUTH NIGHTLY AS NEEDED FOR SLEEP, Disp-180 tablet, R-3, E-Prescribe         STOP taking these medications       benzonatate (TESSALON) 100 MG capsule Comments:   Reason for Stopping:         guaiFENesin-codeine (GUAIFENESIN AC) 100-10 MG/5ML syrup Comments:   Reason for Stopping:             Allergies   No Known Allergies

## 2022-12-31 ENCOUNTER — PATIENT OUTREACH (OUTPATIENT)
Dept: CARE COORDINATION | Facility: CLINIC | Age: 52
End: 2022-12-31

## 2022-12-31 DIAGNOSIS — K85.00 IDIOPATHIC ACUTE PANCREATITIS WITHOUT INFECTION OR NECROSIS: Primary | ICD-10-CM

## 2022-12-31 RX ORDER — OXYCODONE HYDROCHLORIDE 5 MG/1
5 TABLET ORAL EVERY 6 HOURS PRN
Qty: 12 TABLET | Refills: 0 | Status: ON HOLD | OUTPATIENT
Start: 2022-12-31 | End: 2023-01-06

## 2022-12-31 NOTE — PROGRESS NOTES
Clinic Care Coordination Contact  [facility]: Post-Discharge Note  SITUATION                                                      Admission:    Admission Date: 12/27/22   Reason for Admission: nausea and vomiting and pancreatitis  Discharge:   Discharge Date: 12/29/22  Discharge Diagnosis: nausea and vomiting and pancreatitis    BACKGROUND                                                      Per hospital discharge summary and inpatient provider notes:    Drew Monsivais is a 52 year old male with PMH including DMII, HTN, GERD, ED, renal stones, dyslipidemia, anxiety and depression who presents with intractable nausea and vomiting.     Pt reports vomiting since 0300 this morning and subsequent midline chest pain.  Reports having a history of intermittent epigastric pain and was supposed to get an EGD done, but this was not done as he did not have insurance. Had light red tinge in his vomit this morning; but had red-colored Crystal Lite the night before.  No red tinge in vomitus since.  Has diarrhea at baseline; last bowel movement was yesterday and was soft/watery.  Epigastric and midline chest pain do not radiate.  Denies fever, does note sweating and chills due to vomiting.  Feels like something is caught in his throat.     Has chronic nausea. Takes THC gummies daily (50 mg); last took gummies yesterday. Smokes marijuana occasionally. No alcohol use. Quit smoking; previously smoked 0.5-1ppd. Ran out of all of his medications 1 week ago.     Reports significant anxiety with being in an unfamiliar room without windows and any family members around.     In ED, was hypertensive, afebrile, other VS WNL. Cr 1.33, glucose 224, BMP, CBC, and LFTs otherwise normal, EKG unremarkable, troponin negative, lipase 582. CT CAP showing no acute processes, hepatic steatosis, bilateral nonobstructive nephrolithiasis. Received Zofran, IV fluids, protonix, and morphine.    ASSESSMENT           Discharge Assessment  How are  you doing now that you are home?: I am not doing that good. I am still having hiccupping which is affecting my sleeping and eating. Patient is still able to eat but it is hard with his hiccupps. Patient's sister said she will  Mylanta which was recommened to patient to help with it. Patient is missing guaiFENesin-codeine which he called about yesterday because it was not ready at his pharmacy. His sister then went to another pharmacy to see if they had it and they did not. She is going to try and call this morning to see if it ready.  How are your symptoms? (Red Flag symptoms escalate to triage hotline per guidelines): Unchanged  Do you feel your condition is stable enough to be safe at home until your provider visit?: Yes  Does the patient have their discharge instructions? : Yes  Does the patient have questions regarding their discharge instructions? : No  Were you started on any new medications or were there changes to any of your previous medications? : Yes  Does the patient have all of their medications?: No (see comment) (Patient is missing one medication but is [facility] see if [facility] is ready)  Do you have questions regarding any of your medications? : No  Discharge follow-up appointment scheduled within 14 calendar days? : No  Is patient agreeable to assistance with scheduling? : No                  PLAN                                                      Outpatient Plan: Follow up with primary care provider, Haroon Farmer, within 7 days for hospital follow-up to see how you are doing. No  follow up labs or test are needed.     No future appointments.      For any urgent concerns, please contact our 24 hour nurse triage line: [phone] ([phone])         SERGEI Shah  [phone]  [facility]

## 2023-01-03 ENCOUNTER — HOSPITAL ENCOUNTER (OUTPATIENT)
Facility: CLINIC | Age: 53
Setting detail: OBSERVATION
Discharge: HOME | End: 2023-01-06
Attending: EMERGENCY MEDICINE | Admitting: STUDENT IN AN ORGANIZED HEALTH CARE EDUCATION/TRAINING PROGRAM

## 2023-01-03 DIAGNOSIS — R11.2 NAUSEA AND VOMITING, UNSPECIFIED VOMITING TYPE: ICD-10-CM

## 2023-01-03 DIAGNOSIS — J20.9 ACUTE BRONCHITIS, UNSPECIFIED ORGANISM: ICD-10-CM

## 2023-01-03 DIAGNOSIS — F41.8 DEPRESSION WITH ANXIETY: Primary | ICD-10-CM

## 2023-01-03 DIAGNOSIS — R10.13 ABDOMINAL PAIN, EPIGASTRIC: ICD-10-CM

## 2023-01-03 DIAGNOSIS — G47.00 INSOMNIA, UNSPECIFIED TYPE: ICD-10-CM

## 2023-01-03 LAB
ALBUMIN SERPL-MCNC: 4.8 G/DL (ref 3.5–5)
ALBUMIN UR-MCNC: 20 MG/DL
ALP SERPL-CCNC: 78 U/L (ref 45–120)
ALT SERPL W P-5'-P-CCNC: 19 U/L (ref 0–45)
ANION GAP SERPL CALCULATED.3IONS-SCNC: 16 MMOL/L (ref 5–18)
APPEARANCE UR: CLEAR
AST SERPL W P-5'-P-CCNC: 18 U/L (ref 0–40)
ATRIAL RATE - MUSE: 97 BPM
BILIRUB DIRECT SERPL-MCNC: 0.4 MG/DL
BILIRUB SERPL-MCNC: 1.5 MG/DL (ref 0–1)
BILIRUB UR QL STRIP: NEGATIVE
BUN SERPL-MCNC: 13 MG/DL (ref 8–22)
CALCIUM SERPL-MCNC: 9.9 MG/DL (ref 8.5–10.5)
CHLORIDE BLD-SCNC: 92 MMOL/L (ref 98–107)
CO2 SERPL-SCNC: 23 MMOL/L (ref 22–31)
COLOR UR AUTO: ABNORMAL
CREAT SERPL-MCNC: 1.37 MG/DL (ref 0.7–1.3)
DIASTOLIC BLOOD PRESSURE - MUSE: 113 MMHG
ERYTHROCYTE [DISTWIDTH] IN BLOOD BY AUTOMATED COUNT: 12.5 % (ref 10–15)
GFR SERPL CREATININE-BSD FRML MDRD: 61 ML/MIN/1.73M2
GLUCOSE BLD-MCNC: 231 MG/DL (ref 70–125)
GLUCOSE BLDC GLUCOMTR-MCNC: 146 MG/DL (ref 70–99)
GLUCOSE BLDC GLUCOMTR-MCNC: 151 MG/DL (ref 70–99)
GLUCOSE BLDC GLUCOMTR-MCNC: 156 MG/DL (ref 70–99)
GLUCOSE UR STRIP-MCNC: 70 MG/DL
HCT VFR BLD AUTO: 48.4 % (ref 40–53)
HGB BLD-MCNC: 17.4 G/DL (ref 13.3–17.7)
HGB UR QL STRIP: NEGATIVE
INTERPRETATION ECG - MUSE: NORMAL
KETONES UR STRIP-MCNC: 40 MG/DL
LACTATE SERPL-SCNC: 1.2 MMOL/L (ref 0.7–2)
LACTATE SERPL-SCNC: 2.1 MMOL/L (ref 0.7–2)
LEUKOCYTE ESTERASE UR QL STRIP: NEGATIVE
LIPASE SERPL-CCNC: 172 U/L (ref 0–52)
MCH RBC QN AUTO: 28 PG (ref 26.5–33)
MCHC RBC AUTO-ENTMCNC: 36 G/DL (ref 31.5–36.5)
MCV RBC AUTO: 78 FL (ref 78–100)
MUCOUS THREADS #/AREA URNS LPF: PRESENT /LPF
NITRATE UR QL: NEGATIVE
P AXIS - MUSE: 67 DEGREES
PH UR STRIP: 7 [PH] (ref 5–7)
PLATELET # BLD AUTO: 303 10E3/UL (ref 150–450)
POTASSIUM BLD-SCNC: 4.5 MMOL/L (ref 3.5–5)
PR INTERVAL - MUSE: 164 MS
PROT SERPL-MCNC: 8.1 G/DL (ref 6–8)
QRS DURATION - MUSE: 86 MS
QT - MUSE: 374 MS
QTC - MUSE: 474 MS
R AXIS - MUSE: 267 DEGREES
RBC # BLD AUTO: 6.21 10E6/UL (ref 4.4–5.9)
RBC URINE: <1 /HPF
SODIUM SERPL-SCNC: 131 MMOL/L (ref 136–145)
SP GR UR STRIP: 1.01 (ref 1–1.03)
SYSTOLIC BLOOD PRESSURE - MUSE: 186 MMHG
T AXIS - MUSE: 57 DEGREES
TROPONIN I SERPL-MCNC: <0.01 NG/ML (ref 0–0.29)
UROBILINOGEN UR STRIP-MCNC: <2 MG/DL
VENTRICULAR RATE- MUSE: 97 BPM
WBC # BLD AUTO: 12.1 10E3/UL (ref 4–11)
WBC URINE: 1 /HPF

## 2023-01-03 PROCEDURE — 36415 COLL VENOUS BLD VENIPUNCTURE: CPT | Performed by: EMERGENCY MEDICINE

## 2023-01-03 PROCEDURE — 96372 THER/PROPH/DIAG INJ SC/IM: CPT | Performed by: STUDENT IN AN ORGANIZED HEALTH CARE EDUCATION/TRAINING PROGRAM

## 2023-01-03 PROCEDURE — 81001 URINALYSIS AUTO W/SCOPE: CPT | Performed by: EMERGENCY MEDICINE

## 2023-01-03 PROCEDURE — G0378 HOSPITAL OBSERVATION PER HR: HCPCS

## 2023-01-03 PROCEDURE — 96375 TX/PRO/DX INJ NEW DRUG ADDON: CPT

## 2023-01-03 PROCEDURE — 96374 THER/PROPH/DIAG INJ IV PUSH: CPT

## 2023-01-03 PROCEDURE — 99222 1ST HOSP IP/OBS MODERATE 55: CPT | Mod: GC | Performed by: STUDENT IN AN ORGANIZED HEALTH CARE EDUCATION/TRAINING PROGRAM

## 2023-01-03 PROCEDURE — 99285 EMERGENCY DEPT VISIT HI MDM: CPT | Mod: 25

## 2023-01-03 PROCEDURE — 84484 ASSAY OF TROPONIN QUANT: CPT | Performed by: EMERGENCY MEDICINE

## 2023-01-03 PROCEDURE — 258N000003 HC RX IP 258 OP 636: Performed by: EMERGENCY MEDICINE

## 2023-01-03 PROCEDURE — 83690 ASSAY OF LIPASE: CPT | Performed by: EMERGENCY MEDICINE

## 2023-01-03 PROCEDURE — 96361 HYDRATE IV INFUSION ADD-ON: CPT

## 2023-01-03 PROCEDURE — 250N000012 HC RX MED GY IP 250 OP 636 PS 637: Performed by: STUDENT IN AN ORGANIZED HEALTH CARE EDUCATION/TRAINING PROGRAM

## 2023-01-03 PROCEDURE — 83605 ASSAY OF LACTIC ACID: CPT | Performed by: EMERGENCY MEDICINE

## 2023-01-03 PROCEDURE — 250N000013 HC RX MED GY IP 250 OP 250 PS 637: Performed by: STUDENT IN AN ORGANIZED HEALTH CARE EDUCATION/TRAINING PROGRAM

## 2023-01-03 PROCEDURE — 250N000011 HC RX IP 250 OP 636: Performed by: EMERGENCY MEDICINE

## 2023-01-03 PROCEDURE — 85027 COMPLETE CBC AUTOMATED: CPT | Performed by: EMERGENCY MEDICINE

## 2023-01-03 PROCEDURE — 80053 COMPREHEN METABOLIC PANEL: CPT | Performed by: EMERGENCY MEDICINE

## 2023-01-03 PROCEDURE — 82248 BILIRUBIN DIRECT: CPT | Performed by: EMERGENCY MEDICINE

## 2023-01-03 PROCEDURE — 250N000009 HC RX 250: Performed by: EMERGENCY MEDICINE

## 2023-01-03 PROCEDURE — 96376 TX/PRO/DX INJ SAME DRUG ADON: CPT

## 2023-01-03 PROCEDURE — 258N000003 HC RX IP 258 OP 636: Performed by: STUDENT IN AN ORGANIZED HEALTH CARE EDUCATION/TRAINING PROGRAM

## 2023-01-03 PROCEDURE — 250N000013 HC RX MED GY IP 250 OP 250 PS 637: Performed by: EMERGENCY MEDICINE

## 2023-01-03 PROCEDURE — 250N000011 HC RX IP 250 OP 636: Performed by: STUDENT IN AN ORGANIZED HEALTH CARE EDUCATION/TRAINING PROGRAM

## 2023-01-03 PROCEDURE — 82962 GLUCOSE BLOOD TEST: CPT

## 2023-01-03 PROCEDURE — 93005 ELECTROCARDIOGRAM TRACING: CPT | Performed by: EMERGENCY MEDICINE

## 2023-01-03 RX ORDER — PANTOPRAZOLE SODIUM 20 MG/1
40 TABLET, DELAYED RELEASE ORAL
Status: DISCONTINUED | OUTPATIENT
Start: 2023-01-03 | End: 2023-01-06 | Stop reason: HOSPADM

## 2023-01-03 RX ORDER — MORPHINE SULFATE 4 MG/ML
4 INJECTION, SOLUTION INTRAMUSCULAR; INTRAVENOUS ONCE
Status: COMPLETED | OUTPATIENT
Start: 2023-01-03 | End: 2023-01-03

## 2023-01-03 RX ORDER — LORAZEPAM 2 MG/ML
0.5 INJECTION INTRAMUSCULAR ONCE
Status: COMPLETED | OUTPATIENT
Start: 2023-01-03 | End: 2023-01-03

## 2023-01-03 RX ORDER — NICOTINE POLACRILEX 4 MG
15-30 LOZENGE BUCCAL
Status: DISCONTINUED | OUTPATIENT
Start: 2023-01-03 | End: 2023-01-06 | Stop reason: HOSPADM

## 2023-01-03 RX ORDER — MORPHINE SULFATE 2 MG/ML
1 INJECTION, SOLUTION INTRAMUSCULAR; INTRAVENOUS
Status: DISCONTINUED | OUTPATIENT
Start: 2023-01-03 | End: 2023-01-04

## 2023-01-03 RX ORDER — ONDANSETRON 2 MG/ML
4 INJECTION INTRAMUSCULAR; INTRAVENOUS EVERY 6 HOURS PRN
Status: DISCONTINUED | OUTPATIENT
Start: 2023-01-03 | End: 2023-01-06 | Stop reason: HOSPADM

## 2023-01-03 RX ORDER — HYDRALAZINE HYDROCHLORIDE 20 MG/ML
10 INJECTION INTRAMUSCULAR; INTRAVENOUS EVERY 6 HOURS PRN
Status: DISCONTINUED | OUTPATIENT
Start: 2023-01-03 | End: 2023-01-06 | Stop reason: HOSPADM

## 2023-01-03 RX ORDER — DEXTROSE MONOHYDRATE 25 G/50ML
25-50 INJECTION, SOLUTION INTRAVENOUS
Status: DISCONTINUED | OUTPATIENT
Start: 2023-01-03 | End: 2023-01-06 | Stop reason: HOSPADM

## 2023-01-03 RX ORDER — PROCHLORPERAZINE MALEATE 10 MG
10 TABLET ORAL EVERY 6 HOURS PRN
Status: DISCONTINUED | OUTPATIENT
Start: 2023-01-03 | End: 2023-01-06 | Stop reason: HOSPADM

## 2023-01-03 RX ORDER — LISINOPRIL 20 MG/1
40 TABLET ORAL DAILY
Status: DISCONTINUED | OUTPATIENT
Start: 2023-01-03 | End: 2023-01-06 | Stop reason: HOSPADM

## 2023-01-03 RX ORDER — SERTRALINE HYDROCHLORIDE 25 MG/1
50 TABLET, FILM COATED ORAL DAILY
Status: DISCONTINUED | OUTPATIENT
Start: 2023-01-03 | End: 2023-01-03

## 2023-01-03 RX ORDER — HYDROXYZINE HYDROCHLORIDE 25 MG/1
25 TABLET, FILM COATED ORAL EVERY 8 HOURS
Status: DISCONTINUED | OUTPATIENT
Start: 2023-01-03 | End: 2023-01-04

## 2023-01-03 RX ORDER — AMLODIPINE BESYLATE 10 MG/1
10 TABLET ORAL DAILY
Status: DISCONTINUED | OUTPATIENT
Start: 2023-01-03 | End: 2023-01-06 | Stop reason: HOSPADM

## 2023-01-03 RX ORDER — PROCHLORPERAZINE 25 MG
25 SUPPOSITORY, RECTAL RECTAL EVERY 12 HOURS PRN
Status: DISCONTINUED | OUTPATIENT
Start: 2023-01-03 | End: 2023-01-06 | Stop reason: HOSPADM

## 2023-01-03 RX ORDER — TRAZODONE HYDROCHLORIDE 50 MG/1
50-100 TABLET, FILM COATED ORAL AT BEDTIME
Status: DISCONTINUED | OUTPATIENT
Start: 2023-01-03 | End: 2023-01-06 | Stop reason: HOSPADM

## 2023-01-03 RX ORDER — ONDANSETRON 2 MG/ML
4 INJECTION INTRAMUSCULAR; INTRAVENOUS ONCE
Status: COMPLETED | OUTPATIENT
Start: 2023-01-03 | End: 2023-01-03

## 2023-01-03 RX ORDER — SODIUM CHLORIDE 9 MG/ML
INJECTION, SOLUTION INTRAVENOUS CONTINUOUS
Status: DISCONTINUED | OUTPATIENT
Start: 2023-01-03 | End: 2023-01-03

## 2023-01-03 RX ORDER — CODEINE PHOSPHATE AND GUAIFENESIN 10; 100 MG/5ML; MG/5ML
1-2 SOLUTION ORAL EVERY 4 HOURS PRN
Qty: 236 ML | Refills: 0 | Status: ON HOLD | OUTPATIENT
Start: 2023-01-03 | End: 2023-01-06

## 2023-01-03 RX ORDER — SODIUM CHLORIDE, SODIUM LACTATE, POTASSIUM CHLORIDE, CALCIUM CHLORIDE 600; 310; 30; 20 MG/100ML; MG/100ML; MG/100ML; MG/100ML
INJECTION, SOLUTION INTRAVENOUS CONTINUOUS
Status: DISCONTINUED | OUTPATIENT
Start: 2023-01-03 | End: 2023-01-04

## 2023-01-03 RX ORDER — ONDANSETRON 4 MG/1
4 TABLET, ORALLY DISINTEGRATING ORAL EVERY 6 HOURS PRN
Status: DISCONTINUED | OUTPATIENT
Start: 2023-01-03 | End: 2023-01-06 | Stop reason: HOSPADM

## 2023-01-03 RX ORDER — LIDOCAINE 40 MG/G
CREAM TOPICAL
Status: DISCONTINUED | OUTPATIENT
Start: 2023-01-03 | End: 2023-01-06 | Stop reason: HOSPADM

## 2023-01-03 RX ADMIN — MORPHINE SULFATE 1 MG: 2 INJECTION, SOLUTION INTRAMUSCULAR; INTRAVENOUS at 17:07

## 2023-01-03 RX ADMIN — HYDROXYZINE HYDROCHLORIDE 25 MG: 25 TABLET ORAL at 22:11

## 2023-01-03 RX ADMIN — AMLODIPINE BESYLATE 10 MG: 10 TABLET ORAL at 16:55

## 2023-01-03 RX ADMIN — HYDROXYZINE HYDROCHLORIDE 25 MG: 25 TABLET ORAL at 14:11

## 2023-01-03 RX ADMIN — FAMOTIDINE 20 MG: 10 INJECTION, SOLUTION INTRAVENOUS at 08:28

## 2023-01-03 RX ADMIN — ONDANSETRON 4 MG: 2 INJECTION INTRAMUSCULAR; INTRAVENOUS at 16:58

## 2023-01-03 RX ADMIN — SODIUM CHLORIDE 1000 ML: 9 INJECTION, SOLUTION INTRAVENOUS at 07:39

## 2023-01-03 RX ADMIN — MORPHINE SULFATE 4 MG: 4 INJECTION, SOLUTION INTRAMUSCULAR; INTRAVENOUS at 07:43

## 2023-01-03 RX ADMIN — INSULIN ASPART 1 UNITS: 100 INJECTION, SOLUTION INTRAVENOUS; SUBCUTANEOUS at 19:32

## 2023-01-03 RX ADMIN — INSULIN ASPART 1 UNITS: 100 INJECTION, SOLUTION INTRAVENOUS; SUBCUTANEOUS at 23:42

## 2023-01-03 RX ADMIN — MORPHINE SULFATE 1 MG: 2 INJECTION, SOLUTION INTRAMUSCULAR; INTRAVENOUS at 22:11

## 2023-01-03 RX ADMIN — ONDANSETRON 4 MG: 2 INJECTION INTRAMUSCULAR; INTRAVENOUS at 07:40

## 2023-01-03 RX ADMIN — TRAZODONE HYDROCHLORIDE 50 MG: 50 TABLET ORAL at 22:11

## 2023-01-03 RX ADMIN — PANTOPRAZOLE SODIUM 40 MG: 20 TABLET, DELAYED RELEASE ORAL at 14:17

## 2023-01-03 RX ADMIN — ALUMINUM HYDROXIDE, MAGNESIUM HYDROXIDE, AND DIMETHICONE 30 ML: 200; 20; 200 SUSPENSION ORAL at 10:06

## 2023-01-03 RX ADMIN — LORAZEPAM 0.5 MG: 2 INJECTION INTRAMUSCULAR; INTRAVENOUS at 10:39

## 2023-01-03 RX ADMIN — SODIUM CHLORIDE, POTASSIUM CHLORIDE, SODIUM LACTATE AND CALCIUM CHLORIDE: 600; 310; 30; 20 INJECTION, SOLUTION INTRAVENOUS at 11:15

## 2023-01-03 RX ADMIN — PROCHLORPERAZINE EDISYLATE 10 MG: 5 INJECTION INTRAMUSCULAR; INTRAVENOUS at 17:36

## 2023-01-03 RX ADMIN — SODIUM CHLORIDE, POTASSIUM CHLORIDE, SODIUM LACTATE AND CALCIUM CHLORIDE: 600; 310; 30; 20 INJECTION, SOLUTION INTRAVENOUS at 19:45

## 2023-01-03 RX ADMIN — LISINOPRIL 40 MG: 20 TABLET ORAL at 16:56

## 2023-01-03 RX ADMIN — MORPHINE SULFATE 4 MG: 4 INJECTION, SOLUTION INTRAMUSCULAR; INTRAVENOUS at 08:29

## 2023-01-03 ASSESSMENT — ACTIVITIES OF DAILY LIVING (ADL)
CONCENTRATING,_REMEMBERING_OR_MAKING_DECISIONS_DIFFICULTY: YES
ADLS_ACUITY_SCORE: 19
DOING_ERRANDS_INDEPENDENTLY_DIFFICULTY: NO
ADLS_ACUITY_SCORE: 35
WALKING_OR_CLIMBING_STAIRS_DIFFICULTY: NO
CHANGE_IN_FUNCTIONAL_STATUS_SINCE_ONSET_OF_CURRENT_ILLNESS/INJURY: NO
WEAR_GLASSES_OR_BLIND: NO
ADLS_ACUITY_SCORE: 35
ADLS_ACUITY_SCORE: 35
FALL_HISTORY_WITHIN_LAST_SIX_MONTHS: NO
DIFFICULTY_EATING/SWALLOWING: NO
ADLS_ACUITY_SCORE: 35
ADLS_ACUITY_SCORE: 19
ADLS_ACUITY_SCORE: 35
TOILETING_ISSUES: NO
DRESSING/BATHING_DIFFICULTY: NO
ADLS_ACUITY_SCORE: 35

## 2023-01-03 NOTE — PHARMACY-ADMISSION MEDICATION HISTORY
Pharmacy Note - Admission Medication History    Pertinent Provider Information:     Patient has not been taking several of his medications due to patient preference. He hasn't taken his diabetes medications in 6 months because he was trying to manage his diabetes with diet.  He doesn't like how sertraline makes him feel and is not taking.    He's only taking one of his blood pressure medications- the lisinopril.       ______________________________________________________________________    Prior To Admission (PTA) med list completed and updated in EMR.       PTA Med List   Medication Sig Last Dose     calcium carbonate (TUMS) 500 MG chewable tablet Take 1 tablet (500 mg) by mouth 3 times daily as needed for heartburn PRN     lisinopril (ZESTRIL) 40 MG tablet Take 1 tablet (40 mg) by mouth daily 1/2/2023     ondansetron (ZOFRAN ODT) 4 MG ODT tab Take 1 tablet (4 mg) by mouth every 6 hours as needed for nausea or vomiting Past Week     oxyCODONE (ROXICODONE) 5 MG tablet Take 1 tablet (5 mg) by mouth every 6 hours as needed for pain 1/2/2023     prochlorperazine (COMPAZINE) 10 MG tablet Take 1 tablet (10 mg) by mouth every 6 hours as needed for vomiting      tadalafil (CIALIS) 5 MG tablet Take one to four pills daily as needed for sex > 2 days     traZODone (DESYREL) 50 MG tablet TAKE 1 TO 2 TABLETS BY MOUTH NIGHTLY AS NEEDED FOR SLEEP (Patient taking differently: Take 150 mg by mouth At Bedtime) 1/2/2023       Information source(s): Patient and [facility]  Method of interview communication: in-person    Summary of Changes to PTA Med List  New: none  Discontinued: none  Changed: none    Patient was asked about OTC/herbal products specifically.  PTA med list reflects this.    In the past week, patient estimated taking medication this percent of the time:  less than 50% due to patient preference.    Allergies were reviewed, assessed, and updated with the patient.      Patient does not use any multi-dose  medications prior to admission.    The information provided in this note is only as accurate as the sources available at the time of the update(s).    Thank you for the opportunity to participate in the care of this patient.    Miley Guillen RPH  1/3/2023 12:07 PM

## 2023-01-03 NOTE — H&P
[facility]    History and Physical - Hospitalist Service       Date of Admission:  1/3/2023    Assessment & Plan      Drew Monsivais is a 52 year old male admitted on 1/3/2023. He has a history of hypertension, DM 2, ED, GERD, renal stone, dyslipidemia, depression and anxiety and is admitted for nausea vomiting and abdominal pain    Acute on chronic nausea and vomiting  Abdominal pain  Recent history of idiopathic pancreatitis  Anxiety  Hx of GERD  Presented to the ED with intractable nausea and vomiting and epigastric pain.  Patient was discharged recently from the hospital after diagnosed with idiopathic pancreatitis.  Since discharge, patient symptoms return and became worsening.   In ED, was hypertensive, afebrile, other VS WNL.  His lipase decreased from 582 from previous admission to 172.Cr 1.37 .BMP, CBC, and LFTs otherwise normal, EKG unremarkable.CT from previous admission revealed  no acute processes, hepatic steatosis, bilateral nonobstructive nephrolithiasis. Per chart review, was seen for dysphagia, intermittent nausea vomiting, and heartburn at a clinic visit in September.  Was prescribed omeprazole and referred to GI for EGD, but this was not completed.  Current etiology of intractable acute on chronic nausea and vomiting could be pancreatitis versus GERD versus ulcer.  Suspect anxiety contributing. Could also consider cyclic vomiting 2/2 THC use.   - NPO except ice chips and meds  -  ml/hr  -Morphine for pain  - hydroxyzine 3 times daily for anxiety; restart PTA sertraline and trazodone once med rec completed  - Zofran for nausea  -Pain team consult     CKDII  Baseline creatinine around 1.1-1.2. Cr 1.33 on admission.  - Monitor     Hypertension  Hypertensive urgency  Elevated blood pressures on admission likely 2/2 pain and n/v. Pt has also been out of medications for 1 week.  - restart PTA amlodipine and lisinopril  - hydralazine PRN     Dyslipidemia  Last  "lipid profile 1 week ago revealed cholesterol of 196, TG of 408 Not on statin.       Diabetes mellitus type 2  Not on insulin at home. Last A1c 7.7 in 9/2022; had been improving in glucose control in the past year.  - medium sliding scale insulin  - hold PTA glipizide and metformin    Depression: continue PTA sertraline and trazodone once med rec completed  H/o nephrolithiasis: continue PTA flomax        Diet: NPO for Medical/Clinical Reasons Except for: Ice Chips NPO  DVT Prophylaxis: Pneumatic Compression Devices  Still Catheter: Not present  Fluids: LR 125ml/hs  Lines: None     Cardiac Monitoring: None  Code Status: Full Code    Clinically Significant Risk Factors Present on Admission                  # Hypertension: home medication list includes antihypertensive(s)     # DMII: A1C = 8.9 % (Ref range: <5.7 %) within past 3 months    # Overweight: Estimated body mass index is 27.37 kg/m  as calculated from the following:    Height as of 12/27/22: 1.727 m (5' 8\").    Weight as of this encounter: 81.6 kg (180 lb).           Disposition Plan      Expected Discharge Date: 01/04/2023                The patient's care was discussed with the Attending Physician, Dr. estrada.      Wali Gale MD  Hospitalist Service  [facility]  Securely message with [employer] (more info)  Text page via [email or web address] Paging/Directory   ______________________________________________________________________    Chief Complaint   Nausea, vomiting, and abdominal pain    History is obtained from the patient    History of Present Illness   Drew Monsivais is a 52 year old male who has a history of DM 2, hypertension, dyslipidemia, depression, and anxiety, and recent pancreatitis and is admitted for evaluation of nausea/vomiting and abdominal pain.  Patient was admitted to [facility] to the residency team from 12/27 to 12/29/2022.  During that time patient presented with chest pain, nausea.  Trop was negative, ECG " reassuring, BMP and CBC and LFT within normal range.  Patient abdominal pain on the epigastric area.  Lipase found to be elevated.  Pancreatitis protocol was started, n.p.o., nausea control, IV fluid, and pain control.  Patient has no history of alcohol use.  Shanta 0-1 for acute pancreatitis, low mortality risk.  Unknown etiology of his pancreatitis.  Prior to discharge, patient was started on diet.  Did well and discharged on oxycodone and Zofran for nausea.    Patient stated after his discharge, did consume ice cream and is mostly after which his pain and nausea became severe.  Unable to keep food in his stomach.  He also endorses shortness of breath, chest pain while vomiting, and upper abdominal pain, 5-6 out of 10.  Patient did call EMS, unable to drive to the hospital.  Also, endorses diarrhea that was resolved.  He is stated his anxious, does not want to die because of his situation.      Past Medical History    Past Medical History:   Diagnosis Date     Calculus of kidney     at least 4 episodes most recent 2012 once surgically removed Stones present both kidneys       Closed fracture of metatarsal bone(s)           Headache     Frontal-?migraine Triggers: no obvious,  excedrin light senstive Chiro        Past Surgical History   No past surgical history on file.    Prior to Admission Medications   Prior to Admission Medications   Prescriptions Last Dose Informant Patient Reported? Taking?   amLODIPine (NORVASC) 10 MG tablet Not Taking  No No   Sig: Take 1 tablet (10 mg) by mouth daily   Patient not taking: Reported on 1/3/2023   calcium carbonate (TUMS) 500 MG chewable tablet PRN  No Yes   Sig: Take 1 tablet (500 mg) by mouth 3 times daily as needed for heartburn   glipiZIDE (GLUCOTROL XL) 5 MG 24 hr tablet Not Taking  No No   Sig: Take 1 tablet twice daily for diabetes   Patient not taking: Reported on 1/3/2023   guaiFENesin-codeine (ROBITUSSIN AC) 100-10 MG/5ML solution   No No   Sig: Take 5-10 mLs by  mouth every 4 hours as needed for cough   lisinopril (ZESTRIL) 40 MG tablet 1/2/2023  No Yes   Sig: Take 1 tablet (40 mg) by mouth daily   metFORMIN (GLUCOPHAGE) 500 MG tablet Not Taking  No No   Sig: Take 2 tablets (1,000 mg) by mouth 2 times daily (with meals) for 30 days   Patient not taking: Reported on 1/3/2023   omeprazole (PRILOSEC) 20 MG DR capsule Not Taking  No No   Sig: Take 1 capsule (20 mg) by mouth 2 times daily   Patient not taking: Reported on 1/3/2023   ondansetron (ZOFRAN ODT) 4 MG ODT tab Past Week  No Yes   Sig: Take 1 tablet (4 mg) by mouth every 6 hours as needed for nausea or vomiting   oxyCODONE (ROXICODONE) 5 MG tablet 1/2/2023  No Yes   Sig: Take 1 tablet (5 mg) by mouth every 6 hours as needed for pain   prochlorperazine (COMPAZINE) 10 MG tablet   No Yes   Sig: Take 1 tablet (10 mg) by mouth every 6 hours as needed for vomiting   sertraline (ZOLOFT) 50 MG tablet Not Taking  No No   Sig: TAKE ONE TABLET BY MOUTH DAILY FOR MOOD   Patient not taking: Reported on 1/3/2023   tadalafil (CIALIS) 5 MG tablet > 2 days  No Yes   Sig: Take one to four pills daily as needed for sex   tamsulosin (FLOMAX) 0.4 MG capsule Not Taking  No No   Sig: Take 1 capsule (0.4 mg) by mouth daily   Patient not taking: Reported on 1/3/2023   traZODone (DESYREL) 50 MG tablet 1/2/2023  No Yes   Sig: TAKE 1 TO 2 TABLETS BY MOUTH NIGHTLY AS NEEDED FOR SLEEP   Patient taking differently: Take 150 mg by mouth At Bedtime      Facility-Administered Medications: None        Review of Systems    CONSTITUTIONAL: NEGATIVE for fever, chills, change in weight  INTEGUMENTARY/SKIN: NEGATIVE for worrisome rashes, moles or lesions  EYES: NEGATIVE for vision changes or irritation  ENT/MOUTH: NEGATIVE for ear, mouth and throat problems  RESP: NEGATIVE for significant cough. + SOB  CV: NEGATIVE for chest pain, palpitations or peripheral edema  GI: Positive for abdominal pain, nausea and vomiting  : NEGATIVE for frequency, dysuria, or  hematuria  MUSCULOSKELETAL:Positive for back pain  NEURO: NEGATIVE for weakness, dizziness or paresthesias  ENDOCRINE: NEGATIVE for temperature intolerance, skin/hair changes  HEME: NEGATIVE for bleeding problems  PSYCHIATRIC: NEGATIVE for changes in mood or affect    Social History   I have reviewed this patient's social history and updated it with pertinent information if needed.  Social History     Tobacco Use     Smoking status: Former     Types: Cigarettes     Smokeless tobacco: Never     Tobacco comments:     College   Vaping Use     Vaping Use: Never used   Substance Use Topics     Alcohol use: No     Drug use: Yes     Comment: Drug use: Cannabis       Family History   I have reviewed this patient's family history and updated it with pertinent information if needed.  Family History   Problem Relation Age of Onset     Cerebrovascular Disease Mother      Aneurysm Father        Allergies   No Known Allergies     Physical Exam   Vital Signs:     BP: 125/89 Pulse: 84   Resp: 16 SpO2: 98 %      Weight: 180 lbs 0 oz  Constitutional:  Well developed, anxious, under distress mildly diaphoretic.   HENT:  Normocephalic, Atraumatic, Bilateral external ears normal, Oropharynx moist, Nose normal.   Neck:  Normal range of motion, No meningismus, No stridor.   Eyes:  EOMI, Conjunctiva normal, No discharge.   Respiratory:  Normal breath sounds, No respiratory distress, No wheezing, No chest tenderness.   Cardiovascular:  Normal heart rate, Normal rhythm, No murmurs  GI:  Soft, No guarding, No CVA tenderness. Moderate epigastric tenderness. No lower abdominal tenderness.   Musculoskeletal:  No tenderness to palpation or major deformities noted.   Integument:  Warm, Dry, No erythema, No rash.   Neurologic:  Alert & oriented x 3, No focal deficits noted.   Psychiatric:  Affect normal, Judgment normal, Mood normal.   Medical Decision Making       Please see A&P for additional details of medical decision making.      Data    ------------------------- PAST 24 HR DATA REVIEWED -----------------------------------------------    I have personally reviewed the following data over the past 24 hrs:    12.1 (H)  \   17.4   / 303     131 (L) 92 (L) 13 /  231 (H)   4.5 23 1.37 (H) \       ALT: 19 AST: 18 AP: 78 TBILI: 1.5 (H)   ALB: 4.8 TOT PROTEIN: 8.1 (H) LIPASE: 172 (H)       Procal: N/A CRP: N/A Lactic Acid: 1.2

## 2023-01-03 NOTE — ED PROVIDER NOTES
EMERGENCY DEPARTMENT ENCOUnter      NAME: Drew Monsivais  AGE: 52 year old male  YOB: 1970  MRN: [id number]  EVALUATION DATE & TIME: 1/3/2023  7:03 AM    PCP: Haroon Farmer    ED PROVIDER: David Woods DO      Chief Complaint   Patient presents with     Nausea & Vomiting         FINAL IMPRESSION:  1. Abdominal pain, epigastric    2. Nausea and vomiting, unspecified vomiting type          ED COURSE & MEDICAL DECISION MAKIN:08 AM I introduced myself to the patient, obtained patient history, performed a physical exam, and discussed plan for ED workup including potential diagnostic laboratory/imaging studies and interventions. Patient is in ED-7.   8:21 AM I rechecked the patient and updated them on laboratory and imaging results.    The patient presented to the emergency department today with complaints of epigastric pain and nausea with vomiting.  He was seen here several days ago for the same and diagnosed with pancreatitis.  Since leaving the hospital his symptoms have returned.  He denies any new symptoms.  He has mild tenderness in this area on exam.  Laboratory testing shows a minimal elevation in his creatinine and lipase levels.  Fluids along with pain medication and nausea medication have been started and we will plan to keep the patient in the hospital for further monitoring.  Repeat CT imaging was considered but felt to be unnecessary at this time given his lack of new symptoms.  Case was discussed with hospitalist.        Medical Decision Making    History:    Supplemental history from: Documented in HPI, if applicable    External Record(s) reviewed: Inpatient Record: Last admission    Work Up:    Chart documentation includes differential considered and any EKGs or imaging independently interpreted by provider.    In additional to work up documented, I considered the following work up: See chart documentation, if applicable.    External consultation:    Discussion of  management with another provider: See chart documentation, if applicable    Complicating factors:    Care impacted by chronic illness: Diabetes and Hypertension    Care affected by social determinants of health: N/A    Disposition considerations: Admit.        At the conclusion of the encounter I discussed the results of all of the tests and the disposition. The questions were answered. The patient or family acknowledged understanding and was agreeable with the care plan.         MEDICATIONS GIVEN IN THE EMERGENCY:  Medications   0.9% sodium chloride BOLUS (1,000 mLs Intravenous New Bag 1/3/23 0758)   ondansetron (ZOFRAN) injection 4 mg (4 mg Intravenous Given 1/3/23 0740)   morphine (PF) injection 4 mg (4 mg Intravenous Given 1/3/23 0743)   morphine (PF) injection 4 mg (4 mg Intravenous Given 1/3/23 0829)   famotidine (PEPCID) injection 20 mg (20 mg Intravenous Given 1/3/23 0828)       =================================================================    HPI        rDew Monsivais is a 52 year old male with a pertinent history of diabetes mellitus type 2, hypertension, dyslipidemia, acute pancreatitis and depression with anxiety who presents to this ED via EMS for evaluation of nausea and vomiting.     Per chart review, the patient was admitted to [facility] from 12/27/2022-12/29/2022 (2 days). The patient initially presented to the ED for evaluation of chest pain, nausea and vomiting. While in the ED Troponin was negative, EKG reassuring, BMP and CBC and LFTs WNL, patient locates pain in epigastrum, lipase found to be elevated and zofran/IVF/protonix/morphine given, no EtOH history, patient with [place] 0 or Shanta 1 acute pancreatitis low mortality risk overall. The patient was admitted for pancreatitis. While admitted the patient's chest and abdominal pain resolved, with nausea and vomiting improving, and patient able to tolerate PO intake on day of discharge. The patient was discharged with new  "prescription(s) for: TUMS (500 MG), Zofran (4 MG), Compazine (10 MG). Patient was discharged with plan to continue advancing diet at home (bland foods back to regular) and plan for outpatient follow-up with primary care provider.     The patient presents to the ED after having dry heaving episodes for the last couple of days along with small amounts of emesis. He notes that his episodes of emesis looks like \"coffee grinds\". Patient reports that he was admitted last week Tuesday for the same symptoms due to an acute pancreatitis. He also endorses shortness of breath, chest pain/heaviness and upper abdominal pain due to the dry heaving and rates it a 4-5/10. Patient has been unable to hold food down and drive in his car which prompted him to call EMS. Patient reports that he's had these episodes in the past, but notes it was never diagnosed. He also had back pain last night which has since been resolved. Patient reports a history of anxiety and panic attacks. He does not have any allergy to medications.         REVIEW OF SYSTEMS     Constitutional:  Denies fever or chills  HENT:  Denies sore throat   Respiratory:  Denies cough. Positive for shortness of breath.   Cardiovascular:  Denies palpitations. Positive for chest pain.   GI: Positive for abdominal pain, nausea and vomiting.   Musculoskeletal:  Positive for back pain.    Skin:  Denies rash   Neurologic:  Denies headache, focal weakness or sensory changes    All other systems reviewed and are negative      PAST MEDICAL HISTORY:  Past Medical History:   Diagnosis Date     Calculus of kidney     at least 4 episodes most recent 2012 once surgically removed Stones present both kidneys       Closed fracture of metatarsal bone(s)           Headache     Frontal-?migraine Triggers: no obvious,  excedrin light senstive Chiro        PAST SURGICAL HISTORY:  No past surgical history on file.        CURRENT MEDICATIONS:    amLODIPine (NORVASC) 10 MG tablet  calcium carbonate " (TUMS) 500 MG chewable tablet  glipiZIDE (GLUCOTROL XL) 5 MG 24 hr tablet  guaiFENesin-codeine (ROBITUSSIN AC) 100-10 MG/5ML solution  lisinopril (ZESTRIL) 40 MG tablet  metFORMIN (GLUCOPHAGE) 500 MG tablet  omeprazole (PRILOSEC) 20 MG DR capsule  ondansetron (ZOFRAN ODT) 4 MG ODT tab  oxyCODONE (ROXICODONE) 5 MG tablet  prochlorperazine (COMPAZINE) 10 MG tablet  sertraline (ZOLOFT) 50 MG tablet  tadalafil (CIALIS) 5 MG tablet  tamsulosin (FLOMAX) 0.4 MG capsule  traZODone (DESYREL) 50 MG tablet        ALLERGIES:  No Known Allergies    FAMILY HISTORY:  Family History   Problem Relation Age of Onset     Cerebrovascular Disease Mother      Aneurysm Father        SOCIAL HISTORY:   Social History     Socioeconomic History     Marital status:      Number of children: 1   Occupational History     Occupation: [employer]   Tobacco Use     Smoking status: Former     Types: Cigarettes     Smokeless tobacco: Never     Tobacco comments:     College   Vaping Use     Vaping Use: Never used   Substance and Sexual Activity     Alcohol use: No     Drug use: Yes     Comment: Drug use: Cannabis     Sexual activity: Yes     Partners: Female   Social History Narrative    Diet-            Exercise-work is physical          No health insurance       VITALS:  Patient Vitals for the past 24 hrs:   BP Pulse Resp SpO2 Weight   01/03/23 0707 (!) 186/113 107 18 99 % 81.6 kg (180 lb)       PHYSICAL EXAM    Constitutional:  Well developed, Well nourished. Mildly diaphoretic.   HENT:  Normocephalic, Atraumatic, Bilateral external ears normal, Oropharynx moist, Nose normal.   Neck:  Normal range of motion, No meningismus, No stridor.   Eyes:  EOMI, Conjunctiva normal, No discharge.   Respiratory:  Normal breath sounds, No respiratory distress, No wheezing, No chest tenderness.   Cardiovascular:  Normal heart rate, Normal rhythm, No murmurs  GI:  Soft, No guarding, No CVA tenderness. Moderate epigastric tenderness. No lower abdominal  tenderness.   Musculoskeletal:  No tenderness to palpation or major deformities noted.   Integument:  Warm, Dry, No erythema, No rash.   Neurologic:  Alert & oriented x 3, No focal deficits noted.   Psychiatric:  Affect normal, Judgment normal, Mood normal.      LAB:  All pertinent labs reviewed and interpreted.  Results for orders placed or performed during the hospital encounter of 01/03/23   CBC with platelets   Result Value Ref Range    WBC Count 12.1 (H) 4.0 - 11.0 10e3/uL    RBC Count 6.21 (H) 4.40 - 5.90 10e6/uL    Hemoglobin 17.4 13.3 - 17.7 g/dL    Hematocrit 48.4 40.0 - 53.0 %    MCV 78 78 - 100 fL    MCH 28.0 26.5 - 33.0 pg    MCHC 36.0 31.5 - 36.5 g/dL    RDW 12.5 10.0 - 15.0 %    Platelet Count 303 150 - 450 10e3/uL   Basic metabolic panel   Result Value Ref Range    Sodium 131 (L) 136 - 145 mmol/L    Potassium 4.5 3.5 - 5.0 mmol/L    Chloride 92 (L) 98 - 107 mmol/L    Carbon Dioxide (CO2) 23 22 - 31 mmol/L    Anion Gap 16 5 - 18 mmol/L    Urea Nitrogen 13 8 - 22 mg/dL    Creatinine 1.37 (H) 0.70 - 1.30 mg/dL    Calcium 9.9 8.5 - 10.5 mg/dL    Glucose 231 (H) 70 - 125 mg/dL    GFR Estimate 61 >60 mL/min/1.73m2   Hepatic function panel   Result Value Ref Range    Bilirubin Total 1.5 (H) 0.0 - 1.0 mg/dL    Bilirubin Direct 0.4 <=0.5 mg/dL    Protein Total 8.1 (H) 6.0 - 8.0 g/dL    Albumin 4.8 3.5 - 5.0 g/dL    Alkaline Phosphatase 78 45 - 120 U/L    AST 18 0 - 40 U/L    ALT 19 0 - 45 U/L   Result Value Ref Range    Lipase 172 (H) 0 - 52 U/L   Lactic acid whole blood   Result Value Ref Range    Lactic Acid 2.1 (H) 0.7 - 2.0 mmol/L   Troponin I (now)   Result Value Ref Range    Troponin I <0.01 0.00 - 0.29 ng/mL         EKG:    Normal sinus rhythm at 97 bpm.  No obvious signs of acute ischemia.  QRS 86 ms,  ms.    I have independently reviewed and interpreted this EKG          I, Natacha Tee, am serving as a scribe to document services personally performed by Dr. Woods based on my  observation and the provider's statements to me. I, David Woods, DO attest that Natacha Tee is acting in a scribe capacity, has observed my performance of the services and has documented them in accordance with my direction.    David Woods DO  Emergency Medicine  [facility] EMERGENCY ROOM  [address] [address]  [phone]  Dept: [phone]     David Woods MD  01/03/23 0841

## 2023-01-03 NOTE — ED NOTES
Pt given ativan and states his GI cocktail did help with his pain but he did get anxious at first with the feeling of not breathing.  States it was the GI cocktail

## 2023-01-03 NOTE — ED TRIAGE NOTES
Pt was admitted last week from Tuesday to Friday/Saturday for Acute Pancreatitis attack. Discharged to home and he states he did well the first day and was able to eat bland.  Then yesterday he was unable to keep anything down and states he had coffee ground emesis.  For EMS and here only small amount of clear emesis, does have dry heaves.  Tried ODT zofran and has been taking Oxy at home.  Not an alcoholic. Is diabetic.  Medics gave 4mg IM Zofran      Triage Assessment     Row Name 01/03/23 0707       Triage Assessment (Adult)    Airway WDL WDL       Respiratory WDL    Respiratory WDL WDL       Skin Circulation/Temperature WDL    Skin Circulation/Temperature WDL WDL       Cardiac WDL    Cardiac WDL WDL       Peripheral/Neurovascular WDL    Peripheral Neurovascular WDL WDL       Cognitive/Neuro/Behavioral WDL    Cognitive/Neuro/Behavioral WDL WDL

## 2023-01-03 NOTE — PROGRESS NOTES
Pt complains of having bad heartburn and hiccups, updated attending and order given for pantoprazole.

## 2023-01-04 ENCOUNTER — TELEPHONE (OUTPATIENT)
Dept: FAMILY MEDICINE | Facility: CLINIC | Age: 53
End: 2023-01-04

## 2023-01-04 LAB
GLUCOSE BLDC GLUCOMTR-MCNC: 135 MG/DL (ref 70–99)
GLUCOSE BLDC GLUCOMTR-MCNC: 138 MG/DL (ref 70–99)
GLUCOSE BLDC GLUCOMTR-MCNC: 149 MG/DL (ref 70–99)
GLUCOSE BLDC GLUCOMTR-MCNC: 158 MG/DL (ref 70–99)
GLUCOSE BLDC GLUCOMTR-MCNC: 279 MG/DL (ref 70–99)

## 2023-01-04 PROCEDURE — 82962 GLUCOSE BLOOD TEST: CPT

## 2023-01-04 PROCEDURE — 250N000013 HC RX MED GY IP 250 OP 250 PS 637: Performed by: STUDENT IN AN ORGANIZED HEALTH CARE EDUCATION/TRAINING PROGRAM

## 2023-01-04 PROCEDURE — G0378 HOSPITAL OBSERVATION PER HR: HCPCS

## 2023-01-04 PROCEDURE — 250N000011 HC RX IP 250 OP 636: Performed by: STUDENT IN AN ORGANIZED HEALTH CARE EDUCATION/TRAINING PROGRAM

## 2023-01-04 PROCEDURE — 258N000003 HC RX IP 258 OP 636: Performed by: STUDENT IN AN ORGANIZED HEALTH CARE EDUCATION/TRAINING PROGRAM

## 2023-01-04 PROCEDURE — 99232 SBSQ HOSP IP/OBS MODERATE 35: CPT | Mod: GC

## 2023-01-04 PROCEDURE — 96361 HYDRATE IV INFUSION ADD-ON: CPT

## 2023-01-04 PROCEDURE — 250N000013 HC RX MED GY IP 250 OP 250 PS 637

## 2023-01-04 PROCEDURE — 96372 THER/PROPH/DIAG INJ SC/IM: CPT

## 2023-01-04 PROCEDURE — 36415 COLL VENOUS BLD VENIPUNCTURE: CPT | Performed by: STUDENT IN AN ORGANIZED HEALTH CARE EDUCATION/TRAINING PROGRAM

## 2023-01-04 PROCEDURE — 96376 TX/PRO/DX INJ SAME DRUG ADON: CPT

## 2023-01-04 RX ORDER — NALOXONE HYDROCHLORIDE 0.4 MG/ML
0.2 INJECTION, SOLUTION INTRAMUSCULAR; INTRAVENOUS; SUBCUTANEOUS
Status: DISCONTINUED | OUTPATIENT
Start: 2023-01-04 | End: 2023-01-06 | Stop reason: HOSPADM

## 2023-01-04 RX ORDER — LORAZEPAM 0.5 MG/1
0.5 TABLET ORAL
Status: COMPLETED | OUTPATIENT
Start: 2023-01-04 | End: 2023-01-04

## 2023-01-04 RX ORDER — LORAZEPAM 2 MG/ML
0.5 INJECTION INTRAMUSCULAR
Status: DISCONTINUED | OUTPATIENT
Start: 2023-01-04 | End: 2023-01-04

## 2023-01-04 RX ORDER — NALOXONE HYDROCHLORIDE 0.4 MG/ML
0.4 INJECTION, SOLUTION INTRAMUSCULAR; INTRAVENOUS; SUBCUTANEOUS
Status: DISCONTINUED | OUTPATIENT
Start: 2023-01-04 | End: 2023-01-06 | Stop reason: HOSPADM

## 2023-01-04 RX ORDER — HYDROXYZINE HYDROCHLORIDE 25 MG/1
25 TABLET, FILM COATED ORAL ONCE
Status: COMPLETED | OUTPATIENT
Start: 2023-01-04 | End: 2023-01-04

## 2023-01-04 RX ORDER — OXYCODONE HYDROCHLORIDE 5 MG/1
5-10 TABLET ORAL EVERY 6 HOURS PRN
Status: DISCONTINUED | OUTPATIENT
Start: 2023-01-04 | End: 2023-01-05

## 2023-01-04 RX ORDER — HYDROXYZINE HYDROCHLORIDE 25 MG/1
50 TABLET, FILM COATED ORAL EVERY 8 HOURS
Status: DISCONTINUED | OUTPATIENT
Start: 2023-01-04 | End: 2023-01-06 | Stop reason: HOSPADM

## 2023-01-04 RX ORDER — CALCIUM CARBONATE 500 MG/1
500 TABLET, CHEWABLE ORAL 3 TIMES DAILY PRN
Status: DISCONTINUED | OUTPATIENT
Start: 2023-01-04 | End: 2023-01-06 | Stop reason: HOSPADM

## 2023-01-04 RX ADMIN — HYDROXYZINE HYDROCHLORIDE 50 MG: 25 TABLET ORAL at 21:21

## 2023-01-04 RX ADMIN — TRAZODONE HYDROCHLORIDE 50 MG: 50 TABLET ORAL at 21:24

## 2023-01-04 RX ADMIN — HYDROXYZINE HYDROCHLORIDE 25 MG: 25 TABLET ORAL at 15:05

## 2023-01-04 RX ADMIN — LORAZEPAM 0.5 MG: 0.5 TABLET ORAL at 17:26

## 2023-01-04 RX ADMIN — SODIUM CHLORIDE, POTASSIUM CHLORIDE, SODIUM LACTATE AND CALCIUM CHLORIDE: 600; 310; 30; 20 INJECTION, SOLUTION INTRAVENOUS at 12:36

## 2023-01-04 RX ADMIN — LISINOPRIL 40 MG: 20 TABLET ORAL at 12:23

## 2023-01-04 RX ADMIN — INSULIN ASPART 1 UNITS: 100 INJECTION, SOLUTION INTRAVENOUS; SUBCUTANEOUS at 07:33

## 2023-01-04 RX ADMIN — MORPHINE SULFATE 1 MG: 2 INJECTION, SOLUTION INTRAMUSCULAR; INTRAVENOUS at 04:15

## 2023-01-04 RX ADMIN — PROCHLORPERAZINE EDISYLATE 10 MG: 5 INJECTION INTRAMUSCULAR; INTRAVENOUS at 21:53

## 2023-01-04 RX ADMIN — INSULIN ASPART 3 UNITS: 100 INJECTION, SOLUTION INTRAVENOUS; SUBCUTANEOUS at 13:15

## 2023-01-04 RX ADMIN — HYDROXYZINE HYDROCHLORIDE 25 MG: 25 TABLET ORAL at 13:15

## 2023-01-04 RX ADMIN — AMLODIPINE BESYLATE 10 MG: 10 TABLET ORAL at 12:22

## 2023-01-04 RX ADMIN — PANTOPRAZOLE SODIUM 40 MG: 20 TABLET, DELAYED RELEASE ORAL at 12:23

## 2023-01-04 RX ADMIN — CALCIUM CARBONATE (ANTACID) CHEW TAB 500 MG 500 MG: 500 CHEW TAB at 21:57

## 2023-01-04 RX ADMIN — OXYCODONE HYDROCHLORIDE 5 MG: 5 TABLET ORAL at 13:15

## 2023-01-04 RX ADMIN — HYDROXYZINE HYDROCHLORIDE 25 MG: 25 TABLET ORAL at 06:01

## 2023-01-04 RX ADMIN — ONDANSETRON 4 MG: 2 INJECTION INTRAMUSCULAR; INTRAVENOUS at 08:43

## 2023-01-04 RX ADMIN — OXYCODONE HYDROCHLORIDE 10 MG: 5 TABLET ORAL at 19:24

## 2023-01-04 RX ADMIN — TRAZODONE HYDROCHLORIDE 50 MG: 50 TABLET ORAL at 21:21

## 2023-01-04 RX ADMIN — SODIUM CHLORIDE, POTASSIUM CHLORIDE, SODIUM LACTATE AND CALCIUM CHLORIDE: 600; 310; 30; 20 INJECTION, SOLUTION INTRAVENOUS at 04:08

## 2023-01-04 RX ADMIN — ONDANSETRON 4 MG: 2 INJECTION INTRAMUSCULAR; INTRAVENOUS at 18:23

## 2023-01-04 ASSESSMENT — ACTIVITIES OF DAILY LIVING (ADL)
ADLS_ACUITY_SCORE: 19

## 2023-01-04 NOTE — PROGRESS NOTES
PRIMARY DIAGNOSIS: Abdominal Pain  OUTPATIENT/OBSERVATION GOALS TO BE MET BEFORE DISCHARGE:  1. ADLs back to baseline: Yes    2. Activity and level of assistance: Up with standby assistance.    3. Pain status: Improved but still requiring IV narcotics, morphine.     4. Return to near baseline physical activity: Yes, up with minimal SBA.      Discharge Planner Nurse   Safe discharge environment identified: No  Barriers to discharge: Yes, pain control and AM labs.        Entered by: Sesar Garibay RN 01/03/2023 11:56 PM     Please review provider order for any additional goals.   Nurse to notify provider when observation goals have been met and patient is ready for discharge.

## 2023-01-04 NOTE — PROGRESS NOTES
"PRIMARY DIAGNOSIS: \"GENERIC\" NURSING  OUTPATIENT/OBSERVATION GOALS TO BE MET BEFORE DISCHARGE:  1. ADLs back to baseline: Yes      2. Activity and level of assistance: Ambulating independently.    3. Pain status: Improved-controlled with oral pain medications.    4. Return to near baseline physical activity: Yes     Discharge Planner Nurse   Safe discharge environment identified: Yes  Barriers to discharge: Yes       Entered by: Mylene Estrada RN 01/04/2023 5:55 PM     Please review provider order for any additional goals.   Nurse to notify provider when observation goals have been met and patient is ready for discharge.  "

## 2023-01-04 NOTE — PROGRESS NOTES
PRIMARY DIAGNOSIS: ACUTE PAIN  OUTPATIENT/OBSERVATION GOALS TO BE MET BEFORE DISCHARGE:  1. Pain Status: Improved-controlled with oral pain medications.    2. Return to near baseline physical activity: Yes    3. Cleared for discharge by consultants (if involved): No    Discharge Planner Nurse   Safe discharge environment identified: Yes  Barriers to discharge: Yes       Entered by: Mylene Estrada RN 01/04/2023 10:27 AM     Please review provider order for any additional goals.   Nurse to notify provider when observation goals have been met and patient is ready for discharge.

## 2023-01-04 NOTE — PROGRESS NOTES
[facility]    Progress Note - Hospitalist Service       Date of Admission:  1/3/2023    Assessment & Plan      Drew Monsivais is a 52 year old male admitted on 1/3/2023. He has a history of hypertension, DM 2, ED, GERD, renal stone, dyslipidemia, depression and anxiety and is admitted for acute on chronic nausea, vomiting, and abdominal pain. Was hospitalized 12/27-12/29 for similar symptoms and found to have idiopathic pancreatitis.     Acute on chronic nausea and vomiting  Abdominal pain  Recent history of idiopathic pancreatitis  Anxiety  Hx of GERD  Patient's sx returned and worsening since discharge, so re-admitted. In ED, was hypertensive, afebrile, other VS WNL. His lipase decreased from 582 from previous admission to 172. Cr 1.37. BMP, CBC, and LFTs otherwise normal, EKG unremarkable. CT from previous admission revealed no acute processes, hepatic steatosis, bilateral nonobstructive nephrolithiasis. Per chart review, was seen for dysphagia, intermittent nausea vomiting, and heartburn at a clinic visit in September.  Was prescribed omeprazole and referred to GI for EGD, but this was not completed due to pt not having insurance.  Current etiology of intractable acute on chronic nausea and vomiting could be pancreatitis versus GERD versus ulcer.  Suspect anxiety contributing. Could also consider cyclic vomiting 2/2 THC use.  - ADAT - clear liquids today  - Stop IV fluids  - PO oxycodone PRN for pain  - hydroxyzine 3 times daily for anxiety; restart PTA trazodone  - Zofran for nausea  - Acupuncture consult    CKDII  Baseline creatinine around 1.1-1.2. Cr 1.33 on admission.  - Monitor    Hypertension  Hypertensive urgency  Elevated blood pressures on admission likely 2/2 pain, n/v, and anxiety. Pt was only taking lisinopril, not amlodipine at home.  - restart PTA amlodipine and lisinopril  - hydralazine PRN    Dyslipidemia  Last lipid profile 1 week ago revealed cholesterol of  "196, TG of 408. Not on statin.  - Start statin outpatient    Diabetes mellitus type 2  Not on insulin at home. Last A1c 8.9% on 12/27/22; had not been taking DM medications at home due to worry that it would affect ED.  - medium sliding scale insulin  - hold PTA glipizide and metformin    Depression: continue PTA trazodone     Diet: Clear Liquid Diet    DVT Prophylaxis: Pneumatic Compression Devices  Still Catheter: Not present  Fluids: PO  Lines: None     Cardiac Monitorig: None  Code Status: Full Code      Clinically Significant Risk Factors Present on Admission                  # Hypertension: home medication list includes antihypertensive(s)     # DMII: A1C = 8.9 % (Ref range: <5.7 %) within past 3 months    # Overweight: Estimated body mass index is 27.37 kg/m  as calculated from the following:    Height as of 12/27/22: 1.727 m (5' 8\").    Weight as of this encounter: 81.6 kg (180 lb).           Disposition Plan      Expected Discharge Date: 01/05/2023                The patient's care was discussed with the attending physician, Dr. Tovar.    Estefania Villalobos MD PGY-1  [facility] Family Medicine Resident Team  [facility]  Securely message with [employer] (more info)  Text page via [employer] Paging/Directory   ______________________________________________________________________    Interval History   Patient reports only mild epigastric pain and his nausea is controlled.  Has been having occasional hiccups and dry heaving.  Just finished a lemon ice and some juice and was tolerating well.  Denies chest pain and dyspnea.    Physical Exam   Vital Signs: Temp: 99.1  F (37.3  C) Temp src: Oral BP: (!) 163/96 Pulse: 95   Resp: 16 SpO2: 98 % O2 Device: None (Room air)    Weight: 180 lbs 0 oz  General Appearance: Alert, cooperative, no acute distress  Respiratory: Lungs clear to auscultation bilaterally, no midline chest wall tenderness  Cardiovascular: Regular rate and rhythm, normal S1 and S2, no " murmurs, no lower extremity edema bilaterally  GI: Soft, nondistended, bowel sounds present, nontender to palpation  Skin: No jaundice    Medical Decision Making       Please see A&P for additional details of medical decision making.  MANAGEMENT DISCUSSED with the following over the past 24 hours: nurse, patient, case management       Data         Imaging results reviewed over the past 24 hrs:   No results found for this or any previous visit (from the past 24 hour(s)).

## 2023-01-04 NOTE — PROGRESS NOTES
"PRIMARY DIAGNOSIS: \"GENERIC\" NURSING  OUTPATIENT/OBSERVATION GOALS TO BE MET BEFORE DISCHARGE:  1. ADLs back to baseline: Yes    2. Activity and level of assistance: Up with minimal SBA.    3. Pain status: Improved but still requiring IV narcotics, morphine.     4. Return to near baseline physical activity: Yes. Pt still burping and has heartburn. Refused antiemetic. Pt in better mood after sleeping and taking meds. Voices needs appropriately.      Discharge Planner Nurse   Safe discharge environment identified: Yes  Barriers to discharge: Yes, AM labs and pain control.        Entered by: Sesar Garibay RN 01/04/2023 4:25 AM     Please review provider order for any additional goals.   Nurse to notify provider when observation goals have been met and patient is ready for discharge.  "

## 2023-01-04 NOTE — PROGRESS NOTES
Care Management Follow Up    Length of Stay (days): 0    Expected Discharge Date: 01/05/2023     Concerns to be Addressed:       Patient plan of care discussed at interdisciplinary rounds: Yes    Anticipated Discharge Disposition:       Anticipated Discharge Services:    Anticipated Discharge DME:      Patient/family educated on Medicare website which has current facility and service quality ratings:    Education Provided on the Discharge Plan:    Patient/Family in Agreement with the Plan:      Referrals Placed by CM/SW:    Private pay costs discussed: Not applicable    Additional Information:  Chart reviewed, pt lives in private residence.  Anticipate home at discharge.  Care management available as needed.      HOWARD Lu

## 2023-01-05 LAB
GLUCOSE BLDC GLUCOMTR-MCNC: 105 MG/DL (ref 70–99)
GLUCOSE BLDC GLUCOMTR-MCNC: 209 MG/DL (ref 70–99)
GLUCOSE BLDC GLUCOMTR-MCNC: 271 MG/DL (ref 70–99)
GLUCOSE BLDC GLUCOMTR-MCNC: 297 MG/DL (ref 70–99)

## 2023-01-05 PROCEDURE — 250N000013 HC RX MED GY IP 250 OP 250 PS 637

## 2023-01-05 PROCEDURE — G0378 HOSPITAL OBSERVATION PER HR: HCPCS

## 2023-01-05 PROCEDURE — 250N000013 HC RX MED GY IP 250 OP 250 PS 637: Performed by: STUDENT IN AN ORGANIZED HEALTH CARE EDUCATION/TRAINING PROGRAM

## 2023-01-05 PROCEDURE — 96376 TX/PRO/DX INJ SAME DRUG ADON: CPT

## 2023-01-05 PROCEDURE — 82962 GLUCOSE BLOOD TEST: CPT

## 2023-01-05 PROCEDURE — 250N000011 HC RX IP 250 OP 636: Performed by: STUDENT IN AN ORGANIZED HEALTH CARE EDUCATION/TRAINING PROGRAM

## 2023-01-05 PROCEDURE — 96372 THER/PROPH/DIAG INJ SC/IM: CPT

## 2023-01-05 PROCEDURE — 99232 SBSQ HOSP IP/OBS MODERATE 35: CPT | Mod: GC

## 2023-01-05 RX ORDER — OXYCODONE HYDROCHLORIDE 5 MG/1
5 TABLET ORAL ONCE
Status: COMPLETED | OUTPATIENT
Start: 2023-01-05 | End: 2023-01-05

## 2023-01-05 RX ORDER — OXYCODONE HYDROCHLORIDE 5 MG/1
10 TABLET ORAL EVERY 6 HOURS PRN
Status: DISCONTINUED | OUTPATIENT
Start: 2023-01-05 | End: 2023-01-05

## 2023-01-05 RX ORDER — OXYCODONE HYDROCHLORIDE 5 MG/1
5 TABLET ORAL EVERY 6 HOURS PRN
Status: DISCONTINUED | OUTPATIENT
Start: 2023-01-05 | End: 2023-01-06 | Stop reason: HOSPADM

## 2023-01-05 RX ADMIN — AMLODIPINE BESYLATE 10 MG: 10 TABLET ORAL at 08:19

## 2023-01-05 RX ADMIN — LISINOPRIL 40 MG: 20 TABLET ORAL at 08:19

## 2023-01-05 RX ADMIN — OXYCODONE HYDROCHLORIDE 5 MG: 5 TABLET ORAL at 07:31

## 2023-01-05 RX ADMIN — HYDROXYZINE HYDROCHLORIDE 50 MG: 25 TABLET ORAL at 06:03

## 2023-01-05 RX ADMIN — OXYCODONE HYDROCHLORIDE 5 MG: 5 TABLET ORAL at 20:02

## 2023-01-05 RX ADMIN — OXYCODONE HYDROCHLORIDE 5 MG: 5 TABLET ORAL at 02:18

## 2023-01-05 RX ADMIN — OXYCODONE HYDROCHLORIDE 10 MG: 5 TABLET ORAL at 13:43

## 2023-01-05 RX ADMIN — PANTOPRAZOLE SODIUM 40 MG: 20 TABLET, DELAYED RELEASE ORAL at 07:31

## 2023-01-05 RX ADMIN — HYDROXYZINE HYDROCHLORIDE 50 MG: 25 TABLET ORAL at 21:56

## 2023-01-05 RX ADMIN — HYDROXYZINE HYDROCHLORIDE 50 MG: 25 TABLET ORAL at 13:43

## 2023-01-05 RX ADMIN — TRAZODONE HYDROCHLORIDE 100 MG: 50 TABLET ORAL at 21:55

## 2023-01-05 RX ADMIN — ONDANSETRON 4 MG: 2 INJECTION INTRAMUSCULAR; INTRAVENOUS at 08:28

## 2023-01-05 ASSESSMENT — ACTIVITIES OF DAILY LIVING (ADL)
ADLS_ACUITY_SCORE: 19

## 2023-01-05 NOTE — PROGRESS NOTES
"    PRIMARY DIAGNOSIS: \"GENERIC\" NURSING  OUTPATIENT/OBSERVATION GOALS TO BE MET BEFORE DISCHARGE:  1. ADLs back to baseline: Yes    2. Activity and level of assistance: Ambulating independently.    3. Pain status: Improved-controlled with oral pain medications.    4. Return to near baseline physical activity: Yes     Discharge Planner Nurse   Safe discharge environment identified: Yes  Barriers to discharge: Yes       Entered by: Mylene Estrada RN 01/04/2023 6:03 PM     Please review provider order for any additional goals.   Nurse to notify provider when observation goals have been met and patient is ready for discharge.  "

## 2023-01-05 NOTE — UTILIZATION REVIEW
Concurrent stay review; Secondary Review Determination       Under the authority of the Utilization Management Committee, the utilization review process indicated a secondary review on the above patient.  The review outcome is based on review of the medical records, discussions with staff, and applying clinical experience noted on the date of the review.          (x) Observation Status Appropriate - Concurrent stay review    RATIONALE FOR DETERMINATION     Mr. Monsivais is a 51 yo male pt with PMH of recurrent abd pain and N/V who was recently admitted for similar symptoms last month and found to have idiopathic pancreatitis.  Returned with similar symptoms; no acute findings on CT imaging, lab.  No clear infectious process and no fevers.  Suspect that he may have a component of cyclical vomiting from ongoing THC use.      He is currently tolerating an advanced diet, not on IVF and on po prn pain medications.  Likely will be able to discharge later today.        Patient is clinically improving and there is no clear indication to change patient's status to inpatient. The severity of illness, intensity of service provided, expected LOS and risk for adverse outcome make the care appropriate for observation.      The information on this document is developed by the utilization review team in order for the business office to ensure compliance.  This only denotes the appropriateness of proper admission status and does not reflect the quality of care rendered.         The definitions of Inpatient Status and Observation Status used in making the determination above are those provided in the CMS Coverage Manual, Chapter 1 and Chapter 6, section 70.4.          Sincerely,       Raegan Hays, DO  Utilization Review  Physician Advisor  [facility].

## 2023-01-05 NOTE — PROVIDER NOTIFICATION
Paged MD about pt c/o heartburn, request for chewable tums. Awaiting response.    Kimberlee Medina RN  1/4/2023  9:31 PM

## 2023-01-05 NOTE — PROGRESS NOTES
[facility]    Progress Note - Hospitalist Service       Date of Admission:  1/3/2023    Assessment & Plan      Drew Monsivais is a 52 year old male admitted on 1/3/2023. He has a history of hypertension, DM 2, ED, GERD, renal stone, dyslipidemia, depression and anxiety and is admitted for acute on chronic nausea, vomiting, and abdominal pain. Was hospitalized 12/27-12/29 for similar symptoms and found to have idiopathic pancreatitis.     Acute on chronic nausea and vomiting, improved  Abdominal pain, improved  Recent history of idiopathic pancreatitis  Anxiety  Hx of GERD  Patient's sx returned and worsening since discharge, so re-admitted. In ED, was hypertensive, afebrile, other VS WNL. His lipase decreased from 582 from previous admission to 172. Cr 1.37. BMP, CBC, and LFTs otherwise normal, EKG unremarkable. CT from previous admission revealed no acute processes, hepatic steatosis, bilateral nonobstructive nephrolithiasis. Per chart review, was seen for dysphagia, intermittent nausea vomiting, and heartburn at a clinic visit in September.  Was prescribed omeprazole and referred to GI for EGD, but this was not completed due to pt not having insurance.  Current etiology of intractable acute on chronic nausea and vomiting could be pancreatitis versus GERD versus ulcer.  Suspect anxiety contributing. Symptoms now controlled and pt tolerating full liquid diet; will advance to low fiber today. Explained to pt that oxycodone is not appropriate for anxiety control - will discuss other modalities for long-term management, including pharmacologic and psychotherapy. Pt open to psychotherapy and sister is helping him get insurance to assist in health care access.  - ADAT - low fiber diet today  - PO oxycodone 5 mg PRN for pain  - hydroxyzine 3 times daily for anxiety; restart PTA trazodone  - Zofran for nausea  - Acupuncture consult  - Recommend psychotherapy and adding pharmacologic  medication for anxiety at discharge    CKDII  Baseline creatinine around 1.1-1.2. Cr 1.33 on admission.  - Monitor    Hypertension  Hypertensive urgency  Elevated blood pressures on admission likely 2/2 pain, n/v, and anxiety. Pt was only taking lisinopril, not amlodipine at home.  - restart PTA amlodipine and lisinopril  - hydralazine PRN    Dyslipidemia  Last lipid profile 1 week ago revealed cholesterol of 196, TG of 408. Not on statin.  - Start statin outpatient    Diabetes mellitus type 2  Not on insulin at home. Last A1c 8.9% on 12/27/22; had not been taking DM medications at home due to worry that it would affect ED.  - medium sliding scale insulin  - hold PTA glipizide and metformin    Depression: continue PTA trazodone     Diet: Combination Diet Low Fiber    DVT Prophylaxis: Pneumatic Compression Devices  Still Catheter: Not present  Fluids: PO  Lines: None     Cardiac Monitorig: None  Code Status: Full Code        Disposition Plan      Expected Discharge Date: 01/06/2023        Discharge Comments: advance diet and pain control        The patient's care was discussed with the attending physician, Dr. Tovar.    Estefania Villalobos MD PGY-1  [facility] Family Medicine Resident Team  [facility]  Securely message with [employer] (more info)  Text page via [employer] Paging/Directory   ______________________________________________________________________    Interval History   Patient tolerated cream of wheat, cranberry juice and lemon ice this morning. Reports epigastric pain and nausea are controlled.  No hiccups or dry heaving currently.  Denies chest pain and dyspnea.  Reports that taking 10 mg of oxycodone allows him to feel more calm than 5 mg of oxycodone, which is why he preferred the 10 mg dose.    Physical Exam   Vital Signs: Temp: 98.5  F (36.9  C) Temp src: Oral BP: 110/82 Pulse: 92   Resp: 18 SpO2: 97 % O2 Device: None (Room air)    Weight: 180 lbs 0 oz  General Appearance: Alert,  cooperative, no acute distress  Respiratory: Lungs clear to auscultation bilaterally, no midline chest wall tenderness  Cardiovascular: Regular rate and rhythm, normal S1 and S2, no murmurs, no lower extremity edema bilaterally  GI: Soft, nondistended, bowel sounds present, nontender to palpation  Skin: No jaundice    Medical Decision Making       Please see A&P for additional details of medical decision making.  MANAGEMENT DISCUSSED with the following over the past 24 hours: nurse, patient, family    Data         Imaging results reviewed over the past 24 hrs:   No results found for this or any previous visit (from the past 24 hour(s)).

## 2023-01-05 NOTE — PROVIDER NOTIFICATION
"RN paged [facility] about increasing frequency of pain medications to q4h from q6h. Pt requesting 10 mg of oxycodone instead of range of 5-10 mg per pain rating due to 5 mg \"not being effective\" for his pain/discomfort. [facility] resident called back and said OK to give 1 time dose of 5 mg Oxycodone now and they will have the day team address changing oxycodone orders.     Kimberlee Medina RN  1/5/2023  7:05 AM      "

## 2023-01-05 NOTE — PROGRESS NOTES
PRIMARY DIAGNOSIS: ACUTE PAIN  OUTPATIENT/OBSERVATION GOALS TO BE MET BEFORE DISCHARGE:  1. Pain Status: Improved-controlled with oral pain medications. Taking oxycodone for pain with adequate relief.    2. Return to near baseline physical activity: Yes    3. Cleared for discharge by consultants (if involved): No    Discharge Planner Nurse   Safe discharge environment identified: Yes  Barriers to discharge: Yes       Entered by: Mylene Estrada RN 01/05/2023 10:10 AM     Please review provider order for any additional goals.   Nurse to notify provider when observation goals have been met and patient is ready for discharge.

## 2023-01-05 NOTE — PLAN OF CARE
Problem: Nausea and Vomiting  Goal: Nausea and Vomiting Relief  Outcome: Progressing   PRN compazine administered x 1 this shift as Zofran was not available to be given when patient was vomiting. Pt reported compazine was effective with nausea control. Has not requested any PRN nausea medications the rest of this shift. Intermittent waves of nausea with 1 emesis.       Problem: Pain Acute  Goal: Optimal Pain Control and Function  Outcome: Progressing   Pt reports pain 4-7/10 this shift, did receive PRN oxycodone per his request for pain and has been effective. Pt has been able to rest/sleep throughout this shift.     Pt was able to have a popsicle and 2- 8 ounce glasses of cranberry juice this shift without emesis. Pt c/o heartburn/acid reflux, received new order for PRN tums which was effective per pt. Pt wants to have regular food today as he thinks this will help his acid reflux.    PRIMARY DIAGNOSIS: ACUTE PAIN  OUTPATIENT/OBSERVATION GOALS TO BE MET BEFORE DISCHARGE:  1. Pain Status: Improved-controlled with oral pain medications.    2. Return to near baseline physical activity: Yes    3. Cleared for discharge by consultants (if involved): No    Discharge Planner Nurse   Safe discharge environment identified: Yes  Barriers to discharge: Yes- pain control, diet advancement       Entered by: Kimberlee Medina RN 01/05/2023 6:21 AM     Please review provider order for any additional goals.   Nurse to notify provider when observation goals have been met and patient is ready for discharge.

## 2023-01-05 NOTE — PROGRESS NOTES
PRIMARY DIAGNOSIS: ACUTE PAIN  OUTPATIENT/OBSERVATION GOALS TO BE MET BEFORE DISCHARGE:  1. Pain Status: Improved-controlled with oral pain medications.    2. Return to near baseline physical activity: Yes    3. Cleared for discharge by consultants (if involved): No    Discharge Planner Nurse   Safe discharge environment identified: Yes  Barriers to discharge: Yes- pain control, diet advancement       Entered by: Kimberlee Medina RN 01/05/2023 2:29 AM     Please review provider order for any additional goals.   Nurse to notify provider when observation goals have been met and patient is ready for discharge.

## 2023-01-05 NOTE — PROGRESS NOTES
PRIMARY DIAGNOSIS: ACUTE PAIN  OUTPATIENT/OBSERVATION GOALS TO BE MET BEFORE DISCHARGE:  1. Pain Status: Improved-controlled with oral pain medications.    2. Return to near baseline physical activity: Yes    3. Cleared for discharge by consultants (if involved): No    Discharge Planner Nurse   Safe discharge environment identified: Yes  Barriers to discharge: Yes- pain control, diet advancement       Entered by: Kimberlee Medina RN 01/04/2023 9:31 PM     Please review provider order for any additional goals.   Nurse to notify provider when observation goals have been met and patient is ready for discharge.

## 2023-01-06 VITALS
BODY MASS INDEX: 27.37 KG/M2 | DIASTOLIC BLOOD PRESSURE: 89 MMHG | HEART RATE: 85 BPM | WEIGHT: 180 LBS | RESPIRATION RATE: 18 BRPM | SYSTOLIC BLOOD PRESSURE: 138 MMHG | OXYGEN SATURATION: 97 % | TEMPERATURE: 98.2 F

## 2023-01-06 LAB
GLUCOSE BLDC GLUCOMTR-MCNC: 212 MG/DL (ref 70–99)
GLUCOSE BLDC GLUCOMTR-MCNC: 274 MG/DL (ref 70–99)

## 2023-01-06 PROCEDURE — 250N000011 HC RX IP 250 OP 636: Performed by: STUDENT IN AN ORGANIZED HEALTH CARE EDUCATION/TRAINING PROGRAM

## 2023-01-06 PROCEDURE — 96372 THER/PROPH/DIAG INJ SC/IM: CPT

## 2023-01-06 PROCEDURE — 82962 GLUCOSE BLOOD TEST: CPT

## 2023-01-06 PROCEDURE — G0378 HOSPITAL OBSERVATION PER HR: HCPCS

## 2023-01-06 PROCEDURE — 99238 HOSP IP/OBS DSCHRG MGMT 30/<: CPT | Mod: GC

## 2023-01-06 PROCEDURE — 96376 TX/PRO/DX INJ SAME DRUG ADON: CPT

## 2023-01-06 PROCEDURE — 250N000013 HC RX MED GY IP 250 OP 250 PS 637: Performed by: STUDENT IN AN ORGANIZED HEALTH CARE EDUCATION/TRAINING PROGRAM

## 2023-01-06 RX ORDER — TRAZODONE HYDROCHLORIDE 50 MG/1
150 TABLET, FILM COATED ORAL AT BEDTIME
Qty: 40 TABLET | Refills: 0 | Status: SHIPPED | OUTPATIENT
Start: 2023-01-06

## 2023-01-06 RX ORDER — BUSPIRONE HYDROCHLORIDE 10 MG/1
10 TABLET ORAL 2 TIMES DAILY PRN
Qty: 30 TABLET | Refills: 0 | Status: SHIPPED | OUTPATIENT
Start: 2023-01-06 | End: 2024-05-12

## 2023-01-06 RX ORDER — PANTOPRAZOLE SODIUM 40 MG/1
40 TABLET, DELAYED RELEASE ORAL DAILY
Qty: 30 TABLET | Refills: 0 | Status: ON HOLD | OUTPATIENT
Start: 2023-01-06 | End: 2023-02-19

## 2023-01-06 RX ORDER — FLUOXETINE 10 MG/1
CAPSULE ORAL
Qty: 30 CAPSULE | Refills: 0 | Status: SHIPPED | OUTPATIENT
Start: 2023-01-06 | End: 2024-05-12

## 2023-01-06 RX ADMIN — LISINOPRIL 40 MG: 20 TABLET ORAL at 08:48

## 2023-01-06 RX ADMIN — PANTOPRAZOLE SODIUM 40 MG: 20 TABLET, DELAYED RELEASE ORAL at 06:32

## 2023-01-06 RX ADMIN — ONDANSETRON 4 MG: 2 INJECTION INTRAMUSCULAR; INTRAVENOUS at 01:52

## 2023-01-06 RX ADMIN — AMLODIPINE BESYLATE 10 MG: 10 TABLET ORAL at 08:48

## 2023-01-06 ASSESSMENT — ACTIVITIES OF DAILY LIVING (ADL)
ADLS_ACUITY_SCORE: 19

## 2023-01-06 NOTE — PLAN OF CARE
Problem: Plan of Care - These are the overarching goals to be used throughout the patient stay.    Goal: Optimal Comfort and Wellbeing  Outcome: Progressing  Intervention: Monitor Pain and Promote Comfort  Recent Flowsheet Documentation  Taken 1/6/2023 0130 by Rosa Kemp RN  Pain Management Interventions: care clustered  Taken 1/5/2023 2102 by Rosa Kemp RN  Pain Management Interventions: care clustered  Taken 1/5/2023 2000 by Rosa Kemp RN  Pain Management Interventions:   medication (see MAR)   ambulation/increased activity   care clustered   emotional support   pain management plan reviewed with patient/caregiver   quiet environment facilitated   repositioned   rest   therapeutic presence     Patient's vitals are stable, afebrile. Rates epigastric pain 5/10. Oxycodone 5 mg administered x1 overnight (see MAR). Complained of nausea, zofran administered at 0152 with relief. Pt. Up to bathroom, voiding. Pt. Eating and drinking. IV is saline locked. Will continue to monitor.     IRAIDA Kemp RN

## 2023-01-06 NOTE — DISCHARGE SUMMARY
[facility]  Discharge Summary - Medicine & Pediatrics       Date of Admission:  1/3/2023  Date of Discharge:  1/6/2023 10:01 AM  Discharging Provider: Dr. Azra Tovar and Dr. Mamie Cosby  Discharge Service: Hospitalist Service    Discharge Diagnoses   Active Problems:  Anxiety  GERD       Follow-ups Needed After Discharge   Follow-up Appointments     Follow-up and recommended labs and tests       Follow up with primary care provider, Haroon Farmer, within 7 days to   evaluate medication change and for hospital follow- up.  The following   labs/tests are recommended: PHQ-9, MARIA DOLORES-7.      Please schedule an appointment for an esophagogastroduodenoscopy (EGD or   upper GI scope) with GI.     Please schedule an appointment to establish care with a therapist, ideally   someone trained in Cognitive Behavioral Therapy.             Unresulted Labs Ordered in the Past 30 Days of this Admission     No orders found from 12/4/2022 to 1/4/2023.      These results will be followed up by none    Discharge Disposition   Discharged to home  Condition at discharge: Stable    Hospital Course   Drew Monsivais was admitted on 1/3/2023 for intractable vomiting, nausea and abdominal pain.  The following problems were addressed during his hospitalization:    Acute on chronic nausea and vomiting, resolved  Abdominal pain, resolved   Recent history of idiopathic pancreatitis  Anxiety  Hx of GERD  Patient was admitted 12/27/22-12/29/22 for idiopathic pancreatitis. (see prior discharge summary for more details). He was readmitted 1/3/22 for continued abdominal pain, vomiting, nausea and poor oral intake. Patient was hypertensive, afebrile, other VS WNL in the ED. His lipase decreased from 582 from previous admission to 172. Cr 1.37. BMP, CBC, and LFTs otherwise normal, EKG unremarkable. Current etiology of intractable acute on chronic nausea and vomiting could be pancreatitis versus GERD versus ulcer.   Suspect anxiety contributing. Started patient with NPO and IV pain meds, then switched to oral and slowly advanced diet as tolerated. Nausea and vomiting controlled with Zofran and TUMS. Pain controlled with oxycodone. Symptoms now controlled and patient tolerated full diet without abdominal pain, vomiting, or hiccupping.  Had an extensive discussion with patient regarding symptom monitoring, keeping a food diary, and following up with GI for EGD once he obtains insurance. Pt open to psychotherapy and sister is helping him get insurance to assist in health care access.  Patient will be sent home with Zofran and TUMS for nausea, Fluoxetine 10mg daily and Buspar 10mg BID PRN for anxiety.     CKDII  Baseline creatinine around 1.1-1.2. Cr 1.33 on admission.     Hypertension  Hypertensive urgency  Elevated blood pressures on admission likely 2/2 pain, n/v, and anxiety. Pt was only taking lisinopril, not amlodipine at home.Restarted amlodipine and lisinopril. Patient expressed understanding in taking these every single day.      Dyslipidemia  Last lipid profile 1 week ago revealed cholesterol of 196, TG of 408. Not on statin. Starting statin outpatient to reduce likelihood of future pancreatitis from hypertriglyceridemia and improve cardiac outcomes.      Diabetes mellitus type 2  Not on insulin at home. Last A1c 8.9% on 12/27/22; had not been taking DM medications at home due to worry that it would affect ED. Discussed importance of taking medications daily. Held oral agents and used sliding scale insulin while inpatient and restarted home doses of glipizide and metformin on discharge.     Depression  Insomnia  Patient had been taking sertraline PRN prior. Discussed the importance of daily use for maximum effect. Patient is working to obtain insurance to establish care with a psychotherapist. Started him on 10mg fluoxetine with plan to increase to 20mg in 7 days. Continue trazodone at increased rate of 150mg at night  and 1mg melatonin at night.     Consultations This Hospital Stay   ACUPUNCTURE IP CONSULT    Code Status   Prior       The patient was discussed with Dr. Azra Tovar and the Resident Team.     Mamie Cosby MD  [facility] [address] [address]  Phone: [phone]  Fax: [phone]  ______________________________________________________________________    Physical Exam   Vital Signs: Temp: 98.2  F (36.8  C) Temp src: Oral BP: 138/89 Pulse: 85   Resp: 18 SpO2: 97 % O2 Device: None (Room air)    Weight: 180 lbs 0 oz  Constitutional: awake, alert, cooperative, no apparent distress, and appears stated age  Respiratory: No increased work of breathing, good air exchange, clear to auscultation bilaterally, no crackles or wheezing  Cardiovascular: Normal apical impulse, regular rate and rhythm, normal S1 and S2, no S3 or S4, and no murmur noted  GI: No scars, normal bowel sounds, soft, non-distended, non-tender, no masses palpated, no hepatosplenomegally, no belching or hiccupping today.  Neuropsychiatric: General: normal, calm and normal eye contact  Level of consciousness: alert / normal  Affect: normal and pleasant  Orientation: oriented to self, place, time and situation  Memory and insight: normal, memory for past and recent events intact and thought process normal      Primary Care Physician   Haroon Farmer    Discharge Orders      Reason for your hospital stay    Intractable nausea, vomiting and stomach pain, likely from a combination of pancreatitis of unknown origin and GERD     Follow-up and recommended labs and tests     Follow up with primary care provider, Haroon Farmer, within 7 days to evaluate medication change and for hospital follow- up.  The following labs/tests are recommended: PHQ-9, MARIA DOLORES-7.      Please schedule an appointment for an  esophagogastroduodenoscopy (EGD or upper GI scope) with GI.     Please schedule an appointment to establish care with a therapist, ideally someone trained in Cognitive Behavioral Therapy.     Activity    Your activity upon discharge: activity as tolerated. No restriction on physical activity. Keep a food diary to monitor reactions to certain foods.     Diet    Follow this diet upon discharge: Regular adult diet, keep diet bland for 1-2 weeks after discharge and reintroduce foods carefully.       Significant Results and Procedures   Most Recent 3 CBC's:Recent Labs   Lab Test 01/03/23  0733 12/28/22 0927 12/27/22  1233   WBC 12.1* 12.1* 9.9   HGB 17.4 13.9 14.9   MCV 78 80 77*    235 237     Most Recent 3 BMP's:Recent Labs   Lab Test 01/06/23  0634 01/06/23  0154 01/05/23  2202 01/03/23  1627 01/03/23  0733 12/29/22  1225 12/29/22  1059 12/28/22  0928 12/28/22 0927   NA  --   --   --   --  131*  --  131*  --  135*   POTASSIUM  --   --   --   --  4.5  --  4.2  --  4.2   CHLORIDE  --   --   --   --  92*  --  95*  --  97*   CO2  --   --   --   --  23  --  25  --  24   BUN  --   --   --   --  13  --  11  --  11   CR  --   --   --   --  1.37*  --  1.11  --  1.21   ANIONGAP  --   --   --   --  16  --  11  --  14   LEISA  --   --   --   --  9.9  --  9.0  --  9.4   * 212* 297*   < > 231*   < > 191*   < > 266*    < > = values in this interval not displayed.     Most Recent 2 LFT's:Recent Labs   Lab Test 01/03/23 0733 12/28/22 0927   AST 18 18   ALT 19 16   ALKPHOS 78 67   BILITOTAL 1.5* 1.2*   ,   Results for orders placed or performed during the hospital encounter of 12/27/22   CT Chest/Abdomen/Pelvis w Contrast    Narrative    EXAM: CT CHEST/ABDOMEN/PELVIS W CONTRAST  LOCATION: [facility]  DATE/TIME: 12/27/2022 1:51 PM    INDICATION: patient having chest painafter throwing up all day yesterday.  COMPARISON: Chest radiographs 11/10/2022. CTA chest, abdomen, and pelvis  11/25/2020.  TECHNIQUE: CT scan of the chest, abdomen, and pelvis was performed following injection of IV contrast. Multiplanar reformats were obtained. Dose reduction techniques were used.   CONTRAST: 100ml Isovue 370    FINDINGS:   LUNGS AND PLEURA: Normal.    MEDIASTINUM/AXILLAE: Normal.    CORONARY ARTERY CALCIFICATION: Mild.    HEPATOBILIARY: Hepatic steatosis.    PANCREAS: Normal.    SPLEEN: Normal.    ADRENAL GLANDS: Normal.    KIDNEYS/BLADDER: Several nonobstructing stones in the collecting systems of both kidneys ranging in size from 2-4 mm on the right and 1-5 mm on the left.    BOWEL: Normal. No obstruction or inflammation. Normal appendix. No free fluid or gas.    LYMPH NODES: Normal.    VASCULATURE: Mild aortoiliac atherosclerosis. No aneurysm.    PELVIC ORGANS: Normal.    MUSCULOSKELETAL: Advanced degenerative disc disease at L5-S1.      Impression    IMPRESSION:  1.  No acute process in the chest, abdomen or pelvis.  2.  Hepatic steatosis.  3.  Bilateral nonobstructive nephrolithiasis.       Discharge Medications   Discharge Medication List as of 1/6/2023  9:37 AM      START taking these medications    Details   busPIRone (BUSPAR) 10 MG tablet Take 1 tablet (10 mg) by mouth 2 times daily as needed (Anxiety), Disp-30 tablet, R-0, E-Prescribe      FLUoxetine (PROZAC) 10 MG capsule Take one pill by mouth for 7 days, then take two pills by mouth daily, Disp-30 capsule, R-0, E-Prescribe      melatonin 1 MG TABS tablet Take 1 tablet (1 mg) by mouth nightly as needed for sleep, Disp-15 tablet, R-0, E-Prescribe      pantoprazole (PROTONIX) 40 MG EC tablet Take 1 tablet (40 mg) by mouth daily, Disp-30 tablet, R-0, E-Prescribe         CONTINUE these medications which have CHANGED    Details   traZODone (DESYREL) 50 MG tablet Take 3 tablets (150 mg) by mouth At Bedtime, Disp-40 tablet, R-0, E-Prescribe         CONTINUE these medications which have NOT CHANGED    Details   calcium carbonate (TUMS) 500 MG chewable  tablet Take 1 tablet (500 mg) by mouth 3 times daily as needed for heartburn, Disp-60 tablet, R-0, E-Prescribe      lisinopril (ZESTRIL) 40 MG tablet Take 1 tablet (40 mg) by mouth daily, Disp-30 tablet, R-0, E-Prescribe      ondansetron (ZOFRAN ODT) 4 MG ODT tab Take 1 tablet (4 mg) by mouth every 6 hours as needed for nausea or vomiting, Disp-30 tablet, R-0, E-Prescribe      tadalafil (CIALIS) 5 MG tablet Take one to four pills daily as needed for sex, Disp-30 tablet, R-0, E-Prescribe      amLODIPine (NORVASC) 10 MG tablet Take 1 tablet (10 mg) by mouth daily, Disp-30 tablet, R-0, E-Prescribe      glipiZIDE (GLUCOTROL XL) 5 MG 24 hr tablet Take 1 tablet twice daily for diabetes, Disp-60 tablet, R-0, E-Prescribe      metFORMIN (GLUCOPHAGE) 500 MG tablet Take 2 tablets (1,000 mg) by mouth 2 times daily (with meals) for 30 days, Disp-120 tablet, R-0, E-Prescribe      tamsulosin (FLOMAX) 0.4 MG capsule Take 1 capsule (0.4 mg) by mouth daily, Disp-30 capsule, R-0, E-Prescribe         STOP taking these medications       guaiFENesin-codeine (ROBITUSSIN AC) 100-10 MG/5ML solution Comments:   Reason for Stopping:         omeprazole (PRILOSEC) 20 MG DR capsule Comments:   Reason for Stopping:         oxyCODONE (ROXICODONE) 5 MG tablet Comments:   Reason for Stopping:         prochlorperazine (COMPAZINE) 10 MG tablet Comments:   Reason for Stopping:         sertraline (ZOLOFT) 50 MG tablet Comments:   Reason for Stopping:             Allergies   No Known Allergies

## 2023-01-06 NOTE — PROGRESS NOTES
PRIMARY DIAGNOSIS: ACUTE PAIN  OUTPATIENT/OBSERVATION GOALS TO BE MET BEFORE DISCHARGE:  1. Pain Status: Improved-controlled with oral pain medications. Patient states pain is improving. Hiccups have decreased in frequency.    2. Return to near baseline physical activity: Yes     3. Cleared for discharge by consultants (if involved): No    Discharge Planner Nurse   Safe discharge environment identified: Yes  Barriers to discharge: Yes       Entered by: Mylene Estrada RN 01/05/2023 6:59 PM     Please review provider order for any additional goals.   Nurse to notify provider when observation goals have been met and patient is ready for discharge.

## 2023-01-06 NOTE — PROGRESS NOTES
"PRIMARY DIAGNOSIS: \"GENERIC\" NURSING  OUTPATIENT/OBSERVATION GOALS TO BE MET BEFORE DISCHARGE:  1. ADLs back to baseline: Yes    2. Activity and level of assistance: Ambulating independently.    3. Pain status: Pain free.    4. Return to near baseline physical activity: Yes     Discharge Planner Nurse   Safe discharge environment identified: Yes  Barriers to discharge: No       Entered by: Estefania Koo RN 01/06/2023 8:57 AM     Please review provider order for any additional goals.   Nurse to notify provider when observation goals have been met and patient is ready for discharge.    Pt is eager to discharge home.  He has a plan in place to manage diabetes and anxiety.  Physician currently speaking with patient and his sister regarding discharge medications.    "

## 2023-01-09 ENCOUNTER — PATIENT OUTREACH (OUTPATIENT)
Dept: CARE COORDINATION | Facility: CLINIC | Age: 53
End: 2023-01-09

## 2023-01-09 NOTE — PROGRESS NOTES
"Clinic Care Coordination Contact  [facility]: Post-Discharge Note  SITUATION                                                      Admission:    Admission Date: 01/03/23   Reason for Admission: Anxiety  GERD  Discharge:   Discharge Date: 01/06/23  Discharge Diagnosis: Anxiety  GERD    BACKGROUND                                                      Per hospital discharge summary and inpatient provider notes:    Drew Monsivais is a 52 year old male who has a history of DM 2, hypertension, dyslipidemia, depression, and anxiety, and recent pancreatitis and is admitted for evaluation of nausea/vomiting and abdominal pain.  Patient was admitted to [facility] to the residency team from 12/27 to 12/29/2022.  During that time patient presented with chest pain, nausea.  Trop was negative, ECG reassuring, BMP and CBC and LFT within normal range.  Patient abdominal pain on the epigastric area.  Lipase found to be elevated.  Pancreatitis protocol was started, n.p.o., nausea control, IV fluid, and pain control.  Patient has no history of alcohol use.  [place] 0-1 for acute pancreatitis, low mortality risk.  Unknown etiology of his pancreatitis.  Prior to discharge, patient was started on diet.  Did well and discharged on oxycodone and Zofran for nausea.     Patient stated after his discharge, did consume ice cream and is mostly after which his pain and nausea became severe.  Unable to keep food in his stomach.  He also endorses shortness of breath, chest pain while vomiting, and upper abdominal pain, 5-6 out of 10.  Patient did call EMS, unable to drive to the hospital.  Also, endorses diarrhea that was resolved.  He is stated his anxious, does not want to die because of his situation.    ASSESSMENT      Discharge Assessment  How are you doing now that you are home?: \"Doing better\"  How are your symptoms? (Red Flag symptoms escalate to triage hotline per guidelines): Improved  Do you feel your condition is stable " enough to be safe at home until your provider visit?: Yes  Does the patient have their discharge instructions? : Yes  Does the patient have questions regarding their discharge instructions? : No  Were you started on any new medications or were there changes to any of your previous medications? : Yes  Does the patient have all of their medications?: Yes  Do you have questions regarding any of your medications? : No  Do you have all of your needed medical supplies or equipment (DME)?  (i.e. oxygen tank, CPAP, cane, etc.): Yes  Discharge follow-up appointment scheduled within 14 calendar days? : No  Is patient agreeable to assistance with scheduling? : No    Post-op ([facility]ER[facility]I CTA Only)  If the patient had a surgery or procedure, do they have any questions for a nurse?: No    PLAN                                                      Outpatient Plan:    Follow up with primary care provider, Haroon Farmer, within 7 days to evaluate medication change and for hospital follow- up.  The following labs/tests are recommended: PHQ-9, MARIA DOLORES-7.       Please schedule an appointment for an esophagogastroduodenoscopy (EGD or   upper GI scope) with GI.      Please schedule an appointment to establish care with a therapist, ideally   someone trained in Cognitive Behavioral Therapy.    No future appointments.      For any urgent concerns, please contact our 24 hour nurse triage line: [phone] ([phone])         SERGEI Parnell  [phone]  [facility]

## 2023-01-30 DIAGNOSIS — Z87.442 H/O RENAL CALCULI: ICD-10-CM

## 2023-01-30 DIAGNOSIS — R10.9 FLANK PAIN: ICD-10-CM

## 2023-01-31 RX ORDER — TAMSULOSIN HYDROCHLORIDE 0.4 MG/1
CAPSULE ORAL
Qty: 90 CAPSULE | Refills: 0 | Status: SHIPPED | OUTPATIENT
Start: 2023-01-31 | End: 2023-06-09

## 2023-01-31 NOTE — TELEPHONE ENCOUNTER
"Routing refill request to provider for review/approval because:  Needs review    Last Written Prescription Date:  12/29/22  Last Fill Quantity: 30,  # refills: 0   Last office visit provider:  1/3/23     Requested Prescriptions   Pending Prescriptions Disp Refills     tamsulosin (FLOMAX) 0.4 MG capsule [Pharmacy Med Name: TAMSULOSIN HCL 0.4 MG CAPSULE] 90 capsule      Sig: TAKE 1 CAPSULE BY MOUTH EVERY DAY       Alpha Blockers Failed - 1/30/2023  9:11 AM        Failed - Patient does not have Tadalafil, Vardenafil, or Sildenafil on their medication list        Passed - Blood pressure under 140/90 in past 12 months     BP Readings from Last 3 Encounters:   01/06/23 138/89   12/29/22 (!) 181/104   12/01/22 (!) 153/97                 Passed - Recent (12 mo) or future (30 days) visit within the authorizing provider's specialty     Patient has had an office visit with the authorizing provider or a provider within the authorizing providers department within the previous 12 mos or has a future within next 30 days. See \"Patient Info\" tab in inbasket, or \"Choose Columns\" in Meds & Orders section of the refill encounter.              Passed - Medication is active on med list        Passed - Patient is 18 years of age or older             Asiya Malin RN 01/30/23 7:40 PM  "

## 2023-02-14 ENCOUNTER — APPOINTMENT (OUTPATIENT)
Dept: CT IMAGING | Facility: CLINIC | Age: 53
DRG: 380 | End: 2023-02-14
Attending: EMERGENCY MEDICINE
Payer: COMMERCIAL

## 2023-02-14 ENCOUNTER — APPOINTMENT (OUTPATIENT)
Dept: ULTRASOUND IMAGING | Facility: CLINIC | Age: 53
DRG: 380 | End: 2023-02-14
Attending: PHYSICIAN ASSISTANT
Payer: COMMERCIAL

## 2023-02-14 ENCOUNTER — HOSPITAL ENCOUNTER (INPATIENT)
Facility: CLINIC | Age: 53
LOS: 5 days | Discharge: HOME | DRG: 380 | End: 2023-02-19
Attending: EMERGENCY MEDICINE | Admitting: INTERNAL MEDICINE
Payer: COMMERCIAL

## 2023-02-14 DIAGNOSIS — F12.90 MARIJUANA USE: ICD-10-CM

## 2023-02-14 DIAGNOSIS — R11.2 NAUSEA AND VOMITING, UNSPECIFIED VOMITING TYPE: ICD-10-CM

## 2023-02-14 DIAGNOSIS — R11.2 INTRACTABLE NAUSEA AND VOMITING: Primary | ICD-10-CM

## 2023-02-14 DIAGNOSIS — R10.84 DIFFUSE ABDOMINAL PAIN: ICD-10-CM

## 2023-02-14 DIAGNOSIS — R73.09 ELEVATED GLUCOSE: ICD-10-CM

## 2023-02-14 DIAGNOSIS — E83.42 HYPOMAGNESEMIA: ICD-10-CM

## 2023-02-14 DIAGNOSIS — R10.13 ABDOMINAL PAIN, EPIGASTRIC: ICD-10-CM

## 2023-02-14 LAB
ALBUMIN SERPL-MCNC: 4.5 G/DL (ref 3.5–5)
ALBUMIN UR-MCNC: 20 MG/DL
ALP SERPL-CCNC: 80 U/L (ref 45–120)
ALT SERPL W P-5'-P-CCNC: 10 U/L (ref 0–45)
AMPHETAMINES UR QL SCN: ABNORMAL
ANION GAP SERPL CALCULATED.3IONS-SCNC: 21 MMOL/L (ref 5–18)
APPEARANCE UR: CLEAR
APTT PPP: 26 SECONDS (ref 22–38)
AST SERPL W P-5'-P-CCNC: 10 U/L (ref 0–40)
ATRIAL RATE - MUSE: 113 BPM
BARBITURATES UR QL: ABNORMAL
BASE EXCESS BLDV CALC-SCNC: 5.4 MMOL/L
BASOPHILS # BLD AUTO: 0 10E3/UL (ref 0–0.2)
BASOPHILS NFR BLD AUTO: 0 %
BENZODIAZ UR QL: ABNORMAL
BILIRUB DIRECT SERPL-MCNC: 0.2 MG/DL
BILIRUB SERPL-MCNC: 0.8 MG/DL (ref 0–1)
BILIRUB UR QL STRIP: NEGATIVE
BUN SERPL-MCNC: 20 MG/DL (ref 8–22)
CALCIUM SERPL-MCNC: 10.5 MG/DL (ref 8.5–10.5)
CANNABINOIDS UR QL SCN: ABNORMAL
CHLORIDE BLD-SCNC: 97 MMOL/L (ref 98–107)
CO2 SERPL-SCNC: 20 MMOL/L (ref 22–31)
COCAINE UR QL: ABNORMAL
COLOR UR AUTO: ABNORMAL
CREAT SERPL-MCNC: 1.49 MG/DL (ref 0.7–1.3)
CREAT UR-MCNC: 117 MG/DL
DIASTOLIC BLOOD PRESSURE - MUSE: NORMAL MMHG
EOSINOPHIL # BLD AUTO: 0 10E3/UL (ref 0–0.7)
EOSINOPHIL NFR BLD AUTO: 0 %
ERYTHROCYTE [DISTWIDTH] IN BLOOD BY AUTOMATED COUNT: 12.3 % (ref 10–15)
FLUAV RNA SPEC QL NAA+PROBE: NEGATIVE
FLUBV RNA RESP QL NAA+PROBE: NEGATIVE
GFR SERPL CREATININE-BSD FRML MDRD: 55 ML/MIN/1.73M2
GLUCOSE BLD-MCNC: 271 MG/DL (ref 70–125)
GLUCOSE BLDC GLUCOMTR-MCNC: 179 MG/DL (ref 70–99)
GLUCOSE BLDC GLUCOMTR-MCNC: 232 MG/DL (ref 70–99)
GLUCOSE UR STRIP-MCNC: 100 MG/DL
GROUP A STREP BY PCR: NOT DETECTED
HCO3 BLDV-SCNC: 28 MMOL/L (ref 24–30)
HCT VFR BLD AUTO: 37.4 % (ref 40–53)
HGB BLD-MCNC: 13.5 G/DL (ref 13.3–17.7)
HGB UR QL STRIP: NEGATIVE
HOLD SPECIMEN: NORMAL
IMM GRANULOCYTES # BLD: 0.1 10E3/UL
IMM GRANULOCYTES NFR BLD: 1 %
INR PPP: 0.96 (ref 0.85–1.15)
INTERPRETATION ECG - MUSE: NORMAL
KETONES BLD-SCNC: 0.59 MMOL/L
KETONES UR STRIP-MCNC: ABNORMAL MG/DL
LACTATE SERPL-SCNC: 2.5 MMOL/L (ref 0.7–2)
LACTATE SERPL-SCNC: 3.7 MMOL/L (ref 0.7–2)
LEUKOCYTE ESTERASE UR QL STRIP: NEGATIVE
LIPASE SERPL-CCNC: 233 U/L (ref 0–52)
LYMPHOCYTES # BLD AUTO: 1 10E3/UL (ref 0.8–5.3)
LYMPHOCYTES NFR BLD AUTO: 6 %
MAGNESIUM SERPL-MCNC: 1.6 MG/DL (ref 1.8–2.6)
MCH RBC QN AUTO: 28.1 PG (ref 26.5–33)
MCHC RBC AUTO-ENTMCNC: 36.1 G/DL (ref 31.5–36.5)
MCV RBC AUTO: 78 FL (ref 78–100)
MONOCYTES # BLD AUTO: 0.9 10E3/UL (ref 0–1.3)
MONOCYTES NFR BLD AUTO: 5 %
NEUTROPHILS # BLD AUTO: 14.3 10E3/UL (ref 1.6–8.3)
NEUTROPHILS NFR BLD AUTO: 88 %
NITRATE UR QL: NEGATIVE
NRBC # BLD AUTO: 0 10E3/UL
NRBC BLD AUTO-RTO: 0 /100
OPIATES UR QL SCN: ABNORMAL
OXYCODONE UR QL: ABNORMAL
OXYHGB MFR BLDV: 83.3 % (ref 70–75)
P AXIS - MUSE: 69 DEGREES
PCO2 BLDV: 30 MM HG (ref 35–50)
PCP UR QL SCN: ABNORMAL
PH BLDV: 7.57 [PH] (ref 7.35–7.45)
PH UR STRIP: 7 [PH] (ref 5–7)
PLATELET # BLD AUTO: 306 10E3/UL (ref 150–450)
PO2 BLDV: 43 MM HG (ref 25–47)
POTASSIUM BLD-SCNC: 4.6 MMOL/L (ref 3.5–5)
PR INTERVAL - MUSE: 168 MS
PROT SERPL-MCNC: 8 G/DL (ref 6–8)
QRS DURATION - MUSE: 96 MS
QT - MUSE: 356 MS
QTC - MUSE: 488 MS
R AXIS - MUSE: 269 DEGREES
RBC # BLD AUTO: 4.81 10E6/UL (ref 4.4–5.9)
RBC URINE: <1 /HPF
RSV RNA SPEC NAA+PROBE: NEGATIVE
SAO2 % BLDV: 84.9 % (ref 70–75)
SARS-COV-2 RNA RESP QL NAA+PROBE: NEGATIVE
SODIUM SERPL-SCNC: 138 MMOL/L (ref 136–145)
SP GR UR STRIP: >1.05 (ref 1–1.03)
SYSTOLIC BLOOD PRESSURE - MUSE: NORMAL MMHG
T AXIS - MUSE: 67 DEGREES
TRIGL SERPL-MCNC: 275 MG/DL
TROPONIN I SERPL-MCNC: <0.01 NG/ML (ref 0–0.29)
TROPONIN T BLD-MCNC: 0 UG/L
UROBILINOGEN UR STRIP-MCNC: <2 MG/DL
VENTRICULAR RATE- MUSE: 113 BPM
WBC # BLD AUTO: 16.2 10E3/UL (ref 4–11)
WBC URINE: 1 /HPF

## 2023-02-14 PROCEDURE — 76705 ECHO EXAM OF ABDOMEN: CPT

## 2023-02-14 PROCEDURE — 80307 DRUG TEST PRSMV CHEM ANLYZR: CPT | Performed by: EMERGENCY MEDICINE

## 2023-02-14 PROCEDURE — 250N000012 HC RX MED GY IP 250 OP 636 PS 637: Performed by: INTERNAL MEDICINE

## 2023-02-14 PROCEDURE — 82248 BILIRUBIN DIRECT: CPT | Performed by: EMERGENCY MEDICINE

## 2023-02-14 PROCEDURE — 81001 URINALYSIS AUTO W/SCOPE: CPT | Performed by: EMERGENCY MEDICINE

## 2023-02-14 PROCEDURE — 250N000013 HC RX MED GY IP 250 OP 250 PS 637: Performed by: INTERNAL MEDICINE

## 2023-02-14 PROCEDURE — 85025 COMPLETE CBC W/AUTO DIFF WBC: CPT | Performed by: EMERGENCY MEDICINE

## 2023-02-14 PROCEDURE — 99285 EMERGENCY DEPT VISIT HI MDM: CPT | Mod: 25,CS

## 2023-02-14 PROCEDURE — C9113 INJ PANTOPRAZOLE SODIUM, VIA: HCPCS | Performed by: EMERGENCY MEDICINE

## 2023-02-14 PROCEDURE — C9803 HOPD COVID-19 SPEC COLLECT: HCPCS

## 2023-02-14 PROCEDURE — 85730 THROMBOPLASTIN TIME PARTIAL: CPT | Performed by: EMERGENCY MEDICINE

## 2023-02-14 PROCEDURE — 96361 HYDRATE IV INFUSION ADD-ON: CPT

## 2023-02-14 PROCEDURE — 82805 BLOOD GASES W/O2 SATURATION: CPT | Performed by: EMERGENCY MEDICINE

## 2023-02-14 PROCEDURE — 84478 ASSAY OF TRIGLYCERIDES: CPT | Performed by: PHYSICIAN ASSISTANT

## 2023-02-14 PROCEDURE — 250N000011 HC RX IP 250 OP 636: Performed by: INTERNAL MEDICINE

## 2023-02-14 PROCEDURE — 87651 STREP A DNA AMP PROBE: CPT | Performed by: EMERGENCY MEDICINE

## 2023-02-14 PROCEDURE — 87040 BLOOD CULTURE FOR BACTERIA: CPT | Performed by: EMERGENCY MEDICINE

## 2023-02-14 PROCEDURE — 82962 GLUCOSE BLOOD TEST: CPT

## 2023-02-14 PROCEDURE — 84484 ASSAY OF TROPONIN QUANT: CPT | Performed by: EMERGENCY MEDICINE

## 2023-02-14 PROCEDURE — 99222 1ST HOSP IP/OBS MODERATE 55: CPT | Performed by: INTERNAL MEDICINE

## 2023-02-14 PROCEDURE — C9113 INJ PANTOPRAZOLE SODIUM, VIA: HCPCS | Performed by: INTERNAL MEDICINE

## 2023-02-14 PROCEDURE — 96365 THER/PROPH/DIAG IV INF INIT: CPT

## 2023-02-14 PROCEDURE — 250N000011 HC RX IP 250 OP 636: Performed by: EMERGENCY MEDICINE

## 2023-02-14 PROCEDURE — 74177 CT ABD & PELVIS W/CONTRAST: CPT

## 2023-02-14 PROCEDURE — 120N000001 HC R&B MED SURG/OB

## 2023-02-14 PROCEDURE — 85610 PROTHROMBIN TIME: CPT | Performed by: EMERGENCY MEDICINE

## 2023-02-14 PROCEDURE — 36415 COLL VENOUS BLD VENIPUNCTURE: CPT | Performed by: INTERNAL MEDICINE

## 2023-02-14 PROCEDURE — 87637 SARSCOV2&INF A&B&RSV AMP PRB: CPT | Performed by: EMERGENCY MEDICINE

## 2023-02-14 PROCEDURE — 83605 ASSAY OF LACTIC ACID: CPT | Performed by: EMERGENCY MEDICINE

## 2023-02-14 PROCEDURE — 258N000003 HC RX IP 258 OP 636: Performed by: EMERGENCY MEDICINE

## 2023-02-14 PROCEDURE — 83605 ASSAY OF LACTIC ACID: CPT | Performed by: INTERNAL MEDICINE

## 2023-02-14 PROCEDURE — 83690 ASSAY OF LIPASE: CPT | Performed by: EMERGENCY MEDICINE

## 2023-02-14 PROCEDURE — 258N000003 HC RX IP 258 OP 636: Performed by: INTERNAL MEDICINE

## 2023-02-14 PROCEDURE — 93005 ELECTROCARDIOGRAM TRACING: CPT | Performed by: EMERGENCY MEDICINE

## 2023-02-14 PROCEDURE — 82010 KETONE BODYS QUAN: CPT | Performed by: EMERGENCY MEDICINE

## 2023-02-14 PROCEDURE — 83735 ASSAY OF MAGNESIUM: CPT | Performed by: EMERGENCY MEDICINE

## 2023-02-14 PROCEDURE — 36415 COLL VENOUS BLD VENIPUNCTURE: CPT | Performed by: EMERGENCY MEDICINE

## 2023-02-14 PROCEDURE — 96375 TX/PRO/DX INJ NEW DRUG ADDON: CPT

## 2023-02-14 PROCEDURE — 250N000009 HC RX 250: Performed by: INTERNAL MEDICINE

## 2023-02-14 RX ORDER — PROCHLORPERAZINE 25 MG
25 SUPPOSITORY, RECTAL RECTAL EVERY 12 HOURS PRN
Status: DISCONTINUED | OUTPATIENT
Start: 2023-02-14 | End: 2023-02-19 | Stop reason: HOSPADM

## 2023-02-14 RX ORDER — LIDOCAINE 40 MG/G
CREAM TOPICAL
Status: DISCONTINUED | OUTPATIENT
Start: 2023-02-14 | End: 2023-02-19 | Stop reason: HOSPADM

## 2023-02-14 RX ORDER — FENTANYL CITRATE 50 UG/ML
100 INJECTION, SOLUTION INTRAMUSCULAR; INTRAVENOUS ONCE
Status: COMPLETED | OUTPATIENT
Start: 2023-02-14 | End: 2023-02-14

## 2023-02-14 RX ORDER — TAMSULOSIN HYDROCHLORIDE 0.4 MG/1
0.4 CAPSULE ORAL DAILY
Status: DISCONTINUED | OUTPATIENT
Start: 2023-02-14 | End: 2023-02-19 | Stop reason: HOSPADM

## 2023-02-14 RX ORDER — PROCHLORPERAZINE MALEATE 10 MG
10 TABLET ORAL EVERY 6 HOURS PRN
Status: DISCONTINUED | OUTPATIENT
Start: 2023-02-14 | End: 2023-02-19 | Stop reason: HOSPADM

## 2023-02-14 RX ORDER — ONDANSETRON 2 MG/ML
4 INJECTION INTRAMUSCULAR; INTRAVENOUS EVERY 6 HOURS PRN
Status: DISCONTINUED | OUTPATIENT
Start: 2023-02-14 | End: 2023-02-19 | Stop reason: HOSPADM

## 2023-02-14 RX ORDER — AMLODIPINE BESYLATE 10 MG/1
10 TABLET ORAL DAILY
Status: DISCONTINUED | OUTPATIENT
Start: 2023-02-14 | End: 2023-02-19 | Stop reason: HOSPADM

## 2023-02-14 RX ORDER — METOCLOPRAMIDE HYDROCHLORIDE 5 MG/ML
5 INJECTION INTRAMUSCULAR; INTRAVENOUS EVERY 6 HOURS
Status: DISCONTINUED | OUTPATIENT
Start: 2023-02-14 | End: 2023-02-19 | Stop reason: HOSPADM

## 2023-02-14 RX ORDER — PIPERACILLIN SODIUM, TAZOBACTAM SODIUM 3; .375 G/15ML; G/15ML
3.38 INJECTION, POWDER, LYOPHILIZED, FOR SOLUTION INTRAVENOUS ONCE
Status: COMPLETED | OUTPATIENT
Start: 2023-02-14 | End: 2023-02-14

## 2023-02-14 RX ORDER — METOCLOPRAMIDE HYDROCHLORIDE 5 MG/ML
10 INJECTION INTRAMUSCULAR; INTRAVENOUS ONCE
Status: COMPLETED | OUTPATIENT
Start: 2023-02-14 | End: 2023-02-14

## 2023-02-14 RX ORDER — METOCLOPRAMIDE HYDROCHLORIDE 5 MG/ML
10 INJECTION INTRAMUSCULAR; INTRAVENOUS EVERY 6 HOURS
Status: DISCONTINUED | OUTPATIENT
Start: 2023-02-14 | End: 2023-02-14

## 2023-02-14 RX ORDER — ONDANSETRON 4 MG/1
4 TABLET, ORALLY DISINTEGRATING ORAL EVERY 6 HOURS PRN
Status: DISCONTINUED | OUTPATIENT
Start: 2023-02-14 | End: 2023-02-19 | Stop reason: HOSPADM

## 2023-02-14 RX ORDER — TRAZODONE HYDROCHLORIDE 50 MG/1
100 TABLET, FILM COATED ORAL AT BEDTIME
Status: DISCONTINUED | OUTPATIENT
Start: 2023-02-14 | End: 2023-02-19 | Stop reason: HOSPADM

## 2023-02-14 RX ORDER — SODIUM CHLORIDE 9 MG/ML
INJECTION, SOLUTION INTRAVENOUS ONCE
Status: COMPLETED | OUTPATIENT
Start: 2023-02-14 | End: 2023-02-14

## 2023-02-14 RX ORDER — NICOTINE POLACRILEX 4 MG
15-30 LOZENGE BUCCAL
Status: DISCONTINUED | OUTPATIENT
Start: 2023-02-14 | End: 2023-02-19 | Stop reason: HOSPADM

## 2023-02-14 RX ORDER — SCOLOPAMINE TRANSDERMAL SYSTEM 1 MG/1
1 PATCH, EXTENDED RELEASE TRANSDERMAL
Status: DISCONTINUED | OUTPATIENT
Start: 2023-02-14 | End: 2023-02-19 | Stop reason: HOSPADM

## 2023-02-14 RX ORDER — LABETALOL HYDROCHLORIDE 5 MG/ML
20 INJECTION, SOLUTION INTRAVENOUS EVERY 6 HOURS PRN
Status: DISCONTINUED | OUTPATIENT
Start: 2023-02-14 | End: 2023-02-17

## 2023-02-14 RX ORDER — LISINOPRIL 20 MG/1
40 TABLET ORAL DAILY
Status: DISCONTINUED | OUTPATIENT
Start: 2023-02-14 | End: 2023-02-17

## 2023-02-14 RX ORDER — ACETAMINOPHEN 650 MG/1
650 SUPPOSITORY RECTAL EVERY 6 HOURS PRN
Status: DISCONTINUED | OUTPATIENT
Start: 2023-02-14 | End: 2023-02-19 | Stop reason: HOSPADM

## 2023-02-14 RX ORDER — IOPAMIDOL 755 MG/ML
75 INJECTION, SOLUTION INTRAVASCULAR ONCE
Status: COMPLETED | OUTPATIENT
Start: 2023-02-14 | End: 2023-02-14

## 2023-02-14 RX ORDER — ONDANSETRON 2 MG/ML
4 INJECTION INTRAMUSCULAR; INTRAVENOUS ONCE
Status: COMPLETED | OUTPATIENT
Start: 2023-02-14 | End: 2023-02-14

## 2023-02-14 RX ORDER — DIPHENHYDRAMINE HYDROCHLORIDE 50 MG/ML
25 INJECTION INTRAMUSCULAR; INTRAVENOUS ONCE
Status: COMPLETED | OUTPATIENT
Start: 2023-02-14 | End: 2023-02-14

## 2023-02-14 RX ORDER — ACETAMINOPHEN 325 MG/1
650 TABLET ORAL EVERY 6 HOURS PRN
Status: DISCONTINUED | OUTPATIENT
Start: 2023-02-14 | End: 2023-02-19 | Stop reason: HOSPADM

## 2023-02-14 RX ORDER — DEXTROSE MONOHYDRATE 25 G/50ML
25-50 INJECTION, SOLUTION INTRAVENOUS
Status: DISCONTINUED | OUTPATIENT
Start: 2023-02-14 | End: 2023-02-19 | Stop reason: HOSPADM

## 2023-02-14 RX ORDER — MAGNESIUM SULFATE HEPTAHYDRATE 40 MG/ML
2 INJECTION, SOLUTION INTRAVENOUS ONCE
Status: COMPLETED | OUTPATIENT
Start: 2023-02-14 | End: 2023-02-14

## 2023-02-14 RX ORDER — BUSPIRONE HYDROCHLORIDE 10 MG/1
10 TABLET ORAL 2 TIMES DAILY PRN
Status: DISCONTINUED | OUTPATIENT
Start: 2023-02-14 | End: 2023-02-19 | Stop reason: HOSPADM

## 2023-02-14 RX ADMIN — MAGNESIUM SULFATE HEPTAHYDRATE 2 G: 40 INJECTION, SOLUTION INTRAVENOUS at 09:26

## 2023-02-14 RX ADMIN — PANTOPRAZOLE SODIUM 40 MG: 40 INJECTION, POWDER, FOR SOLUTION INTRAVENOUS at 20:24

## 2023-02-14 RX ADMIN — IOPAMIDOL 75 ML: 755 INJECTION, SOLUTION INTRAVENOUS at 09:17

## 2023-02-14 RX ADMIN — SODIUM CHLORIDE 1000 ML: 9 INJECTION, SOLUTION INTRAVENOUS at 11:03

## 2023-02-14 RX ADMIN — PROMETHAZINE HYDROCHLORIDE 25 MG: 25 INJECTION INTRAMUSCULAR; INTRAVENOUS at 20:29

## 2023-02-14 RX ADMIN — BENZOCAINE AND MENTHOL 1 LOZENGE: 15; 3.6 LOZENGE ORAL at 20:37

## 2023-02-14 RX ADMIN — ONDANSETRON 4 MG: 2 INJECTION INTRAMUSCULAR; INTRAVENOUS at 08:10

## 2023-02-14 RX ADMIN — PROCHLORPERAZINE EDISYLATE 10 MG: 5 INJECTION INTRAMUSCULAR; INTRAVENOUS at 14:29

## 2023-02-14 RX ADMIN — ONDANSETRON 4 MG: 2 INJECTION INTRAMUSCULAR; INTRAVENOUS at 17:20

## 2023-02-14 RX ADMIN — PIPERACILLIN AND TAZOBACTAM 3.38 G: 3; .375 INJECTION, POWDER, FOR SOLUTION INTRAVENOUS at 12:54

## 2023-02-14 RX ADMIN — FENTANYL CITRATE 100 MCG: 50 INJECTION, SOLUTION INTRAMUSCULAR; INTRAVENOUS at 09:01

## 2023-02-14 RX ADMIN — SODIUM CHLORIDE: 9 INJECTION, SOLUTION INTRAVENOUS at 12:48

## 2023-02-14 RX ADMIN — PANTOPRAZOLE SODIUM 40 MG: 40 INJECTION, POWDER, FOR SOLUTION INTRAVENOUS at 08:12

## 2023-02-14 RX ADMIN — METOCLOPRAMIDE HYDROCHLORIDE 5 MG: 5 INJECTION INTRAMUSCULAR; INTRAVENOUS at 18:01

## 2023-02-14 RX ADMIN — INSULIN ASPART 1 UNITS: 100 INJECTION, SOLUTION INTRAVENOUS; SUBCUTANEOUS at 16:21

## 2023-02-14 RX ADMIN — BENZOCAINE AND MENTHOL 1 LOZENGE: 15; 3.6 LOZENGE ORAL at 18:02

## 2023-02-14 RX ADMIN — LABETALOL HYDROCHLORIDE 20 MG: 5 INJECTION, SOLUTION INTRAVENOUS at 17:28

## 2023-02-14 RX ADMIN — DIPHENHYDRAMINE HYDROCHLORIDE 25 MG: 50 INJECTION INTRAMUSCULAR; INTRAVENOUS at 11:31

## 2023-02-14 RX ADMIN — METOCLOPRAMIDE HYDROCHLORIDE 10 MG: 5 INJECTION INTRAMUSCULAR; INTRAVENOUS at 11:28

## 2023-02-14 RX ADMIN — INSULIN ASPART 1 UNITS: 100 INJECTION, SOLUTION INTRAVENOUS; SUBCUTANEOUS at 20:53

## 2023-02-14 RX ADMIN — SCOPALAMINE 1 PATCH: 1 PATCH, EXTENDED RELEASE TRANSDERMAL at 20:27

## 2023-02-14 RX ADMIN — SODIUM CHLORIDE 1000 ML: 9 INJECTION, SOLUTION INTRAVENOUS at 08:19

## 2023-02-14 ASSESSMENT — ENCOUNTER SYMPTOMS
NAUSEA: 1
DIARRHEA: 1
VOMITING: 1
ABDOMINAL PAIN: 1
FEVER: 0
SHORTNESS OF BREATH: 0

## 2023-02-14 ASSESSMENT — ACTIVITIES OF DAILY LIVING (ADL)
ADLS_ACUITY_SCORE: 35
ADLS_ACUITY_SCORE: 35
DEPENDENT_IADLS:: INDEPENDENT
TOILETING_ISSUES: NO
WALKING_OR_CLIMBING_STAIRS_DIFFICULTY: NO
ADLS_ACUITY_SCORE: 21
HEARING_DIFFICULTY_OR_DEAF: NO
CONCENTRATING,_REMEMBERING_OR_MAKING_DECISIONS_DIFFICULTY: YES
DIFFICULTY_EATING/SWALLOWING: NO
ADLS_ACUITY_SCORE: 19
ADLS_ACUITY_SCORE: 35
DRESSING/BATHING_DIFFICULTY: NO
EQUIPMENT_CURRENTLY_USED_AT_HOME: GLUCOMETER
ADLS_ACUITY_SCORE: 35
FALL_HISTORY_WITHIN_LAST_SIX_MONTHS: NO
DIFFICULTY_COMMUNICATING: NO
ADLS_ACUITY_SCORE: 35
CHANGE_IN_FUNCTIONAL_STATUS_SINCE_ONSET_OF_CURRENT_ILLNESS/INJURY: NO
ADLS_ACUITY_SCORE: 19
DOING_ERRANDS_INDEPENDENTLY_DIFFICULTY: NO
WEAR_GLASSES_OR_BLIND: NO

## 2023-02-14 NOTE — PHARMACY-ADMISSION MEDICATION HISTORY
"Pharmacy Note - Admission Medication History    Pertinent Provider Information: Upon interview, pt states he is not very familiar with drug names. States that only med change since discharge on 1/6/23 is that metformin has been discontinued. When asked if he takes glipizide, pt states \"maybe\" - office note from 2/13/23 indicates this should be continued. He also doesn't recognize names of tamsulosin or buspirone.     Pt has not had meds since 2 days ago due to vomiting.   ______________________________________________________________________    Prior To Admission (PTA) med list completed and updated in EMR.       PTA Med List   Medication Sig Note Last Dose     amLODIPine (NORVASC) 10 MG tablet Take 1 tablet (10 mg) by mouth daily 2/14/2023: Last filled 12/29/22 x 30 day supply 2/12/2023     busPIRone (BUSPAR) 10 MG tablet Take 1 tablet (10 mg) by mouth 2 times daily as needed (Anxiety)  Unknown     calcium carbonate (TUMS) 500 MG chewable tablet Take 1 tablet (500 mg) by mouth 3 times daily as needed for heartburn  Unknown     FLUoxetine (PROZAC) 10 MG capsule Take one pill by mouth for 7 days, then take two pills by mouth daily (Patient taking differently: Take 20 mg by mouth daily) 2/14/2023: Last filled 1/6/23 x 18 day supply 2/12/2023     glipiZIDE (GLUCOTROL XL) 5 MG 24 hr tablet Take 1 tablet twice daily for diabetes (Patient taking differently: Take 5 mg by mouth 2 times daily Take 1 tablet twice daily for diabetes) 2/14/2023: Last filled 12/16/22 x 90 day supply, pt unfamiliar with drug name. Office visit yesterday indicates pt should be taking Unknown     lisinopril (ZESTRIL) 40 MG tablet Take 1 tablet (40 mg) by mouth daily  2/12/2023     melatonin 1 MG TABS tablet Take 1 tablet (1 mg) by mouth nightly as needed for sleep  Unknown     ondansetron (ZOFRAN ODT) 4 MG ODT tab Take 1 tablet (4 mg) by mouth every 6 hours as needed for nausea or vomiting  Unknown     pantoprazole (PROTONIX) 40 MG EC tablet Take " 1 tablet (40 mg) by mouth daily  2/12/2023     tadalafil (CIALIS) 5 MG tablet Take one to four pills daily as needed for sex  Unknown     tamsulosin (FLOMAX) 0.4 MG capsule TAKE 1 CAPSULE BY MOUTH EVERY DAY  2/12/2023     traZODone (DESYREL) 50 MG tablet Take 3 tablets (150 mg) by mouth At Bedtime (Patient taking differently: Take 100 mg by mouth At Bedtime)  2/12/2023       Information source(s): Patient and CareEverywhere/SureScripts  Method of interview communication: in-person    Summary of Changes to PTA Med List  New: none  Discontinued: metformin per MD insutructions  Changed: none    Patient was asked about OTC/herbal products specifically.  PTA med list reflects this.    In the past week, patient estimated taking medication this percent of the time:  50-90% due to illness.    Allergies were reviewed, assessed, and updated with the patient.      Patient does not use any multi-dose medications prior to admission.    The information provided in this note is only as accurate as the sources available at the time of the update(s).    Thank you for the opportunity to participate in the care of this patient.    Trina Arambula, PharmD  2/14/2023 11:03 AM

## 2023-02-14 NOTE — H&P
[facility]    History and Physical - Hospitalist Service       Date of Admission:  2/14/2023    Assessment & Plan      Drew Monsivais is a 52 year old male with history of type 2 diabetes mellitus, intractable nausea and vomiting, marijuana use, acute pancreatitis, hypertension, dyslipidemia, who presents to the ER with complaints of nausea, vomiting and diarrhea.    #N/V/D - unclear etiology  -Multifactorial history: prior pancreatitis and current elevated lipase (just not as high); history of high TGs, history of possible GERD and maybe PUD. Has had many visits similar, supposed to see GI but hasn't d/t insurance issues.  -IVF PRN  -replete electrolytes  -will consult GI --- should get EGD this time  -send stool studies, h pylori, check triglyerides, LFTs, RUQUS  -f.u RUQUS   -discuss ETOH use     MARY JANE on CKDII - likely pre renal 2/2 dehydration  Baseline creatinine around 1.1-1.2. Cr 1.49 on admission  IVF     Hypertension  Hypertensive urgency  Elevated blood pressures on admission likely 2/2 pain, n/v, and anxiety.  Restart home meds     Dyslipidemia  Not on statins    Diabetes mellitus type 2  Not on insulin at home. Last A1c 8.9% on 12/27/22; had not been taking DM medications at home due to worry that it would affect ED.   ISS  Accuchecks     Depression  Insomnia  10mg fluoxetine with plan to increase to 20mg in 7 days.   Continue trazodone at increased rate of 150mg at night and   1mg melatonin at night     Diet:  NPO  DVT Prophylaxis: Pneumatic Compression Devices  Still Catheter: Not present  Lines: None     Cardiac Monitoring: None  Code Status:   Full    Clinically Significant Risk Factors Present on Admission           # Hypercalcemia: Highest Ca = 10.5 mg/dL in last 2 days, will monitor as appropriate  # Hypomagnesemia: Lowest Mg = 1.6 mg/dL in last 2 days, will replace as needed  # Anion Gap Metabolic Acidosis: Highest Anion Gap = 21 mmol/L in last 2 days, will monitor  and treat as appropriate      # Hypertension: home medication list includes antihypertensive(s)     # DMII: A1C = 8.9 % (Ref range: <5.7 %) within past 3 months            Disposition Plan           Yary Fernandez MD  Hospitalist Service  [facility]  Securely message with [employer] (more info)  Text page via [facility] Paging/Directory     ______________________________________________________________________    Chief Complaint   N/V/D    History of Present Illness   Drew Monsivais is a 52 year old male with history of type 2 diabetes mellitus, intractable nausea and vomiting, marijuana use, acute pancreatitis, hypertension, dyslipidemia, who presents to the ER with complaints of nausea, vomiting and diarrhea.     N+V x 7 days. More persistent. Taken nausea meds but not working. Having some diarrhea. No chest pain//SOB/fever/chills. Reports compliance with diabetic meds and home meds. Prior history of pancreatitis of unclear etiology. Prior history of high TGs. Has had no formal GI evaluation.      Reports his last marijuana use was a couple days ago.      Past Medical History    Past Medical History:   Diagnosis Date     Calculus of kidney     at least 4 episodes most recent 2012 once surgically removed Stones present both kidneys       Closed fracture of metatarsal bone(s)           Headache     Frontal-?migraine Triggers: no obvious,  excedrin light senstive Chiro        Past Surgical History   History reviewed. No pertinent surgical history.    Prior to Admission Medications   Prior to Admission Medications   Prescriptions Last Dose Informant Patient Reported? Taking?   FLUoxetine (PROZAC) 10 MG capsule 2/12/2023  No Yes   Sig: Take one pill by mouth for 7 days, then take two pills by mouth daily   Patient taking differently: Take 10 mg by mouth 2 times daily   amLODIPine (NORVASC) 10 MG tablet 2/12/2023  No Yes   Sig: Take 1 tablet (10 mg) by mouth daily   busPIRone (BUSPAR) 10 MG  tablet Unknown  No Yes   Sig: Take 1 tablet (10 mg) by mouth 2 times daily as needed (Anxiety)   calcium carbonate (TUMS) 500 MG chewable tablet Unknown  No Yes   Sig: Take 1 tablet (500 mg) by mouth 3 times daily as needed for heartburn   glipiZIDE (GLUCOTROL XL) 5 MG 24 hr tablet Unknown  No Yes   Sig: Take 1 tablet twice daily for diabetes   Patient taking differently: Take 5 mg by mouth 2 times daily Take 1 tablet twice daily for diabetes   lisinopril (ZESTRIL) 40 MG tablet 2/12/2023  No Yes   Sig: Take 1 tablet (40 mg) by mouth daily   melatonin 1 MG TABS tablet Unknown  No Yes   Sig: Take 1 tablet (1 mg) by mouth nightly as needed for sleep   ondansetron (ZOFRAN ODT) 4 MG ODT tab Unknown  No Yes   Sig: Take 1 tablet (4 mg) by mouth every 6 hours as needed for nausea or vomiting   pantoprazole (PROTONIX) 40 MG EC tablet 2/12/2023  No Yes   Sig: Take 1 tablet (40 mg) by mouth daily   tadalafil (CIALIS) 5 MG tablet Unknown  No Yes   Sig: Take one to four pills daily as needed for sex   tamsulosin (FLOMAX) 0.4 MG capsule 2/12/2023  No Yes   Sig: TAKE 1 CAPSULE BY MOUTH EVERY DAY   traZODone (DESYREL) 50 MG tablet 2/12/2023  No Yes   Sig: Take 3 tablets (150 mg) by mouth At Bedtime   Patient taking differently: Take 100 mg by mouth At Bedtime      Facility-Administered Medications: None      2023 UPDATE: these sections are not required for     Physical Exam   Vital Signs: Temp: 99  F (37.2  C) Temp src: Oral BP: (!) 177/112 Pulse: 114   Resp: 22 SpO2: 99 % O2 Device: None (Room air)    Weight: 186 lbs 0 oz    General: No acute distress, breathing comfortably on room air  Neuro: EOMI, PERRLA. Facial muscles symmetric, strength/sensation grossly intact.  HEENT: Anicteric sclera. Oropharynx is clear. No lymphadenopathy.  Chest/Lungs: No accessory respiratory muscle use. Adequate air movement throughout. CTAB.  CV: Normal rate, regular rhythm. nl S1/S2. No m/r/g. Cap refill &lt;2s.  Abd: BS+. Soft, NTND.  Ext: Warm,  well-perfused. Strong distal pulses.      Medical Decision Making       45 MINUTES SPENT BY ME on the date of service doing chart review, history, exam, documentation & further activities per the note.      Data     I have personally reviewed the following data over the past 24 hrs:    16.2 (H)  \   13.5   / 306     138 97 (L) 20 /  271 (H)   4.6 20 (L) 1.49 (H) \       ALT: 10 AST: 10 AP: 80 TBILI: 0.8   ALB: 4.5 TOT PROTEIN: 8.0 LIPASE: 233 (H)       INR:  0.96 PTT:  26   D-dimer:  N/A Fibrinogen:  N/A       Imaging results reviewed over the past 24 hrs:   Recent Results (from the past 24 hour(s))   CT Chest/Abdomen/Pelvis w Contrast    Narrative    EXAM: CT CHEST/ABDOMEN/PELVIS W CONTRAST  LOCATION: [facility]  DATE/TIME: 2/14/2023 9:24 AM    INDICATION: vomiting, hematemesis  COMPARISON: 12/27/2022  TECHNIQUE: CT scan of the chest, abdomen, and pelvis was performed following injection of IV contrast. Multiplanar reformats were obtained. Dose reduction techniques were used.   CONTRAST: Isovue 370 75mL     FINDINGS:   LUNGS AND PLEURA: There are a few patchy groundglass nodular opacities in the right upper and lower lobes. No effusions. Small volume airway secretions.    MEDIASTINUM/AXILLAE: Heart size is normal. No adenopathy. Normal caliber aorta. Mild wall thickening esophagus. No pneumomediastinum.    CORONARY ARTERY CALCIFICATION: Mild.    HEPATOBILIARY: Hepatic steatosis.    PANCREAS: Normal.    SPLEEN: Normal.    ADRENAL GLANDS: Normal.    KIDNEYS/BLADDER: Nonobstructing bilateral renal calculi. No hydronephrosis.    BOWEL: No obstruction or inflammatory change. Normal appendix.    LYMPH NODES: Normal.    VASCULATURE: Moderate atherosclerosis.    PELVIC ORGANS: Normal.    MUSCULOSKELETAL: Degenerative changes of the spine.      Impression    IMPRESSION:  1.  Mild wall thickening in the esophagus can be seen with esophagitis.  2.  There are a few patchy groundglass nodular opacities  in the right upper and lower lobes suggestive of infectious or inflammatory bronchiolitis. Recommend follow-up in 3 months to ensure resolution.  3.  Otherwise, no CT evidence for source of symptoms.  4.  Nephrolithiasis.  5.  Hepatic steatosis.     Recent Labs   Lab 02/14/23  0802   WBC 16.2*   HGB 13.5   MCV 78      INR 0.96      POTASSIUM 4.6   CHLORIDE 97*   CO2 20*   BUN 20   CR 1.49*   ANIONGAP 21*   LEISA 10.5   *   ALBUMIN 4.5   PROTTOTAL 8.0   BILITOTAL 0.8   ALKPHOS 80   ALT 10   AST 10   LIPASE 233*

## 2023-02-14 NOTE — ED TRIAGE NOTES
Pt arrives with c/o nausea and vomiting x 24 hours. Pt reports he noted some bright red blood in his vomit early this morning. Sore throat. Pt was seen approx 6 weeks ago in the ED and has some concerns regarding his appendix.      Triage Assessment     Row Name 02/14/23 [id number]       Triage Assessment (Adult)    Airway WDL WDL       Respiratory WDL    Respiratory WDL WDL       Skin Circulation/Temperature WDL    Skin Circulation/Temperature WDL WDL       Cardiac WDL    Cardiac WDL X;rhythm    Pulse Rate & Regularity tachycardic       Peripheral/Neurovascular WDL    Peripheral Neurovascular WDL WDL       Cognitive/Neuro/Behavioral WDL    Cognitive/Neuro/Behavioral WDL WDL

## 2023-02-14 NOTE — ED PROVIDER NOTES
EMERGENCY DEPARTMENT ENCOUNTER      NAME: Drew Monsivais  AGE: 52 year old male  YOB: 1970  MRN: [id number]  EVALUATION DATE & TIME: 2/14/2023  7:38 AM    PCP: Haroon Farmer    ED PROVIDER: Claire Jacinto M.D.        Chief Complaint   Patient presents with     Nausea & Vomiting     Hematemesis         FINAL IMPRESSION:    1. Nausea and vomiting, unspecified vomiting type    2. Diffuse abdominal pain    3. Hypomagnesemia    4. Elevated glucose    5. Marijuana use            MEDICAL DECISION MAKING:    Drew Monsivais is a 52 year old male with history of type 2 diabetes mellitus, intractable nausea and vomiting, marijuana use, acute pancreatitis, hypertension, dyslipidemia, who presents to the ER with complaints of nausea, vomiting and diarrhea.    Patient noted to be a little alkalotic with pH of 7.57.  No signs for DKA.  Creatinine a little bit higher than baseline.  Lipase elevated though no focal tenderness in the epigastrium and no signs of pancreatitis on CT scan.  He always seems to have a bit of an elevated lipase going back since Christmas.  Patient with mild electrolyte derangements but overall cannot stop with his retching.  Plan at this time is admission to the hospital for ongoing fluids, antiemetics.  WBC elevated, tachycardic, lactic acid elevated and therefore will cover with a dose of Zosyn in the meantime while we continue to evaluate the patient.  This could cover possible GI source but as well as also possible aspiration pneumonia.  Blood pressure mildly elevated and we will continue to monitor this.    Overall patient excepted to the hospitalist service for ongoing monitoring and management.      ED COURSE:  7:42 AM  I met with the patient to gather history and perform my exam. ED course and treatment discussed.    10:25 AM Rechecked patient.  Continues to have retching and coughing.  Pneumonia is less likely, but he may be aspirating with his retching and  vomiting.    10:38 AM Spoke with Hospitalist, Dr. Fernandez who accepts patient for admission.  Admit patient to medical telemetry.  Glucose mildly elevated but patient is actually alkalotic and not acidotic.  We will continue with fluid hydration.  We will also try a little Reglan with Benadryl to see if this helps control his retching.  On examination he continues to have some retching and coughing.  He does not feel like he will be able to manage at home and at this time I would agree.  We will continue with the IV fluids.  CT scan does not show any acute surgical process.  No overt signs for pancreatitis on imaging.  There are some patchy groundglass opacities which may represent some aspiration pneumonia but this time he is afebrile.  Patient excepted to the hospital by the hospitalist for ongoing management.  Unclear at this time if this could just be a gastroenteritis type picture, related to his marijuana use, or some other causes.  Patient was possibly get endoscopy appointment when he was discharged back in early January but never did.  He states he plans to call today until he came into the ER because he was not feeling well.  At this time I do feel sepsis is less likely, but elevated WBC, lactic acid, tachycardia, plan at this time is to treat with a dose of Zosyn even cover possible aspiration pneumonia while we have more time to sort out his clinical condition.  Patient agrees with the plan for admission to the hospital and all of his questions have been answered.    I do not think that this represents ACS, PE, ruptured AAA, aortic dissection, bowel obstruction, bowel ischemia, cholecystitis, pancreatitis, appendicitis, diverticulitis, kidney stone, pyelonephritis, incarcerated or strangulated hernia, testicular torsion, perforated GI ulcer, or other such etiologies at this time.      COVID-19 PPE worn during patient evaluation:  Mask: n95 and homemade masks   Eye Protection: goggles   Gown: none    Hair cover: yes  Face shield: none   Patient wearing a mask: yes     At the conclusion of the encounter I discussed the results of all of the tests and the disposition. Their questions were answered. The patient (and any family present) acknowledged understanding and were agreeable with the care plan.      CONSULTANTS:  Hospitalist- Dr. Fernandez        MEDICATIONS GIVEN IN THE EMERGENCY:  Medications   sodium chloride 0.9% infusion (has no administration in time range)   metoclopramide (REGLAN) injection 10 mg (has no administration in time range)   diphenhydrAMINE (BENADRYL) injection 25 mg (has no administration in time range)   lidocaine 1 % 0.1-1 mL (has no administration in time range)   lidocaine (LMX4) cream (has no administration in time range)   sodium chloride (PF) 0.9% PF flush 3 mL (has no administration in time range)   sodium chloride (PF) 0.9% PF flush 3 mL (has no administration in time range)   melatonin tablet 1 mg (has no administration in time range)   acetaminophen (TYLENOL) tablet 650 mg (has no administration in time range)     Or   acetaminophen (TYLENOL) Suppository 650 mg (has no administration in time range)   ondansetron (ZOFRAN ODT) ODT tab 4 mg (has no administration in time range)     Or   ondansetron (ZOFRAN) injection 4 mg (has no administration in time range)   prochlorperazine (COMPAZINE) injection 10 mg (has no administration in time range)     Or   prochlorperazine (COMPAZINE) tablet 10 mg (has no administration in time range)     Or   prochlorperazine (COMPAZINE) suppository 25 mg (has no administration in time range)   amLODIPine (NORVASC) tablet 10 mg (has no administration in time range)   busPIRone (BUSPAR) tablet 10 mg (has no administration in time range)   FLUoxetine (PROzac) capsule 20 mg (has no administration in time range)   lisinopril (ZESTRIL) tablet 40 mg (has no administration in time range)   tamsulosin (FLOMAX) capsule 0.4 mg (has no administration in time  range)   traZODone (DESYREL) tablet 100 mg (has no administration in time range)   0.9% sodium chloride BOLUS (has no administration in time range)   pantoprazole (PROTONIX) IV push injection 40 mg (has no administration in time range)   glucose gel 15-30 g (has no administration in time range)     Or   dextrose 50 % injection 25-50 mL (has no administration in time range)     Or   glucagon injection 1 mg (has no administration in time range)   insulin aspart (NovoLOG) injection (RAPID ACTING) (has no administration in time range)   insulin aspart (NovoLOG) injection (RAPID ACTING) (has no administration in time range)   insulin aspart (NovoLOG) injection (RAPID ACTING) (has no administration in time range)   piperacillin-tazobactam (ZOSYN) 3.375 g vial to attach to  mL bag (has no administration in time range)   0.9% sodium chloride BOLUS (0 mLs Intravenous Stopped 2/14/23 0928)   pantoprazole (PROTONIX) IV push injection 40 mg (40 mg Intravenous Given 2/14/23 0812)   ondansetron (ZOFRAN) injection 4 mg (4 mg Intravenous Given 2/14/23 0810)   fentaNYL (PF) (SUBLIMAZE) injection 100 mcg (100 mcg Intravenous Given 2/14/23 0901)   magnesium sulfate 2 g in 50 mL sterile water intermittent infusion (0 g Intravenous Stopped 2/14/23 1026)   iopamidol (ISOVUE-370) solution 75 mL (75 mLs Intravenous Given 2/14/23 0917)           NEW PRESCRIPTIONS STARTED AT TODAY'S ER VISIT     Medication List      There are no discharge medications for this visit.             CONDITION:  stable        DISPOSITION:  Med tele as accepted by Dr. Fernandez         =================================================================  =================================================================  TRIAGE ASSESSMENT:  Pt arrives with c/o nausea and vomiting x 24 hours. Pt reports he noted some bright red blood in his vomit early this morning. Sore throat. Pt was seen approx 6 weeks ago in the ED and has some concerns regarding his  "appendix.       ED Triage Vitals [02/14/23 0727]   Enc Vitals Group      BP (!) 149/109      Pulse (!) 121      Resp 20      Temp 99  F (37.2  C)      Temp src Oral      SpO2 99 %      Weight 84.4 kg (186 lb)     ================================================================  ================================================================    HPI    Patient information was obtained from: patient    Use of Intrepreter: N/A     Drew Monsivais is a 52 year old male with history of type 2 diabetes mellitus, intractable nausea and vomiting, marijuana use, acute pancreatitis, hypertension, dyslipidemia, who presents to the ER with complaints of nausea, vomiting and diarrhea.    Patient reports intermittent nausea and vomiting for the past 7 days. He states that the nausea and vomiting has gotten more persistent and worse since yesterday into today. He says he has taken nausea medications within the past week but has not taken is ODT Zofran today due to \"getting his son to school and other arrangements.\" He also reports having a few episodes of diarrhea within the past 24 hours. He also reports some blood in his emesis after several episodes of vomiting.    Reports his last marijuana use was a couple days ago.    He denies chest pain, abdominal pain, and fever. He is not allergic to any medications. He has been taking his diabetes medications and saw a diabetes specialist yesterday who did some blood work. There were no other concerns/complaints at this time.      REVIEW OF SYSTEMS  Review of Systems   Constitutional: Negative for fever.   Respiratory: Negative for shortness of breath.    Cardiovascular: Negative for chest pain.   Gastrointestinal: Positive for abdominal pain, diarrhea, nausea and vomiting.        +some hematemesis   Allergic/Immunologic: Negative for immunocompromised state.   All other systems reviewed and are negative.          PAST MEDICAL HISTORY:  Past Medical History:   Diagnosis Date     " Calculus of kidney     at least 4 episodes most recent 2012 once surgically removed Stones present both kidneys       Closed fracture of metatarsal bone(s)           Headache     Frontal-?migraine Triggers: no obvious,  excedrin light senstive Chiro          PAST SURGICAL HISTORY:  History reviewed. No pertinent surgical history.      CURRENT MEDICATIONS:    Prior to Admission medications    Medication Sig Start Date End Date Taking? Authorizing Provider   amLODIPine (NORVASC) 10 MG tablet Take 1 tablet (10 mg) by mouth daily 12/29/22   Sandrita Wagner MD   busPIRone (BUSPAR) 10 MG tablet Take 1 tablet (10 mg) by mouth 2 times daily as needed (Anxiety) 1/6/23   Azra Tovar MD   calcium carbonate (TUMS) 500 MG chewable tablet Take 1 tablet (500 mg) by mouth 3 times daily as needed for heartburn 12/29/22   Sandrita Wagner MD   FLUoxetine (PROZAC) 10 MG capsule Take one pill by mouth for 7 days, then take two pills by mouth daily 1/6/23   Azra Tovar MD   glipiZIDE (GLUCOTROL XL) 5 MG 24 hr tablet Take 1 tablet twice daily for diabetes 12/29/22 1/29/23  Sandrita Wagner MD   lisinopril (ZESTRIL) 40 MG tablet Take 1 tablet (40 mg) by mouth daily 12/29/22   Sandrita Wagner MD   melatonin 1 MG TABS tablet Take 1 tablet (1 mg) by mouth nightly as needed for sleep 1/6/23   Azra Tovar MD   metFORMIN (GLUCOPHAGE) 500 MG tablet Take 2 tablets (1,000 mg) by mouth 2 times daily (with meals) for 30 days 12/29/22 1/28/23  Sandrita Wagner MD   ondansetron (ZOFRAN ODT) 4 MG ODT tab Take 1 tablet (4 mg) by mouth every 6 hours as needed for nausea or vomiting 12/29/22   Sandrita Wagner MD   pantoprazole (PROTONIX) 40 MG EC tablet Take 1 tablet (40 mg) by mouth daily 1/6/23   Azra Tovar MD   tadalafil (CIALIS) 5 MG tablet Take one to four pills daily as needed for sex 9/12/22   Haroon Farmer MD   tamsulosin (FLOMAX) 0.4 MG capsule TAKE 1 CAPSULE BY MOUTH EVERY DAY 1/31/23    Bekah Fleming NP   traZODone (DESYREL) 50 MG tablet Take 3 tablets (150 mg) by mouth At Bedtime 1/6/23   Azra Tovar MD         ALLERGIES:  No Known Allergies      FAMILY HISTORY:  Family History   Problem Relation Age of Onset     Cerebrovascular Disease Mother      Aneurysm Father          SOCIAL HISTORY:  Social History     Socioeconomic History     Marital status:      Spouse name: None     Number of children: 1     Years of education: None     Highest education level: None   Occupational History     Occupation: [employer]   Tobacco Use     Smoking status: Former     Types: Cigarettes     Smokeless tobacco: Never     Tobacco comments:     [employer]   Vaping Use     Vaping Use: Never used   Substance and Sexual Activity     Alcohol use: No     Drug use: Yes     Comment: Drug use: Cannabis     Sexual activity: Yes     Partners: Female   Social History Narrative    Diet-            Exercise-work is physical          No health insurance         VITALS:  Patient Vitals for the past 24 hrs:   BP Temp Temp src Pulse Resp SpO2 Weight   02/14/23 1030 (!) 177/112 -- -- 114 -- 99 % --   02/14/23 1008 -- -- -- 109 -- 98 % --   02/14/23 1000 (!) 138/101 -- -- -- -- -- --   02/14/23 0900 (!) 157/100 -- -- 107 22 98 % --   02/14/23 0834 (!) 144/79 -- -- 103 -- -- --   02/14/23 0830 -- -- -- 108 16 95 % --   02/14/23 0820 (!) 169/103 -- -- 108 21 100 % --   02/14/23 0758 -- -- -- 113 -- -- --   02/14/23 0749 (!) 136/108 -- -- 114 -- 100 % --   02/14/23 0727 (!) 149/109 99  F (37.2  C) Oral (!) 121 20 99 % 84.4 kg (186 lb)       Wt Readings from Last 3 Encounters:   02/14/23 84.4 kg (186 lb)   01/03/23 81.6 kg (180 lb)   12/27/22 79 kg (174 lb 3 oz)       Estimated Creatinine Clearance: 60 mL/min (A) (based on SCr of 1.49 mg/dL (H)).    PHYSICAL EXAM    Constitutional:  Well developed, Well nourished, +dry heaving, GCS 15  HENT:  Normocephalic, Atraumatic, Bilateral external ears normal, Nose normal. Neck-  Supple, No stridor.   Eyes:  PERRL, EOMI, Conjunctiva normal, No discharge.  Respiratory:  Normal breath sounds, No respiratory distress, No wheezing, Speaks full sentences easily. No cough.   Cardiovascular:  Normal heart rate, Regular rhythm, No rubs, No gallops. Chest wall nontender.   GI:  No excessive obesity.  Bowel sounds normal, Soft, mild diffuse tenderness, No masses, No flank tenderness. No rebound or guarding.   : deferred  Musculoskeletal:  No cyanosis, No clubbing. Good range of motion in all major joints. No major deformities noted.   Integument:  Warm, No erythema, No rash.  No petechiae.  Neurologic:  Alert & oriented x 3  Psychiatric:  Affect normal, Cooperative         LAB:  All pertinent labs reviewed and interpreted.  Recent Results (from the past 24 hour(s))   ECG 12-LEAD WITH MUSE ([facility])    Collection Time: 02/14/23  7:46 AM   Result Value Ref Range    Systolic Blood Pressure  mmHg    Diastolic Blood Pressure  mmHg    Ventricular Rate 113 BPM    Atrial Rate 113 BPM    OK Interval 168 ms    QRS Duration 96 ms     ms    QTc 488 ms    P Axis 69 degrees    R AXIS 269 degrees    T Axis 67 degrees    Interpretation ECG       Sinus tachycardia  Right superior axis deviation  Abnormal ECG  When compared with ECG of 03-JAN-2023 07:49,  No significant change was found  Confirmed by SEE ED PROVIDER NOTE FOR, ECG INTERPRETATION ([id number]),  CIARA MONTIEL ([id number]) on 2/14/2023 8:12:03 AM     Basic metabolic panel    Collection Time: 02/14/23  8:02 AM   Result Value Ref Range    Sodium 138 136 - 145 mmol/L    Potassium 4.6 3.5 - 5.0 mmol/L    Chloride 97 (L) 98 - 107 mmol/L    Carbon Dioxide (CO2) 20 (L) 22 - 31 mmol/L    Anion Gap 21 (H) 5 - 18 mmol/L    Urea Nitrogen 20 8 - 22 mg/dL    Creatinine 1.49 (H) 0.70 - 1.30 mg/dL    Calcium 10.5 8.5 - 10.5 mg/dL    Glucose 271 (H) 70 - 125 mg/dL    GFR Estimate 55 (L) >60 mL/min/1.73m2   Hepatic function panel    Collection Time: 02/14/23  8:02 AM    Result Value Ref Range    Bilirubin Total 0.8 0.0 - 1.0 mg/dL    Bilirubin Direct 0.2 <=0.5 mg/dL    Protein Total 8.0 6.0 - 8.0 g/dL    Albumin 4.5 3.5 - 5.0 g/dL    Alkaline Phosphatase 80 45 - 120 U/L    AST 10 0 - 40 U/L    ALT 10 0 - 45 U/L   Lipase    Collection Time: 02/14/23  8:02 AM   Result Value Ref Range    Lipase 233 (H) 0 - 52 U/L   Blood gas venous    Collection Time: 02/14/23  8:02 AM   Result Value Ref Range    pH Venous 7.57 (HH) 7.35 - 7.45    pCO2 Venous 30 (L) 35 - 50 mm Hg    pO2 Venous 43 25 - 47 mm Hg    Bicarbonate Venous 28 24 - 30 mmol/L    Base Excess/Deficit (+/-) 5.4   mmol/L    Oxyhemoglobin Venous 83.3 (H) 70.0 - 75.0 %    O2 Sat, Venous 84.9 (H) 70.0 - 75.0 %   Magnesium    Collection Time: 02/14/23  8:02 AM   Result Value Ref Range    Magnesium 1.6 (L) 1.8 - 2.6 mg/dL   Troponin I (now)    Collection Time: 02/14/23  8:02 AM   Result Value Ref Range    Troponin I <0.01 0.00 - 0.29 ng/mL   INR    Collection Time: 02/14/23  8:02 AM   Result Value Ref Range    INR 0.96 0.85 - 1.15   PTT    Collection Time: 02/14/23  8:02 AM   Result Value Ref Range    aPTT 26 22 - 38 Seconds   Extra Red Top Tube    Collection Time: 02/14/23  8:02 AM   Result Value Ref Range    Hold Specimen JIC    Extra Green Top (Lithium Heparin) Tube    Collection Time: 02/14/23  8:02 AM   Result Value Ref Range    Hold Specimen JIC    Extra Purple Top Tube    Collection Time: 02/14/23  8:02 AM   Result Value Ref Range    Hold Specimen JIC    CBC with platelets and differential    Collection Time: 02/14/23  8:02 AM   Result Value Ref Range    WBC Count 16.2 (H) 4.0 - 11.0 10e3/uL    RBC Count 4.81 4.40 - 5.90 10e6/uL    Hemoglobin 13.5 13.3 - 17.7 g/dL    Hematocrit 37.4 (L) 40.0 - 53.0 %    MCV 78 78 - 100 fL    MCH 28.1 26.5 - 33.0 pg    MCHC 36.1 31.5 - 36.5 g/dL    RDW 12.3 10.0 - 15.0 %    Platelet Count 306 150 - 450 10e3/uL    % Neutrophils 88 %    % Lymphocytes 6 %    % Monocytes 5 %    % Eosinophils 0 %     % Basophils 0 %    % Immature Granulocytes 1 %    NRBCs per 100 WBC 0 <1 /100    Absolute Neutrophils 14.3 (H) 1.6 - 8.3 10e3/uL    Absolute Lymphocytes 1.0 0.8 - 5.3 10e3/uL    Absolute Monocytes 0.9 0.0 - 1.3 10e3/uL    Absolute Eosinophils 0.0 0.0 - 0.7 10e3/uL    Absolute Basophils 0.0 0.0 - 0.2 10e3/uL    Absolute Immature Granulocytes 0.1 <=0.4 10e3/uL    Absolute NRBCs 0.0 10e3/uL   Ketone Beta-Hydroxybutyrate Quantitative    Collection Time: 02/14/23  8:02 AM   Result Value Ref Range    Ketone (Beta-Hydroxybutyrate) Quantitative 0.59 (H) <=0.3 mmol/L   iStat Troponin, POCT    Collection Time: 02/14/23  8:02 AM   Result Value Ref Range    TROPPC POCT 0.00 <=0.12 ug/L   Group A Streptococcus PCR Throat Swab    Collection Time: 02/14/23  8:15 AM    Specimen: Throat; Swab   Result Value Ref Range    Group A strep by PCR Not Detected Not Detected   Symptomatic Influenza A/B & SARS-CoV2 (COVID-19) Virus PCR Multiplex Nasopharyngeal    Collection Time: 02/14/23  8:15 AM    Specimen: Nasopharyngeal; Swab   Result Value Ref Range    Influenza A PCR Negative Negative    Influenza B PCR Negative Negative    RSV PCR Negative Negative    SARS CoV2 PCR Negative Negative   Lactic acid whole blood    Collection Time: 02/14/23 10:40 AM   Result Value Ref Range    Lactic Acid 3.7 (H) 0.7 - 2.0 mmol/L       No results found for: [facility]        RADIOLOGY:  Reviewed all pertinent imaging. Please see official radiology report.    CT Chest/Abdomen/Pelvis w Contrast   Final Result   IMPRESSION:   1.  Mild wall thickening in the esophagus can be seen with esophagitis.   2.  There are a few patchy groundglass nodular opacities in the right upper and lower lobes suggestive of infectious or inflammatory bronchiolitis. Recommend follow-up in 3 months to ensure resolution.   3.  Otherwise, no CT evidence for source of symptoms.   4.  Nephrolithiasis.   5.  Hepatic steatosis.            EKG:    Indication: vomiting    Performed at:  07:46am  Impression: Sinus tachycardia at 113 bpm.  Flipped T waves noted in lead aVR and possibly aVL.  Slow R wave progression.  NH interval 168 ms, QRS 96 ms, QTc 488 ms.  EKG does appear similar to one from January 3, 2023.      I have independently reviewed and interpreted the EKG(s) documented above.        PROCEDURES:  None      Medical Decision Making    History:    Supplemental history from: Documented in chart, if applicable    External Record(s) reviewed: Documented in chart, if applicable.    Work Up:    Chart documentation includes differential considered and any EKGs or imaging independently interpreted by provider, where specified.    In additional to work up documented, I considered the following work up: Documented in chart, if applicable.    External consultation:    Discussion of management with another provider: Documented in chart, if applicable    Complicating factors:    Care impacted by chronic illness: N/A    Care affected by social determinants of health: N/A    Disposition considerations: Admit.        I, Maeve Lewis, am serving as a scribe to document services personally performed by Dr. Claire Jacinto based on my observation and the provider's statements to me. I, Dr. Claire Jacinto MD attest that Maeve Lewis is acting in a scribe capacity, has observed my performance of the services and has documented them in accordance with my direction.        Claire Jacinto M.D. [facility]  Emergency Medicine and Medical Toxicology  Formerly [facility] EMERGENCY ROOM  [address] [place] [address]  [phone]  Dept: [phone]           Claire Jacinto MD  02/14/23 1054

## 2023-02-14 NOTE — ED NOTES
Pt requesting pain meds. States his abdomen hurts from all of the coughing and gagging. MD informed. Pt given urinal and knows sample is requested.

## 2023-02-14 NOTE — PROGRESS NOTES
Pt was nauseated most of their stay here at the ED. Pt was able to obtain control with compazine as the prn of choice.     Patient has been following commands and using call light to go to the bathroom. Stand by assist.     Pt still needs the stool sample, possible lactate recheck.

## 2023-02-14 NOTE — PROVIDER NOTIFICATION
Text paged Dr. Pennington to update on patient 5/10 throat pain while swallowing and further nausea/vomiting. Also text paged to ask for NPO diet order to be changed as patient has tolerated juice and would like to try some soup this evening. Orders given for reglan, advance as tolerated diet order, and throat lozenge given.

## 2023-02-14 NOTE — CONSULTS
"GI CONSULT NOTE      Name: Drew Monsivais  Medical Record #: [id number]  YOB: 1970  Date of Admission: 2/14/2023  Date/Time: 2/14/2023/11:03 AM     CHIEF COMPLAINT: Nausea, vomiting and diarrhea    HISTORY OF PRESENT ILLNESS: We were asked to see Drew Monsivais by Dr. Melvin for \"esophagitis on CT; repeat bouts of Nausea/vomiting; bloody hemetemeis; referred to GI outpatient for EGD has not completed.\" He is not known to our service. History obtained from chart and patient.     Drew Monsivais is a 52 year old year old male with history of DM II, nausea and vomiting, marijuana use, HTN, HLD, acute pancreatitis who presented to the ED with complaints of nausea, vomiting and diarrhea. It appears he has a long history of nausea and vomiting. He had been admitted 12/27-12/29/22 with idiopathic pancreatitis. He was then admitted 1/3-1/6/23 with ongoing nausea and vomiting. Symptoms treated with Zofran and TUMS. He went home with plans to pursue outpatient EGD, but it does not appear this was done.    He presented today with ongoing nausea and vomiting, which he states has gotten worse. He saw some bright red blood in his emesis this AM after several bouts. he does have abdominal pain, but reports this seems to be due to muscle strain from all the vomiting. Lipase 233, LFTs normal. WBC 16.2. CT chest/abd/pelvis with contrast shows esophageal thickening, hepatic steatosis. RUQ US negative aside from hepatic steatosis. He is actively retching during my visit, clear fluids. No bloody emesis note at this time. He has Zofran at home, which he didn't take today. Sometimes he finds it helpful.     He has been using cannabis, typically 1-2 gummies at night, but it also sounds as though he smokes marijuana occasionally as well. He has not been told to quit using.     REVIEW OF SYSTEMS (ROS): Complete review of systems negative other than listed in HPI.    PAST MEDICAL HISTORY:  Past Medical " History:   Diagnosis Date     Calculus of kidney     at least 4 episodes most recent 2012 once surgically removed Stones present both kidneys       Closed fracture of metatarsal bone(s)           Headache     Frontal-?migraine Triggers: no obvious,  excedrin light senstive Chiro       FAMILY HISTORY:  Family History   Problem Relation Age of Onset     Cerebrovascular Disease Mother      Aneurysm Father      SOCIAL HISTORY:  Social History     Socioeconomic History     Marital status:      Spouse name: Not on file     Number of children: 1     Years of education: Not on file     Highest education level: Not on file   Occupational History     Occupation: [employer]   Tobacco Use     Smoking status: Former     Types: Cigarettes     Smokeless tobacco: Never     Tobacco comments:     College   Vaping Use     Vaping Use: Never used   Substance and Sexual Activity     Alcohol use: No     Drug use: Yes     Comment: Drug use: Cannabis     Sexual activity: Yes     Partners: Female   Other Topics Concern     Not on file   Social History Narrative    Diet-            Exercise-work is physical          No health insurance     Social Determinants of Health     Financial Resource Strain: Not on file   Food Insecurity: Not on file   Transportation Needs: Not on file   Physical Activity: Not on file   Stress: Not on file   Social Connections: Not on file   Intimate Partner Violence: Not on file   Housing Stability: Not on file     MEDICATIONS PRIOR TO ADMISSION: (Not in a hospital admission)       ALLERGIES: Patient has no known allergies.    PHYSICAL EXAM:    BP (!) 177/112   Pulse 114   Temp 99  F (37.2  C) (Oral)   Resp 22   Wt 84.4 kg (186 lb)   SpO2 99%   BMI 28.28 kg/m      GENERAL: Pleasant, actively retching   NECK: Supple without adenopathy  EYES: No scleral icterus  LUNGS: Clear to auscultation bilaterally  HEART: Regular rate and rhythm, S1 and S2 present, no lower extremity edema  ABDOMEN: Non-distended.  Positive bowel sounds. Soft, diffuse tenderness, no guarding/rebound/mass, no obvious organomegaly  MUSKULOSKELETAL:  Warm and well perfused, moves all extremities well  SKIN: No jaundice  NEUROLOGIC: Alert and oriented  PSYCHIATRIC: Normal affect    LAB DATA:  CMP Results:   Recent Labs   Lab Test 02/14/23  0802 01/06/23  0634 01/06/23  0154 01/03/23  1627 01/03/23  0733 12/29/22  1225 12/29/22  1059 12/28/22  0928 12/28/22  0927     --   --   --  131*  --  131*  --  135*   POTASSIUM 4.6  --   --   --  4.5  --  4.2  --  4.2   CHLORIDE 97*  --   --   --  92*  --  95*  --  97*   CO2 20*  --   --   --  23  --  25  --  24   ANIONGAP 21*  --   --   --  16  --  11  --  14   * 274* 212*   < > 231*   < > 191*   < > 266*   BUN 20  --   --   --  13  --  11  --  11   CR 1.49*  --   --   --  1.37*  --  1.11  --  1.21   BILITOTAL 0.8  --   --   --  1.5*  --   --   --  1.2*   ALKPHOS 80  --   --   --  78  --   --   --  67   ALT 10  --   --   --  19  --   --   --  16   AST 10  --   --   --  18  --   --   --  18    < > = values in this interval not displayed.      CBC  Recent Labs   Lab 02/14/23 0802   WBC 16.2*   RBC 4.81   HGB 13.5   HCT 37.4*   MCV 78   MCH 28.1   MCHC 36.1   RDW 12.3        INR  Recent Labs   Lab 02/14/23  0802   INR 0.96      Lipase   Date Value Ref Range Status   02/14/2023 233 (H) 0 - 52 U/L Final   01/03/2023 172 (H) 0 - 52 U/L Final   12/27/2022 582 (H) 0 - 52 U/L Final     IMAGING:  EXAM: US ABDOMEN LIMITED  LOCATION: [facility]  DATE/TIME: 2/14/2023 11:54 AM     INDICATION: pancreatitis  COMPARISON: CT chest, abdomen, and pelvis from earlier today  TECHNIQUE: Limited abdominal ultrasound.     FINDINGS:     GALLBLADDER: Normal. No gallstones, wall thickening, or pericholecystic fluid. Negative sonographic Tse's sign.     BILE DUCTS: No biliary dilatation. The common duct measures 2 mm.     LIVER: Increased echogenicity from diffuse fatty infiltration. No  focal mass.     RIGHT KIDNEY: No hydronephrosis.     PANCREAS: The visualized portions are normal.     No ascites.                                                                      IMPRESSION:  Hepatic steatosis.    EXAM: CT CHEST/ABDOMEN/PELVIS W CONTRAST  LOCATION: [facility]  DATE/TIME: 2/14/2023 9:24 AM     INDICATION: vomiting, hematemesis  COMPARISON: 12/27/2022  TECHNIQUE: CT scan of the chest, abdomen, and pelvis was performed following injection of IV contrast. Multiplanar reformats were obtained. Dose reduction techniques were used.   CONTRAST: Isovue 370 75mL      FINDINGS:   LUNGS AND PLEURA: There are a few patchy groundglass nodular opacities in the right upper and lower lobes. No effusions. Small volume airway secretions.     MEDIASTINUM/AXILLAE: Heart size is normal. No adenopathy. Normal caliber aorta. Mild wall thickening esophagus. No pneumomediastinum.     CORONARY ARTERY CALCIFICATION: Mild.     HEPATOBILIARY: Hepatic steatosis.     PANCREAS: Normal.     SPLEEN: Normal.     ADRENAL GLANDS: Normal.     KIDNEYS/BLADDER: Nonobstructing bilateral renal calculi. No hydronephrosis.     BOWEL: No obstruction or inflammatory change. Normal appendix.     LYMPH NODES: Normal.     VASCULATURE: Moderate atherosclerosis.     PELVIC ORGANS: Normal.     MUSCULOSKELETAL: Degenerative changes of the spine.                                    IMPRESSION:  1.  Mild wall thickening in the esophagus can be seen with esophagitis.  2.  There are a few patchy groundglass nodular opacities in the right upper and lower lobes suggestive of infectious or inflammatory bronchiolitis. Recommend follow-up in 3 months to ensure resolution.  3.  Otherwise, no CT evidence for source of symptoms.  4.  Nephrolithiasis.  5.  Hepatic steatosis.    ASSESSMENT:  1. Nausea and vomiting  52 year old male with chronic nausea and vomiting, recent diagnosis of acute pancreatitis (12/2022) who presents with  ongoing nausea and vomiting. CT scan and US unremarkable for cause here. Lipase still elevated. Suspect the wall thickening seen on CT sequale of vomiting. CHS high on differential, also consider gastroparesis, PUD, GOO. Recommend supportive cares, marijuana cessation, and will plan for EGD tomorrow AM.     2. Hematemesis  Hemoglobin normal. History c/w Ivet [place] tear due to vomiting or possibly esophagitis. Very low suspicion for varices, hemorrhagic ulcer etc. EGD as above.     PLAN:  1. NPO midnight  2. EGD tomorrow AM  3. Hemoglobin in the AM   4. Supportive cares with antiemetics  5. Marijuana cessation     Discussed with Dr. Duong who will also visit with the patient.     TIME SPENT: 50 min including chart review, patient interview and care coordination.                                                Helen Mccarthy PA-C  Thank you for the opportunity to participate in the care of this patient.   Please feel free to call me with any questions or concerns.  Phone number [phone].

## 2023-02-14 NOTE — CONSULTS
Care Management Initial Consult    General Information  Assessment completed with: Patient,    Type of CM/SW Visit: Initial Assessment    Primary Care Provider verified and updated as needed: Yes   Readmission within the last 30 days: no previous admission in last 30 days         Advance Care Planning: Advance Care Planning Reviewed:  (does not have a HCD, declines blank HCD. He verbally states would want his sister Enid to make medical decisions for him IF he were not able to make them for himself.)       Communication Assessment  Patient's communication style: spoken language (English or Bilingual)       Cognitive  Cognitive/Neuro/Behavioral: WDL                      Living Environment:   People in home: child(tania), dependent (14 year old son (shared custody))  son Max  Current living Arrangements: house ([facility])      Able to return to prior arrangements: yes     Family/Social Support:  Care provided by: self  Provides care for: child(tania) (13 yo son- grandparents will care for him while patient is in hospital)  Marital Status:   Parent(s), Sibling(s), Children (mom, 2 sisters, 13 yo son)          Description of Support System: Supportive, Involved       Current Resources:   Patient receiving home care services: No  Community Resources: None  Equipment currently used at home: glucometer  Supplies currently used at home: Diabetic Supplies    Employment/Financial:  Employment Status: employed full-time     Employment/ Comments: no  or [employer]  Financial Concerns: No concerns identified   Referral to Financial Worker: No    Lifestyle & Psychosocial Needs:  Social Determinants of Health     Tobacco Use: Medium Risk     Smoking Tobacco Use: Former     Smokeless Tobacco Use: Never     Passive Exposure: Not on file   Alcohol Use: Not on file   Financial Resource Strain: Not on file   Food Insecurity: Not on file   Transportation Needs: Not on file   Physical Activity: Not on file   Stress: Not on  file   Social Connections: Not on file   Intimate Partner Violence: Not on file   Depression: Not at risk     PHQ-2 Score: 2   Housing Stability: Not on file     Functional Status:  Prior to admission patient needed assistance:   Dependent ADLs:: Independent  Dependent IADLs:: Independent     Mental Health Status:  Mental Health Status:  (hx of depression/anxiety, taking medications from PCP)  Mental Health Management: Medication    Chemical Dependency Status:  Chemical Dependency Status:  (denies concerns. States he has never been to CD treatment in the past.)           Values/Beliefs:  Spiritual, Cultural Beliefs, Adventism Practices, Values that affect care: no             Additional Information:  Chart reviewed. Admits at [facility] in December and January. NPO pending GI Consult. IV abx. Labs.     CM met with patient; assessment completed. [facility]; shares custody of his 14 year old son half time. Independent with all cares, including driving and works full time. PCP confirmed. No assistive devices. No HC services.     Anticipate return home without additional needs or services. He plans to drive himself home- car in ER parking lot.     Allison Hill RN

## 2023-02-14 NOTE — PROGRESS NOTES
Patient had two blood pressures over 180, IV labetalol given per order set, patient put on continuous pulse monitoring.

## 2023-02-15 ENCOUNTER — ANESTHESIA EVENT (OUTPATIENT)
Dept: SURGERY | Facility: CLINIC | Age: 53
DRG: 380 | End: 2023-02-15
Payer: COMMERCIAL

## 2023-02-15 ENCOUNTER — APPOINTMENT (OUTPATIENT)
Dept: RADIOLOGY | Facility: CLINIC | Age: 53
DRG: 380 | End: 2023-02-15
Attending: INTERNAL MEDICINE
Payer: COMMERCIAL

## 2023-02-15 ENCOUNTER — ANESTHESIA (OUTPATIENT)
Dept: SURGERY | Facility: CLINIC | Age: 53
DRG: 380 | End: 2023-02-15
Payer: COMMERCIAL

## 2023-02-15 LAB
ALBUMIN SERPL-MCNC: 3.5 G/DL (ref 3.5–5)
ANION GAP SERPL CALCULATED.3IONS-SCNC: 13 MMOL/L (ref 5–18)
BASOPHILS # BLD AUTO: 0 10E3/UL (ref 0–0.2)
BASOPHILS NFR BLD AUTO: 0 %
CHLORIDE BLD-SCNC: 103 MMOL/L (ref 98–107)
CHOLEST SERPL-MCNC: 182 MG/DL
CO2 SERPL-SCNC: 22 MMOL/L (ref 22–31)
CREAT UR-MCNC: 153 MG/DL
EOSINOPHIL # BLD AUTO: 0 10E3/UL (ref 0–0.7)
EOSINOPHIL NFR BLD AUTO: 0 %
ERYTHROCYTE [DISTWIDTH] IN BLOOD BY AUTOMATED COUNT: 12.5 % (ref 10–15)
GLUCOSE BLDC GLUCOMTR-MCNC: 202 MG/DL (ref 70–99)
GLUCOSE BLDC GLUCOMTR-MCNC: 204 MG/DL (ref 70–99)
GLUCOSE BLDC GLUCOMTR-MCNC: 210 MG/DL (ref 70–99)
GLUCOSE BLDC GLUCOMTR-MCNC: 212 MG/DL (ref 70–99)
GLUCOSE BLDC GLUCOMTR-MCNC: 216 MG/DL (ref 70–99)
GLUCOSE BLDC GLUCOMTR-MCNC: 225 MG/DL (ref 70–99)
HCT VFR BLD AUTO: 32.1 % (ref 40–53)
HDLC SERPL-MCNC: 45 MG/DL
HGB BLD-MCNC: 11 G/DL (ref 13.3–17.7)
HOLD SPECIMEN: NORMAL
IMM GRANULOCYTES # BLD: 0.2 10E3/UL
IMM GRANULOCYTES NFR BLD: 1 %
LDLC SERPL CALC-MCNC: 88 MG/DL
LYMPHOCYTES # BLD AUTO: 1 10E3/UL (ref 0.8–5.3)
LYMPHOCYTES NFR BLD AUTO: 8 %
MAGNESIUM SERPL-MCNC: 1.9 MG/DL (ref 1.8–2.6)
MCH RBC QN AUTO: 28.4 PG (ref 26.5–33)
MCHC RBC AUTO-ENTMCNC: 34.3 G/DL (ref 31.5–36.5)
MCV RBC AUTO: 83 FL (ref 78–100)
MONOCYTES # BLD AUTO: 0.8 10E3/UL (ref 0–1.3)
MONOCYTES NFR BLD AUTO: 6 %
NEUTROPHILS # BLD AUTO: 10.9 10E3/UL (ref 1.6–8.3)
NEUTROPHILS NFR BLD AUTO: 85 %
NRBC # BLD AUTO: 0 10E3/UL
NRBC BLD AUTO-RTO: 0 /100
PLATELET # BLD AUTO: 239 10E3/UL (ref 150–450)
POTASSIUM BLD-SCNC: 4.9 MMOL/L (ref 3.5–5)
POTASSIUM BLD-SCNC: 4.9 MMOL/L (ref 3.5–5)
PROCALCITONIN SERPL-MCNC: 0.09 NG/ML (ref 0–0.49)
RBC # BLD AUTO: 3.87 10E6/UL (ref 4.4–5.9)
SODIUM SERPL-SCNC: 138 MMOL/L (ref 136–145)
TRIGL SERPL-MCNC: 245 MG/DL
UPPER GI ENDOSCOPY: NORMAL
WBC # BLD AUTO: 12.9 10E3/UL (ref 4–11)

## 2023-02-15 PROCEDURE — 88342 IMHCHEM/IMCYTCHM 1ST ANTB: CPT | Mod: 26 | Performed by: PATHOLOGY

## 2023-02-15 PROCEDURE — 85025 COMPLETE CBC W/AUTO DIFF WBC: CPT | Performed by: INTERNAL MEDICINE

## 2023-02-15 PROCEDURE — C9113 INJ PANTOPRAZOLE SODIUM, VIA: HCPCS | Performed by: INTERNAL MEDICINE

## 2023-02-15 PROCEDURE — 71045 X-RAY EXAM CHEST 1 VIEW: CPT

## 2023-02-15 PROCEDURE — 250N000009 HC RX 250

## 2023-02-15 PROCEDURE — 250N000013 HC RX MED GY IP 250 OP 250 PS 637: Performed by: INTERNAL MEDICINE

## 2023-02-15 PROCEDURE — 0DB58ZX EXCISION OF ESOPHAGUS, VIA NATURAL OR ARTIFICIAL OPENING ENDOSCOPIC, DIAGNOSTIC: ICD-10-PCS | Performed by: INTERNAL MEDICINE

## 2023-02-15 PROCEDURE — 99232 SBSQ HOSP IP/OBS MODERATE 35: CPT | Performed by: INTERNAL MEDICINE

## 2023-02-15 PROCEDURE — 258N000003 HC RX IP 258 OP 636: Performed by: INTERNAL MEDICINE

## 2023-02-15 PROCEDURE — 83735 ASSAY OF MAGNESIUM: CPT | Performed by: INTERNAL MEDICINE

## 2023-02-15 PROCEDURE — 250N000011 HC RX IP 250 OP 636

## 2023-02-15 PROCEDURE — 120N000001 HC R&B MED SURG/OB

## 2023-02-15 PROCEDURE — 36415 COLL VENOUS BLD VENIPUNCTURE: CPT | Performed by: INTERNAL MEDICINE

## 2023-02-15 PROCEDURE — 250N000011 HC RX IP 250 OP 636: Performed by: INTERNAL MEDICINE

## 2023-02-15 PROCEDURE — 88305 TISSUE EXAM BY PATHOLOGIST: CPT | Mod: 26 | Performed by: PATHOLOGY

## 2023-02-15 PROCEDURE — 999N000141 HC STATISTIC PRE-PROCEDURE NURSING ASSESSMENT: Performed by: INTERNAL MEDICINE

## 2023-02-15 PROCEDURE — 84540 ASSAY OF URINE/UREA-N: CPT | Performed by: INTERNAL MEDICINE

## 2023-02-15 PROCEDURE — 82570 ASSAY OF URINE CREATININE: CPT | Performed by: INTERNAL MEDICINE

## 2023-02-15 PROCEDURE — 0DB98ZX EXCISION OF DUODENUM, VIA NATURAL OR ARTIFICIAL OPENING ENDOSCOPIC, DIAGNOSTIC: ICD-10-PCS | Performed by: INTERNAL MEDICINE

## 2023-02-15 PROCEDURE — 87070 CULTURE OTHR SPECIMN AEROBIC: CPT | Performed by: INTERNAL MEDICINE

## 2023-02-15 PROCEDURE — 0DB68ZX EXCISION OF STOMACH, VIA NATURAL OR ARTIFICIAL OPENING ENDOSCOPIC, DIAGNOSTIC: ICD-10-PCS | Performed by: INTERNAL MEDICINE

## 2023-02-15 PROCEDURE — 80051 ELECTROLYTE PANEL: CPT | Performed by: INTERNAL MEDICINE

## 2023-02-15 PROCEDURE — 360N000075 HC SURGERY LEVEL 2, PER MIN: Performed by: INTERNAL MEDICINE

## 2023-02-15 PROCEDURE — 82040 ASSAY OF SERUM ALBUMIN: CPT | Performed by: INTERNAL MEDICINE

## 2023-02-15 PROCEDURE — 250N000013 HC RX MED GY IP 250 OP 250 PS 637: Performed by: HOSPITALIST

## 2023-02-15 PROCEDURE — 258N000003 HC RX IP 258 OP 636

## 2023-02-15 PROCEDURE — 84145 PROCALCITONIN (PCT): CPT | Performed by: INTERNAL MEDICINE

## 2023-02-15 PROCEDURE — 370N000017 HC ANESTHESIA TECHNICAL FEE, PER MIN: Performed by: INTERNAL MEDICINE

## 2023-02-15 PROCEDURE — 87205 SMEAR GRAM STAIN: CPT | Performed by: INTERNAL MEDICINE

## 2023-02-15 PROCEDURE — 88305 TISSUE EXAM BY PATHOLOGIST: CPT | Mod: TC | Performed by: INTERNAL MEDICINE

## 2023-02-15 PROCEDURE — 80061 LIPID PANEL: CPT | Performed by: INTERNAL MEDICINE

## 2023-02-15 PROCEDURE — 272N000001 HC OR GENERAL SUPPLY STERILE: Performed by: INTERNAL MEDICINE

## 2023-02-15 RX ORDER — ONDANSETRON 2 MG/ML
4 INJECTION INTRAMUSCULAR; INTRAVENOUS EVERY 30 MIN PRN
Status: DISCONTINUED | OUTPATIENT
Start: 2023-02-15 | End: 2023-02-15 | Stop reason: HOSPADM

## 2023-02-15 RX ORDER — PROPOFOL 10 MG/ML
INJECTION, EMULSION INTRAVENOUS PRN
Status: DISCONTINUED | OUTPATIENT
Start: 2023-02-15 | End: 2023-02-15

## 2023-02-15 RX ORDER — HYDROMORPHONE HCL IN WATER/PF 6 MG/30 ML
0.4 PATIENT CONTROLLED ANALGESIA SYRINGE INTRAVENOUS EVERY 5 MIN PRN
Status: DISCONTINUED | OUTPATIENT
Start: 2023-02-15 | End: 2023-02-15 | Stop reason: HOSPADM

## 2023-02-15 RX ORDER — SODIUM CHLORIDE, SODIUM LACTATE, POTASSIUM CHLORIDE, CALCIUM CHLORIDE 600; 310; 30; 20 MG/100ML; MG/100ML; MG/100ML; MG/100ML
INJECTION, SOLUTION INTRAVENOUS CONTINUOUS
Status: DISCONTINUED | OUTPATIENT
Start: 2023-02-15 | End: 2023-02-15 | Stop reason: HOSPADM

## 2023-02-15 RX ORDER — MULTIVITAMIN,THERAPEUTIC
1 TABLET ORAL DAILY
Status: DISCONTINUED | OUTPATIENT
Start: 2023-02-15 | End: 2023-02-19 | Stop reason: HOSPADM

## 2023-02-15 RX ORDER — PROPOFOL 10 MG/ML
INJECTION, EMULSION INTRAVENOUS CONTINUOUS PRN
Status: DISCONTINUED | OUTPATIENT
Start: 2023-02-15 | End: 2023-02-15

## 2023-02-15 RX ORDER — OXYCODONE HYDROCHLORIDE 5 MG/1
10 TABLET ORAL EVERY 4 HOURS PRN
Status: DISCONTINUED | OUTPATIENT
Start: 2023-02-15 | End: 2023-02-15 | Stop reason: HOSPADM

## 2023-02-15 RX ORDER — FENTANYL CITRATE 50 UG/ML
50 INJECTION, SOLUTION INTRAMUSCULAR; INTRAVENOUS EVERY 5 MIN PRN
Status: DISCONTINUED | OUTPATIENT
Start: 2023-02-15 | End: 2023-02-15 | Stop reason: HOSPADM

## 2023-02-15 RX ORDER — GUAIFENESIN 600 MG/1
600 TABLET, EXTENDED RELEASE ORAL 2 TIMES DAILY
Status: DISCONTINUED | OUTPATIENT
Start: 2023-02-15 | End: 2023-02-19 | Stop reason: HOSPADM

## 2023-02-15 RX ORDER — HYDROMORPHONE HCL IN WATER/PF 6 MG/30 ML
0.2 PATIENT CONTROLLED ANALGESIA SYRINGE INTRAVENOUS EVERY 5 MIN PRN
Status: DISCONTINUED | OUTPATIENT
Start: 2023-02-15 | End: 2023-02-15 | Stop reason: HOSPADM

## 2023-02-15 RX ORDER — ONDANSETRON 4 MG/1
4 TABLET, ORALLY DISINTEGRATING ORAL EVERY 30 MIN PRN
Status: DISCONTINUED | OUTPATIENT
Start: 2023-02-15 | End: 2023-02-15 | Stop reason: HOSPADM

## 2023-02-15 RX ORDER — FENTANYL CITRATE 50 UG/ML
25 INJECTION, SOLUTION INTRAMUSCULAR; INTRAVENOUS EVERY 5 MIN PRN
Status: DISCONTINUED | OUTPATIENT
Start: 2023-02-15 | End: 2023-02-15 | Stop reason: HOSPADM

## 2023-02-15 RX ORDER — SODIUM CHLORIDE, SODIUM LACTATE, POTASSIUM CHLORIDE, CALCIUM CHLORIDE 600; 310; 30; 20 MG/100ML; MG/100ML; MG/100ML; MG/100ML
INJECTION, SOLUTION INTRAVENOUS CONTINUOUS PRN
Status: DISCONTINUED | OUTPATIENT
Start: 2023-02-15 | End: 2023-02-15

## 2023-02-15 RX ORDER — SODIUM CHLORIDE 9 MG/ML
INJECTION, SOLUTION INTRAVENOUS CONTINUOUS
Status: DISCONTINUED | OUTPATIENT
Start: 2023-02-15 | End: 2023-02-19 | Stop reason: HOSPADM

## 2023-02-15 RX ORDER — ONDANSETRON 2 MG/ML
INJECTION INTRAMUSCULAR; INTRAVENOUS PRN
Status: DISCONTINUED | OUTPATIENT
Start: 2023-02-15 | End: 2023-02-15

## 2023-02-15 RX ORDER — SUCRALFATE ORAL 1 G/10ML
1 SUSPENSION ORAL
Status: DISCONTINUED | OUTPATIENT
Start: 2023-02-15 | End: 2023-02-19 | Stop reason: HOSPADM

## 2023-02-15 RX ORDER — LIDOCAINE HYDROCHLORIDE 10 MG/ML
INJECTION, SOLUTION INFILTRATION; PERINEURAL PRN
Status: DISCONTINUED | OUTPATIENT
Start: 2023-02-15 | End: 2023-02-15

## 2023-02-15 RX ORDER — OXYCODONE HYDROCHLORIDE 5 MG/1
5 TABLET ORAL EVERY 4 HOURS PRN
Status: DISCONTINUED | OUTPATIENT
Start: 2023-02-15 | End: 2023-02-15 | Stop reason: HOSPADM

## 2023-02-15 RX ORDER — LIDOCAINE 40 MG/G
CREAM TOPICAL
Status: DISCONTINUED | OUTPATIENT
Start: 2023-02-15 | End: 2023-02-15 | Stop reason: HOSPADM

## 2023-02-15 RX ORDER — DEXAMETHASONE SODIUM PHOSPHATE 4 MG/ML
INJECTION, SOLUTION INTRA-ARTICULAR; INTRALESIONAL; INTRAMUSCULAR; INTRAVENOUS; SOFT TISSUE PRN
Status: DISCONTINUED | OUTPATIENT
Start: 2023-02-15 | End: 2023-02-15

## 2023-02-15 RX ADMIN — SUCRALFATE 1 G: 1 SUSPENSION ORAL at 23:57

## 2023-02-15 RX ADMIN — SUCRALFATE 1 G: 1 SUSPENSION ORAL at 12:37

## 2023-02-15 RX ADMIN — METOCLOPRAMIDE HYDROCHLORIDE 5 MG: 5 INJECTION INTRAMUSCULAR; INTRAVENOUS at 00:29

## 2023-02-15 RX ADMIN — TRAZODONE HYDROCHLORIDE 100 MG: 50 TABLET ORAL at 23:59

## 2023-02-15 RX ADMIN — SODIUM CHLORIDE, POTASSIUM CHLORIDE, SODIUM LACTATE AND CALCIUM CHLORIDE: 600; 310; 30; 20 INJECTION, SOLUTION INTRAVENOUS at 10:11

## 2023-02-15 RX ADMIN — PROPOFOL 50 MG: 10 INJECTION, EMULSION INTRAVENOUS at 11:48

## 2023-02-15 RX ADMIN — METOCLOPRAMIDE HYDROCHLORIDE 5 MG: 5 INJECTION INTRAMUSCULAR; INTRAVENOUS at 05:39

## 2023-02-15 RX ADMIN — PROMETHAZINE HYDROCHLORIDE 25 MG: 25 INJECTION INTRAMUSCULAR; INTRAVENOUS at 04:15

## 2023-02-15 RX ADMIN — SODIUM CHLORIDE: 9 INJECTION, SOLUTION INTRAVENOUS at 12:25

## 2023-02-15 RX ADMIN — PROMETHAZINE HYDROCHLORIDE 25 MG: 25 INJECTION INTRAMUSCULAR; INTRAVENOUS at 13:03

## 2023-02-15 RX ADMIN — ONDANSETRON 4 MG: 2 INJECTION INTRAMUSCULAR; INTRAVENOUS at 11:40

## 2023-02-15 RX ADMIN — LABETALOL HYDROCHLORIDE 20 MG: 5 INJECTION, SOLUTION INTRAVENOUS at 16:55

## 2023-02-15 RX ADMIN — INSULIN ASPART 1 UNITS: 100 INJECTION, SOLUTION INTRAVENOUS; SUBCUTANEOUS at 07:57

## 2023-02-15 RX ADMIN — PROMETHAZINE HYDROCHLORIDE 25 MG: 25 INJECTION INTRAMUSCULAR; INTRAVENOUS at 20:25

## 2023-02-15 RX ADMIN — DEXAMETHASONE SODIUM PHOSPHATE 8 MG: 4 INJECTION, SOLUTION INTRA-ARTICULAR; INTRALESIONAL; INTRAMUSCULAR; INTRAVENOUS; SOFT TISSUE at 11:41

## 2023-02-15 RX ADMIN — SUCRALFATE 1 G: 1 SUSPENSION ORAL at 16:21

## 2023-02-15 RX ADMIN — ONDANSETRON 4 MG: 2 INJECTION INTRAMUSCULAR; INTRAVENOUS at 03:35

## 2023-02-15 RX ADMIN — METOCLOPRAMIDE HYDROCHLORIDE 5 MG: 5 INJECTION INTRAMUSCULAR; INTRAVENOUS at 12:26

## 2023-02-15 RX ADMIN — PANTOPRAZOLE SODIUM 40 MG: 40 INJECTION, POWDER, FOR SOLUTION INTRAVENOUS at 21:19

## 2023-02-15 RX ADMIN — PHENOL 1 ML: 1.4 SPRAY ORAL at 15:47

## 2023-02-15 RX ADMIN — PROCHLORPERAZINE EDISYLATE 10 MG: 5 INJECTION INTRAMUSCULAR; INTRAVENOUS at 15:10

## 2023-02-15 RX ADMIN — METOCLOPRAMIDE HYDROCHLORIDE 5 MG: 5 INJECTION INTRAMUSCULAR; INTRAVENOUS at 18:27

## 2023-02-15 RX ADMIN — INSULIN ASPART 1 UNITS: 100 INJECTION, SOLUTION INTRAVENOUS; SUBCUTANEOUS at 21:18

## 2023-02-15 RX ADMIN — PROPOFOL 140 MCG/KG/MIN: 10 INJECTION, EMULSION INTRAVENOUS at 11:48

## 2023-02-15 RX ADMIN — INSULIN ASPART 1 UNITS: 100 INJECTION, SOLUTION INTRAVENOUS; SUBCUTANEOUS at 04:22

## 2023-02-15 RX ADMIN — SODIUM CHLORIDE: 9 INJECTION, SOLUTION INTRAVENOUS at 08:42

## 2023-02-15 RX ADMIN — PHENOL 1 ML: 1.4 SPRAY ORAL at 06:13

## 2023-02-15 RX ADMIN — INSULIN ASPART 1 UNITS: 100 INJECTION, SOLUTION INTRAVENOUS; SUBCUTANEOUS at 16:21

## 2023-02-15 RX ADMIN — PHENOL 1 ML: 1.4 SPRAY ORAL at 07:52

## 2023-02-15 RX ADMIN — LIDOCAINE HYDROCHLORIDE 2 ML: 10 INJECTION, SOLUTION INFILTRATION; PERINEURAL at 11:44

## 2023-02-15 RX ADMIN — PROPOFOL 50 MG: 10 INJECTION, EMULSION INTRAVENOUS at 11:46

## 2023-02-15 RX ADMIN — PROCHLORPERAZINE EDISYLATE 10 MG: 5 INJECTION INTRAMUSCULAR; INTRAVENOUS at 07:52

## 2023-02-15 RX ADMIN — ONDANSETRON 4 MG: 2 INJECTION INTRAMUSCULAR; INTRAVENOUS at 16:55

## 2023-02-15 RX ADMIN — GUAIFENESIN 600 MG: 600 TABLET ORAL at 23:56

## 2023-02-15 RX ADMIN — INSULIN ASPART 1 UNITS: 100 INJECTION, SOLUTION INTRAVENOUS; SUBCUTANEOUS at 00:29

## 2023-02-15 RX ADMIN — PANTOPRAZOLE SODIUM 40 MG: 40 INJECTION, POWDER, FOR SOLUTION INTRAVENOUS at 07:52

## 2023-02-15 ASSESSMENT — ACTIVITIES OF DAILY LIVING (ADL)
ADLS_ACUITY_SCORE: 21

## 2023-02-15 ASSESSMENT — LIFESTYLE VARIABLES: TOBACCO_USE: 1

## 2023-02-15 NOTE — PROGRESS NOTES
"CLINICAL NUTRITION SERVICES - ASSESSMENT NOTE     Nutrition Prescription    RECOMMENDATIONS FOR MDs/PROVIDERS TO ORDER:   Recommend Mvi with minerals, Thiamine 100 mg daily d/t not meeting RDIs with extended low intake  Consider IM replacement if not able to tolerate P.o    Malnutrition Status:    Severe on acute illness    Recommendations already ordered by Registered Dietitian (RD):  Ensure enlive bid    Future/Additional Recommendations:  Adjust supplements pending intake, jenny, acceptance, weight     REASON FOR ASSESSMENT  Drew Monsivais is a/an 52 year old male assessed by the dietitian for Admission Nutrition Risk Screen for positive with 2-13 lb weight loss and eating poorly d/t decreased appetite    Pt presents with intractable N/VD x 7 days, unclear etiology, MARY JANE on CKD3  Hx DM2, acute pancreatitis(12/2022), depression    NUTRITION HISTORY  Pt reports poor intake since discharge from last hospitalization 1/6, however he was able to eat some  Not taking any supplements at home  N/V x 7 days PTA per H and P    CURRENT NUTRITION ORDERS  Diet: Full Liquid  Was on regular diet last night   Intake/Tolerance: Poor. Has not ordered anything today. Ordered sherbet and soup last night    Receiving NS IVF at 75 ml/hr    LABS  Labs reviewed    MEDICATIONS  Medications reviewed  Ssi, iv reglan q 6 hr, iv protoinx bid, scopolamine, carafate qid    ANTHROPOMETRICS  Height: 172.7 cm (5' 8\")  Most Recent Weight: 72.2 kg (159 lb 2.8 oz)  8.6% weight loss x 6 weeks or less  IBW: 70 kg  BMI: Normal BMI  Weight History:   Wt Readings from Last 10 Encounters:   02/15/23 72.2 kg (159 lb 2.8 oz)   01/03/23 81.6 kg (180 lb)- stated in ER   12/27/22 79 kg (174 lb 3 oz)   12/01/22 78.9 kg (174 lb)   11/22/22 79.1 kg (174 lb 4.8 oz)   11/10/22 76.2 kg (168 lb)   11/09/22 76.7 kg (169 lb)   09/20/22 78 kg (172 lb)   09/12/22 79.4 kg (175 lb)   08/05/22 77.8 kg (171 lb 8 oz)   Pt did not know UBW. Appears to be 170-175 lb    Dosing " Weight: 72.2 kg    ASSESSED NUTRITION NEEDS  Estimated Energy Needs: 1675-3607 kcals/day (25 - 30 kcals/kg)  Justification: Maintenance  Estimated Protein Needs:  grams protein/day (1.2 - 1.5 grams of pro/kg)  Justification: Repletion  Estimated Fluid Needs: 1762-5650 mL/day (25 - 30 mL/kg)   Justification: Maintenance    PHYSICAL FINDINGS  See malnutrition section below.  Sore throat  Emesis 0400 this am  Last BM unknown    MALNUTRITION:  % Weight Loss:  > 7.5% in 3 months (severe malnutrition)  % Intake:  </= 50% for >/= 5 days (severe malnutrition)  Subcutaneous Fat Loss:  None observed  Muscle Loss:  None observed  Fluid Retention:  None noted    Malnutrition Diagnosis: Severe malnutrition  In Context of:  Acute illness or injury    NUTRITION DIAGNOSIS  Malnutrition related to altered GI function as evidenced by intake < 50% x 1 week,  8.6% weight loss x 6 weeks or less     INTERVENTIONS  Implementation  Mvi with minerals   Thiamine 100 mg daily   Ensure enlive bid  Pt is at risk for refeeding but do not anticipate improved intake for a few days    Goals  Meet estimated needs  Normalize bowels     Monitoring/Evaluation  Po jenny and diet advance, weight, intake, supplement tolerance, EGD results

## 2023-02-15 NOTE — PROGRESS NOTES
Spoke with Helen BAPTISTE to discuss patient not being able to take oral medications-- Thera vit and thiamine that was ordered. Patient mostly unable to keep anything down except for water, also doubtful that he would be able to take ensure at this time. OLIVA states she would wait on EGD results and GI's recommendation before patient would need something such as TPN. Paged Dr. Duong, no notes in chart about patient's procedure today or further recommendations from GI.

## 2023-02-15 NOTE — CARE PLAN
Patient currently dry heaving and vomited once after EGD procedure this afternoon. Antiemetics given. IV fluids running at 75 ml/hour. Oral fluids and broth encouraged. Carafate given per order from GI. Vital signs stable. Patient still rating throat pain 5/10. Throat spray given. Continuing to monitor.

## 2023-02-15 NOTE — ANESTHESIA POSTPROCEDURE EVALUATION
Patient: Drew Monsivais    Procedure: Procedure(s):  ESOPHAGOGASTRODUODENOSCOPY (EGD) with BIOPSY       Anesthesia Type:  MAC    Note:  Disposition: Outpatient   Postop Pain Control: Uneventful            Sign Out: Well controlled pain   PONV: No   Neuro/Psych: Uneventful            Sign Out: Acceptable/Baseline neuro status   Airway/Respiratory: Uneventful            Sign Out: Acceptable/Baseline resp. status   CV/Hemodynamics: Uneventful            Sign Out: Acceptable CV status; No obvious hypovolemia; No obvious fluid overload   Other NRE: NONE   DID A NON-ROUTINE EVENT OCCUR? No           Last vitals:  Vitals:    02/15/23 0939 02/15/23 1215 02/15/23 1230   BP: (!) 162/99 (!) 142/80 (!) 143/66   Pulse: 95 89 90   Resp: 16 13 15   Temp:      SpO2: 97%  98%       Electronically Signed By: Shola Will MD  February 15, 2023  1:57 PM

## 2023-02-15 NOTE — PROVIDER NOTIFICATION
Patient dry heaving/nausea unresolved after doses of IV reglan, zofran, and compazine. Updated Dr. Lowery, and requested that patient potentially continue IV fluids overnight as the order is just for a one time bag. No new orders given for IV fluids.

## 2023-02-15 NOTE — PROGRESS NOTES
Dr. Duong called unit back, states he wrote a post procedure note but RN unable to see.       Provider states plan for the patient is symptom management with IV protonix and antiemetics. Provider states EGD revealed esophogitis from excessive vomiting and a lot of phlegm. Provider states phlegm indicates a potential respiratory infection so they took biopsys. RN asked about TPN/lipids, provider states there is no indication for that at this time and will just continue with symptom management for patient. Provider routed to patient room to update patient. RN updated patient sisterEnid with patient permission (contact information on sticky note)

## 2023-02-15 NOTE — ANESTHESIA PREPROCEDURE EVALUATION
Anesthesia Pre-Procedure Evaluation    Patient: Drew Monsivais   MRN: [id number] : 1970        Procedure : Procedure(s):  ESOPHAGOGASTRODUODENOSCOPY (EGD)          Past Medical History:   Diagnosis Date     Calculus of kidney     at least 4 episodes most recent  once surgically removed Stones present both kidneys       Closed fracture of metatarsal bone(s)           Diabetes (H)      Headache     Frontal-?migraine Triggers: no obvious,  excedrin light senstive Chiro      Hypertension      PONV (postoperative nausea and vomiting)       History reviewed. No pertinent surgical history.   No Known Allergies   Social History     Tobacco Use     Smoking status: Former     Types: Cigarettes     Smokeless tobacco: Never     Tobacco comments:     [employer]   Substance Use Topics     Alcohol use: No      Wt Readings from Last 1 Encounters:   23 84.4 kg (186 lb)        Anesthesia Evaluation   Pt has had prior anesthetic. Type: General.    History of anesthetic complications  - PONV.      ROS/MED HX  ENT/Pulmonary:  - neg pulmonary ROS   (+) tobacco use, Past use,     Neurologic:  - neg neurologic ROS     Cardiovascular:     (+) Dyslipidemia hypertension----- (-) murmur   METS/Exercise Tolerance:     Hematologic:  - neg hematologic  ROS     Musculoskeletal:  - neg musculoskeletal ROS     GI/Hepatic:       Renal/Genitourinary:     (+) renal disease, type: ARF,     Endo:     (+) type II DM,     Psychiatric/Substance Use:  - neg psychiatric ROS   (+) psychiatric history anxiety and depression     Infectious Disease:       Malignancy:       Other:            Physical Exam    Airway        Mallampati: II   TM distance: > 3 FB   Neck ROM: full   Mouth opening: > 3 cm    Respiratory Devices and Support         Dental       (+) Modest Abnormalities - crowns, retainers, 1 or 2 missing teeth      Cardiovascular          Rhythm and rate: regular and normal (-) no murmur    Pulmonary           breath sounds clear to  auscultation           OUTSIDE LABS:  CBC:   Lab Results   Component Value Date    WBC 16.2 (H) 02/14/2023    WBC 12.1 (H) 01/03/2023    HGB 13.5 02/14/2023    HGB 17.4 01/03/2023    HCT 37.4 (L) 02/14/2023    HCT 48.4 01/03/2023     02/14/2023     01/03/2023     BMP:   Lab Results   Component Value Date     02/15/2023     02/14/2023    POTASSIUM 4.9 02/15/2023    POTASSIUM 4.9 02/15/2023    CHLORIDE 103 02/15/2023    CHLORIDE 97 (L) 02/14/2023    CO2 22 02/15/2023    CO2 20 (L) 02/14/2023    BUN 20 02/14/2023    BUN 13 01/03/2023    CR 1.49 (H) 02/14/2023    CR 1.37 (H) 01/03/2023     (H) 02/15/2023     (H) 02/15/2023     COAGS:   Lab Results   Component Value Date    PTT 26 02/14/2023    INR 0.96 02/14/2023     POC: No results found for: BGM, HCG, HCGS  HEPATIC:   Lab Results   Component Value Date    ALBUMIN 3.5 02/15/2023    PROTTOTAL 8.0 02/14/2023    ALT 10 02/14/2023    AST 10 02/14/2023    ALKPHOS 80 02/14/2023    BILITOTAL 0.8 02/14/2023     OTHER:   Lab Results   Component Value Date    LACT 2.5 (H) 02/14/2023    A1C 8.9 (H) 12/27/2022    LEISA 10.5 02/14/2023    MAG 1.9 02/15/2023    LIPASE 233 (H) 02/14/2023    TSH 0.83 03/09/2022       Anesthesia Plan    ASA Status:  3   NPO Status:  NPO Appropriate    Anesthesia Type: MAC.     - Reason for MAC: immobility needed, straight local not clinically adequate, chronic cardiopulmonary disease      Maintenance: Balanced.        Consents    Anesthesia Plan(s) and associated risks, benefits, and realistic alternatives discussed. Questions answered and patient/representative(s) expressed understanding.     - Discussed: Risks, Benefits and Alternatives for BOTH SEDATION and the PROCEDURE were discussed     - Discussed with:  Patient         Postoperative Care       PONV prophylaxis: Ondansetron (or other 5HT-3)     Comments:    Other Comments: MAC w/ propofol gtt.            Shola Will MD

## 2023-02-15 NOTE — PLAN OF CARE
Goal Outcome Evaluation:         VSS. No emesis this shift but patient dry heaving and nauseous. Antiemetics given with some relief. Denies pain or abdominal tenderness. Chloraseptic spray prn for sore throat. Monitoring blood sugars q4h; blood sugars have been elevated >200 this shift. NPO since midnight for EGD today. Using call light appropriately. Will continue to monitor.

## 2023-02-15 NOTE — ANESTHESIA CARE TRANSFER NOTE
Patient: Drew Monsivais    Procedure: Procedure(s):  ESOPHAGOGASTRODUODENOSCOPY (EGD) with BIOPSY       Diagnosis: Nausea and vomiting, unspecified vomiting type [R11.2]  Diagnosis Additional Information: No value filed.    Anesthesia Type:   MAC     Note:    Oropharynx: oropharynx clear of all foreign objects  Level of Consciousness: drowsy  Oxygen Supplementation: room air    Independent Airway: airway patency satisfactory and stable  Dentition: dentition unchanged  Vital Signs Stable: post-procedure vital signs reviewed and stable  Report to RN Given: handoff report given  Patient transferred to: Medical/Surgical Unit    Handoff Report: Identifed the Patient, Identified the Reponsible Provider, Reviewed the pertinent medical history, Discussed the surgical course, Reviewed Intra-OP anesthesia mangement and issues during anesthesia, Set expectations for post-procedure period and Allowed opportunity for questions and acknowledgement of understanding      Vitals:  Vitals Value Taken Time   BP     Temp     Pulse     Resp     SpO2         Electronically Signed By: MAYELA Robbins CRNA  February 15, 2023  12:18 PM

## 2023-02-15 NOTE — PROVIDER NOTIFICATION
Text paged Dr. Hood to update on patients weight loss and self report of not eating a meal in a week. Chicken broth and lemon ice offered to patient. Patient able to take a little.

## 2023-02-15 NOTE — PROGRESS NOTES
No further vomiting since admission to the floor at 5 pm. Patient up ad erlin, independent. Lethargic. Oral fluids encouraged. Fluids running at 125 ml/hour. Patient is aware that stool sample is still needed. Lactic acid redraw came back 2.5, down from 3.7 earlier today. Blood pressures now stable. Lozenge given for throat pain. Continuing to monitor.

## 2023-02-15 NOTE — PROGRESS NOTES
"I talked to patient over the phone and discussed procedure results with him and our recommendations. Refer to procedure note under \"chart review\" --> \"procedures\" for details.  Advised patient to increase liquid intake as tolerated.   DORETHA SAHU MD   "

## 2023-02-15 NOTE — UTILIZATION REVIEW
Admission Status; Secondary Review Determination   Under the authority of the Utilization Management Committee, the utilization review process indicated a secondary review on Drew Monsivais. The review outcome is based on review of the medical records, discussions with staff, and applying clinical experience noted on the date of the review.   (x) Inpatient Status Appropriate - This patient's medical care is consistent with medical management for inpatient care and reasonable inpatient medical practice.     RATIONALE FOR DETERMINATION   52-year-old male admitted with intractable nausea and vomiting and hematemesis.  Imaging showing thickening of the esophagus.  GI consulted, n.p.o. for EGD.  Trending hemoglobin.  Also with MARY JANE on admission, now on IV fluids as he has not been able to keep anything down for a few days.  Has type 2 diabetes and has not been taking diabetic medications at home secondary to the above, blood sugars have been running high.     At the time of admission with the information available to the attending physician more than 2 nights Hospital complex care was anticipated, based on patient risk of adverse outcome if treated as outpatient and complex care required. Inpatient admission is appropriate based on the Medicare guidelines.   The information on this document is developed by the utilization review team in order for the business office to ensure compliance. This only denotes the appropriateness of proper admission status and does not reflect the quality of care rendered.   The definitions of Inpatient Status and Observation Status used in making the determination above are those provided in the CMS Coverage Manual, Chapter 1 and Chapter 6, section 70.4.   Sincerely,   Vasile Chavarria MD  Utilization Review  Physician Advisor  [facility]

## 2023-02-15 NOTE — PROGRESS NOTES
Hospitalist Progress Note    Assessment/Plan  Intractable nausea and vomiting  -Status post upper endoscopy with findings of LA grade D esophagitis with no bleeding.  Finding of excessive phlegm and findings suggestive of respiratory infection  -GI recommendations of high-dose PPI, and sucralfate, and to follow-up repeat upper endoscopy in 3 months.  GI will arrange for follow-up.  -Full liquid diet.  -Continue IV fluid for now until tolerating oral feeds fully.   -IV PPI/antiemetics  -Monitor and replete electrolytes as needed  -Respiratory work-up given EGD findings.  -Close monitor.     MARY JANE on CKDIII.  Etiology prerenal.  This is in the setting of nausea and vomiting.  -Creatinine noted at 1.49: Baseline documented as 1.1  -Watch progress with IV fluid therapy.  -Avoid nephrotoxins/hypotension  -Renally dose medication  -To consider nephrology consult as indicated.    Essential hypertension  Hypertensive urgency  -Urgency improved.  However BP not at goal.   -Continue home amlodipine.  Hold home lisinopril in the setting of MARY JANE.   -Add as needed hydralazine with holding orders.      Dyslipidemia  -Documented by history.  -Check lipid panel     Diabetes mellitus type 2  -Not on insulin at home.  -Last A1c 8.9% on 12/27/22. ?  Noncompliance with medication regimen.  -Accu-Cheks AC/at bedtime  -Carb controlled diet  -We will do sliding scale insulin for now.  Add prandial/basal insulin depending on trend.     Depression  Insomnia  -Continue home medications     (+) Cannabinoids in urine drug screen.  - ?  Cannabinoid hyperemesis syndrome, in the setting of intractable nausea and vomiting  -Continue IV fluid therapy  -Monitor and replete electrolytes as needed    DVT Prophylaxis:  SCDs  Disposition: Pending progress.       Subjective  Patient seen in the room.  He is just back from EGD.    Objective    Vital signs in last 24 hours  Temp:  [98.5  F (36.9  C)-99.4  F (37.4  C)] 98.5  F (36.9  C)  Pulse:  []  91  Resp:  [15-22] 16  BP: (138-187)/() 150/76  SpO2:  [95 %-100 %] 96 % @LASTSAO2(12)@ O2 Device: None (Room air)    Weight:   Wt Readings from Last 3 Encounters:   02/14/23 84.4 kg (186 lb)   01/03/23 81.6 kg (180 lb)   12/27/22 79 kg (174 lb 3 oz)      Weight change:     Intake/Output last 3 shifts  I/O last 3 completed shifts:  In: 1050 [IV Piggyback:1050]  Out: 200 [Urine:200]  Body mass index is 28.28 kg/m .    Physical Exam    General Appearance:    Alert, cooperative, no distress, appears stated age   Lungs:     Clear bilaterally    Cardiovascular:    Regular rate ands rhythm.  Normal S1, S2.  No murmur, rub or gallop.  No edema   Abdomen:     Soft, non-tender, bowel sounds active all four quadrants,     no masses, no organomegaly   Neurologic:   Awake, alert, oriented x 3.  Grossly nonfocal      Pertinent Labs   Lab Results: personally reviewed.   Recent Labs   Lab 02/15/23  0641 02/14/23  0802    138   CO2 22 20*   BUN  --  20   ALBUMIN  --  4.5   ALKPHOS  --  80   ALT  --  10   AST  --  10     Recent Labs   Lab 02/14/23  0802   WBC 16.2*   HGB 13.5   HCT 37.4*        Recent Labs   Lab 02/14/23  0802   TROPONINI <0.01     Invalid input(s): POCGLUFGR    Medications  Current Facility-Administered Medications   Medication     acetaminophen (TYLENOL) tablet 650 mg    Or     acetaminophen (TYLENOL) Suppository 650 mg     amLODIPine (NORVASC) tablet 10 mg     benzocaine-menthol (CEPACOL) 15-3.6 MG lozenge 1 lozenge     busPIRone (BUSPAR) tablet 10 mg     glucose gel 15-30 g    Or     dextrose 50 % injection 25-50 mL    Or     glucagon injection 1 mg     FLUoxetine (PROzac) capsule 20 mg     insulin aspart (NovoLOG) injection (RAPID ACTING)     labetalol (NORMODYNE/TRANDATE) injection 20 mg     lidocaine (LMX4) cream     lidocaine 1 % 0.1-1 mL     lisinopril (ZESTRIL) tablet 40 mg     melatonin tablet 1 mg     metoclopramide (REGLAN) injection 5 mg     ondansetron (ZOFRAN ODT) ODT tab 4 mg     Or     ondansetron (ZOFRAN) injection 4 mg     pantoprazole (PROTONIX) IV push injection 40 mg     phenol (CHLORASEPTIC) 1.4 % spray 1 mL     prochlorperazine (COMPAZINE) injection 10 mg    Or     prochlorperazine (COMPAZINE) tablet 10 mg    Or     prochlorperazine (COMPAZINE) suppository 25 mg     promethazine (PHENERGAN) 25 mg in sodium chloride 0.9 % 50 mL intermittent infusion     scopolamine (TRANSDERM) 72 hr patch 1 patch    And     scopolamine (TRANSDERM-SCOP) Patch in Place     sodium chloride (PF) 0.9% PF flush 3 mL     sodium chloride (PF) 0.9% PF flush 3 mL     tamsulosin (FLOMAX) capsule 0.4 mg     traZODone (DESYREL) tablet 100 mg       Pertinent Radiology   Radiology Results: Personally reviewed   Results for orders placed or performed during the hospital encounter of 02/14/23   CT Chest/Abdomen/Pelvis w Contrast    Impression    IMPRESSION:  1.  Mild wall thickening in the esophagus can be seen with esophagitis.  2.  There are a few patchy groundglass nodular opacities in the right upper and lower lobes suggestive of infectious or inflammatory bronchiolitis. Recommend follow-up in 3 months to ensure resolution.  3.  Otherwise, no CT evidence for source of symptoms.  4.  Nephrolithiasis.  5.  Hepatic steatosis.   US Abdomen Limited    Impression    IMPRESSION:  Hepatic steatosis.           I spent a total of 42 minutes for this encounter with interval history, physical exam, in patient medication review and reconciliation.      Dimitri Hood DO  Internal Medicine Hospitalist  2/15/2023

## 2023-02-15 NOTE — PROGRESS NOTES
Dr. Hood states that no further plans for discharge today, continue to defer to GI note for further patient plan of care, RN suggested TPN, no new orders at this time.

## 2023-02-15 NOTE — H&P
ENDOSCOPY PRE-SEDATION H&P FOR OUTPATIENT PROCEDURES    Drew Monsivais  [id number]  1970    Procedure: EGD    Pre-procedure diagnosis: intractable n/v, hematemesis.     Past medical history:   Past Medical History:   Diagnosis Date     Calculus of kidney     at least 4 episodes most recent 2012 once surgically removed Stones present both kidneys       Closed fracture of metatarsal bone(s)           Diabetes (H)      Headache     Frontal-?migraine Triggers: no obvious,  excedrin light senstive Chiro      Hypertension      PONV (postoperative nausea and vomiting)        Past surgical history: History reviewed. No pertinent surgical history.    Current Facility-Administered Medications   Medication     [Auto Hold] acetaminophen (TYLENOL) tablet 650 mg    Or     [Auto Hold] acetaminophen (TYLENOL) Suppository 650 mg     [Auto Hold] amLODIPine (NORVASC) tablet 10 mg     [Auto Hold] benzocaine-menthol (CEPACOL) 15-3.6 MG lozenge 1 lozenge     [Auto Hold] busPIRone (BUSPAR) tablet 10 mg     [Auto Hold] glucose gel 15-30 g    Or     [Auto Hold] dextrose 50 % injection 25-50 mL    Or     [Auto Hold] glucagon injection 1 mg     [Auto Hold] FLUoxetine (PROzac) capsule 20 mg     [Auto Hold] insulin aspart (NovoLOG) injection (RAPID ACTING)     [Auto Hold] labetalol (NORMODYNE/TRANDATE) injection 20 mg     lactated ringers infusion     lidocaine (LMX4) cream     lidocaine (LMX4) cream     [Auto Hold] lidocaine (LMX4) cream     lidocaine 1 % 0.1-1 mL     lidocaine 1 % 0.1-1 mL     [Auto Hold] lidocaine 1 % 0.1-1 mL     [Auto Hold] lisinopril (ZESTRIL) tablet 40 mg     [Auto Hold] melatonin tablet 1 mg     [Auto Hold] metoclopramide (REGLAN) injection 5 mg     [Auto Hold] ondansetron (ZOFRAN ODT) ODT tab 4 mg    Or     [Auto Hold] ondansetron (ZOFRAN) injection 4 mg     [Auto Hold] pantoprazole (PROTONIX) IV push injection 40 mg     [Auto Hold] phenol (CHLORASEPTIC) 1.4 % spray 1 mL     [Auto Hold] prochlorperazine  (COMPAZINE) injection 10 mg    Or     [Auto Hold] prochlorperazine (COMPAZINE) tablet 10 mg    Or     [Auto Hold] prochlorperazine (COMPAZINE) suppository 25 mg     [Auto Hold] promethazine (PHENERGAN) 25 mg in sodium chloride 0.9 % 50 mL intermittent infusion     [Auto Hold] scopolamine (TRANSDERM) 72 hr patch 1 patch    And     [Auto Hold] scopolamine (TRANSDERM-SCOP) Patch in Place     sodium chloride (PF) 0.9% PF flush 3 mL     sodium chloride (PF) 0.9% PF flush 3 mL     sodium chloride (PF) 0.9% PF flush 3 mL     sodium chloride (PF) 0.9% PF flush 3 mL     [Auto Hold] sodium chloride (PF) 0.9% PF flush 3 mL     [Auto Hold] sodium chloride (PF) 0.9% PF flush 3 mL     sodium chloride 0.9% infusion     [Auto Hold] tamsulosin (FLOMAX) capsule 0.4 mg     [Auto Hold] traZODone (DESYREL) tablet 100 mg       No Known Allergies    History of Anesthesia/Sedation Problems: no    Physical Exam:    Mental status: alert and oriented x 3  Heart: Normal  Lung: Normal  Assessment of patient's airway: Normal  Other as pertinent for procedure: None     ASA Score: III    Mallampati score:  II - Faucial pillars and soft palate may be seen, but uvula is masked by the base of the tongue    Assessment/Plan:     Per anesthesia      DORETHA SAHU MD

## 2023-02-16 LAB
ANION GAP SERPL CALCULATED.3IONS-SCNC: 7 MMOL/L (ref 5–18)
BUN SERPL-MCNC: 15 MG/DL (ref 8–22)
CALCIUM SERPL-MCNC: 9.1 MG/DL (ref 8.5–10.5)
CHLORIDE BLD-SCNC: 107 MMOL/L (ref 98–107)
CO2 SERPL-SCNC: 25 MMOL/L (ref 22–31)
CREAT SERPL-MCNC: 1.07 MG/DL (ref 0.7–1.3)
GFR SERPL CREATININE-BSD FRML MDRD: 82 ML/MIN/1.73M2
GLUCOSE BLD-MCNC: 186 MG/DL (ref 70–125)
GLUCOSE BLDC GLUCOMTR-MCNC: 146 MG/DL (ref 70–99)
GLUCOSE BLDC GLUCOMTR-MCNC: 156 MG/DL (ref 70–99)
GLUCOSE BLDC GLUCOMTR-MCNC: 162 MG/DL (ref 70–99)
GLUCOSE BLDC GLUCOMTR-MCNC: 173 MG/DL (ref 70–99)
GLUCOSE BLDC GLUCOMTR-MCNC: 187 MG/DL (ref 70–99)
HOLD SPECIMEN: NORMAL
HOLD SPECIMEN: NORMAL
MAGNESIUM SERPL-MCNC: 1.7 MG/DL (ref 1.8–2.6)
PATH REPORT.COMMENTS IMP SPEC: NORMAL
PATH REPORT.COMMENTS IMP SPEC: NORMAL
PATH REPORT.FINAL DX SPEC: NORMAL
PATH REPORT.GROSS SPEC: NORMAL
PATH REPORT.MICROSCOPIC SPEC OTHER STN: NORMAL
PATH REPORT.RELEVANT HX SPEC: NORMAL
PHOTO IMAGE: NORMAL
POTASSIUM BLD-SCNC: 4.3 MMOL/L (ref 3.5–5)
POTASSIUM BLD-SCNC: 4.3 MMOL/L (ref 3.5–5)
SODIUM SERPL-SCNC: 139 MMOL/L (ref 136–145)
UUN UR-MCNC: 874 MG/DL (ref 801–1666)

## 2023-02-16 PROCEDURE — 250N000011 HC RX IP 250 OP 636: Performed by: INTERNAL MEDICINE

## 2023-02-16 PROCEDURE — 36415 COLL VENOUS BLD VENIPUNCTURE: CPT | Performed by: INTERNAL MEDICINE

## 2023-02-16 PROCEDURE — 80048 BASIC METABOLIC PNL TOTAL CA: CPT | Performed by: INTERNAL MEDICINE

## 2023-02-16 PROCEDURE — 250N000013 HC RX MED GY IP 250 OP 250 PS 637: Performed by: INTERNAL MEDICINE

## 2023-02-16 PROCEDURE — 258N000003 HC RX IP 258 OP 636: Performed by: INTERNAL MEDICINE

## 2023-02-16 PROCEDURE — 36415 COLL VENOUS BLD VENIPUNCTURE: CPT | Performed by: STUDENT IN AN ORGANIZED HEALTH CARE EDUCATION/TRAINING PROGRAM

## 2023-02-16 PROCEDURE — 84132 ASSAY OF SERUM POTASSIUM: CPT | Performed by: STUDENT IN AN ORGANIZED HEALTH CARE EDUCATION/TRAINING PROGRAM

## 2023-02-16 PROCEDURE — 120N000001 HC R&B MED SURG/OB

## 2023-02-16 PROCEDURE — C9113 INJ PANTOPRAZOLE SODIUM, VIA: HCPCS | Performed by: INTERNAL MEDICINE

## 2023-02-16 PROCEDURE — 99233 SBSQ HOSP IP/OBS HIGH 50: CPT | Performed by: STUDENT IN AN ORGANIZED HEALTH CARE EDUCATION/TRAINING PROGRAM

## 2023-02-16 PROCEDURE — 83735 ASSAY OF MAGNESIUM: CPT | Performed by: INTERNAL MEDICINE

## 2023-02-16 RX ADMIN — METOCLOPRAMIDE HYDROCHLORIDE 5 MG: 5 INJECTION INTRAMUSCULAR; INTRAVENOUS at 17:47

## 2023-02-16 RX ADMIN — INSULIN ASPART 1 UNITS: 100 INJECTION, SOLUTION INTRAVENOUS; SUBCUTANEOUS at 12:36

## 2023-02-16 RX ADMIN — METOCLOPRAMIDE HYDROCHLORIDE 5 MG: 5 INJECTION INTRAMUSCULAR; INTRAVENOUS at 06:02

## 2023-02-16 RX ADMIN — ONDANSETRON 4 MG: 2 INJECTION INTRAMUSCULAR; INTRAVENOUS at 11:16

## 2023-02-16 RX ADMIN — SUCRALFATE 1 G: 1 SUSPENSION ORAL at 15:58

## 2023-02-16 RX ADMIN — INSULIN ASPART 1 UNITS: 100 INJECTION, SOLUTION INTRAVENOUS; SUBCUTANEOUS at 04:04

## 2023-02-16 RX ADMIN — SUCRALFATE 1 G: 1 SUSPENSION ORAL at 21:25

## 2023-02-16 RX ADMIN — PANTOPRAZOLE SODIUM 40 MG: 40 INJECTION, POWDER, FOR SOLUTION INTRAVENOUS at 08:30

## 2023-02-16 RX ADMIN — INSULIN ASPART 1 UNITS: 100 INJECTION, SOLUTION INTRAVENOUS; SUBCUTANEOUS at 20:28

## 2023-02-16 RX ADMIN — INSULIN ASPART 1 UNITS: 100 INJECTION, SOLUTION INTRAVENOUS; SUBCUTANEOUS at 16:58

## 2023-02-16 RX ADMIN — PROMETHAZINE HYDROCHLORIDE 25 MG: 25 INJECTION INTRAMUSCULAR; INTRAVENOUS at 17:47

## 2023-02-16 RX ADMIN — GUAIFENESIN 600 MG: 600 TABLET ORAL at 20:28

## 2023-02-16 RX ADMIN — ONDANSETRON 4 MG: 4 TABLET, ORALLY DISINTEGRATING ORAL at 02:44

## 2023-02-16 RX ADMIN — METOCLOPRAMIDE HYDROCHLORIDE 5 MG: 5 INJECTION INTRAMUSCULAR; INTRAVENOUS at 12:35

## 2023-02-16 RX ADMIN — TRAZODONE HYDROCHLORIDE 100 MG: 50 TABLET ORAL at 21:25

## 2023-02-16 RX ADMIN — INSULIN ASPART 1 UNITS: 100 INJECTION, SOLUTION INTRAVENOUS; SUBCUTANEOUS at 08:34

## 2023-02-16 RX ADMIN — METOCLOPRAMIDE HYDROCHLORIDE 5 MG: 5 INJECTION INTRAMUSCULAR; INTRAVENOUS at 00:03

## 2023-02-16 RX ADMIN — BUSPIRONE HYDROCHLORIDE 10 MG: 10 TABLET ORAL at 14:33

## 2023-02-16 RX ADMIN — SODIUM CHLORIDE: 9 INJECTION, SOLUTION INTRAVENOUS at 01:07

## 2023-02-16 RX ADMIN — ONDANSETRON 4 MG: 2 INJECTION INTRAMUSCULAR; INTRAVENOUS at 21:28

## 2023-02-16 RX ADMIN — INSULIN ASPART 1 UNITS: 100 INJECTION, SOLUTION INTRAVENOUS; SUBCUTANEOUS at 00:01

## 2023-02-16 RX ADMIN — PANTOPRAZOLE SODIUM 40 MG: 40 INJECTION, POWDER, FOR SOLUTION INTRAVENOUS at 20:28

## 2023-02-16 RX ADMIN — PROMETHAZINE HYDROCHLORIDE 25 MG: 25 INJECTION INTRAMUSCULAR; INTRAVENOUS at 09:36

## 2023-02-16 RX ADMIN — AMLODIPINE BESYLATE 10 MG: 5 TABLET ORAL at 08:29

## 2023-02-16 RX ADMIN — SODIUM CHLORIDE: 9 INJECTION, SOLUTION INTRAVENOUS at 15:06

## 2023-02-16 ASSESSMENT — ACTIVITIES OF DAILY LIVING (ADL)
ADLS_ACUITY_SCORE: 21

## 2023-02-16 NOTE — PROGRESS NOTES
"GI PROGRESS NOTE  2/16/2023  Drew Monsivais  1970  [id number]    Subjective:   Still feels about the same. No retching today, but uncomfortable with nausea, phlegm in the throat.      Objective:     Blood pressure (!) 155/82, pulse 100, temperature 98.9  F (37.2  C), temperature source Oral, resp. rate 17, height 1.727 m (5' 8\"), weight 72.2 kg (159 lb 2.8 oz), SpO2 98 %.    Body mass index is 24.2 kg/m .  Gen: NAD  Cardio: RRR  GI: Non-distended, BS positive, soft, non-tender    Laboratory:  BMP  Recent Labs   Lab 02/16/23  1211 02/16/23  0817 02/16/23  0811 02/16/23  0400 02/15/23  0756 02/15/23  0641 02/14/23  1518 02/14/23  0802   NA  --   --   --   --   --  138  --  138   POTASSIUM  --  4.3  --   --   --  4.9  4.9  --  4.6   CHLORIDE  --   --   --   --   --  103  --  97*   LEISA  --   --   --   --   --   --   --  10.5   CO2  --   --   --   --   --  22  --  20*   BUN  --   --   --   --   --   --   --  20   CR  --   --   --   --   --   --   --  1.49*   *  --  173* 156*   < >  --    < > 271*    < > = values in this interval not displayed.     CBC  Recent Labs   Lab 02/15/23  1240 02/14/23  0802   WBC 12.9* 16.2*   RBC 3.87* 4.81   HGB 11.0* 13.5   HCT 32.1* 37.4*   MCV 83 78   MCH 28.4 28.1   MCHC 34.3 36.1   RDW 12.5 12.3    306     INR  Recent Labs   Lab 02/14/23  0802   INR 0.96      LFTs  Recent Labs   Lab Test 02/15/23  0641 02/14/23  1523 02/14/23  0802 01/03/23  0955 01/03/23  0733 12/28/22  0927 12/27/22  1233 09/12/22  1106 08/05/22  1117   ALBUMIN 3.5  --  4.5  --  4.8 4.0 4.3   < >  --    BILITOTAL  --   --  0.8  --  1.5* 1.2* 0.7   < >  --    ALT  --   --  10  --  19 16 20   < >  --    AST  --   --  10  --  18 18 19   < >  --    PROTEIN  --  20*  --  20*  --   --   --   --  Negative   LIPASE  --   --  233*  --  172*  --  582*  --   --     < > = values in this interval not displayed.     Imaging:  EXAM: US ABDOMEN LIMITED  LOCATION: [facility]  DATE/TIME: 2/14/2023 11:54 AM     INDICATION: pancreatitis  COMPARISON: CT chest, abdomen, and pelvis from earlier today  TECHNIQUE: Limited abdominal ultrasound.     FINDINGS:     GALLBLADDER: Normal. No gallstones, wall thickening, or pericholecystic fluid. Negative sonographic Tse's sign.     BILE DUCTS: No biliary dilatation. The common duct measures 2 mm.     LIVER: Increased echogenicity from diffuse fatty infiltration. No focal mass.     RIGHT KIDNEY: No hydronephrosis.     PANCREAS: The visualized portions are normal.     No ascites                               IMPRESSION:  Hepatic steatosis.     EXAM: CT CHEST/ABDOMEN/PELVIS W CONTRAST  LOCATION: [facility]  DATE/TIME: 2/14/2023 9:24 AM    IMPRESSION:  1.  Mild wall thickening in the esophagus can be seen with esophagitis.  2.  There are a few patchy groundglass nodular opacities in the right upper and lower lobes suggestive of infectious or inflammatory bronchiolitis. Recommend follow-up in 3 months to ensure resolution.  3.  Otherwise, no CT evidence for source of symptoms.  4.  Nephrolithiasis.  5.  Hepatic steatosis.      Procedures:  EGD 2/15/23:  Findings:        Excessive phlegm and inflammed larynx.        LA Grade D (one or more mucosal breaks involving at least 75% of        esophageal circumference) esophagitis with no bleeding was extending        between 30 to 40 cm from the incisors (to the GEJ). Biopsies were taken        with a cold forceps for histology.        The entire examined stomach was normal. Biopsies were taken with a cold        forceps for histology.        A single 5 mm mucosal nodule over the posterior wall of the duodenal        bulb. Biopsies were taken with a cold forceps for histology.        The examined duodenum was normal otherwise. Biopsies were taken with a        cold forceps for histology.                                                                                      Moderate Sedation:        See Anesthesia Note   Impression:            - Excessive phlegm and findings suggestive of                          respiratory infection.                          - Erosive esophagitis, likely the source of bleeding.                          Biopsied. This is likely due to excessive vomiting.                          - Normal stomach. Biopsied.                          - Mucosal nodule found in the duodenum. Biopsied.                          - Normal examined duodenum. Biopsied.     Recommendation:        - Evaluate for respiratory infection.                          - Await pathology results.                          - Repeat upper endoscopy in 3 months to evaluate the                          response to therapy. We will arrange for it.                          - [facility] to [facility] for ongoing care.                          - Resume previous diet.                          - Continue present medications.                          - High dose PPI, pantoprazole 40 mg IV BID.                          - Sucralfate 1 gram QID for one month.                          - Await pathology results.     A) DUODENUM, BIOPSY:  -DUODENAL MUCOSA WITHOUT DIAGNOSTIC ABNORMALITY    B) STOMACH, BIOPSY:  -ANTRAL AND ANTRAL FUNDIC TRANSITIONAL ZONE MUCOSA WITHOUT DIAGNOSTIC ABNORMALITY  -NO INTESTINAL METAPLASIA  -NO HELICOBACTER PYLORI    C) ESOPHAGUS, BIOPSY:   -SEVERE ACTIVE ESOPHAGITIS WITH ULCERATION  -NEGATIVE FOR VIRAL CYTOPATHIC EFFECT    D) DUODENUM, NODULE, BIOPSY:  -POLYPOID DUODENAL MUCOSA CONSISTENT WITH CHRONIC PEPTIC TYPE DUODENITIS     Assessment:   1. Nausea and vomiting  2. Erosive esophagitis  52 year old male with chronic nausea and vomiting, recent diagnosis of acute pancreatitis (12/2022) who presents with ongoing nausea and vomiting. CT scan and US unremarkable for cause here. Lipase still elevated. Suspect the wall thickening seen on CT sequale of vomiting. EGD showed  excessive phlegm, erosive esophagitis. Biopsies of the esophagus shows erosive esophagitis, negative for underlying virus. Duodenal bx c/w peptic injury. Gastric bx normal. GERD, CHS high on differential, also consider gastroparesis although less likely. On IV PPI, scheduled sucralfate and Reglan and PRN antiemetics. No longer retching constantly, but not eating/drinnking much.    3. Respiratory infection  EGD showed excessive phlegm and laryngeal inflammation c/w respiratory infection. This is likely exacerbating symptoms. Cultures pending. COVID, strep, flu negative in the ED.       4. Hematemesis -resolved  Due to erosive esophagitis. Hemoglobin normal on presentation, now 11.0 likely due to dilution. No further hematemesis noted.      Plan:   1. BID IV PPI  2. Sucralfate 1g 4 times daily  3. Reglan every 6 hours  4. Follow-up respiratory cultures  5. Antiemetics as needed.   6. Advance diet as tolerated  7. Marijuana cessation                                                                         Helen Mccarthy PA-C  Thank you for the opportunity to participate in the care of this patient.   Please feel free to call me with any questions or concerns.  Phone number [phone].

## 2023-02-16 NOTE — CONSULTS
"ACUPUNCTURIST TREATMENT NOTE    Name: Drew Monsivais  :  1970  MRN:  [id number]    Acupuncture Treatment  Patient Type: Medical  Intervention Reason: Nausea, Pain  Pain Location: throat  Pre-session Pain Ratin  Post-session Pain Ratin  Pre-session Nausea ratin  Post-session Nausea ratin  Patient complaint:: nausea and throat pain  Initial insertions: Du 20, (R) Chyna 7, (R) Pc 6, Sp 4, Janine 3, KI 6  Number of needles inserted: 9  Number of needles removed: 9         \"Risks and benefits of acupuncture were discussed with patient. Consent for treatment was given. We thank you for the referral.\"     Tameka Bravo L.Ac.     Date:  2023  Time:  1:37 PM    "

## 2023-02-16 NOTE — PLAN OF CARE
Problem: Plan of Care - These are the overarching goals to be used throughout the patient stay.    Goal: Optimal Comfort and Wellbeing  Outcome: Progressing  Intervention: Monitor Pain and Promote Comfort  Recent Flowsheet Documentation  Taken 2/15/2023 2342 by Rosa Kemp RN  Pain Management Interventions:   care clustered   emotional support   pain management plan reviewed with patient/caregiver   quiet environment facilitated   repositioned   rest   therapeutic presence  Taken 2/15/2023 2000 by Rosa Kemp RN  Pain Management Interventions:   care clustered   emotional support   pain management plan reviewed with patient/caregiver   quiet environment facilitated   repositioned   rest   therapeutic presence  Patient is stable, afebrile. Zofran, phenergan, and reglan administered for nausea with relief. Pt. Is up independently, voiding. Normal Saline at 75 ml/hr. Will continue to monitor.    IRAIDA Kemp RN

## 2023-02-16 NOTE — PROGRESS NOTES
Hospitalist Progress Note    Assessment/Plan  Intractable nausea and vomiting  -Status post upper endoscopy with findings of LA grade D esophagitis with no bleeding.  Finding of excessive phlegm and findings suggestive of respiratory infection  -GI recommendations of high-dose PPI, and sucralfate Q.I.D., reglan q6hrs, and to follow-up repeat upper endoscopy in 3 months.  GI will arrange for follow-up.  - ddx includes cannabinoid hyperemesis syndrome vs gastroparesis   - hospitalist discussed with pt at-length about this possibility; given it is daily routine for him, initially averse to cessation but open to it  -Full liquid diet, as per GI  -Continue IV fluid for now until tolerating oral feeds fully.   -IV PPI/antiemetics  -Monitor and replete electrolytes as needed  -Respiratory work-up given EGD findings.   - follow up cultures- preliminary is negative   - procalcitonin is negative  -Close monitor.     MARY JANE on CKDIII.  Etiology prerenal.  This is in the setting of nausea and vomiting.  -Creatinine noted at 1.49: Baseline documented as 1.1  -Watch progress with IV fluid therapy.  -Avoid nephrotoxins/hypotension  -Renally dose medication  -To consider nephrology consult as indicated.  - no BMP today-- writer ordering 2/16 to check    Essential hypertension  Hypertensive urgency  -Urgency improved.  However BP not at goal.   -Continue home amlodipine.  Hold home lisinopril in the setting of MARY JANE.   -Add as needed hydralazine with holding orders.      Dyslipidemia  -Documented by history.  -Check lipid panel     Diabetes mellitus type 2  -Not on insulin at home.  -Last A1c 8.9% on 12/27/22.     -Accu-Cheks AC/at bedtime  -Carb controlled diet  -We will do sliding scale insulin for now.  Add prandial/basal insulin depending on trend.      Depression  Insomnia  -Continue home medications     (+) Cannabinoids in urine drug screen.  - ?  Cannabinoid hyperemesis syndrome, in the setting of intractable nausea and  vomiting  -Continue IV fluid therapy  -Monitor and replete electrolytes as needed    DVT Prophylaxis:  SCDs  Disposition: Pending progress.     Abdias Bowling   Hospitalist Service  [facility]   Securely message with the [employer]oc[employer] Web Console (learn more here)  Text page via [employer] Paging/Directory      ----------------------------------------------     Subjective  Patient is new to writer today  He is still very nauseous, bedside RN states he could not keep down his morning pills  Pt has no new pain or breathing issues but is feeling like his nausea is not better  We discussed his marijuana use, which she confirms is daily  -He usually smokes from a pipe, but more recently has been using Gummies as to avoid other family members observing  -We discussed cannabinoid induced hyperemesis syndrome as a potential, and he was quite anxious about this, as he feels he has been using this as a part of his usual routine in order to destress   -we discussed the benefits of cessation and that we are still awaiting other studies and testing-he has no other concerns or symptoms or questions; all questions answered to verbalized satisfaction       Objective    Vital signs in last 24 hours  Temp:  [98  F (36.7  C)-98.9  F (37.2  C)] 98.9  F (37.2  C)  Pulse:  [] 100  Resp:  [17-18] 17  BP: (155-190)/() 155/82  SpO2:  [97 %-98 %] 98 % @LASTSAO2(12)@ O2 Device: None (Room air)    Weight:   Wt Readings from Last 3 Encounters:   02/15/23 72.2 kg (159 lb 2.8 oz)   01/03/23 81.6 kg (180 lb)   12/27/22 79 kg (174 lb 3 oz)      Weight change: -12.2 kg (-26 lb 13.3 oz)    Intake/Output last 3 shifts  I/O last 3 completed shifts:  In: 268.75 [I.V.:268.75]  Out: 1 [Emesis/NG output:1]  Body mass index is 24.2 kg/m .    Physical Exam    General Appearance:    Alert, cooperative, no distress, appears stated age   Lungs:     Clear bilaterally    Cardiovascular:    Regular rate ands rhythm.  Normal S1, S2.  No  murmur, rub or gallop.  No edema   Abdomen:     Soft, non-tender, bowel sounds active all four quadrants,     no masses, no organomegaly   Neurologic:   Awake, alert, oriented x 3.  Grossly nonfocal      Pertinent Labs   Lab Results: personally reviewed.   Recent Labs   Lab 02/15/23  0641 02/14/23  0802    138   CO2 22 20*   BUN  --  20   ALBUMIN 3.5 4.5   ALKPHOS  --  80   ALT  --  10   AST  --  10     Recent Labs   Lab 02/15/23  1240 02/14/23  0802   WBC 12.9* 16.2*   HGB 11.0* 13.5   HCT 32.1* 37.4*    306     Recent Labs   Lab 02/14/23  0802   TROPONINI <0.01     Invalid input(s): POCGLUFGR    Medications  Current Facility-Administered Medications   Medication     acetaminophen (TYLENOL) tablet 650 mg    Or     acetaminophen (TYLENOL) Suppository 650 mg     amLODIPine (NORVASC) tablet 10 mg     benzocaine-menthol (CEPACOL) 15-3.6 MG lozenge 1 lozenge     busPIRone (BUSPAR) tablet 10 mg     glucose gel 15-30 g    Or     dextrose 50 % injection 25-50 mL    Or     glucagon injection 1 mg     FLUoxetine (PROzac) capsule 20 mg     guaiFENesin (MUCINEX) 12 hr tablet 600 mg     insulin aspart (NovoLOG) injection (RAPID ACTING)     labetalol (NORMODYNE/TRANDATE) injection 20 mg     lidocaine (LMX4) cream     lidocaine 1 % 0.1-1 mL     [Held by provider] lisinopril (ZESTRIL) tablet 40 mg     melatonin tablet 1 mg     metoclopramide (REGLAN) injection 5 mg     multivitamin, therapeutic (THERA-VIT) tablet 1 tablet     ondansetron (ZOFRAN ODT) ODT tab 4 mg    Or     ondansetron (ZOFRAN) injection 4 mg     pantoprazole (PROTONIX) IV push injection 40 mg     phenol (CHLORASEPTIC) 1.4 % spray 1 mL     prochlorperazine (COMPAZINE) injection 10 mg    Or     prochlorperazine (COMPAZINE) tablet 10 mg    Or     prochlorperazine (COMPAZINE) suppository 25 mg     promethazine (PHENERGAN) 25 mg in sodium chloride 0.9 % 50 mL intermittent infusion     scopolamine (TRANSDERM) 72 hr patch 1 patch    And     scopolamine  (TRANSDERM-SCOP) Patch in Place     sodium chloride (PF) 0.9% PF flush 3 mL     sodium chloride (PF) 0.9% PF flush 3 mL     sodium chloride 0.9% infusion     sucralfate (CARAFATE) suspension 1 g     tamsulosin (FLOMAX) capsule 0.4 mg     thiamine tablet 50 mg     traZODone (DESYREL) tablet 100 mg       Pertinent Radiology   Radiology Results: Personally reviewed   Results for orders placed or performed during the hospital encounter of 02/14/23   CT Chest/Abdomen/Pelvis w Contrast    Impression    IMPRESSION:  1.  Mild wall thickening in the esophagus can be seen with esophagitis.  2.  There are a few patchy groundglass nodular opacities in the right upper and lower lobes suggestive of infectious or inflammatory bronchiolitis. Recommend follow-up in 3 months to ensure resolution.  3.  Otherwise, no CT evidence for source of symptoms.  4.  Nephrolithiasis.  5.  Hepatic steatosis.   US Abdomen Limited    Impression    IMPRESSION:  Hepatic steatosis.           50  MINUTES SPENT BY ME on the date of service doing chart review, history, exam, documentation & further activities per the note.

## 2023-02-16 NOTE — PROGRESS NOTES
Hospitalist OK with not doing lantus insulin per diabetes educator note, as patient is not eating much, will tank blood sugars.

## 2023-02-17 LAB
ANION GAP SERPL CALCULATED.3IONS-SCNC: 8 MMOL/L (ref 5–18)
BACTERIA SPT CULT: NORMAL
BUN SERPL-MCNC: 13 MG/DL (ref 8–22)
CALCIUM SERPL-MCNC: 8.8 MG/DL (ref 8.5–10.5)
CHLORIDE BLD-SCNC: 103 MMOL/L (ref 98–107)
CO2 SERPL-SCNC: 25 MMOL/L (ref 22–31)
CREAT SERPL-MCNC: 0.86 MG/DL (ref 0.7–1.3)
ERYTHROCYTE [DISTWIDTH] IN BLOOD BY AUTOMATED COUNT: 12.2 % (ref 10–15)
GFR SERPL CREATININE-BSD FRML MDRD: >90 ML/MIN/1.73M2
GLUCOSE BLD-MCNC: 138 MG/DL (ref 70–125)
GLUCOSE BLDC GLUCOMTR-MCNC: 102 MG/DL (ref 70–99)
GLUCOSE BLDC GLUCOMTR-MCNC: 130 MG/DL (ref 70–99)
GLUCOSE BLDC GLUCOMTR-MCNC: 132 MG/DL (ref 70–99)
GLUCOSE BLDC GLUCOMTR-MCNC: 147 MG/DL (ref 70–99)
GLUCOSE BLDC GLUCOMTR-MCNC: 149 MG/DL (ref 70–99)
GLUCOSE BLDC GLUCOMTR-MCNC: 149 MG/DL (ref 70–99)
GRAM STAIN RESULT: NORMAL
HCT VFR BLD AUTO: 31.3 % (ref 40–53)
HGB BLD-MCNC: 11.2 G/DL (ref 13.3–17.7)
MAGNESIUM SERPL-MCNC: 1.5 MG/DL (ref 1.8–2.6)
MAGNESIUM SERPL-MCNC: 2.2 MG/DL (ref 1.8–2.6)
MCH RBC QN AUTO: 28.8 PG (ref 26.5–33)
MCHC RBC AUTO-ENTMCNC: 35.8 G/DL (ref 31.5–36.5)
MCV RBC AUTO: 81 FL (ref 78–100)
PLATELET # BLD AUTO: 231 10E3/UL (ref 150–450)
POTASSIUM BLD-SCNC: 4 MMOL/L (ref 3.5–5)
RBC # BLD AUTO: 3.89 10E6/UL (ref 4.4–5.9)
SODIUM SERPL-SCNC: 136 MMOL/L (ref 136–145)
WBC # BLD AUTO: 8.9 10E3/UL (ref 4–11)

## 2023-02-17 PROCEDURE — 250N000011 HC RX IP 250 OP 636: Performed by: INTERNAL MEDICINE

## 2023-02-17 PROCEDURE — 80048 BASIC METABOLIC PNL TOTAL CA: CPT | Performed by: STUDENT IN AN ORGANIZED HEALTH CARE EDUCATION/TRAINING PROGRAM

## 2023-02-17 PROCEDURE — 85027 COMPLETE CBC AUTOMATED: CPT | Performed by: STUDENT IN AN ORGANIZED HEALTH CARE EDUCATION/TRAINING PROGRAM

## 2023-02-17 PROCEDURE — 250N000013 HC RX MED GY IP 250 OP 250 PS 637: Performed by: STUDENT IN AN ORGANIZED HEALTH CARE EDUCATION/TRAINING PROGRAM

## 2023-02-17 PROCEDURE — 120N000001 HC R&B MED SURG/OB

## 2023-02-17 PROCEDURE — 250N000013 HC RX MED GY IP 250 OP 250 PS 637: Performed by: INTERNAL MEDICINE

## 2023-02-17 PROCEDURE — 258N000003 HC RX IP 258 OP 636: Performed by: INTERNAL MEDICINE

## 2023-02-17 PROCEDURE — 250N000011 HC RX IP 250 OP 636: Performed by: STUDENT IN AN ORGANIZED HEALTH CARE EDUCATION/TRAINING PROGRAM

## 2023-02-17 PROCEDURE — 99233 SBSQ HOSP IP/OBS HIGH 50: CPT | Performed by: STUDENT IN AN ORGANIZED HEALTH CARE EDUCATION/TRAINING PROGRAM

## 2023-02-17 PROCEDURE — 36415 COLL VENOUS BLD VENIPUNCTURE: CPT | Performed by: STUDENT IN AN ORGANIZED HEALTH CARE EDUCATION/TRAINING PROGRAM

## 2023-02-17 PROCEDURE — 83735 ASSAY OF MAGNESIUM: CPT | Performed by: STUDENT IN AN ORGANIZED HEALTH CARE EDUCATION/TRAINING PROGRAM

## 2023-02-17 PROCEDURE — 250N000009 HC RX 250: Performed by: INTERNAL MEDICINE

## 2023-02-17 PROCEDURE — C9113 INJ PANTOPRAZOLE SODIUM, VIA: HCPCS | Performed by: INTERNAL MEDICINE

## 2023-02-17 PROCEDURE — 258N000003 HC RX IP 258 OP 636: Performed by: STUDENT IN AN ORGANIZED HEALTH CARE EDUCATION/TRAINING PROGRAM

## 2023-02-17 RX ORDER — LISINOPRIL 20 MG/1
40 TABLET ORAL DAILY
Status: DISCONTINUED | OUTPATIENT
Start: 2023-02-17 | End: 2023-02-19 | Stop reason: HOSPADM

## 2023-02-17 RX ORDER — HYDRALAZINE HYDROCHLORIDE 10 MG/1
10 TABLET, FILM COATED ORAL 3 TIMES DAILY PRN
Status: DISCONTINUED | OUTPATIENT
Start: 2023-02-17 | End: 2023-02-19 | Stop reason: HOSPADM

## 2023-02-17 RX ORDER — MAGNESIUM SULFATE 4 G/50ML
4 INJECTION INTRAVENOUS ONCE
Status: COMPLETED | OUTPATIENT
Start: 2023-02-17 | End: 2023-02-17

## 2023-02-17 RX ORDER — CAPSAICIN 0.025 %
CREAM (GRAM) TOPICAL 3 TIMES DAILY
Status: DISCONTINUED | OUTPATIENT
Start: 2023-02-17 | End: 2023-02-18

## 2023-02-17 RX ORDER — GABAPENTIN 100 MG/1
100 CAPSULE ORAL 3 TIMES DAILY
Status: DISCONTINUED | OUTPATIENT
Start: 2023-02-17 | End: 2023-02-18

## 2023-02-17 RX ORDER — DOXYCYCLINE 100 MG/10ML
100 INJECTION, POWDER, LYOPHILIZED, FOR SOLUTION INTRAVENOUS 2 TIMES DAILY
Status: DISCONTINUED | OUTPATIENT
Start: 2023-02-17 | End: 2023-02-19 | Stop reason: HOSPADM

## 2023-02-17 RX ORDER — CLONIDINE HYDROCHLORIDE 0.1 MG/1
0.1 TABLET ORAL EVERY 6 HOURS PRN
Status: DISCONTINUED | OUTPATIENT
Start: 2023-02-17 | End: 2023-02-19 | Stop reason: HOSPADM

## 2023-02-17 RX ADMIN — TRAZODONE HYDROCHLORIDE 100 MG: 50 TABLET ORAL at 23:03

## 2023-02-17 RX ADMIN — TAMSULOSIN HYDROCHLORIDE 0.4 MG: 0.4 CAPSULE ORAL at 10:15

## 2023-02-17 RX ADMIN — ONDANSETRON 4 MG: 2 INJECTION INTRAMUSCULAR; INTRAVENOUS at 08:19

## 2023-02-17 RX ADMIN — FLUOXETINE 20 MG: 20 CAPSULE ORAL at 10:15

## 2023-02-17 RX ADMIN — METOCLOPRAMIDE HYDROCHLORIDE 5 MG: 5 INJECTION INTRAMUSCULAR; INTRAVENOUS at 06:36

## 2023-02-17 RX ADMIN — Medication 50 MG: at 10:16

## 2023-02-17 RX ADMIN — CAPSAICIN: 0.25 CREAM TOPICAL at 12:57

## 2023-02-17 RX ADMIN — Medication 1 MG: at 00:05

## 2023-02-17 RX ADMIN — CAPSAICIN: 0.25 CREAM TOPICAL at 17:27

## 2023-02-17 RX ADMIN — GUAIFENESIN 600 MG: 600 TABLET ORAL at 23:03

## 2023-02-17 RX ADMIN — GABAPENTIN 100 MG: 100 CAPSULE ORAL at 23:10

## 2023-02-17 RX ADMIN — THERA TABS 1 TABLET: TAB at 10:15

## 2023-02-17 RX ADMIN — DOXYCYCLINE 100 MG: 100 INJECTION, POWDER, LYOPHILIZED, FOR SOLUTION INTRAVENOUS at 17:28

## 2023-02-17 RX ADMIN — GABAPENTIN 100 MG: 100 CAPSULE ORAL at 12:04

## 2023-02-17 RX ADMIN — PANTOPRAZOLE SODIUM 40 MG: 40 INJECTION, POWDER, FOR SOLUTION INTRAVENOUS at 10:16

## 2023-02-17 RX ADMIN — PANTOPRAZOLE SODIUM 40 MG: 40 INJECTION, POWDER, FOR SOLUTION INTRAVENOUS at 20:10

## 2023-02-17 RX ADMIN — GUAIFENESIN 600 MG: 600 TABLET ORAL at 10:16

## 2023-02-17 RX ADMIN — SUCRALFATE 1 G: 1 SUSPENSION ORAL at 10:15

## 2023-02-17 RX ADMIN — DOXYCYCLINE 100 MG: 100 INJECTION, POWDER, LYOPHILIZED, FOR SOLUTION INTRAVENOUS at 23:11

## 2023-02-17 RX ADMIN — INSULIN ASPART 1 UNITS: 100 INJECTION, SOLUTION INTRAVENOUS; SUBCUTANEOUS at 12:10

## 2023-02-17 RX ADMIN — AMLODIPINE BESYLATE 10 MG: 5 TABLET ORAL at 10:15

## 2023-02-17 RX ADMIN — SUCRALFATE 1 G: 1 SUSPENSION ORAL at 17:35

## 2023-02-17 RX ADMIN — MAGNESIUM SULFATE HEPTAHYDRATE 4 G: 80 INJECTION, SOLUTION INTRAVENOUS at 18:41

## 2023-02-17 RX ADMIN — METOCLOPRAMIDE HYDROCHLORIDE 5 MG: 5 INJECTION INTRAMUSCULAR; INTRAVENOUS at 00:05

## 2023-02-17 RX ADMIN — METOCLOPRAMIDE HYDROCHLORIDE 5 MG: 5 INJECTION INTRAMUSCULAR; INTRAVENOUS at 17:35

## 2023-02-17 RX ADMIN — METOCLOPRAMIDE HYDROCHLORIDE 5 MG: 5 INJECTION INTRAMUSCULAR; INTRAVENOUS at 12:04

## 2023-02-17 RX ADMIN — GABAPENTIN 100 MG: 100 CAPSULE ORAL at 17:27

## 2023-02-17 RX ADMIN — INSULIN ASPART 1 UNITS: 100 INJECTION, SOLUTION INTRAVENOUS; SUBCUTANEOUS at 00:05

## 2023-02-17 RX ADMIN — SODIUM CHLORIDE: 9 INJECTION, SOLUTION INTRAVENOUS at 06:36

## 2023-02-17 RX ADMIN — SCOPALAMINE 1 PATCH: 1 PATCH, EXTENDED RELEASE TRANSDERMAL at 20:12

## 2023-02-17 RX ADMIN — SODIUM CHLORIDE: 9 INJECTION, SOLUTION INTRAVENOUS at 17:35

## 2023-02-17 RX ADMIN — LISINOPRIL 40 MG: 20 TABLET ORAL at 17:27

## 2023-02-17 RX ADMIN — SUCRALFATE 1 G: 1 SUSPENSION ORAL at 23:31

## 2023-02-17 RX ADMIN — INSULIN ASPART 1 UNITS: 100 INJECTION, SOLUTION INTRAVENOUS; SUBCUTANEOUS at 20:10

## 2023-02-17 ASSESSMENT — ACTIVITIES OF DAILY LIVING (ADL)
ADLS_ACUITY_SCORE: 21

## 2023-02-17 NOTE — PLAN OF CARE
Problem: Nausea and Vomiting  Goal: Nausea and Vomiting Relief  Outcome: Progressing   PRN zofran administered x 1 this shift. Scheduled reglan given as ordered. No emesis this shift.      Problem: Pain Acute  Goal: Optimal Pain Control and Function  Outcome: Progressing   Pt has not requested any PRN pain medications, c/o sore throat. Voice is hoarse.     Kimberlee Medina RN  2/17/2023  5:36 AM

## 2023-02-17 NOTE — PLAN OF CARE
Goal Outcome Evaluation:                    Problem: Nausea and Vomiting  Goal: Nausea and Vomiting Relief  Outcome: Progressing  Intervention: Prevent and Manage Nausea and Vomiting  Recent Flowsheet Documentation  Taken 2/16/2023 1535 by Mylene Estrada RN  Nausea/Vomiting Interventions: antiemetic  Environmental Support: calm environment promoted  Taken 2/16/2023 0834 by Mylene Estrada RN  Nausea/Vomiting Interventions: antiemetic  Environmental Support: calm environment promoted     Patient has been nauseated all morning. Has had a small emesis this am. Has been dry heaving as well. Antiemetics given with some relief. C/O sore throat. Denies abd pain. IV fluids running. Taking in small amounts of fluids. Continue to monitor.

## 2023-02-17 NOTE — PROGRESS NOTES
[facility]    Medicine Progress Note - Hospitalist Service    Date of Admission:  2/14/2023     Assessment & Plan   52-year-old gentleman with history of diabetes mellitus type 2, previous episodes of intractable nausea with emesis, notable marijuana use, previous episodes of acute pancreatitis, hypertension, hyperlipidemia, admitted to the hospital on 2/14/2023 with presentation of nausea, emesis and diarrhea.  Additionally found with acute on chronic kidney injury, creatinine 1.49 admission, also noted with elevated pressures and hypertensive urgency. GI has been following. Loose stools has not recurred, no stool panel nor h.pylori obtained.    Status post EGD 2/15 with biopsies demonstrating LA grade D esophagitis with no bleeding and intraprocedural findings of excessive phlegm suggestive of respiratory infection, erosive esophagitis felt to be source of bleeding given reported hematemesis, and pending biopsies.  Ongoing significant nausea, working on diet advancement now full liquid diet.  Advancing as tolerated.  Initiating short 5-day empiric course of doxycycline twice daily 2/17 for potential respiratory infection.      Hypothesis of possibly cannabis induced hyperemesis, starting capsaicin cream, and gabapentin prn for hiccups.  Acupuncture following for alternative treatments. Will need follow-up endoscopy in 3 months.    Problem List:   Intractable nausea and vomiting, transient hematemesis   EGD findings of 'excessive phlegm'  (+) Cannabinoids in urine drug screen, ?Cannabinoid hyperemesis syndrome  - as directly above in summary.   - continue GI recommendations of high-dose PPI, and sucralfate Q.I.D., reglan q6hrs, and to follow-up repeat upper endoscopy in 3 months.  GI will arrange for follow-up.  - ADAT and accordingly DECREASE MIVFs to 50ccs/hr on 2/17  -Continue IV fluid for now until tolerating oral feeds fully.   -Monitor and replete electrolytes as needed  -Respiratory  work-up, negative   - given EGD findings though   - START doxycycline 100mg BID IV x5 days empiric course (transition to po when po)  - START capsaicin cream tid prn  - will discuss TPN on 2/18 after trying capsaicin  -recommended cessation of marijuana   -will need GI follow-up, consider functional component if workup and biopsy negative ... would ?metabolic rheumatologic component?     MARY JANE on CKDIII  - Etiology prerenal. In the setting of nausea and vomiting.  -Creatinine on admit 1.49: Baseline documented as 1.1  - on 2/17 normalized Cr  - avoid nephrotoxins/hypotension  -Renally dose medications      Essential hypertension  Hypertensive urgency  -Urgency improved.  However BP remains labile.   -Continue home amlodipine.    - will resume lisinopril now that MARY JANE has resolved, 2/17/2023   - Start PRN hydralazine with parameters - sbp > 200 on two checks or elevated pressures with associated headaches or vision changes      Dyslipidemia  -Documented by history  -Check lipid panel --?  Elevated triglycerides 245  -Follow-up as an outpatient     Diabetes mellitus type 2  -Not on insulin at home.  -Last A1c 8.9% on 12/27/22.     -Accu-Cheks AC/at bedtime  -Carb controlled diet  -We will do sliding scale insulin for now.  Add prandial/basal insulin depending on trend.     -There is no clear trend yet, as his p.o. intake has been extremely labile     Depression  Insomnia  -Continue home medications     Hiccups  A/p- has been intermittently coming and going over the past several days; discussed evidence-based studies suggestive of potential therapeutic value and trial of gabapentin 100 mg 3 times daily until hiccups resolve       Diet: Full Liquid Diet  Snacks/Supplements Adult: Ensure Enlive; With Meals    DVT Prophylaxis: Pneumatic Compression Devices and promote ambulation   Still Catheter: Not present  Lines: None     Cardiac Monitoring: None  Code Status: Full Code      Clinically Significant Risk Factors              # Hypomagnesemia: Lowest Mg = 1.5 mg/dL in last 2 days, will replace as needed            # DMII: A1C = 8.9 % (Ref range: <5.7 %) within past 3 months, PRESENT ON ADMISSION   # Severe Malnutrition: based on nutrition assessment, PRESENT ON ADMISSION       Disposition Plan      Expected Discharge Date: 02/18/2023      Destination: home            Abdias Bowling MD  Hospitalist Service  [facility]  Securely message with [employer] (more info)  Text page via [employer] Paging/Directory   ______________________________________________________________________    Interval History   - no acute interval events  - pt feels 'about the same' as yesterday  - however he is sitting up in bed and not hunched over in a fetal position 24 hours ago  - his sister Enid is here and answered all of her questions and patient's questions to verbalized satisfaction    Physical Exam   Vital Signs: Temp: 98.1  F (36.7  C) Temp src: Oral BP: (!) 171/97 Pulse: 84   Resp: 17 SpO2: 97 % O2 Device: None (Room air)    Weight: 159 lbs 2.75 oz    General: alert, oriented, and in no acute distress  Pulmonary: clear to auscultation bilaterally, normal respiratory effort, on room air, no rales or wheezes or evidence of accessory muscle use   CVS: regular rate and rhythm, no murmurs, rubs, or gallops; no blatant jugular venous distention; no extremity edema and extremities are warm to the touch  GI: soft, nontender, BS+, no rebound or guarding, no conspicuous organomegaly   Neuro: nonfocal, moves all extremities of own volition, slightly tremulous  Psych: appropriate, anxious appearing    Medical Decision Making       62 MINUTES SPENT BY ME on the date of service doing chart review, history, exam, documentation & further activities per the note.      Data     I have personally reviewed the following data over the past 24 hrs:    8.9  \   11.2 (L)   / 231     136 103 13 /  138 (H)   4.0 25 0.86 \       Imaging results reviewed over  the past 24 hrs:   No results found for this or any previous visit (from the past 24 hour(s)).

## 2023-02-17 NOTE — PROGRESS NOTES
CLINICAL NUTRITION NOTE  Pt N/V has not changed. Eating/drinking little to nothing.   Only ordered juice 1 meal yesterday and a few clear liquids today so far.     Recommend TPN if no improvement in the next 24 hours.     Per Md note: will discuss TPN on 2/18 after trying capsaicin    Will continue to monitor

## 2023-02-17 NOTE — PROGRESS NOTES
"GI PROGRESS NOTE  2/17/2023  Drew Monsivais  1970  [id number]    Subjective:   Still feels about the same. Sister at bedside. No retching today, but uncomfortable with nausea, phlegm in the throat. Feels like it's hard to breath on occasion, pulse ox 97%.      Objective:     Blood pressure (!) 171/97, pulse 84, temperature 98.1  F (36.7  C), temperature source Oral, resp. rate 17, height 1.727 m (5' 8\"), weight 72.2 kg (159 lb 2.8 oz), SpO2 97 %.    Body mass index is 24.2 kg/m .  Gen: Anxious, no retching, sister Enid at bedside.   Eyes: No icterus   Cardio: RRR  GI: Non-distended, BS positive, soft, non-tender  Skin: No jaundice    Laboratory:  BMP  Recent Labs   Lab 02/17/23  0802 02/17/23  0404 02/17/23  0005 02/16/23  1614 02/16/23  1450 02/16/23  1211 02/16/23  0817 02/15/23  0756 02/15/23  0641 02/14/23  1518 02/14/23  0802   NA  --   --   --   --  139  --   --   --  138  --  138   POTASSIUM  --   --   --   --  4.3  --  4.3  --  4.9  4.9  --  4.6   CHLORIDE  --   --   --   --  107  --   --   --  103  --  97*   LEISA  --   --   --   --  9.1  --   --   --   --   --  10.5   CO2  --   --   --   --  25  --   --   --  22  --  20*   BUN  --   --   --   --  15  --   --   --   --   --  20   CR  --   --   --   --  1.07  --   --   --   --   --  1.49*   * 132* 149*   < > 186*   < >  --    < >  --    < > 271*    < > = values in this interval not displayed.     CBC  Recent Labs   Lab 02/15/23  1240 02/14/23  0802   WBC 12.9* 16.2*   RBC 3.87* 4.81   HGB 11.0* 13.5   HCT 32.1* 37.4*   MCV 83 78   MCH 28.4 28.1   MCHC 34.3 36.1   RDW 12.5 12.3    306     INR  Recent Labs   Lab 02/14/23  0802   INR 0.96      LFTs  Recent Labs   Lab Test 02/15/23  0641 02/14/23  1523 02/14/23  0802 01/03/23  0955 01/03/23  0733 12/28/22  0927 12/27/22  1233 09/12/22  1106 08/05/22  1117   ALBUMIN 3.5  --  4.5  --  4.8 4.0 4.3   < >  --    BILITOTAL  --   --  0.8  --  1.5* 1.2* 0.7   < >  --    ALT  --   --  10  " --  19 16 20   < >  --    AST  --   --  10  --  18 18 19   < >  --    PROTEIN  --  20*  --  20*  --   --   --   --  Negative   LIPASE  --   --  233*  --  172*  --  582*  --   --     < > = values in this interval not displayed.     Imaging:  EXAM: US ABDOMEN LIMITED  LOCATION: [facility]  DATE/TIME: 2/14/2023 11:54 AM     INDICATION: pancreatitis  COMPARISON: CT chest, abdomen, and pelvis from earlier today  TECHNIQUE: Limited abdominal ultrasound.     FINDINGS:     GALLBLADDER: Normal. No gallstones, wall thickening, or pericholecystic fluid. Negative sonographic Tse's sign.     BILE DUCTS: No biliary dilatation. The common duct measures 2 mm.     LIVER: Increased echogenicity from diffuse fatty infiltration. No focal mass.     RIGHT KIDNEY: No hydronephrosis.     PANCREAS: The visualized portions are normal.     No ascites                               IMPRESSION:  Hepatic steatosis.     EXAM: CT CHEST/ABDOMEN/PELVIS W CONTRAST  LOCATION: [facility]  DATE/TIME: 2/14/2023 9:24 AM    IMPRESSION:  1.  Mild wall thickening in the esophagus can be seen with esophagitis.  2.  There are a few patchy groundglass nodular opacities in the right upper and lower lobes suggestive of infectious or inflammatory bronchiolitis. Recommend follow-up in 3 months to ensure resolution.  3.  Otherwise, no CT evidence for source of symptoms.  4.  Nephrolithiasis.  5.  Hepatic steatosis.      Procedures:  EGD 2/15/23:  Findings:        Excessive phlegm and inflammed larynx.        LA Grade D (one or more mucosal breaks involving at least 75% of        esophageal circumference) esophagitis with no bleeding was extending        between 30 to 40 cm from the incisors (to the GEJ). Biopsies were taken        with a cold forceps for histology.        The entire examined stomach was normal. Biopsies were taken with a cold        forceps for histology.        A single 5 mm mucosal nodule over  the posterior wall of the duodenal        bulb. Biopsies were taken with a cold forceps for histology.        The examined duodenum was normal otherwise. Biopsies were taken with a        cold forceps for histology.                                                                                     Moderate Sedation:        See Anesthesia Note   Impression:            - Excessive phlegm and findings suggestive of                          respiratory infection.                          - Erosive esophagitis, likely the source of bleeding.                          Biopsied. This is likely due to excessive vomiting.                          - Normal stomach. Biopsied.                          - Mucosal nodule found in the duodenum. Biopsied.                          - Normal examined duodenum. Biopsied.     Recommendation:        - Evaluate for respiratory infection.                          - Await pathology results.                          - Repeat upper endoscopy in 3 months to evaluate the                          response to therapy. We will arrange for it.                          - [facility] patient to [facility] for [facility] care.                          - Resume previous diet.                          - Continue present medications.                          - High dose PPI, pantoprazole 40 mg IV BID.                          - Sucralfate 1 gram QID for one month.                          - Await pathology results.     A) DUODENUM, BIOPSY:  -DUODENAL MUCOSA WITHOUT DIAGNOSTIC ABNORMALITY    B) STOMACH, BIOPSY:  -ANTRAL AND ANTRAL FUNDIC TRANSITIONAL ZONE MUCOSA WITHOUT DIAGNOSTIC ABNORMALITY  -NO INTESTINAL METAPLASIA  -NO HELICOBACTER PYLORI    C) ESOPHAGUS, BIOPSY:   -SEVERE ACTIVE ESOPHAGITIS WITH ULCERATION  -NEGATIVE FOR VIRAL CYTOPATHIC EFFECT    D) DUODENUM, NODULE, BIOPSY:  -POLYPOID DUODENAL MUCOSA CONSISTENT WITH CHRONIC PEPTIC TYPE DUODENITIS     Assessment:   1. Nausea and vomiting  2. Erosive  esophagitis  52 year old male with chronic nausea and vomiting, recent diagnosis of acute pancreatitis (12/2022) who presents with ongoing nausea and vomiting. CT scan and US unremarkable for cause here. Lipase still elevated. Suspect the wall thickening seen on CT sequale of vomiting. EGD showed excessive phlegm, erosive esophagitis. Biopsies of the esophagus shows erosive esophagitis, negative for underlying virus. Duodenal bx c/w peptic injury. Gastric bx normal. GERD, CHS high on differential, also consider gastroparesis although less likely. On IV PPI, scheduled sucralfate and Reglan and PRN antiemetics. No longer retching constantly, but not eating/drinnking much. Encouraged oral intake, on full liquid diet. He says he will try.     He will need follow-up EGD in 3 months.     3. Respiratory infection  EGD showed excessive phlegm and laryngeal inflammation c/w respiratory infection. This is likely exacerbating symptoms. Sputum culture with 2+ normal mark. COVID, strep, flu negative in the ED.       4. Hematemesis -resolved  Due to erosive esophagitis. Hemoglobin normal on presentation, now 11.0 likely due to dilution. No further hematemesis noted.      Plan:   1. BID IV PPI  2. Sucralfate 1g 4 times daily  3. Reglan every 6 hours  4. Follow-up respiratory cultures  5. Antiemetics as needed.   6. Encouraged full liquid diet. Advance diet as tolerated  7. Marijuana cessation  8. EGD in three months, our office will arrange.                                                                          Helen Mccarthy PA-C  Thank you for the opportunity to participate in the care of this patient.   Please feel free to call me with any questions or concerns.  Phone number [phone].

## 2023-02-17 NOTE — CONSULTS
"ACUPUNCTURIST TREATMENT NOTE    Name: Drew Monsivais  :  1970  MRN:  [id number]    Acupuncture Treatment  Patient Type: Medical  Intervention Reason: Pain, Pain 2  Pain Location: throat  Pre-session Pain Rating: 3  Post-session Pain Rating: 3  Pain 2 Location: abdomen  Pre-session Pain 2 Rating: 3  Post-session Pain 2 Rating: 3  Patient complaint:: abdominal pain and throat pain  Initial insertions: Du 20, Inderjit 12, Inderjit 6, [address] 36, Janine 3  Number of needles inserted: 7  Number of needles removed: 7  Treatment Observations: abbreviated tx d/t pt was needle sensitive today         \"Risks and benefits of acupuncture were discussed with patient. Consent for treatment was given. We thank you for the referral.\"     Tameka Bravo L.Ac.     Date:  2023  Time:  1:40 PM    "

## 2023-02-18 LAB
ALBUMIN SERPL-MCNC: 3.2 G/DL (ref 3.5–5)
ALP SERPL-CCNC: 67 U/L (ref 45–120)
ALT SERPL W P-5'-P-CCNC: 10 U/L (ref 0–45)
AST SERPL W P-5'-P-CCNC: 12 U/L (ref 0–40)
BILIRUB DIRECT SERPL-MCNC: 0.3 MG/DL
BILIRUB SERPL-MCNC: 0.9 MG/DL (ref 0–1)
GLUCOSE BLDC GLUCOMTR-MCNC: 120 MG/DL (ref 70–99)
GLUCOSE BLDC GLUCOMTR-MCNC: 149 MG/DL (ref 70–99)
GLUCOSE BLDC GLUCOMTR-MCNC: 156 MG/DL (ref 70–99)
GLUCOSE BLDC GLUCOMTR-MCNC: 169 MG/DL (ref 70–99)
GLUCOSE BLDC GLUCOMTR-MCNC: 175 MG/DL (ref 70–99)
GLUCOSE BLDC GLUCOMTR-MCNC: 212 MG/DL (ref 70–99)
HGB BLD-MCNC: 11.5 G/DL (ref 13.3–17.7)
LIPASE SERPL-CCNC: 27 U/L (ref 0–52)
MAGNESIUM SERPL-MCNC: 1.9 MG/DL (ref 1.8–2.6)
POTASSIUM BLD-SCNC: 3.8 MMOL/L (ref 3.5–5)
PROT SERPL-MCNC: 6.3 G/DL (ref 6–8)

## 2023-02-18 PROCEDURE — 84132 ASSAY OF SERUM POTASSIUM: CPT | Performed by: STUDENT IN AN ORGANIZED HEALTH CARE EDUCATION/TRAINING PROGRAM

## 2023-02-18 PROCEDURE — 80076 HEPATIC FUNCTION PANEL: CPT | Performed by: STUDENT IN AN ORGANIZED HEALTH CARE EDUCATION/TRAINING PROGRAM

## 2023-02-18 PROCEDURE — 85018 HEMOGLOBIN: CPT | Performed by: STUDENT IN AN ORGANIZED HEALTH CARE EDUCATION/TRAINING PROGRAM

## 2023-02-18 PROCEDURE — 99233 SBSQ HOSP IP/OBS HIGH 50: CPT | Performed by: STUDENT IN AN ORGANIZED HEALTH CARE EDUCATION/TRAINING PROGRAM

## 2023-02-18 PROCEDURE — 250N000013 HC RX MED GY IP 250 OP 250 PS 637: Performed by: INTERNAL MEDICINE

## 2023-02-18 PROCEDURE — 258N000003 HC RX IP 258 OP 636: Performed by: STUDENT IN AN ORGANIZED HEALTH CARE EDUCATION/TRAINING PROGRAM

## 2023-02-18 PROCEDURE — 250N000013 HC RX MED GY IP 250 OP 250 PS 637: Performed by: STUDENT IN AN ORGANIZED HEALTH CARE EDUCATION/TRAINING PROGRAM

## 2023-02-18 PROCEDURE — 83735 ASSAY OF MAGNESIUM: CPT | Performed by: STUDENT IN AN ORGANIZED HEALTH CARE EDUCATION/TRAINING PROGRAM

## 2023-02-18 PROCEDURE — 250N000011 HC RX IP 250 OP 636: Performed by: STUDENT IN AN ORGANIZED HEALTH CARE EDUCATION/TRAINING PROGRAM

## 2023-02-18 PROCEDURE — C9113 INJ PANTOPRAZOLE SODIUM, VIA: HCPCS | Performed by: INTERNAL MEDICINE

## 2023-02-18 PROCEDURE — 83690 ASSAY OF LIPASE: CPT | Performed by: STUDENT IN AN ORGANIZED HEALTH CARE EDUCATION/TRAINING PROGRAM

## 2023-02-18 PROCEDURE — 36415 COLL VENOUS BLD VENIPUNCTURE: CPT | Performed by: STUDENT IN AN ORGANIZED HEALTH CARE EDUCATION/TRAINING PROGRAM

## 2023-02-18 PROCEDURE — 250N000011 HC RX IP 250 OP 636: Performed by: INTERNAL MEDICINE

## 2023-02-18 PROCEDURE — 120N000001 HC R&B MED SURG/OB

## 2023-02-18 RX ORDER — GABAPENTIN 100 MG/1
200 CAPSULE ORAL 3 TIMES DAILY
Status: DISCONTINUED | OUTPATIENT
Start: 2023-02-18 | End: 2023-02-19 | Stop reason: HOSPADM

## 2023-02-18 RX ADMIN — GABAPENTIN 100 MG: 100 CAPSULE ORAL at 08:27

## 2023-02-18 RX ADMIN — DOXYCYCLINE 100 MG: 100 INJECTION, POWDER, LYOPHILIZED, FOR SOLUTION INTRAVENOUS at 21:14

## 2023-02-18 RX ADMIN — TAMSULOSIN HYDROCHLORIDE 0.4 MG: 0.4 CAPSULE ORAL at 08:27

## 2023-02-18 RX ADMIN — SUCRALFATE 1 G: 1 SUSPENSION ORAL at 16:33

## 2023-02-18 RX ADMIN — PROCHLORPERAZINE EDISYLATE 10 MG: 5 INJECTION INTRAMUSCULAR; INTRAVENOUS at 04:15

## 2023-02-18 RX ADMIN — GABAPENTIN 200 MG: 100 CAPSULE ORAL at 21:14

## 2023-02-18 RX ADMIN — Medication 50 MG: at 08:31

## 2023-02-18 RX ADMIN — INSULIN ASPART 1 UNITS: 100 INJECTION, SOLUTION INTRAVENOUS; SUBCUTANEOUS at 16:33

## 2023-02-18 RX ADMIN — AMLODIPINE BESYLATE 10 MG: 5 TABLET ORAL at 08:27

## 2023-02-18 RX ADMIN — INSULIN ASPART 1 UNITS: 100 INJECTION, SOLUTION INTRAVENOUS; SUBCUTANEOUS at 21:15

## 2023-02-18 RX ADMIN — GABAPENTIN 200 MG: 100 CAPSULE ORAL at 14:09

## 2023-02-18 RX ADMIN — INSULIN ASPART 1 UNITS: 100 INJECTION, SOLUTION INTRAVENOUS; SUBCUTANEOUS at 08:40

## 2023-02-18 RX ADMIN — METOCLOPRAMIDE HYDROCHLORIDE 5 MG: 5 INJECTION INTRAMUSCULAR; INTRAVENOUS at 00:04

## 2023-02-18 RX ADMIN — FLUOXETINE 20 MG: 20 CAPSULE ORAL at 08:27

## 2023-02-18 RX ADMIN — INSULIN ASPART 1 UNITS: 100 INJECTION, SOLUTION INTRAVENOUS; SUBCUTANEOUS at 05:01

## 2023-02-18 RX ADMIN — LISINOPRIL 40 MG: 20 TABLET ORAL at 08:27

## 2023-02-18 RX ADMIN — DOXYCYCLINE 100 MG: 100 INJECTION, POWDER, LYOPHILIZED, FOR SOLUTION INTRAVENOUS at 08:50

## 2023-02-18 RX ADMIN — METOCLOPRAMIDE HYDROCHLORIDE 5 MG: 5 INJECTION INTRAMUSCULAR; INTRAVENOUS at 07:00

## 2023-02-18 RX ADMIN — SUCRALFATE 1 G: 1 SUSPENSION ORAL at 11:21

## 2023-02-18 RX ADMIN — PROCHLORPERAZINE EDISYLATE 10 MG: 5 INJECTION INTRAMUSCULAR; INTRAVENOUS at 21:08

## 2023-02-18 RX ADMIN — GUAIFENESIN 600 MG: 600 TABLET ORAL at 21:16

## 2023-02-18 RX ADMIN — SUCRALFATE 1 G: 1 SUSPENSION ORAL at 21:14

## 2023-02-18 RX ADMIN — SUCRALFATE 1 G: 1 SUSPENSION ORAL at 07:09

## 2023-02-18 RX ADMIN — PANTOPRAZOLE SODIUM 40 MG: 40 INJECTION, POWDER, FOR SOLUTION INTRAVENOUS at 08:27

## 2023-02-18 RX ADMIN — PANTOPRAZOLE SODIUM 40 MG: 40 INJECTION, POWDER, FOR SOLUTION INTRAVENOUS at 21:43

## 2023-02-18 RX ADMIN — SODIUM CHLORIDE: 9 INJECTION, SOLUTION INTRAVENOUS at 14:14

## 2023-02-18 RX ADMIN — GUAIFENESIN 600 MG: 600 TABLET ORAL at 08:27

## 2023-02-18 RX ADMIN — INSULIN ASPART 1 UNITS: 100 INJECTION, SOLUTION INTRAVENOUS; SUBCUTANEOUS at 12:18

## 2023-02-18 RX ADMIN — METOCLOPRAMIDE HYDROCHLORIDE 5 MG: 5 INJECTION INTRAMUSCULAR; INTRAVENOUS at 18:21

## 2023-02-18 RX ADMIN — METOCLOPRAMIDE HYDROCHLORIDE 5 MG: 5 INJECTION INTRAMUSCULAR; INTRAVENOUS at 12:18

## 2023-02-18 RX ADMIN — TRAZODONE HYDROCHLORIDE 100 MG: 50 TABLET ORAL at 21:14

## 2023-02-18 RX ADMIN — THERA TABS 1 TABLET: TAB at 08:27

## 2023-02-18 ASSESSMENT — ACTIVITIES OF DAILY LIVING (ADL)
ADLS_ACUITY_SCORE: 21

## 2023-02-18 NOTE — PLAN OF CARE
Patient vitally stable overnight. Taking reglan and compazine for nausea, had one emesis occurrence at 0415. NaCl 9% running at 50 ml/hr. Patient complained of pain at IV site at beginning of shift in relation to Mg infusion. Warm blanket applied to IV site, patient states that this decreased pain during infusions.  Myriam Lopez RN    Problem: Plan of Care - These are the overarching goals to be used throughout the patient stay.    Goal: Absence of Hospital-Acquired Illness or Injury  Outcome: Progressing  Goal: Optimal Comfort and Wellbeing  Outcome: Progressing  Goal: Readiness for Transition of Care  Outcome: Progressing     Problem: Nausea and Vomiting  Goal: Nausea and Vomiting Relief  Outcome: Progressing     Problem: Pain Acute  Goal: Optimal Pain Control and Function  Outcome: Progressing     Problem: Electrolyte Imbalance  Goal: Electrolyte Imbalance: Plan of Care  Outcome: Progressing   Goal Outcome Evaluation:

## 2023-02-18 NOTE — PROGRESS NOTES
Patient called to report burning and pain sensation to his abdomen. RN noted patients right side of abdomen where capsaicin cream was applied @ 1727. Noted red in color, warm to touch. Areas were washed with cool water and soap. Ice pack applied. Patient now reports improved, skin is pink in color.

## 2023-02-18 NOTE — PLAN OF CARE
Problem: Nausea and Vomiting  Goal: Nausea and Vomiting Relief  Outcome: Progressing     Problem: Electrolyte Imbalance  Goal: Electrolyte Imbalance: Plan of Care  Outcome: Progressing   Goal Outcome Evaluation:    Patient reporting nausea with dry heaving no emesis and intermittent hiccups. Patient with limited oral intake this AM, improved this evening. Received PRN Phenergan IV, scheduled Reglan. Use of sea bands and acupuncture this afternoon. Patient tolerated full liquid diet of applesauce, fruit ices and juice. Patient encouraged to consider advancing diet in the morning if tolerating through the night. Sister at bedside, involved in care.

## 2023-02-18 NOTE — PROGRESS NOTES
"GI PROGRESS NOTE  2/18/2023  Drew Monsivais  1970  [id number]    Subjective:   Patient states feeling well today and tolerating full liquid diet. States appetite and would like to advance his diet today.     Denies any current nausea, vomiting, GERD, or abdominal pain. No stools noted today. Per nursing did have one small emesis overnight.     Still notes slightly labored breathing.    Denies any tremors, tics, or signs or neurologic abnormals since initiation of Reglan.      Objective:     Blood pressure (!) 159/95, pulse 81, temperature 98  F (36.7  C), temperature source Oral, resp. rate 16, height 1.727 m (5' 8\"), weight 72.2 kg (159 lb 2.8 oz), SpO2 96 %.    Body mass index is 24.2 kg/m .  Gen: NAD, breathing still seems slightly labored  GI: Non-distended, BS positive, soft, non-tender    Laboratory:    [facility]  Recent Labs   Lab 02/18/23  0446 02/18/23  0016 02/17/23 2011 02/17/23  1727 02/17/23  1307 02/16/23  1614 02/16/23  1450 02/16/23  1211 02/16/23  0817 02/15/23  0756 02/15/23  0641 02/14/23  1518 02/14/23  0802   NA  --   --   --   --  136  --  139  --   --   --  138  --  138   POTASSIUM  --   --   --   --  4.0  --  4.3  --  4.3  --  4.9  4.9  --  4.6   CHLORIDE  --   --   --   --  103  --  107  --   --   --  103  --  97*   LEISA  --   --   --   --  8.8  --  9.1  --   --   --   --   --  10.5   CO2  --   --   --   --  25  --  25  --   --   --  22  --  20*   BUN  --   --   --   --  13  --  15  --   --   --   --   --  20   CR  --   --   --   --  0.86  --  1.07  --   --   --   --   --  1.49*   * 120* 149*   < > 138*   < > 186*   < >  --    < >  --    < > 271*    < > = values in this interval not displayed.     CBC  Recent Labs   Lab 02/18/23  0914 02/17/23  1307 02/15/23  1240 02/14/23  0802   WBC  --  8.9 12.9* 16.2*   RBC  --  3.89* 3.87* 4.81   HGB 11.5* 11.2* 11.0* 13.5   HCT  --  31.3* 32.1* 37.4*   MCV  --  81 83 78   MCH  --  28.8 28.4 28.1   MCHC  --  35.8 34.3 36.1   RDW  --  " 12.2 12.5 12.3   PLT  --  231 239 306     INR  Recent Labs   Lab 02/14/23  0802   INR 0.96      LFTs  Recent Labs   Lab Test 02/15/23  0641 02/14/23  1523 02/14/23  0802 01/03/23  0955 01/03/23  0733 12/28/22  0927 12/27/22  1233 09/12/22  1106 08/05/22  1117   ALBUMIN 3.5  --  4.5  --  4.8 4.0 4.3   < >  --    BILITOTAL  --   --  0.8  --  1.5* 1.2* 0.7   < >  --    ALT  --   --  10  --  19 16 20   < >  --    AST  --   --  10  --  18 18 19   < >  --    PROTEIN  --  20*  --  20*  --   --   --   --  Negative   LIPASE  --   --  233*  --  172*  --  582*  --   --     < > = values in this interval not displayed.     Imaging:  EXAM: US ABDOMEN LIMITED  LOCATION: [facility]  DATE/TIME: 2/14/2023 11:54 AM     INDICATION: pancreatitis  COMPARISON: CT chest, abdomen, and pelvis from earlier today  TECHNIQUE: Limited abdominal ultrasound.     FINDINGS:     GALLBLADDER: Normal. No gallstones, wall thickening, or pericholecystic fluid. Negative sonographic Tse's sign.     BILE DUCTS: No biliary dilatation. The common duct measures 2 mm.     LIVER: Increased echogenicity from diffuse fatty infiltration. No focal mass.     RIGHT KIDNEY: No hydronephrosis.     PANCREAS: The visualized portions are normal.     No ascites                               IMPRESSION:  Hepatic steatosis.     EXAM: CT CHEST/ABDOMEN/PELVIS W CONTRAST  LOCATION: [facility]  DATE/TIME: 2/14/2023 9:24 AM     IMPRESSION:  1.  Mild wall thickening in the esophagus can be seen with esophagitis.  2.  There are a few patchy groundglass nodular opacities in the right upper and lower lobes suggestive of infectious or inflammatory bronchiolitis. Recommend follow-up in 3 months to ensure resolution.  3.  Otherwise, no CT evidence for source of symptoms.  4.  Nephrolithiasis.  5.  Hepatic steatosis.     Procedures:  EGD 2/15/23:  Findings:        Excessive phlegm and inflammed larynx.        LA Grade D (one or more  mucosal breaks involving at least 75% of        esophageal circumference) esophagitis with no bleeding was extending        between 30 to 40 cm from the incisors (to the GEJ). Biopsies were taken        with a cold forceps for histology.        The entire examined stomach was normal. Biopsies were taken with a cold        forceps for histology.        A single 5 mm mucosal nodule over the posterior wall of the duodenal        bulb. Biopsies were taken with a cold forceps for histology.        The examined duodenum was normal otherwise. Biopsies were taken with a        cold forceps for histology.                                                                                     Moderate Sedation:        See Anesthesia Note   Impression:            - Excessive phlegm and findings suggestive of                          respiratory infection.                          - Erosive esophagitis, likely the source of bleeding.                          Biopsied. This is likely due to excessive vomiting.                          - Normal stomach. Biopsied.                          - Mucosal nodule found in the duodenum. Biopsied.                          - Normal examined duodenum. Biopsied.     Recommendation:        - Evaluate for respiratory infection.                          - Await pathology results.                          - Repeat upper endoscopy in 3 months to evaluate the                          response to therapy. We will arrange for it.                          - [facility] care.                          - Resume previous diet.                          - Continue present medications.                          - High dose PPI, pantoprazole 40 mg IV BID.                          - Sucralfate 1 gram QID for one month.                          - Await pathology results- results LISTED Below      A) DUODENUM, BIOPSY:  -DUODENAL MUCOSA WITHOUT DIAGNOSTIC ABNORMALITY    B) STOMACH,  BIOPSY:  -ANTRAL AND ANTRAL FUNDIC TRANSITIONAL ZONE MUCOSA WITHOUT DIAGNOSTIC ABNORMALITY  -NO INTESTINAL METAPLASIA  -NO HELICOBACTER PYLORI    C) ESOPHAGUS, BIOPSY:   -SEVERE ACTIVE ESOPHAGITIS WITH ULCERATION  -NEGATIVE FOR VIRAL CYTOPATHIC EFFECT    D) DUODENUM, NODULE, BIOPSY:  -POLYPOID DUODENAL MUCOSA CONSISTENT WITH CHRONIC PEPTIC TYPE DUODENITIS     Assessment:   1. Erosive esophagitis  2. Nausea and Vomiting  52 year old male with chronic nausea and vomiting, recent diagnosis of acute pancreatitis (12/2022) who presents with ongoing nausea and vomiting. CT scan and US unremarkable for cause.   - EGD showed excessive phlegm, erosive esophagitis. Biopsies of the esophagus shows erosive esophagitis, negative for underlying virus. Duodenal bx c/w peptic injury. Gastric bx normal.   - Currently on IV PPI, scheduled sucralfate and Reglan and PRN antiemetics.  - Spoke in depth with patient and significant other at baseline about outpatient instructions such as watching for side effects of Reglan, continuing oral BID PPI, outpatient EGD, and advancing diet slowly. Also spoke about stopping marijuana.   - Patient would like to discharge today, spoke about requirements for discharge including tolerating diet.   - No further hematemesis, Hgb continues to remain stable.     3. Respiratory Infection  EGD showed excessive phlegm and laryngeal inflammation c/w respiratory infection. This is likely exacerbating symptoms. Sputum culture with 2+ normal mark. COVID, strep, flu negative in the ED.    - Doxycycline ordered per primary service     Plan:   1. Marijuana cessation  2. Pain control and supportive cares per primary  3. If patient is tolerating diet okay to change to oral PPI BID, continue on this until outpatient EGD  4. Sucralfate and Reglan as ordered  5. Antiemetics PRN  6. Advance diet as tolerated, recommend low fat or more bland diet to start and advancing slowly.  7. Follow up EGD in 12 weeks, ordered  through [facility].   8. GI will continue to follow while patient is here, please call with questions and concerns.    Discussed with Dr. Betty Sandy, CNP  Thank you for the opportunity to participate in the care of this patient.   Please feel free to call me with any questions or concerns.  Phone number [phone].

## 2023-02-18 NOTE — PLAN OF CARE
Patient states he is feeling better today than he did yesterday. States he has no pain and no nausea. Requesting to try a regular diet. Patient tolerated a breakfast of full liquid. Patient with hiccups and moments of dry heaving. Patient declines medication for nausea. This RN encouraged patient to practice incentive spirometer exercises as well as ambulation while in the hospital. Patient verbalized understanding.     Problem: Nausea and Vomiting  Goal: Nausea and Vomiting Relief  Outcome: Progressing  Intervention: Prevent and Manage Nausea and Vomiting  Flowsheets  Taken 2/18/2023 1052  Fluid/Electrolyte Management: fluids provided  Nausea/Vomiting Interventions:   stimuli minimized   sips of clear liquids given  Environmental Support: calm environment promoted  Taken 2/18/2023 4688  Nausea/Vomiting Interventions: stimuli minimized

## 2023-02-18 NOTE — PROGRESS NOTES
[facility]    Medicine Progress Note - Hospitalist Service    Date of Admission:  2/14/2023     Assessment & Plan   52-year-old gentleman with history of diabetes mellitus type 2, previous episodes of intractable nausea with emesis, notable marijuana use, previous episodes of acute pancreatitis, hypertension, hyperlipidemia, admitted to the hospital on 2/14/2023 with presentation of nausea, emesis and diarrhea.  Additionally found with acute on chronic kidney injury, creatinine 1.49 admission, also noted with elevated pressures and hypertensive urgency. GI has been following. Loose stools has not recurred, no stool panel nor h.pylori obtained.    Status post EGD 2/15 with biopsies demonstrating LA grade D esophagitis with no bleeding and intraprocedural findings of excessive phlegm suggestive of respiratory infection, erosive esophagitis felt to be source of bleeding given reported hematemesis, and pending biopsies.  Ongoing significant nausea, working on diet advancement now full liquid diet.  Advancing as tolerated.  Initiating short 5-day empiric course of doxycycline twice daily 2/17 for potential respiratory infection.      Hypothesis of possibly cannabis induced hyperemesis, starting capsaicin cream, and gabapentin prn for hiccups.  Acupuncture following for alternative treatments. Will need follow-up endoscopy in 3 months.    Discussed TPN 2/18/2023 -- will not need as his appetite is finally starting to turn around! He feels ~50% better and GI advancing diet (adat); likely discharge 1-3 days, as early as tomorrow vs early next week.    Problem List:   Intractable nausea and vomiting, transient hematemesis   EGD findings of 'excessive phlegm'  (+) Cannabinoids in urine drug screen, ?Cannabinoid hyperemesis syndrome  - as directly above in summary.   - continue GI recommendations of high-dose PPI, and sucralfate Q.I.D., reglan q6hrs, and to follow-up repeat upper endoscopy in 3  months.  GI will arrange for follow-up.  - ADAT and accordingly DECREASE MIVFs to 50ccs/hr on 2/17  -Continue IV fluid for now until tolerating oral feeds fully.   -Monitor and replete electrolytes as needed  -Respiratory work-up, negative   - given EGD findings though   - continue doxycycline 100mg BID IV x5 days empiric course (transition to po when po)  - stop capsaicin cream tid prn - did not help and had transient skin reaction that  - will discuss TPN on 2/18 after trying capsaicin  -recommended cessation of marijuana   -will need GI follow-up, consider functional component if workup and biopsy negative ... would ?metabolic rheumatologic component?  - today would have started tpn but now finally turning around      MARY JANE on CKDIII  - Etiology prerenal. In the setting of nausea and vomiting.  -Creatinine on admit 1.49: Baseline documented as 1.1  - on 2/17 normalized Cr  - avoid nephrotoxins/hypotension  -Renally dose medications      Essential hypertension  Hypertensive urgency  -Urgency improved.  However BP remains labile.   -Continue home amlodipine.    - will resume lisinopril now that MARY JANE has resolved, 2/17/2023   - Start PRN hydralazine with parameters - sbp > 200 on two checks or elevated pressures with associated headaches or vision changes      Dyslipidemia  -Documented by history  -Check lipid panel --?  Elevated triglycerides 245  -Follow-up as an outpatient     Diabetes mellitus type 2  -Not on insulin at home.  -Last A1c 8.9% on 12/27/22.     -Accu-Cheks AC/at bedtime  -Carb controlled diet  -We will do sliding scale insulin for now.  Add prandial/basal insulin depending on trend.     -There is no clear trend yet, as his p.o. intake has been extremely labile     Depression  Insomnia  -Continue home medications     Hiccups  A/p- has been intermittently coming and going over the past several days; discussed evidence-based studies suggestive of potential therapeutic value and trial of gabapentin 100 mg  3 times daily until hiccups resolve  - 2/18 increase dose to 200mg tid       Diet: Full Liquid Diet  Snacks/Supplements Adult: Ensure Enlive; With Meals    DVT Prophylaxis: Pneumatic Compression Devices and promote ambulation   Still Catheter: Not present  Lines: None     Cardiac Monitoring: None  Code Status: Full Code      Clinically Significant Risk Factors             # Hypomagnesemia: Lowest Mg = 1.5 mg/dL in last 2 days, will replace as needed            # DMII: A1C = 8.9 % (Ref range: <5.7 %) within past 3 months, PRESENT ON ADMISSION   # Severe Malnutrition: based on nutrition assessment, PRESENT ON ADMISSION       Disposition Plan     Expected Discharge Date: 02/18/2023      Destination: home            Abdias Bowling MD  Hospitalist Service  [facility]  Securely message with [employer] (more info)  Text page via [employer] Paging/Directory   ______________________________________________________________________    Interval History   - no acute interval events  - pt feels 'better than yesterday' and his sister Enid concurs  - he is having increased appetite, feels about 50% better  - he is hoping to discharge soon; discussed trying diet advancement today  - also discussed marijuana cessation as well as his doxycycline and stopping capsaicin   - his sister Enid is here and answered all of her questions and patient's questions to verbalized satisfaction    Physical Exam   Vital Signs: Temp: 98  F (36.7  C) Temp src: Oral BP: (!) 159/95 Pulse: 81   Resp: 16 SpO2: 96 % O2 Device: None (Room air)    Weight: 159 lbs 2.75 oz    General: alert, oriented, and in no acute distress  Pulmonary: clear to auscultation bilaterally, normal respiratory effort, on room air, no rales or wheezes or evidence of accessory muscle use   CVS: regular rate and rhythm, no murmurs, rubs, or gallops; no blatant jugular venous distention; no extremity edema and extremities are warm to the touch  GI: soft,  nontender, BS+, no rebound or guarding, no conspicuous organomegaly   Neuro: nonfocal, moves all extremities of own volition, slightly tremulous  Psych: appropriate, anxious appearing    Medical Decision Making       52 MINUTES SPENT BY ME on the date of service doing chart review, history, exam, documentation & further activities per the note.      Data     I have personally reviewed the following data over the past 24 hrs:    8.9  \   11.2 (L)   / 231     136 103 13 /  156 (H)   4.0 25 0.86 \       Imaging results reviewed over the past 24 hrs:   No results found for this or any previous visit (from the past 24 hour(s)).

## 2023-02-19 VITALS
TEMPERATURE: 98.9 F | BODY MASS INDEX: 24.12 KG/M2 | DIASTOLIC BLOOD PRESSURE: 88 MMHG | SYSTOLIC BLOOD PRESSURE: 153 MMHG | OXYGEN SATURATION: 96 % | HEART RATE: 88 BPM | HEIGHT: 68 IN | RESPIRATION RATE: 18 BRPM | WEIGHT: 159.17 LBS

## 2023-02-19 LAB
BACTERIA BLD CULT: NO GROWTH
BACTERIA BLD CULT: NO GROWTH
GLUCOSE BLDC GLUCOMTR-MCNC: 139 MG/DL (ref 70–99)
GLUCOSE BLDC GLUCOMTR-MCNC: 161 MG/DL (ref 70–99)
GLUCOSE BLDC GLUCOMTR-MCNC: 187 MG/DL (ref 70–99)

## 2023-02-19 PROCEDURE — 99239 HOSP IP/OBS DSCHRG MGMT >30: CPT | Performed by: STUDENT IN AN ORGANIZED HEALTH CARE EDUCATION/TRAINING PROGRAM

## 2023-02-19 PROCEDURE — 250N000013 HC RX MED GY IP 250 OP 250 PS 637: Performed by: INTERNAL MEDICINE

## 2023-02-19 PROCEDURE — C9113 INJ PANTOPRAZOLE SODIUM, VIA: HCPCS | Performed by: INTERNAL MEDICINE

## 2023-02-19 PROCEDURE — 250N000011 HC RX IP 250 OP 636: Performed by: STUDENT IN AN ORGANIZED HEALTH CARE EDUCATION/TRAINING PROGRAM

## 2023-02-19 PROCEDURE — 250N000011 HC RX IP 250 OP 636: Performed by: INTERNAL MEDICINE

## 2023-02-19 PROCEDURE — 250N000013 HC RX MED GY IP 250 OP 250 PS 637: Performed by: STUDENT IN AN ORGANIZED HEALTH CARE EDUCATION/TRAINING PROGRAM

## 2023-02-19 RX ORDER — HYDROXYZINE HYDROCHLORIDE 25 MG/1
50 TABLET, FILM COATED ORAL EVERY 6 HOURS PRN
Status: DISCONTINUED | OUTPATIENT
Start: 2023-02-19 | End: 2023-02-19 | Stop reason: HOSPADM

## 2023-02-19 RX ORDER — PROCHLORPERAZINE MALEATE 10 MG
10 TABLET ORAL EVERY 6 HOURS PRN
Qty: 30 TABLET | Refills: 0 | Status: SHIPPED | OUTPATIENT
Start: 2023-02-19 | End: 2024-05-12

## 2023-02-19 RX ORDER — PANTOPRAZOLE SODIUM 40 MG/1
40 TABLET, DELAYED RELEASE ORAL 2 TIMES DAILY
Qty: 60 TABLET | Refills: 0 | Status: SHIPPED | OUTPATIENT
Start: 2023-02-19 | End: 2024-05-12

## 2023-02-19 RX ORDER — SUCRALFATE ORAL 1 G/10ML
1 SUSPENSION ORAL
Qty: 1200 ML | Refills: 0 | Status: SHIPPED | OUTPATIENT
Start: 2023-02-19 | End: 2023-03-21

## 2023-02-19 RX ORDER — DOXYCYCLINE 100 MG/1
100 CAPSULE ORAL 2 TIMES DAILY
Qty: 5 CAPSULE | Refills: 0 | Status: SHIPPED | OUTPATIENT
Start: 2023-02-19 | End: 2023-02-21

## 2023-02-19 RX ORDER — HYDROXYZINE HYDROCHLORIDE 25 MG/1
25 TABLET, FILM COATED ORAL EVERY 6 HOURS PRN
Status: DISCONTINUED | OUTPATIENT
Start: 2023-02-19 | End: 2023-02-19 | Stop reason: HOSPADM

## 2023-02-19 RX ADMIN — Medication 50 MG: at 08:08

## 2023-02-19 RX ADMIN — AMLODIPINE BESYLATE 10 MG: 5 TABLET ORAL at 08:07

## 2023-02-19 RX ADMIN — HYDROXYZINE HYDROCHLORIDE 25 MG: 25 TABLET, FILM COATED ORAL at 11:45

## 2023-02-19 RX ADMIN — THERA TABS 1 TABLET: TAB at 08:07

## 2023-02-19 RX ADMIN — PANTOPRAZOLE SODIUM 40 MG: 40 INJECTION, POWDER, FOR SOLUTION INTRAVENOUS at 08:08

## 2023-02-19 RX ADMIN — METOCLOPRAMIDE HYDROCHLORIDE 5 MG: 5 INJECTION INTRAMUSCULAR; INTRAVENOUS at 11:45

## 2023-02-19 RX ADMIN — PROCHLORPERAZINE EDISYLATE 10 MG: 5 INJECTION INTRAMUSCULAR; INTRAVENOUS at 05:14

## 2023-02-19 RX ADMIN — GABAPENTIN 200 MG: 100 CAPSULE ORAL at 08:07

## 2023-02-19 RX ADMIN — INSULIN ASPART 1 UNITS: 100 INJECTION, SOLUTION INTRAVENOUS; SUBCUTANEOUS at 08:08

## 2023-02-19 RX ADMIN — GUAIFENESIN 600 MG: 600 TABLET ORAL at 08:07

## 2023-02-19 RX ADMIN — METOCLOPRAMIDE HYDROCHLORIDE 5 MG: 5 INJECTION INTRAMUSCULAR; INTRAVENOUS at 06:58

## 2023-02-19 RX ADMIN — TAMSULOSIN HYDROCHLORIDE 0.4 MG: 0.4 CAPSULE ORAL at 08:07

## 2023-02-19 RX ADMIN — FLUOXETINE 20 MG: 20 CAPSULE ORAL at 08:07

## 2023-02-19 RX ADMIN — INSULIN ASPART 1 UNITS: 100 INJECTION, SOLUTION INTRAVENOUS; SUBCUTANEOUS at 04:37

## 2023-02-19 RX ADMIN — METOCLOPRAMIDE HYDROCHLORIDE 5 MG: 5 INJECTION INTRAMUSCULAR; INTRAVENOUS at 00:54

## 2023-02-19 RX ADMIN — LISINOPRIL 40 MG: 20 TABLET ORAL at 08:07

## 2023-02-19 RX ADMIN — SUCRALFATE 1 G: 1 SUSPENSION ORAL at 11:45

## 2023-02-19 RX ADMIN — DOXYCYCLINE 100 MG: 100 INJECTION, POWDER, LYOPHILIZED, FOR SOLUTION INTRAVENOUS at 08:12

## 2023-02-19 ASSESSMENT — ACTIVITIES OF DAILY LIVING (ADL)
ADLS_ACUITY_SCORE: 21

## 2023-02-19 NOTE — PROGRESS NOTES
"GI PROGRESS NOTE  2/19/2023  Drew Monsivais  1970  [id number]    Subjective:   Patient states feeling well today and tolerating low fat diet. States strong appetite today. In conversation noted to have slight symptoms with dry chicken but when sticking to nutritional beverage, soft/liquid foods and eating slow he is doing well.     Denies any current nausea, GERD, hematemesis, esophageal pain, dysphagia, or abdominal pain. No stools noted today.    Patient does admit to two small emesis episodes overnight. States both times due to coughing fit. Also was sleeping fairly flat, when sitting up for meals and after meals tolerating diet well. Ate breakfast this morning with no nausea or vomiting.     Denies any tremors, tics, or signs or neurologic abnormals since initiation of Reglan.     His sister who is present at bedside confirmed above and does state setting him up next week with PCP appointment and nutritionist visit.      Objective:     Blood pressure (!) 153/88, pulse 88, temperature 98.9  F (37.2  C), temperature source Oral, resp. rate 18, height 1.727 m (5' 8\"), weight 72.2 kg (159 lb 2.8 oz), SpO2 96 %.    Body mass index is 24.2 kg/m .  Gen: NAD  GI: Non-distended, BS positive, soft, non-tender    Laboratory:    [facility]  Recent Labs   Lab 02/19/23  0755 02/19/23  0430 02/19/23  0036 02/18/23  1213 02/18/23  0914 02/17/23  1727 02/17/23  1307 02/16/23  1614 02/16/23  1450 02/15/23  0756 02/15/23  0641 02/14/23  1518 02/14/23  0802   NA  --   --   --   --   --   --  136  --  139  --  138  --  138   POTASSIUM  --   --   --   --  3.8  --  4.0  --  4.3   < > 4.9  4.9  --  4.6   CHLORIDE  --   --   --   --   --   --  103  --  107  --  103  --  97*   LEISA  --   --   --   --   --   --  8.8  --  9.1  --   --   --  10.5   CO2  --   --   --   --   --   --  25  --  25  --  22  --  20*   BUN  --   --   --   --   --   --  13  --  15  --   --   --  20   CR  --   --   --   --   --   --  0.86  --  1.07  --   --  "  --  1.49*   * 161* 139*   < >  --    < > 138*   < > 186*   < >  --    < > 271*    < > = values in this interval not displayed.     CBC  Recent Labs   Lab 02/18/23  0914 02/17/23  1307 02/15/23  1240 02/14/23  0802   WBC  --  8.9 12.9* 16.2*   RBC  --  3.89* 3.87* 4.81   HGB 11.5* 11.2* 11.0* 13.5   HCT  --  31.3* 32.1* 37.4*   MCV  --  81 83 78   MCH  --  28.8 28.4 28.1   MCHC  --  35.8 34.3 36.1   RDW  --  12.2 12.5 12.3   PLT  --  231 239 306     INR  Recent Labs   Lab 02/14/23  0802   INR 0.96      LFTs  Recent Labs   Lab Test 02/18/23  0914 02/15/23  0641 02/14/23  1523 02/14/23  0802 01/03/23  0955 01/03/23  0733 09/12/22  1106 08/05/22  1117   ALBUMIN 3.2* 3.5  --  4.5  --  4.8   < >  --    BILITOTAL 0.9  --   --  0.8  --  1.5*   < >  --    ALT 10  --   --  10  --  19   < >  --    AST 12  --   --  10  --  18   < >  --    PROTEIN  --   --  20*  --  20*  --   --  Negative   LIPASE 27  --   --  233*  --  172*   < >  --     < > = values in this interval not displayed.     Imaging:  EXAM: US ABDOMEN LIMITED  LOCATION: [facility]  DATE/TIME: 2/14/2023 11:54 AM     INDICATION: pancreatitis  COMPARISON: CT chest, abdomen, and pelvis from earlier today  TECHNIQUE: Limited abdominal ultrasound.     FINDINGS:     GALLBLADDER: Normal. No gallstones, wall thickening, or pericholecystic fluid. Negative sonographic Tse's sign.     BILE DUCTS: No biliary dilatation. The common duct measures 2 mm.     LIVER: Increased echogenicity from diffuse fatty infiltration. No focal mass.     RIGHT KIDNEY: No hydronephrosis.     PANCREAS: The visualized portions are normal.     No ascites                               IMPRESSION:  Hepatic steatosis.     EXAM: CT CHEST/ABDOMEN/PELVIS W CONTRAST  LOCATION: [facility]  DATE/TIME: 2/14/2023 9:24 AM     IMPRESSION:  1.  Mild wall thickening in the esophagus can be seen with esophagitis.  2.  There are a few patchy groundglass  nodular opacities in the right upper and lower lobes suggestive of infectious or inflammatory bronchiolitis. Recommend follow-up in 3 months to ensure resolution.  3.  Otherwise, no CT evidence for source of symptoms.  4.  Nephrolithiasis.  5.  Hepatic steatosis.     Procedures:  EGD 2/15/23:  Findings:        Excessive phlegm and inflammed larynx.        LA Grade D (one or more mucosal breaks involving at least 75% of        esophageal circumference) esophagitis with no bleeding was extending        between 30 to 40 cm from the incisors (to the GEJ). Biopsies were taken        with a cold forceps for histology.        The entire examined stomach was normal. Biopsies were taken with a cold        forceps for histology.        A single 5 mm mucosal nodule over the posterior wall of the duodenal        bulb. Biopsies were taken with a cold forceps for histology.        The examined duodenum was normal otherwise. Biopsies were taken with a        cold forceps for histology.                                                                                     Moderate Sedation:        See Anesthesia Note   Impression:            - Excessive phlegm and findings suggestive of                          respiratory infection.                          - Erosive esophagitis, likely the source of bleeding.                          Biopsied. This is likely due to excessive vomiting.                          - Normal stomach. Biopsied.                          - Mucosal nodule found in the duodenum. Biopsied.                          - Normal examined duodenum. Biopsied.     Recommendation:        - Evaluate for respiratory infection.                          - Await pathology results.                          - Repeat upper endoscopy in 3 months to evaluate the                          response to therapy. We will arrange for it.                          - [facility] to [facility] for [facility] care.                           - Resume previous diet.                          - Continue present medications.                          - High dose PPI, pantoprazole 40 mg IV BID.                          - Sucralfate 1 gram QID for one month.                          - Await pathology results- results LISTED Below      A) DUODENUM, BIOPSY:  -DUODENAL MUCOSA WITHOUT DIAGNOSTIC ABNORMALITY    B) STOMACH, BIOPSY:  -ANTRAL AND ANTRAL FUNDIC TRANSITIONAL ZONE MUCOSA WITHOUT DIAGNOSTIC ABNORMALITY  -NO INTESTINAL METAPLASIA  -NO HELICOBACTER PYLORI    C) ESOPHAGUS, BIOPSY:   -SEVERE ACTIVE ESOPHAGITIS WITH ULCERATION  -NEGATIVE FOR VIRAL CYTOPATHIC EFFECT    D) DUODENUM, NODULE, BIOPSY:  -POLYPOID DUODENAL MUCOSA CONSISTENT WITH CHRONIC PEPTIC TYPE DUODENITIS     Assessment:   1. Erosive esophagitis  2. Nausea and Vomiting  52 year old male with chronic nausea and vomiting, recent diagnosis of acute pancreatitis (12/2022) who presents with ongoing nausea and vomiting. CT scan and US unremarkable for cause.   - EGD showed excessive phlegm, erosive esophagitis. Biopsies of the esophagus shows erosive esophagitis, negative for underlying virus. Duodenal bx c/w peptic injury. Gastric bx normal.   - Currently on BID oral PPI, scheduled sucralfate and Reglan and PRN antiemetics.  - Spoke in depth with patient and sister at baseline about outpatient instructions such as watching for side effects of Reglan, continuing oral BID PPI, outpatient EGD, and advancing diet slowly. Also spoke about stopping marijuana as well as when to present back to ED and all questions answered.   - No further hematemesis, Hgb continues to remain stable.     3. Respiratory Infection  EGD showed excessive phlegm and laryngeal inflammation c/w respiratory infection. This is likely exacerbating symptoms. Sputum culture with 2+ normal mark. COVID, strep, flu negative in the ED.    - Doxycycline ordered per primary service  - Patient expresses that emesis now feels due to coughing  fits.      Plan:   1. Marijuana cessation  2. Pain control and supportive cares per primary  3. Oral PPI BID, continue on this until outpatient EGD  4. Sucralfate and Reglan as ordered  5. Antiemetics PRN  6. Regular diet as tolerated  7. Follow up EGD in 12 weeks, ordered through [facility] and patient aware.   8. GI will sign off at this time, please call with questions and concerns.    Discussed with Dr. Betty Sandy, CNP  Thank you for the opportunity to participate in the care of this patient.   Please feel free to call me with any questions or concerns.  Phone number [phone].

## 2023-02-19 NOTE — DISCHARGE SUMMARY
[facility]  Hospitalist Discharge Summary      Date of Admission:  2/14/2023  Date of Discharge:  2/19/2023  Discharging Provider: Abdias Bowling MD  Discharge Service: Hospitalist Service    Discharge Diagnoses   Intractable nausea with emesis, 1 episode of hematemesis  Erosive esophagitis, reflux   Excessive phlegm noted on EGD suggestive of respiratory infection/suspected recovering (CA) pneumonia  Suspected marijuana induced emesis - cannabinoid hyperemesis syndrome  Otherwise, cyclic vomiting syndrome     Follow-ups Needed After Discharge   Follow-up Appointments     Follow-up and recommended labs and tests       Follow up with primary care provider, Haroon Farmer, within 7 days for   hospital follow- up.               Unresulted Labs Ordered in the Past 30 Days of this Admission     Date and Time Order Name Status Description    2/14/2023 10:50 AM Blood Culture Peripheral Blood Preliminary     2/14/2023 10:50 AM Blood Culture Peripheral Blood Preliminary       These results will be followed up by [facility]     Discharge Disposition   Discharged to home  Condition at discharge: [facility]     Hospital Course      52-year-old gentleman with history of diabetes mellitus type 2, previous episodes of intractable nausea with emesis, notable marijuana use, previous episodes of acute pancreatitis, hypertension, hyperlipidemia, admitted to the hospital on 2/14/2023 with presentation of nausea, emesis and diarrhea.  Additionally found with acute on chronic kidney injury, creatinine 1.49 admission, also noted with elevated pressures and hypertensive urgency. GI followed. Loose stools did not recur, no stool panel nor h.pylori stool obtained.    Status post EGD 2/15 with biopsies demonstrating LA grade D esophagitis with no bleeding and intraprocedural findings of excessive phlegm suggestive of respiratory infection, erosive esophagitis felt to be source of bleeding given reported hematemesis. He had  labile nausea then given egd findings was started on a short 5-day empiric course of doxycycline twice daily 2/17 for potential recovering respiratory infection.          Patient clinically improved, with his appetite returning as well as p.o. intake, tolerating multiple meals and GI cleared for discharge on 2/19.  Patient was recommended to follow-up with PCP within a week for a posthospitalization visit and to consider rechecking basic labs.  He was given 5 more doses of the empiric doxycycline.      There was a hypothesis of possibly cannabis-induced hyperemesis syndrome; he was strongly encouraged to abstain from current marijuana use and also check his sources.  New medications include PPI escalated to a twice-daily regimen as well as sucralfate with mealtimes and nighttime, and as-needed Reglan, and recommendation to have repeat EGD in 3 months w/ GI follow-up.  His creatinine normalized on 2/17.  He also had transient hiccups during hospitalization.  Also recommend following up on his anxiety.     Consultations This Hospital Stay   GASTROENTEROLOGY IP CONSULT  CARE MANAGEMENT / SOCIAL WORK IP CONSULT  ACUPUNCTURE IP CONSULT  ACUPUNCTURE IP CONSULT    Code Status   Full Code    Time Spent on this Encounter   I, Abdias Bowling MD, personally saw the patient today and spent greater than 30 minutes discharging this patient.       Abdias Bowling MD  [address] [address]  Phone: [phone]  Fax: [phone]  ______________________________________________________________________    Physical Exam   Vital Signs: Temp: 98.9  F (37.2  C) Temp src: Oral BP: (!) 153/88 Pulse: 88   Resp: 18 SpO2: 96 % O2 Device: None (Room air)    Weight: 159 lbs 2.75 oz     General: alert, oriented, and in no acute distress  Pulmonary: clear to auscultation bilaterally, normal respiratory effort, on room air, no rales or wheezes or evidence of accessory muscle  use   CVS: regular rate and rhythm, no murmurs, rubs, or gallops; no blatant jugular venous distention; no extremity edema and extremities are warm to the touch  GI: soft, nontender, BS+, no rebound or guarding, no conspicuous organomegaly   Neuro: nonfocal, moves all extremities of own volition, slightly tremulous  Psych: appropriate, anxious appearing          Primary Care Physician   Haroon Farmer    Discharge Orders      Medication Therapy Management Referral      Reason for your hospital stay    Cyclical Vomiting Syndrome and some acid reflux and recovering pneumonia     Follow-up and recommended labs and tests     Follow up with primary care provider, Haroon Farmer, within 7 days for hospital follow- up.     Activity    Your activity upon discharge: activity as tolerated     Diet    Follow this diet upon discharge: Orders Placed This Encounter      Snacks/Supplements Adult: Ensure Enlive; With Meals      Low Fat Diet       Significant Results and Procedures   Results for orders placed or performed during the hospital encounter of 02/14/23   CT Chest/Abdomen/Pelvis w Contrast    Narrative    EXAM: CT CHEST/ABDOMEN/PELVIS W CONTRAST  LOCATION: [facility]  DATE/TIME: 2/14/2023 9:24 AM    INDICATION: vomiting, hematemesis  COMPARISON: 12/27/2022  TECHNIQUE: CT scan of the chest, abdomen, and pelvis was performed following injection of IV contrast. Multiplanar reformats were obtained. Dose reduction techniques were used.   CONTRAST: Isovue 370 75mL     FINDINGS:   LUNGS AND PLEURA: There are a few patchy groundglass nodular opacities in the right upper and lower lobes. No effusions. Small volume airway secretions.    MEDIASTINUM/AXILLAE: Heart size is normal. No adenopathy. Normal caliber aorta. Mild wall thickening esophagus. No pneumomediastinum.    CORONARY ARTERY CALCIFICATION: Mild.    HEPATOBILIARY: Hepatic steatosis.    PANCREAS: Normal.    SPLEEN: Normal.    ADRENAL GLANDS:  Normal.    KIDNEYS/BLADDER: Nonobstructing bilateral renal calculi. No hydronephrosis.    BOWEL: No obstruction or inflammatory change. Normal appendix.    LYMPH NODES: Normal.    VASCULATURE: Moderate atherosclerosis.    PELVIC ORGANS: Normal.    MUSCULOSKELETAL: Degenerative changes of the spine.      Impression    IMPRESSION:  1.  Mild wall thickening in the esophagus can be seen with esophagitis.  2.  There are a few patchy groundglass nodular opacities in the right upper and lower lobes suggestive of infectious or inflammatory bronchiolitis. Recommend follow-up in 3 months to ensure resolution.  3.  Otherwise, no CT evidence for source of symptoms.  4.  Nephrolithiasis.  5.  Hepatic steatosis.   US Abdomen Limited    Narrative    EXAM: US ABDOMEN LIMITED  LOCATION: [facility]  DATE/TIME: 2/14/2023 11:54 AM    INDICATION: pancreatitis  COMPARISON: CT chest, abdomen, and pelvis from earlier today  TECHNIQUE: Limited abdominal ultrasound.    FINDINGS:    GALLBLADDER: Normal. No gallstones, wall thickening, or pericholecystic fluid. Negative sonographic Tse's sign.    BILE DUCTS: No biliary dilatation. The common duct measures 2 mm.    LIVER: Increased echogenicity from diffuse fatty infiltration. No focal mass.    RIGHT KIDNEY: No hydronephrosis.    PANCREAS: The visualized portions are normal.    No ascites.      Impression    IMPRESSION:  Hepatic steatosis.       XR Chest Port 1 View    Narrative    EXAM: XR CHEST PORT 1 VIEW  LOCATION: [facility]  DATE/TIME: 2/15/2023 11:04 PM    INDICATION: Cough  COMPARISON: 11/10/2022      Impression    IMPRESSION: Negative chest. No consolidation, edema, pleural effusion, or pneumothorax. Normal cardiac mediastinal silhouette.       Discharge Medications   Current Discharge Medication List      START taking these medications    Details   doxycycline hyclate (VIBRAMYCIN) 100 MG capsule Take 1 capsule (100 mg) by mouth 2  times daily for 5 doses  Qty: 5 capsule, Refills: 0    Associated Diagnoses: Intractable nausea and vomiting      prochlorperazine (COMPAZINE) 10 MG tablet Take 1 tablet (10 mg) by mouth every 6 hours as needed for vomiting  Qty: 30 tablet, Refills: 0    Associated Diagnoses: Diffuse abdominal pain; Marijuana use; Intractable nausea and vomiting      sucralfate (CARAFATE) 1 GM/10ML suspension Take 10 mLs (1 g) by mouth 4 times daily (before meals and nightly) for 30 days  Qty: 1200 mL, Refills: 0    Associated Diagnoses: Diffuse abdominal pain; Marijuana use; Intractable nausea and vomiting         CONTINUE these medications which have CHANGED    Details   pantoprazole (PROTONIX) 40 MG EC tablet Take 1 tablet (40 mg) by mouth 2 times daily  Qty: 60 tablet, Refills: 0    Associated Diagnoses: Nausea and vomiting, unspecified vomiting type; Abdominal pain, epigastric         CONTINUE these medications which have NOT CHANGED    Details   amLODIPine (NORVASC) 10 MG tablet Take 1 tablet (10 mg) by mouth daily  Qty: 30 tablet, Refills: 0    Associated Diagnoses: Hypertension, unspecified type      busPIRone (BUSPAR) 10 MG tablet Take 1 tablet (10 mg) by mouth 2 times daily as needed (Anxiety)  Qty: 30 tablet, Refills: 0    Associated Diagnoses: Depression with anxiety      calcium carbonate (TUMS) 500 MG chewable tablet Take 1 tablet (500 mg) by mouth 3 times daily as needed for heartburn  Qty: 60 tablet, Refills: 0    Associated Diagnoses: Stomach burning      FLUoxetine (PROZAC) 10 MG capsule Take one pill by mouth for 7 days, then take two pills by mouth daily  Qty: 30 capsule, Refills: 0    Associated Diagnoses: Depression with anxiety      glipiZIDE (GLUCOTROL XL) 5 MG 24 hr tablet Take 1 tablet twice daily for diabetes  Qty: 60 tablet, Refills: 0    Associated Diagnoses: Type 2 diabetes mellitus with other specified complication, without long-term current use of insulin (H)      lisinopril (ZESTRIL) 40 MG tablet  Take 1 tablet (40 mg) by mouth daily  Qty: 30 tablet, Refills: 0    Associated Diagnoses: Hypertension, unspecified type      melatonin 1 MG TABS tablet Take 1 tablet (1 mg) by mouth nightly as needed for sleep  Qty: 15 tablet, Refills: 0    Associated Diagnoses: Insomnia, unspecified type      ondansetron (ZOFRAN ODT) 4 MG ODT tab Take 1 tablet (4 mg) by mouth every 6 hours as needed for nausea or vomiting  Qty: 30 tablet, Refills: 0    Associated Diagnoses: Intractable nausea and vomiting      tadalafil (CIALIS) 5 MG tablet Take one to four pills daily as needed for sex  Qty: 30 tablet, Refills: 0    Associated Diagnoses: Male erectile disorder      tamsulosin (FLOMAX) 0.4 MG capsule TAKE 1 CAPSULE BY MOUTH EVERY DAY  Qty: 90 capsule, Refills: 0    Associated Diagnoses: Flank pain; H/O renal calculi      traZODone (DESYREL) 50 MG tablet Take 3 tablets (150 mg) by mouth At Bedtime  Qty: 40 tablet, Refills: 0    Associated Diagnoses: Insomnia, unspecified type           Allergies   No Known Allergies

## 2023-02-19 NOTE — PLAN OF CARE
Patient vitally stable. Alert and oriented x4. Declined pain medications. Up independently, ambulating in room.  Had two incidents of emesis this shift, once at 2000 and another at 0400. Is receiving compazine and reglan for nausea with stated relief.  Patient states that he had bouts of intense coughing that he believes is causing him to emesis. Voiding independently and without difficulty. IV in left hand caused slight swelling and pain. IV removed and new one was placed at 0445 this am. Bed in low locked position, call light within reach, patient education ongoing.  Myriam Lopez RN  Problem: Plan of Care - These are the overarching goals to be used throughout the patient stay.    Goal: Absence of Hospital-Acquired Illness or Injury  Outcome: Progressing  Intervention: Identify and Manage Fall Risk  Recent Flowsheet Documentation  Taken 2/18/2023 2330 by Myriam Lopez RN  Safety Promotion/Fall Prevention: clutter free environment maintained  Intervention: Prevent Skin Injury  Recent Flowsheet Documentation  Taken 2/18/2023 2330 by Myriam Lopez RN  Body Position: position changed independently  Goal: Optimal Comfort and Wellbeing  Outcome: Progressing  Goal: Readiness for Transition of Care  Outcome: Progressing     Problem: Nausea and Vomiting  Goal: Nausea and Vomiting Relief  Outcome: Progressing  Intervention: Prevent and Manage Nausea and Vomiting  Recent Flowsheet Documentation  Taken 2/18/2023 2330 by Myriam Lopez RN  Nausea/Vomiting Interventions:   stimuli minimized   sips of clear liquids given     Problem: Pain Acute  Goal: Optimal Pain Control and Function  Outcome: Progressing     Problem: Electrolyte Imbalance  Goal: Electrolyte Imbalance: Plan of Care  Outcome: Progressing   Goal Outcome Evaluation:

## 2023-02-19 NOTE — PROGRESS NOTES
Care Management Discharge Note    Discharge Date: 02/19/2023       Discharge Disposition: Home    Discharge Services: None    Discharge DME: None    Discharge Transportation: car, drives self (car in ER parking lot.)    Private pay costs discussed: Not applicable    PAS Confirmation Code:  (N/A)  Patient/family educated on Medicare website which has current facility and service quality ratings:      Education Provided on the Discharge Plan:  No   Persons Notified of Discharge Plans: Pt   Patient/Family in Agreement with the Plan: yes    Handoff Referral Completed: No    Additional Information:  Chart reviewed.  Pt discharging ome today.  No CM needs identified.  Family to transport.     HOWARD Arias

## 2023-02-19 NOTE — PLAN OF CARE
"Patient feeling well this morning. Patient is feeling excited about going home. Patient was able to eat 2 bowls of cream of wheat for breakfast with no nausea or emesis.     Problem: Plan of Care - These are the overarching goals to be used throughout the patient stay.    Goal: Plan of Care Review  Description: The Plan of Care Review/Shift note should be completed every shift.  The Outcome Evaluation is a brief statement about your assessment that the patient is improving, declining, or no change.  This information will be displayed automatically on your shift note.  Outcome: Adequate for Care Transition  Goal: Patient-Specific Goal (Individualized)  Description: You can add care plan individualizations to a care plan. Examples of Individualization might be:  \"Parent requests to be called daily at 9am for status\", \"I have a hard time hearing out of my right ear\", or \"Do not touch me to wake me up as it startles me\".  Outcome: Adequate for Care Transition  Goal: Absence of Hospital-Acquired Illness or Injury  Outcome: Adequate for Care Transition  Intervention: Identify and Manage Fall Risk  Recent Flowsheet Documentation  Taken 2/19/2023 0807 by Claribel Davison, RN  Safety Promotion/Fall Prevention:   fall prevention program maintained   clutter free environment maintained  Goal: Optimal Comfort and Wellbeing  Outcome: Adequate for Care Transition  Goal: Readiness for Transition of Care  Outcome: Adequate for Care Transition     Problem: Nausea and Vomiting  Goal: Nausea and Vomiting Relief  Outcome: Adequate for Care Transition  Intervention: Prevent and Manage Nausea and Vomiting  Recent Flowsheet Documentation  Taken 2/19/2023 0807 by Claribel Davison, RN  Fluid/Electrolyte Management: fluids provided  Nausea/Vomiting Interventions:   stimuli minimized   sips of clear liquids given  Environmental Support: calm environment promoted     Problem: Pain Acute  Goal: Optimal Pain Control and Function  Outcome: Adequate for " Care Transition  Intervention: Prevent or Manage Pain  Recent Flowsheet Documentation  Taken 2/19/2023 0807 by Claribel Davison, RN  Medication Review/Management: medications reviewed     Problem: Electrolyte Imbalance  Goal: Electrolyte Imbalance: Plan of Care  Outcome: Adequate for Care Transition  Intervention: Monitor and Manage Electrolyte Imbalance  Recent Flowsheet Documentation  Taken 2/19/2023 0807 by Claribel Davison, RN  Fluid/Electrolyte Management: fluids provided

## 2023-02-20 NOTE — PROGRESS NOTES
Brief internal medicine note    -Received a phone call from the patient and family on 2/20/2023 about [facility] in [facility] on [address] not dispensing the doxycycline yesterday evening    -Apparently they were concerned about the duration of the antibiotics and they were not aware of the patient's inpatient course and already received 2.5 days of antibiotics    -Writer called and updated patient's sister at length   - and then writer also called and discussed with the pharmacist at [facility].    -Patient was able to receive his medications as of 2/20 10:19 AM  Abdias Bowling MD on 2/20/2023 at 10:19 AM

## 2023-02-21 ENCOUNTER — PATIENT OUTREACH (OUTPATIENT)
Dept: CARE COORDINATION | Facility: CLINIC | Age: 53
End: 2023-02-21
Payer: COMMERCIAL

## 2023-02-21 NOTE — PROGRESS NOTES
"Clinic Care Coordination Contact  [facility]: Post-Discharge Note  SITUATION                                                      Admission:    Admission Date: 02/14/23   Reason for Admission: 1. Nausea and vomiting, unspecified vomiting type   2. Diffuse abdominal pain   3. Hypomagnesemia   4. Elevated glucose   5. Marijuana use  Discharge:   Discharge Date: 02/19/23  Discharge Diagnosis: Intractable nausea with emesis, 1 episode of hematemesis  Erosive esophagitis, reflux   Excessive phlegm noted on EGD suggestive of respiratory infection/suspected recovering (CA) pneumonia  Suspected marijuana induced emesis - cannabinoid hyperemesis syndrome  Otherwise, cyclic vomiting syndrome    BACKGROUND                                                      Per hospital discharge summary and inpatient provider notes:     Drew Monsivais is a 52 year old male with history of type 2 diabetes mellitus, intractable nausea and vomiting, marijuana use, acute pancreatitis, hypertension, dyslipidemia, who presents to the ER with complaints of nausea, vomiting and diarrhea.     Patient reports intermittent nausea and vomiting for the past 7 days. He states that the nausea and vomiting has gotten more persistent and worse since yesterday into today. He says he has taken nausea medications within the past week but has not taken is ODT Zofran today due to \"getting his son to school and other arrangements.\" He also reports having a few episodes of diarrhea within the past 24 hours. He also reports some blood in his emesis after several episodes of vomiting.     Reports his last marijuana use was a couple days ago.     He denies chest pain, abdominal pain, and fever. He is not allergic to any medications. He has been taking his diabetes medications and saw a diabetes specialist yesterday who did some blood work. There were no other concerns/complaints at this time.      ASSESSMENT           Discharge Assessment  How are you " doing now that you are home?: Patient states yesterday was rough.  Nausea and vomiting yesterday.  Pt states sleep has been an issue, hard to sleep.  Feeling better today than he was yesterday.  Still very weak. Has a bad headache, started yesterday.  States he was able to take Tylenol, this did help.  BS yesterday was around 150 yesterday. Headache is currently a 7/10. Hasn't checked blood sugar today. Pt is stuttering on the phone, states this is normal for him.  How are your symptoms? (Red Flag symptoms escalate to triage hotline per guidelines): Improved  Do you feel your condition is stable enough to be safe at home until your provider visit?: Yes  Does the patient have their discharge instructions? : Yes  Does the patient have questions regarding their discharge instructions? : No  Were you started on any new medications or were there changes to any of your previous medications? : Yes  Does the patient have all of their medications?: Yes  Do you have questions regarding any of your medications? : No  Discharge follow-up appointment scheduled within 14 calendar days? : No  Discharge Follow Up Appointment Scheduled with?: Primary Care Provider (Has a new PCP appt in May.)         Post-op (Clinicians Only)  Did the patient have surgery or a procedure: Yes (EGD)  Fever: No  Chills: No  Bowel Function: loose stools  Date of last BM: 02/20/23    Patient was having a bad headache while on the phone with RN.  RN offered to triage patient's symptoms, patient declined.  Pt was stuttering on the phone but stated this was normal for him.  He has not checked his blood sugar today, RN recommended checking blood sugar and discussed that antibiotics can have an effect on sugar levels. RN also recommended ensuring he is drinking plenty of fluids.  Pt will try Tylenol. RN advised patient if his symptoms worsen to call nurse line and RN provided 24/7 triage line.  Pt wanted to get off the phone due to his headache.  Patient did  state he has had migraines worse than this in the past but the sound makes it worse.  Pt doesn't have appt with PCP until May, RN recommended scheduling appt within 7 days per AVS recommendation.    PLAN                                                      Outpatient Plan:   Follow up with primary care provider, Haroon Farmer, within 7 days for   hospital follow- up.         No future appointments.      For any urgent concerns, please contact our 24 hour nurse triage line: [phone] ([phone])         Kelly Kong RN

## 2023-05-13 ENCOUNTER — HEALTH MAINTENANCE LETTER (OUTPATIENT)
Age: 53
End: 2023-05-13

## 2023-06-08 DIAGNOSIS — Z87.442 H/O RENAL CALCULI: ICD-10-CM

## 2023-06-08 DIAGNOSIS — R10.9 FLANK PAIN: ICD-10-CM

## 2023-06-09 RX ORDER — TAMSULOSIN HYDROCHLORIDE 0.4 MG/1
CAPSULE ORAL
Qty: 90 CAPSULE | Refills: 0 | Status: SHIPPED | OUTPATIENT
Start: 2023-06-09 | End: 2024-06-11

## 2023-06-09 NOTE — TELEPHONE ENCOUNTER
"Routing refill request to provider for review/approval because:  bp out of range, needs review    Last Written Prescription Date:  1/31/23  Last Fill Quantity: 90,  # refills: 0   Last office visit provider:  1/3/23     Requested Prescriptions   Pending Prescriptions Disp Refills     tamsulosin (FLOMAX) 0.4 MG capsule [Pharmacy Med Name: TAMSULOSIN HCL 0.4 MG CAPSULE] 90 capsule 0     Sig: TAKE 1 CAPSULE BY MOUTH EVERY DAY       Alpha Blockers Failed - 6/8/2023  9:06 AM        Failed - Blood pressure under 140/90 in past 12 months     BP Readings from Last 3 Encounters:   02/19/23 (!) 153/88   01/06/23 138/89   12/29/22 (!) 181/104                 Failed - Patient does not have Tadalafil, Vardenafil, or Sildenafil on their medication list        Passed - Recent (12 mo) or future (30 days) visit within the authorizing provider's specialty     Patient has had an office visit with the authorizing provider or a provider within the authorizing providers department within the previous 12 mos or has a future within next 30 days. See \"Patient Info\" tab in inbasket, or \"Choose Columns\" in Meds & Orders section of the refill encounter.              Passed - Medication is active on med list        Passed - Patient is 18 years of age or older             Asiya Malin RN 06/08/23 7:15 PM  "

## 2023-06-29 DIAGNOSIS — E11.69 TYPE 2 DIABETES MELLITUS WITH OTHER SPECIFIED COMPLICATION, WITHOUT LONG-TERM CURRENT USE OF INSULIN (H): ICD-10-CM

## 2023-06-29 NOTE — TELEPHONE ENCOUNTER
Outpatient Medication Detail     Disp Refills Start End KHUSHBU   metFORMIN (GLUCOPHAGE) 500 MG tablet (Discontinued) 120 tablet 0 12/29/2022 2/14/2023 No   Sig - Route: Take 2 tablets (1,000 mg) by mouth 2 times daily (with meals) for 30 days - Oral   Sent to pharmacy as: metFORMIN HCl 500 MG Oral Tablet (GLUCOPHAGE)   Class: E-Prescribe   Reason for Discontinue: Med Rec(No AVS / No eCancel)   Order: [id number]   E-Prescribing Status: Receipt confirmed by pharmacy (12/29/2022  4:12 PM CST)   Renewals    Renewal requests to authorizing provider (Sandrita Wagner MD) prohibited          Arturo Jalloh RN, BSN, MSN  [facility] Triage 2:44 PM

## 2023-07-15 DIAGNOSIS — E11.69 TYPE 2 DIABETES MELLITUS WITH OTHER SPECIFIED COMPLICATION, WITHOUT LONG-TERM CURRENT USE OF INSULIN (H): ICD-10-CM

## 2023-07-15 NOTE — TELEPHONE ENCOUNTER
"Routing refill request to provider for review/approval because:  Drug not active on patient's medication list    Last Written Prescription Date:  10/5/2022  Last Fill Quantity: 360,  # refills: 1  Discontinued 12/29/2022   Last office visit provider:  11/10/2022     Requested Prescriptions   Pending Prescriptions Disp Refills     metFORMIN (GLUCOPHAGE) 500 MG tablet [Pharmacy Med Name: METFORMIN  MG TABLET] 360 tablet 1     Sig: TAKE 2 TABLETS BY MOUTH TWICE A DAY       Biguanide Agents Failed - 7/15/2023  7:30 AM        Failed - Patient has documented A1c within the specified period of time.     If HgbA1C is 8 or greater, it needs to be on file within the past 3 months.  If less than 8, must be on file within the past 6 months.     Recent Labs   Lab Test 12/27/22  1233   A1C 8.9*             Failed - Medication is active on med list        Failed - Recent (6 mo) or future (30 days) visit within the authorizing provider's specialty     Patient had office visit in the last 6 months or has a visit in the next 30 days with authorizing provider or within the authorizing provider's specialty.  See \"Patient Info\" tab in inbasket, or \"Choose Columns\" in Meds & Orders section of the refill encounter.            Passed - Patient is age 10 or older        Passed - Patient's CR is NOT>1.4 OR Patient's EGFR is NOT<45 within past 12 mos.     Recent Labs   Lab Test 02/17/23  1307 08/13/21  0905 12/16/20  1625   GFRESTIMATED >90   < > >60   GFRESTBLACK  --   --  >60    < > = values in this interval not displayed.       Recent Labs   Lab Test 02/17/23  1307   CR 0.86             Passed - Patient does NOT have a diagnosis of CHF.             Radha Shelby RN 07/15/23 8:38 AM  "

## 2023-07-17 NOTE — TELEPHONE ENCOUNTER
07/17/23  Spoke with pt and attempted to schedule. Pt now goes to [facility] for care. Requested pt contact his pharmacy to send refills to current provider.  Concetta

## 2023-08-05 ENCOUNTER — HEALTH MAINTENANCE LETTER (OUTPATIENT)
Age: 53
End: 2023-08-05

## 2023-09-11 DIAGNOSIS — E11.69 TYPE 2 DIABETES MELLITUS WITH OTHER SPECIFIED COMPLICATION, WITHOUT LONG-TERM CURRENT USE OF INSULIN (H): ICD-10-CM

## 2023-10-14 ENCOUNTER — HEALTH MAINTENANCE LETTER (OUTPATIENT)
Age: 53
End: 2023-10-14

## 2024-02-06 ENCOUNTER — MEDICAL CORRESPONDENCE (OUTPATIENT)
Dept: SCHEDULING | Facility: CLINIC | Age: 54
End: 2024-02-06
Payer: COMMERCIAL

## 2024-03-02 ENCOUNTER — HEALTH MAINTENANCE LETTER (OUTPATIENT)
Age: 54
End: 2024-03-02

## 2024-05-12 ENCOUNTER — HOSPITAL ENCOUNTER (EMERGENCY)
Facility: CLINIC | Age: 54
Discharge: STILL ADMITTED OR ADMINISTRATIVE | End: 2024-05-12
Attending: FAMILY MEDICINE | Admitting: FAMILY MEDICINE
Payer: COMMERCIAL

## 2024-05-12 ENCOUNTER — APPOINTMENT (OUTPATIENT)
Dept: RADIOLOGY | Facility: CLINIC | Age: 54
End: 2024-05-12
Attending: FAMILY MEDICINE
Payer: COMMERCIAL

## 2024-05-12 ENCOUNTER — HOSPITAL ENCOUNTER (INPATIENT)
Facility: HOSPITAL | Age: 54
LOS: 9 days | Discharge: HOME | DRG: 233 | End: 2024-05-21
Attending: INTERNAL MEDICINE | Admitting: INTERNAL MEDICINE
Payer: COMMERCIAL

## 2024-05-12 VITALS
OXYGEN SATURATION: 98 % | BODY MASS INDEX: 26.46 KG/M2 | HEART RATE: 73 BPM | RESPIRATION RATE: 18 BRPM | TEMPERATURE: 98.5 F | SYSTOLIC BLOOD PRESSURE: 151 MMHG | WEIGHT: 174 LBS | DIASTOLIC BLOOD PRESSURE: 95 MMHG

## 2024-05-12 DIAGNOSIS — R74.8 ELEVATED LIPASE: ICD-10-CM

## 2024-05-12 DIAGNOSIS — I21.4 NSTEMI (NON-ST ELEVATED MYOCARDIAL INFARCTION) (H): ICD-10-CM

## 2024-05-12 DIAGNOSIS — I21.4 NSTEMI (NON-ST ELEVATED MYOCARDIAL INFARCTION) (H): Primary | ICD-10-CM

## 2024-05-12 DIAGNOSIS — Z95.1 S/P CABG (CORONARY ARTERY BYPASS GRAFT): ICD-10-CM

## 2024-05-12 DIAGNOSIS — E11.65 TYPE 2 DIABETES MELLITUS WITH HYPERGLYCEMIA, WITHOUT LONG-TERM CURRENT USE OF INSULIN (H): ICD-10-CM

## 2024-05-12 PROBLEM — K21.00 GASTROESOPHAGEAL REFLUX DISEASE WITH ESOPHAGITIS: Status: ACTIVE | Noted: 2023-03-09

## 2024-05-12 LAB
ALBUMIN SERPL BCG-MCNC: 4.2 G/DL (ref 3.5–5.2)
ALBUMIN UR-MCNC: NEGATIVE MG/DL
ALP SERPL-CCNC: 95 U/L (ref 40–150)
ALT SERPL W P-5'-P-CCNC: 32 U/L (ref 0–70)
ANION GAP SERPL CALCULATED.3IONS-SCNC: 8 MMOL/L (ref 7–15)
APPEARANCE UR: CLEAR
AST SERPL W P-5'-P-CCNC: 27 U/L (ref 0–45)
BACTERIA #/AREA URNS HPF: ABNORMAL /HPF
BASOPHILS # BLD AUTO: 0 10E3/UL (ref 0–0.2)
BASOPHILS NFR BLD AUTO: 1 %
BILIRUB DIRECT SERPL-MCNC: <0.2 MG/DL (ref 0–0.3)
BILIRUB SERPL-MCNC: 0.4 MG/DL
BILIRUB UR QL STRIP: NEGATIVE
BUN SERPL-MCNC: 16.3 MG/DL (ref 6–20)
CALCIUM SERPL-MCNC: 8.9 MG/DL (ref 8.6–10)
CHLORIDE SERPL-SCNC: 100 MMOL/L (ref 98–107)
COLOR UR AUTO: COLORLESS
CREAT SERPL-MCNC: 1.11 MG/DL (ref 0.67–1.17)
D DIMER PPP FEU-MCNC: 0.32 UG/ML FEU (ref 0–0.5)
DEPRECATED HCO3 PLAS-SCNC: 25 MMOL/L (ref 22–29)
EGFRCR SERPLBLD CKD-EPI 2021: 78 ML/MIN/1.73M2
EOSINOPHIL # BLD AUTO: 0.1 10E3/UL (ref 0–0.7)
EOSINOPHIL NFR BLD AUTO: 1 %
ERYTHROCYTE [DISTWIDTH] IN BLOOD BY AUTOMATED COUNT: 11.9 % (ref 10–15)
ERYTHROCYTE [DISTWIDTH] IN BLOOD BY AUTOMATED COUNT: 11.9 % (ref 10–15)
GLUCOSE BLDC GLUCOMTR-MCNC: 223 MG/DL (ref 70–99)
GLUCOSE BLDC GLUCOMTR-MCNC: 295 MG/DL (ref 70–99)
GLUCOSE BLDC GLUCOMTR-MCNC: 96 MG/DL (ref 70–99)
GLUCOSE SERPL-MCNC: 329 MG/DL (ref 70–99)
GLUCOSE UR STRIP-MCNC: >1000 MG/DL
HBA1C MFR BLD: 9.3 %
HCT VFR BLD AUTO: 42.3 % (ref 40–53)
HCT VFR BLD AUTO: 42.4 % (ref 40–53)
HGB BLD-MCNC: 15 G/DL (ref 13.3–17.7)
HGB BLD-MCNC: 15.1 G/DL (ref 13.3–17.7)
HGB UR QL STRIP: NEGATIVE
IMM GRANULOCYTES # BLD: 0.1 10E3/UL
IMM GRANULOCYTES NFR BLD: 1 %
KETONES UR STRIP-MCNC: NEGATIVE MG/DL
LEUKOCYTE ESTERASE UR QL STRIP: NEGATIVE
LIPASE SERPL-CCNC: 163 U/L (ref 13–60)
LYMPHOCYTES # BLD AUTO: 1.8 10E3/UL (ref 0.8–5.3)
LYMPHOCYTES NFR BLD AUTO: 24 %
MAGNESIUM SERPL-MCNC: 2 MG/DL (ref 1.7–2.3)
MCH RBC QN AUTO: 28.2 PG (ref 26.5–33)
MCH RBC QN AUTO: 28.6 PG (ref 26.5–33)
MCHC RBC AUTO-ENTMCNC: 35.4 G/DL (ref 31.5–36.5)
MCHC RBC AUTO-ENTMCNC: 35.7 G/DL (ref 31.5–36.5)
MCV RBC AUTO: 79 FL (ref 78–100)
MCV RBC AUTO: 81 FL (ref 78–100)
MONOCYTES # BLD AUTO: 0.6 10E3/UL (ref 0–1.3)
MONOCYTES NFR BLD AUTO: 8 %
MUCOUS THREADS #/AREA URNS LPF: PRESENT /LPF
NEUTROPHILS # BLD AUTO: 4.7 10E3/UL (ref 1.6–8.3)
NEUTROPHILS NFR BLD AUTO: 65 %
NITRATE UR QL: NEGATIVE
NRBC # BLD AUTO: 0 10E3/UL
NRBC BLD AUTO-RTO: 0 /100
NT-PROBNP SERPL-MCNC: <36 PG/ML (ref 0–900)
PH UR STRIP: 7 [PH] (ref 5–7)
PLATELET # BLD AUTO: 207 10E3/UL (ref 150–450)
PLATELET # BLD AUTO: 212 10E3/UL (ref 150–450)
POTASSIUM SERPL-SCNC: 4.5 MMOL/L (ref 3.4–5.3)
PROT SERPL-MCNC: 6.6 G/DL (ref 6.4–8.3)
RBC # BLD AUTO: 5.25 10E6/UL (ref 4.4–5.9)
RBC # BLD AUTO: 5.35 10E6/UL (ref 4.4–5.9)
RBC URINE: <1 /HPF
SODIUM SERPL-SCNC: 133 MMOL/L (ref 135–145)
SP GR UR STRIP: 1.02 (ref 1–1.03)
TROPONIN T SERPL HS-MCNC: 110 NG/L
TROPONIN T SERPL HS-MCNC: 183 NG/L
TROPONIN T SERPL HS-MCNC: 54 NG/L
UFH PPP CHRO-ACNC: 0.27 IU/ML
UROBILINOGEN UR STRIP-MCNC: <2 MG/DL
WBC # BLD AUTO: 7.2 10E3/UL (ref 4–11)
WBC # BLD AUTO: 7.8 10E3/UL (ref 4–11)
WBC URINE: <1 /HPF

## 2024-05-12 PROCEDURE — 250N000011 HC RX IP 250 OP 636: Performed by: INTERNAL MEDICINE

## 2024-05-12 PROCEDURE — 99291 CRITICAL CARE FIRST HOUR: CPT | Mod: 25

## 2024-05-12 PROCEDURE — 120N000004 HC R&B MS OVERFLOW

## 2024-05-12 PROCEDURE — 85025 COMPLETE CBC W/AUTO DIFF WBC: CPT | Performed by: FAMILY MEDICINE

## 2024-05-12 PROCEDURE — 99223 1ST HOSP IP/OBS HIGH 75: CPT | Performed by: INTERNAL MEDICINE

## 2024-05-12 PROCEDURE — 93005 ELECTROCARDIOGRAM TRACING: CPT

## 2024-05-12 PROCEDURE — 80048 BASIC METABOLIC PNL TOTAL CA: CPT | Performed by: FAMILY MEDICINE

## 2024-05-12 PROCEDURE — 84484 ASSAY OF TROPONIN QUANT: CPT | Performed by: FAMILY MEDICINE

## 2024-05-12 PROCEDURE — 84484 ASSAY OF TROPONIN QUANT: CPT | Performed by: INTERNAL MEDICINE

## 2024-05-12 PROCEDURE — 83880 ASSAY OF NATRIURETIC PEPTIDE: CPT | Performed by: FAMILY MEDICINE

## 2024-05-12 PROCEDURE — 250N000013 HC RX MED GY IP 250 OP 250 PS 637: Performed by: INTERNAL MEDICINE

## 2024-05-12 PROCEDURE — 81001 URINALYSIS AUTO W/SCOPE: CPT | Performed by: FAMILY MEDICINE

## 2024-05-12 PROCEDURE — 83690 ASSAY OF LIPASE: CPT | Performed by: FAMILY MEDICINE

## 2024-05-12 PROCEDURE — 83036 HEMOGLOBIN GLYCOSYLATED A1C: CPT | Performed by: INTERNAL MEDICINE

## 2024-05-12 PROCEDURE — 250N000012 HC RX MED GY IP 250 OP 636 PS 637: Performed by: FAMILY MEDICINE

## 2024-05-12 PROCEDURE — 82962 GLUCOSE BLOOD TEST: CPT

## 2024-05-12 PROCEDURE — 96375 TX/PRO/DX INJ NEW DRUG ADDON: CPT

## 2024-05-12 PROCEDURE — 82248 BILIRUBIN DIRECT: CPT | Performed by: FAMILY MEDICINE

## 2024-05-12 PROCEDURE — 93005 ELECTROCARDIOGRAM TRACING: CPT | Performed by: FAMILY MEDICINE

## 2024-05-12 PROCEDURE — 85379 FIBRIN DEGRADATION QUANT: CPT | Performed by: FAMILY MEDICINE

## 2024-05-12 PROCEDURE — 83735 ASSAY OF MAGNESIUM: CPT | Performed by: FAMILY MEDICINE

## 2024-05-12 PROCEDURE — 36415 COLL VENOUS BLD VENIPUNCTURE: CPT | Performed by: FAMILY MEDICINE

## 2024-05-12 PROCEDURE — 36415 COLL VENOUS BLD VENIPUNCTURE: CPT | Performed by: INTERNAL MEDICINE

## 2024-05-12 PROCEDURE — 96365 THER/PROPH/DIAG IV INF INIT: CPT

## 2024-05-12 PROCEDURE — 85027 COMPLETE CBC AUTOMATED: CPT | Performed by: INTERNAL MEDICINE

## 2024-05-12 PROCEDURE — 250N000011 HC RX IP 250 OP 636: Performed by: FAMILY MEDICINE

## 2024-05-12 PROCEDURE — 93010 ELECTROCARDIOGRAM REPORT: CPT | Performed by: INTERNAL MEDICINE

## 2024-05-12 PROCEDURE — 96366 THER/PROPH/DIAG IV INF ADDON: CPT

## 2024-05-12 PROCEDURE — 85520 HEPARIN ASSAY: CPT | Performed by: INTERNAL MEDICINE

## 2024-05-12 PROCEDURE — 71046 X-RAY EXAM CHEST 2 VIEWS: CPT

## 2024-05-12 PROCEDURE — 80053 COMPREHEN METABOLIC PANEL: CPT | Performed by: FAMILY MEDICINE

## 2024-05-12 RX ORDER — HEPARIN SODIUM 10000 [USP'U]/100ML
0-5000 INJECTION, SOLUTION INTRAVENOUS CONTINUOUS
Status: DISCONTINUED | OUTPATIENT
Start: 2024-05-12 | End: 2024-05-14

## 2024-05-12 RX ORDER — HEPARIN SODIUM 10000 [USP'U]/100ML
0-5000 INJECTION, SOLUTION INTRAVENOUS CONTINUOUS
Status: DISCONTINUED | OUTPATIENT
Start: 2024-05-12 | End: 2024-05-12 | Stop reason: HOSPADM

## 2024-05-12 RX ORDER — BUSPIRONE HYDROCHLORIDE 10 MG/1
10 TABLET ORAL DAILY PRN
COMMUNITY

## 2024-05-12 RX ORDER — LIDOCAINE 40 MG/G
CREAM TOPICAL
Status: DISCONTINUED | OUTPATIENT
Start: 2024-05-12 | End: 2024-05-14

## 2024-05-12 RX ORDER — ASPIRIN 81 MG/1
81 TABLET ORAL DAILY
Status: DISCONTINUED | OUTPATIENT
Start: 2024-05-13 | End: 2024-05-14

## 2024-05-12 RX ORDER — NITROGLYCERIN 0.4 MG/1
0.4 TABLET SUBLINGUAL EVERY 5 MIN PRN
Status: DISCONTINUED | OUTPATIENT
Start: 2024-05-12 | End: 2024-05-14

## 2024-05-12 RX ORDER — BUSPIRONE HYDROCHLORIDE 10 MG/1
10 TABLET ORAL DAILY PRN
Status: DISCONTINUED | OUTPATIENT
Start: 2024-05-12 | End: 2024-05-21 | Stop reason: HOSPADM

## 2024-05-12 RX ORDER — TRAZODONE HYDROCHLORIDE 50 MG/1
150 TABLET, FILM COATED ORAL AT BEDTIME
Status: DISCONTINUED | OUTPATIENT
Start: 2024-05-12 | End: 2024-05-14

## 2024-05-12 RX ORDER — AMLODIPINE BESYLATE 5 MG/1
10 TABLET ORAL DAILY
Status: DISCONTINUED | OUTPATIENT
Start: 2024-05-12 | End: 2024-05-14

## 2024-05-12 RX ORDER — ONDANSETRON 2 MG/ML
4 INJECTION INTRAMUSCULAR; INTRAVENOUS EVERY 6 HOURS PRN
Status: DISCONTINUED | OUTPATIENT
Start: 2024-05-12 | End: 2024-05-14

## 2024-05-12 RX ORDER — LISINOPRIL 20 MG/1
40 TABLET ORAL DAILY
Status: DISCONTINUED | OUTPATIENT
Start: 2024-05-12 | End: 2024-05-14

## 2024-05-12 RX ORDER — ATORVASTATIN CALCIUM 40 MG/1
40 TABLET, FILM COATED ORAL DAILY
Status: ON HOLD | COMMUNITY
Start: 2023-08-25 | End: 2024-05-21

## 2024-05-12 RX ORDER — DEXTROSE MONOHYDRATE 25 G/50ML
25-50 INJECTION, SOLUTION INTRAVENOUS
Status: DISCONTINUED | OUTPATIENT
Start: 2024-05-12 | End: 2024-05-13

## 2024-05-12 RX ORDER — LORAZEPAM 2 MG/ML
0.5 INJECTION INTRAMUSCULAR ONCE
Status: COMPLETED | OUTPATIENT
Start: 2024-05-12 | End: 2024-05-12

## 2024-05-12 RX ORDER — ONDANSETRON 4 MG/1
4 TABLET, ORALLY DISINTEGRATING ORAL EVERY 6 HOURS PRN
Status: DISCONTINUED | OUTPATIENT
Start: 2024-05-12 | End: 2024-05-14

## 2024-05-12 RX ORDER — CALCIUM CARBONATE 500 MG/1
1000 TABLET, CHEWABLE ORAL 4 TIMES DAILY PRN
Status: DISCONTINUED | OUTPATIENT
Start: 2024-05-12 | End: 2024-05-21 | Stop reason: HOSPADM

## 2024-05-12 RX ORDER — ACETAMINOPHEN 650 MG/1
650 SUPPOSITORY RECTAL EVERY 4 HOURS PRN
Status: DISCONTINUED | OUTPATIENT
Start: 2024-05-12 | End: 2024-05-14

## 2024-05-12 RX ORDER — PANTOPRAZOLE SODIUM 40 MG/1
40 TABLET, DELAYED RELEASE ORAL
Status: DISCONTINUED | OUTPATIENT
Start: 2024-05-12 | End: 2024-05-14

## 2024-05-12 RX ORDER — ACETAMINOPHEN 325 MG/1
650 TABLET ORAL EVERY 4 HOURS PRN
Status: DISCONTINUED | OUTPATIENT
Start: 2024-05-12 | End: 2024-05-13

## 2024-05-12 RX ORDER — AMOXICILLIN 250 MG
2 CAPSULE ORAL 2 TIMES DAILY PRN
Status: DISCONTINUED | OUTPATIENT
Start: 2024-05-12 | End: 2024-05-14

## 2024-05-12 RX ORDER — AMOXICILLIN 250 MG
1 CAPSULE ORAL 2 TIMES DAILY PRN
Status: DISCONTINUED | OUTPATIENT
Start: 2024-05-12 | End: 2024-05-14

## 2024-05-12 RX ORDER — BUSPIRONE HYDROCHLORIDE 10 MG/1
10 TABLET ORAL DAILY
Status: DISCONTINUED | OUTPATIENT
Start: 2024-05-13 | End: 2024-05-21 | Stop reason: HOSPADM

## 2024-05-12 RX ORDER — NICOTINE POLACRILEX 4 MG
15-30 LOZENGE BUCCAL
Status: DISCONTINUED | OUTPATIENT
Start: 2024-05-12 | End: 2024-05-13

## 2024-05-12 RX ORDER — ATORVASTATIN CALCIUM 40 MG/1
40 TABLET, FILM COATED ORAL EVERY EVENING
Status: DISCONTINUED | OUTPATIENT
Start: 2024-05-12 | End: 2024-05-13

## 2024-05-12 RX ORDER — TAMSULOSIN HYDROCHLORIDE 0.4 MG/1
0.4 CAPSULE ORAL EVERY EVENING
Status: DISCONTINUED | OUTPATIENT
Start: 2024-05-12 | End: 2024-05-21 | Stop reason: HOSPADM

## 2024-05-12 RX ORDER — BUSPIRONE HYDROCHLORIDE 10 MG/1
10 TABLET ORAL DAILY
COMMUNITY

## 2024-05-12 RX ORDER — PANTOPRAZOLE SODIUM 40 MG/1
40 TABLET, DELAYED RELEASE ORAL DAILY
COMMUNITY

## 2024-05-12 RX ADMIN — PANTOPRAZOLE SODIUM 40 MG: 40 TABLET, DELAYED RELEASE ORAL at 17:04

## 2024-05-12 RX ADMIN — HEPARIN SODIUM 950 UNITS/HR: 10000 INJECTION, SOLUTION INTRAVENOUS at 13:05

## 2024-05-12 RX ADMIN — LISINOPRIL 40 MG: 20 TABLET ORAL at 16:53

## 2024-05-12 RX ADMIN — ATORVASTATIN CALCIUM 40 MG: 40 TABLET, FILM COATED ORAL at 20:57

## 2024-05-12 RX ADMIN — HUMAN INSULIN 8 UNITS: 100 INJECTION, SOLUTION SUBCUTANEOUS at 14:09

## 2024-05-12 RX ADMIN — TAMSULOSIN HYDROCHLORIDE 0.4 MG: 0.4 CAPSULE ORAL at 20:57

## 2024-05-12 RX ADMIN — TRAZODONE HYDROCHLORIDE 150 MG: 50 TABLET ORAL at 20:56

## 2024-05-12 RX ADMIN — LORAZEPAM 0.5 MG: 2 INJECTION INTRAMUSCULAR; INTRAVENOUS at 14:03

## 2024-05-12 RX ADMIN — AMLODIPINE BESYLATE 10 MG: 5 TABLET ORAL at 16:53

## 2024-05-12 RX ADMIN — HEPARIN SODIUM 950 UNITS/HR: 10000 INJECTION, SOLUTION INTRAVENOUS at 16:54

## 2024-05-12 ASSESSMENT — ACTIVITIES OF DAILY LIVING (ADL)
ADLS_ACUITY_SCORE: 36
ADLS_ACUITY_SCORE: 35
ADLS_ACUITY_SCORE: 36
ADLS_ACUITY_SCORE: 35
ADLS_ACUITY_SCORE: 36
ADLS_ACUITY_SCORE: 33
ADLS_ACUITY_SCORE: 36
ADLS_ACUITY_SCORE: 36
ADLS_ACUITY_SCORE: 35
ADLS_ACUITY_SCORE: 35
ADLS_ACUITY_SCORE: 36
ADLS_ACUITY_SCORE: 36

## 2024-05-12 ASSESSMENT — ENCOUNTER SYMPTOMS
MYALGIAS: 1
COUGH: 1
SHORTNESS OF BREATH: 0
VOMITING: 1
HEMATURIA: 0
NAUSEA: 1

## 2024-05-12 ASSESSMENT — COLUMBIA-SUICIDE SEVERITY RATING SCALE - C-SSRS
2. HAVE YOU ACTUALLY HAD ANY THOUGHTS OF KILLING YOURSELF IN THE PAST MONTH?: NO
6. HAVE YOU EVER DONE ANYTHING, STARTED TO DO ANYTHING, OR PREPARED TO DO ANYTHING TO END YOUR LIFE?: NO
1. IN THE PAST MONTH, HAVE YOU WISHED YOU WERE DEAD OR WISHED YOU COULD GO TO SLEEP AND NOT WAKE UP?: NO

## 2024-05-12 NOTE — PHARMACY-ADMISSION MEDICATION HISTORY
Admission medication history completed at [facility]. Please see Pharmacist Admission Medication History note from 05/12/2024.

## 2024-05-12 NOTE — PHARMACY-ADMISSION MEDICATION HISTORY
Pharmacist Admission Medication History    Admission medication history is complete. The information provided in this note is only as accurate as the sources available at the time of the update.    Information Source(s): Patient and CareEverywhere/SureScripts via in-person    Pertinent Information: N/A    Changes made to PTA medication list:  Added: lipitor  Deleted: compazine, zofran, cialis  Changed: prozac dose, pantoprazole freq, buspar freq    Allergies reviewed with patient and updates made in EHR: yes    Medication History Completed By: Otto Dunham [facility] 5/12/2024 1:02 PM    PTA Med List   Medication Sig Last Dose    amLODIPine (NORVASC) 10 MG tablet Take 1 tablet (10 mg) by mouth daily 5/11/2024 at AM    atorvastatin (LIPITOR) 40 MG tablet Take 40 mg by mouth daily 5/11/2024 at AM    busPIRone (BUSPAR) 10 MG tablet Take 10 mg by mouth daily 5/11/2024 at AM    busPIRone (BUSPAR) 10 MG tablet Take 10 mg by mouth daily as needed (anxiety) For afternoon/evening if needed. Unknown    FLUoxetine (PROZAC) 20 MG capsule Take 20 mg by mouth daily 5/11/2024 at AM    glipiZIDE (GLUCOTROL XL) 5 MG 24 hr tablet Take 1 tablet (5 mg) by mouth 2 times daily Take 1 tablet twice daily for diabetes 5/11/2024 at PM    lisinopril (ZESTRIL) 40 MG tablet Take 1 tablet (40 mg) by mouth daily 5/11/2024 at AM    pantoprazole (PROTONIX) 40 MG EC tablet Take 40 mg by mouth daily 5/11/2024 at AM    tamsulosin (FLOMAX) 0.4 MG capsule TAKE 1 CAPSULE BY MOUTH EVERY DAY 5/11/2024 at AM    traZODone (DESYREL) 50 MG tablet Take 3 tablets (150 mg) by mouth At Bedtime 5/11/2024 at PM

## 2024-05-12 NOTE — ED PROVIDER NOTES
EMERGENCY DEPARTMENT ENCOUNTER      NAME: Drew Monsivais  AGE: 53 year old male  YOB: 1970  MRN: [id number]  EVALUATION DATE & TIME: No admission date for patient encounter.    PCP: Haroon Farmer    ED PROVIDER: Tony Jimenez M.D.    Chief Complaint   Patient presents with    Chest Pain       FINAL IMPRESSION:  1. Type 2 diabetes mellitus with hyperglycemia, without long-term current use of insulin (H)    2. Elevated lipase    3. NSTEMI (non-ST elevated myocardial infarction) (H)        ED COURSE & MEDICAL DECISION MAKING:    Pertinent Labs & Imaging studies independently interpreted by me. (See chart for details)  Reviewed most recent GI evaluation February 2024 for pancreatitis and abdominal pain, at that time dietary recommendations were made, abdominal MRI was ordered but it does not appear the patient followed-up for this.    ED Course as of 05/12/24 1411   Sun May 12, 2024   0925 EKG:    Performed at: 9:19 AM  Impression: Normal EKG  Rate: 81  Rhythm: Sinus  Axis: Left  ME Interval: 186  QRS Interval: 98  QTc Interval: 432  ST Changes: No acute ischemic changes  Comparison: February 2023, no change     0932 Seen and examined, presents with right-sided chest pain that occurred last night when laying down accompanied by some tightness in the shoulders, set up and vomited, felt better and went back to sleep.  Woke up about 7:40 AM with similar symptoms but no vomiting today.  Initially seen at urgent care and was sent to the emergency department.  EKG from urgent care reviewed as well as EKG done in the emergency department, both of which did not show ischemic changes and are reassuring.  Patient with history of pancreatitis, says this feels different.  Also history of kidney stones and says he passed a kidney stone over the weekend.  On exam here, vitally stable, no abdominal tenderness, no reproducible chest pain.  Acute coronary syndrome possible and patient does have risk factors,  no evidence for STEMI, labs are ordered.  D-dimer was ordered to evaluate for pulmonary embolism, consider CT PE study depending on results and clinical course.   1013 Labs ordered and independently interpreted by me with normal CBC, negative D-dimer.   1023 Labs ordered and independently interpreted by me with normal hepatic panel, slightly elevated lipase, troponin 54 which is indeterminate range will be repeated in 2 hours.   1034 Chest xray independently interpreted by me negative for acute findings.   1100 Glucose noted to be elevated, no metabolic acidosis and no urine ketones, no evidence of DKA.  Updated with findings so far, diagnosis and plan.  Repeat troponin will be done and anticipate discharge if this is stable.   1108 I rechecked and updated the patient.  Repeat troponin will be done at 1145 and if this is stable, patient to be discharged   1224 Repeat troponin is 110, significant change from prior.  Will discuss with cardiology, will plan to start heparin.  Patient remains pain-free in the emergency department.   1228 I rechecked and updated the patient. I discussed admission with the patient. Patient is agreeable. All questions answered fully.   1233 Spoke with Dr. Mckinney, Cardiology who agrees with plan for heparinization, recommends transfer to [facility] if able.  If not, patient can be admitted here overnight and transferred tomorrow for angiogram.   1244 Updated patient with findings and plan.  He is quite anxious and has a lot of questions about his diagnosis, remains without chest pain.   [facility] who accepts the patient for transfer     At the conclusion of the encounter I discussed the results of all of the tests and the disposition. The questions were answered. The patient or family acknowledged understanding and was agreeable with the care plan.     Medical Decision Making  Obtained supplemental history:Supplemental history obtained?:  No  Reviewed external records: External records reviewed?: Documented in chart  Care impacted by chronic illness:Diabetes, Hypertension, and Mental Health  Care significantly affected by social determinants of health:Access to Affordable Health Care and Medication Noncompliance  Did you consider but not order tests?: Work up considered but not performed and documented in chart, if applicable  Did you interpret images independently?: Independent interpretation of ECG and images noted in documentation, when applicable.  Consultation discussion with other provider:Did you involve another provider (consultant, , pharmacy, etc.)?: I discussed the care with another health care provider, see documentation for details.  Admit.    PROCEDURES:     Critical Care     Performed by: Dr Tony Jimenez  Total critical care time: 170 minutes  Critical care was necessary to treat or prevent imminent or life-threatening deterioration of the following conditions: NSTEMI, heparin  Critical care was time spent personally by me on the following activities: development of treatment plan with patient or surrogate, discussions with consultants, examination of patient, evaluation of patient's response to treatment, obtaining history from patient or surrogate, ordering and performing treatments and interventions, ordering and review of laboratory studies, ordering and review of radiographic studies, re-evaluation of patient's condition and monitoring for potential decompensation.  Critical care time was exclusive of separately billable procedures and treating other patients.      MEDICATIONS GIVEN IN THE EMERGENCY:  Medications   heparin 25,000 units in 0.45% NaCl 250 mL ANTICOAGULANT infusion (950 Units/hr Intravenous $New Bag 5/12/24 1305)   heparin loading dose for LOW INTENSITY TREATMENT * Give BEFORE starting heparin infusion (4,750 Units Intravenous $Given 5/12/24 1303)   insulin regular (HumuLIN R,NovoLIN R) PEN for subcutaneous use  (8 Units Subcutaneous $Given 5/12/24 2225)   LORazepam (ATIVAN) injection 0.5 mg (0.5 mg Intravenous $Given 5/12/24 1400)       NEW PRESCRIPTIONS STARTED AT TODAY'S ER VISIT  New Prescriptions    No medications on file       =================================================================    HPI    Patient information was obtained from: the patient       Drew Monsivais is a 53 year old male with a pertinent history of hypertension, type 2 diabetes mellitus, anxiety, and GERD who presents to this ED via EMS for evaluation of chest pain.     Per Chart Review,  5/12/24 The patient presented to [facility] for evaluation of one day of gradually worsening chest pain. He described this pain as a heaviness. He endorsed a cough, nausea, and vomiting on 5/11. Upon arrival the patient was hypertensive in the 160s over 100s, and was told to present to the ED for further evaluation. His EKG showed sinus rhythm with left axis deviation.      The patient reports that when he laid down for bed last night (5/11) he had chest pain and his left arm became heavy. He sat up, which improved his symptoms, but was nauseated and had an episode of vomiting. As he continued sitting up he felt a bit better, and was able to go to bed. This morning when he woke up around 0740 (~2 hours ago) his chest pain had returned. He presented to clinic where they gave him a nitroglycerin and Aspirin, which completely resolved his pain. He notes a cough, but it is unclear if this is an ongoing or new issue. He states that his chest pain this morning did not feel like a panic attack or pancreatitis. He also reports that he had a kidney stone two days ago (5/10), as he had penile pain which is typical for him when he has a kidney stone. He has not seen the stone passed, but his pain has resolved. He is taking his lisinopril. The patient denies shortness of breath, hematuria, and any other symptoms or complaints at this time.     ScHx: The  patient works in a warehouse. He denies smoking or drinking alcohol.    REVIEW OF SYSTEMS   Review of Systems   Respiratory:  Positive for cough. Negative for shortness of breath.    Cardiovascular:  Positive for chest pain.   Gastrointestinal:  Positive for nausea and vomiting.   Genitourinary:  Positive for penile pain (resolved). Negative for hematuria.   Musculoskeletal:  Positive for myalgias (left arm).   All other systems reviewed and are negative.     All other systems reviewed and negative    PAST MEDICAL HISTORY:  Past Medical History:   Diagnosis Date    Calculus of kidney     at least 4 episodes most recent 2012 once surgically removed Stones present both kidneys      Closed fracture of metatarsal bone(s)          Diabetes (H)     Headache     Frontal-?migraine Triggers: no obvious,  excedrin light senstive Chiro     Hypertension     PONV (postoperative nausea and vomiting)        PAST SURGICAL HISTORY:  Past Surgical History:   Procedure Laterality Date    ESOPHAGOSCOPY, GASTROSCOPY, DUODENOSCOPY (EGD), COMBINED N/A 2/15/2023    Procedure: ESOPHAGOGASTRODUODENOSCOPY with BIOPSY;  Surgeon: Brandt Dunog MD;  Location: [facility] Main OR       CURRENT MEDICATIONS:    Current Facility-Administered Medications   Medication Dose Route Frequency Provider Last Rate Last Admin    heparin 25,000 units in 0.45% NaCl 250 mL ANTICOAGULANT infusion  0-5,000 Units/hr Intravenous Continuous Tony Jimenez MD 9.5 mL/hr at 05/12/24 1305 950 Units/hr at 05/12/24 1305     Current Outpatient Medications   Medication Sig Dispense Refill    amLODIPine (NORVASC) 10 MG tablet Take 1 tablet (10 mg) by mouth daily 30 tablet 0    atorvastatin (LIPITOR) 40 MG tablet Take 40 mg by mouth daily      busPIRone (BUSPAR) 10 MG tablet Take 10 mg by mouth daily      busPIRone (BUSPAR) 10 MG tablet Take 10 mg by mouth daily as needed (anxiety) For afternoon/evening if needed.      FLUoxetine (PROZAC) 20 MG capsule Take 20 mg by mouth  daily      glipiZIDE (GLUCOTROL XL) 5 MG 24 hr tablet Take 1 tablet (5 mg) by mouth 2 times daily Take 1 tablet twice daily for diabetes 180 tablet 0    lisinopril (ZESTRIL) 40 MG tablet Take 1 tablet (40 mg) by mouth daily 30 tablet 0    pantoprazole (PROTONIX) 40 MG EC tablet Take 40 mg by mouth daily      tamsulosin (FLOMAX) 0.4 MG capsule TAKE 1 CAPSULE BY MOUTH EVERY DAY 90 capsule 0    traZODone (DESYREL) 50 MG tablet Take 3 tablets (150 mg) by mouth At Bedtime 40 tablet 0       ALLERGIES:  No Known Allergies    FAMILY HISTORY:  Family History   Problem Relation Age of Onset    Cerebrovascular Disease Mother     Aneurysm Father        SOCIAL HISTORY:   Social History     Socioeconomic History    Marital status:     Number of children: 1   Occupational History    Occupation: "[employer]s   Tobacco Use    Smoking status: Former     Types: Cigarettes    Smokeless tobacco: Never    Tobacco comments:     [employer]   Vaping Use    Vaping status: Never Used   Substance and Sexual Activity    Alcohol use: No    Drug use: Yes     Comment: Drug use: Cannabis    Sexual activity: Yes     Partners: Female   Social History Narrative    Diet-            Exercise-work is physical          No health insurance       VITALS:  BP (!) 148/88   Pulse 68   Temp 98.5  F (36.9  C) (Oral)   Resp 16   Wt 78.9 kg (174 lb)   SpO2 98%   BMI 26.46 kg/m      PHYSICAL EXAM:  Physical Exam  Vitals and nursing note reviewed.   Constitutional:       Appearance: Normal appearance.   HENT:      Head: Normocephalic and atraumatic.      Right Ear: External ear normal.      Left Ear: External ear normal.      Nose: Nose normal.      Mouth/Throat:      Mouth: Mucous membranes are moist.   Eyes:      Extraocular Movements: Extraocular movements intact.      Conjunctiva/sclera: Conjunctivae normal.      Pupils: Pupils are equal, round, and reactive to light.   Cardiovascular:      Rate and Rhythm: Normal rate and regular rhythm.    Pulmonary:      Effort: Pulmonary effort is normal.      Breath sounds: Normal breath sounds. No wheezing or rales.   Abdominal:      General: Abdomen is flat. There is no distension.      Palpations: Abdomen is soft.      Tenderness: There is no abdominal tenderness. There is no guarding.   Musculoskeletal:         General: Normal range of motion.      Cervical back: Normal range of motion and neck supple.      Right lower leg: No edema.      Left lower leg: No edema.   Lymphadenopathy:      Cervical: No cervical adenopathy.   Skin:     General: Skin is warm and dry.   Neurological:      General: No focal deficit present.      Mental Status: He is alert and oriented to person, place, and time. Mental status is at baseline.      Comments: No gross focal neurologic deficits   Psychiatric:         Mood and Affect: Mood normal.         Behavior: Behavior normal.         Thought Content: Thought content normal.          LAB:  All pertinent labs reviewed and interpreted.  Results for orders placed or performed during the hospital encounter of 05/12/24   Chest XR,  PA & LAT    Impression    IMPRESSION: Negative chest.       Basic metabolic panel   Result Value Ref Range    Sodium 133 (L) 135 - 145 mmol/L    Potassium 4.5 3.4 - 5.3 mmol/L    Chloride 100 98 - 107 mmol/L    Carbon Dioxide (CO2) 25 22 - 29 mmol/L    Anion Gap 8 7 - 15 mmol/L    Urea Nitrogen 16.3 6.0 - 20.0 mg/dL    Creatinine 1.11 0.67 - 1.17 mg/dL    GFR Estimate 78 >60 mL/min/1.73m2    Calcium 8.9 8.6 - 10.0 mg/dL    Glucose 329 (H) 70 - 99 mg/dL   Result Value Ref Range    Lipase 163 (H) 13 - 60 U/L   Hepatic function panel   Result Value Ref Range    Protein Total 6.6 6.4 - 8.3 g/dL    Albumin 4.2 3.5 - 5.2 g/dL    Bilirubin Total 0.4 <=1.2 mg/dL    Alkaline Phosphatase 95 40 - 150 U/L    AST 27 0 - 45 U/L    ALT 32 0 - 70 U/L    Bilirubin Direct <0.20 0.00 - 0.30 mg/dL   D dimer quantitative   Result Value Ref Range    D-Dimer Quantitative 0.32 0.00  - 0.50 ug/mL FEU   Result Value Ref Range    Troponin T, High Sensitivity 54 (H) <=22 ng/L   Result Value Ref Range    Magnesium 2.0 1.7 - 2.3 mg/dL   Nt probnp inpatient (BNP)   Result Value Ref Range    N terminal Pro BNP Inpatient <36 0 - 900 pg/mL   CBC with platelets and differential   Result Value Ref Range    WBC Count 7.2 4.0 - 11.0 10e3/uL    RBC Count 5.25 4.40 - 5.90 10e6/uL    Hemoglobin 15.0 13.3 - 17.7 g/dL    Hematocrit 42.4 40.0 - 53.0 %    MCV 81 78 - 100 fL    MCH 28.6 26.5 - 33.0 pg    MCHC 35.4 31.5 - 36.5 g/dL    RDW 11.9 10.0 - 15.0 %    Platelet Count 207 150 - 450 10e3/uL    % Neutrophils 65 %    % Lymphocytes 24 %    % Monocytes 8 %    % Eosinophils 1 %    % Basophils 1 %    % Immature Granulocytes 1 %    NRBCs per 100 WBC 0 <1 /100    Absolute Neutrophils 4.7 1.6 - 8.3 10e3/uL    Absolute Lymphocytes 1.8 0.8 - 5.3 10e3/uL    Absolute Monocytes 0.6 0.0 - 1.3 10e3/uL    Absolute Eosinophils 0.1 0.0 - 0.7 10e3/uL    Absolute Basophils 0.0 0.0 - 0.2 10e3/uL    Absolute Immature Granulocytes 0.1 <=0.4 10e3/uL    Absolute NRBCs 0.0 10e3/uL   UA with Microscopic reflex to Culture    Specimen: Urine, Clean Catch   Result Value Ref Range    Color Urine Colorless Colorless, Straw, Light Yellow, Yellow    Appearance Urine Clear Clear    Glucose Urine >1000 (A) Negative mg/dL    Bilirubin Urine Negative Negative    Ketones Urine Negative Negative mg/dL    Specific Gravity Urine 1.022 1.001 - 1.030    Blood Urine Negative Negative    pH Urine 7.0 5.0 - 7.0    Protein Albumin Urine Negative Negative mg/dL    Urobilinogen Urine <2.0 <2.0 mg/dL    Nitrite Urine Negative Negative    Leukocyte Esterase Urine Negative Negative    Bacteria Urine Few (A) None Seen /HPF    Mucus Urine Present (A) None Seen /LPF    RBC Urine <1 <=2 /HPF    WBC Urine <1 <=5 /HPF   Result Value Ref Range    Troponin T, High Sensitivity 110 (HH) <=22 ng/L       RADIOLOGY:  Reviewed all pertinent imaging. Please see official  radiology report.  Chest XR,  PA & LAT   Final Result   IMPRESSION: Negative chest.                I, Nicky Kathleen, am serving as a scribe to document services personally performed by Dr. Jimenez based on my observation and the provider's statements to me. I, Tony Jimenez MD attest that Nicky Kathleen is acting in a scribe capacity, has observed my performance of the services and has documented them in accordance with my direction.    Tony Jimenez M.D.  Emergency Medicine  [facility] EMERGENCY ROOM  [address] [address]  [phone]  Dept: [phone]       Tony Jimenez MD  05/12/24 1417

## 2024-05-12 NOTE — ED TRIAGE NOTES
Pt had chest pain last night.  He had nausea and vomited and had pain in B shoulders and his left arm was slightly numb.  He went to bed and woke with slight discomfort this am.  He takes meds for HTN and anxiety.  Has not taken his meds today.  Denies any precipitating anxiety factors recently.  States he went to  and EMS was called and pt came to ER.  He received 324mg ASA and #1 NTG today.  Unsure if NTG helped his pain because he states it was so hectic at that time he did not know what he was feeling right then.     Triage Assessment (Adult)       Row Name 05/12/24 0905          Triage Assessment    Airway WDL WDL        Respiratory WDL    Respiratory WDL WDL        Skin Circulation/Temperature WDL    Skin Circulation/Temperature WDL WDL        Cardiac WDL    Cardiac WDL WDL        Peripheral/Neurovascular WDL    Peripheral Neurovascular WDL WDL        Cognitive/Neuro/Behavioral WDL    Cognitive/Neuro/Behavioral WDL WDL

## 2024-05-12 NOTE — H&P
[facility]    History and Physical - Hospitalist Service       Date of Admission:  (Not on file)    Assessment & Plan      Drew Monsivais is a 53 year old male with PMH of kidney stones, hepatic steatosis, DM2, hypertension, BPH, GERD, anxiety who presented with chest tightness and elevated troponin     Non-STEMI.  On IV heparin.  Cardiology consult.  N.p.o. after midnight in anticipation of coronary angiogram.  Start aspirin, continue statin, check fasting lipids and echocardiogram  DM2 without long-term insulin use.  Check hemoglobin A1c.  Monitor blood sugars with meals and at bedtime, insulin sliding scale if needed.  Hold PTA glipizide  Essential hypertension.  Continue PTA medications  Hepatic steatosis.  Lifestyle modifications  Kidney stones.  Usually able to pass  History of pancreatitis x 2.  Etiology was not clear per the patient.  Lipase is elevated 163 however symptoms are not consistent with pancreatitis        Diet: NPO per Anesthesia Guidelines for Procedure/Surgery Except for: Meds  Combination Diet Moderate Consistent Carb (60 g CHO per Meal) Diet; Low Saturated Fat Na <2400mg Diet, No Caffeine Diet  DVT Prophylaxis: IV heparin  Still Catheter: Not present  Lines: None     Cardiac Monitoring: ACTIVE order. Indication: AMI (NSTEMI/ STEMI) (48 hours)  Code Status: Full Code    Clinically Significant Risk Factors Present on Admission                  # Hypertension: Noted on problem list                 Disposition Plan     Medically Ready for Discharge: Anticipated in 2-4 Days           Grecia Granger MD  Hospitalist Service  [facility]  Securely message with [employer] (more info)  Text page via [employer] Paging/Directory     ______________________________________________________________________    Chief Complaint   Chest tightness, arm numbness    History is obtained from the patient    History of Present Illness   Drew Monsivais is a 53 year  old male with PMH of kidney stones, hepatic steatosis, DM2, hypertension, BPH, GERD, anxiety who presented with the above complaints    Patient reports that last night he was walking briskly to the area where he could see [place] and felt winded.  After he came back from a walk he started feeling tightness in his chest, shoulders, his arms felt numb.  He was also feeling dizzy and vomited.  He was able to fall asleep however he was still having the same symptoms when he woke up this morning at around 7:40 AM.  He went to [place] urgent care where he received nitroglycerin and aspirin which completely resolved his pain and he was sent to the ER for further evaluation.  In the ER [facility] vital signs were stable.  EKG with poor RV progression antterior leads but no ST elevations.     First troponin 54, second troponin 110.  Blood glucose 329, sodium 133, lipase 163.  Patient was started on IV heparin and transferred to [facility] for further management.  At the time of my evaluation patient denies any symptoms, just feels hungry.    He denies similar symptoms in the past.  He walks on a regular basis.  He does not have history of MI.  Stress echo in 2021 was negative.  He only smoked in college for some time.  No premature history of CAD in the family.    He has history of recurrent kidney stones and was passing kidney stone on Friday.      Past Medical History    Past Medical History:   Diagnosis Date    Calculus of kidney     at least 4 episodes most recent 2012 once surgically removed Stones present both kidneys      Closed fracture of metatarsal bone(s)          Diabetes (H)     Headache     Frontal-?migraine Triggers: no obvious,  excedrin light senstive Chiro     Hypertension     PONV (postoperative nausea and vomiting)        Past Surgical History   Past Surgical History:   Procedure Laterality Date    ESOPHAGOSCOPY, GASTROSCOPY, DUODENOSCOPY (EGD), COMBINED N/A 2/15/2023     Procedure: ESOPHAGOGASTRODUODENOSCOPY with BIOPSY;  Surgeon: Brandt Duong MD;  Location: [facility] Main OR       Prior to Admission Medications   Prior to Admission Medications   Prescriptions Last Dose Informant Patient Reported? Taking?   FLUoxetine (PROZAC) 20 MG capsule   Yes No   Sig: Take 20 mg by mouth daily   amLODIPine (NORVASC) 10 MG tablet   No No   Sig: Take 1 tablet (10 mg) by mouth daily   atorvastatin (LIPITOR) 40 MG tablet   Yes No   Sig: Take 40 mg by mouth daily   busPIRone (BUSPAR) 10 MG tablet   Yes No   Sig: Take 10 mg by mouth daily   busPIRone (BUSPAR) 10 MG tablet   Yes No   Sig: Take 10 mg by mouth daily as needed (anxiety) For afternoon/evening if needed.   glipiZIDE (GLUCOTROL XL) 5 MG 24 hr tablet   No No   Sig: Take 1 tablet (5 mg) by mouth 2 times daily Take 1 tablet twice daily for diabetes   lisinopril (ZESTRIL) 40 MG tablet   No No   Sig: Take 1 tablet (40 mg) by mouth daily   pantoprazole (PROTONIX) 40 MG EC tablet   Yes No   Sig: Take 40 mg by mouth daily   tamsulosin (FLOMAX) 0.4 MG capsule   No No   Sig: TAKE 1 CAPSULE BY MOUTH EVERY DAY   traZODone (DESYREL) 50 MG tablet   No No   Sig: Take 3 tablets (150 mg) by mouth At Bedtime      Facility-Administered Medications: None        Review of Systems    The 10 point Review of Systems is negative other than noted in the HPI or here.     Social History   I have reviewed this patient's social history and updated it with pertinent information if needed.  Social History     Tobacco Use    Smoking status: Former     Types: Cigarettes    Smokeless tobacco: Never    Tobacco comments:     Hedgeable   Vaping Use    Vaping status: Never Used   Substance Use Topics    Alcohol use: No    Drug use: Yes     Comment: Drug use: Cannabis        Physical Exam   Vital Signs:     BP: (!) 145/90 Pulse: 74   Resp: 18 SpO2: 97 % O2 Device: None (Room air)    Weight: 0 lbs 0 oz    General Appearance: No acute distress, appears anxious  Respiratory:  Respirations unlabored, lungs are clear  Cardiovascular: Regular rate and rhythm.  Normal S1-S2  GI: Abdomen soft and nontender  Skin: No rashes on exposed areas  Other: Awake and alert, nonfocal    Medical Decision Making       75 MINUTES SPENT BY ME on the date of service doing chart review, history, exam, documentation & further activities per the note.  MANAGEMENT DISCUSSED with the following over the past 24 hours: Patient and nursing staff       Data     I have personally reviewed the following data over the past 24 hrs:    7.8  \   15.1   / 212     133 (L) 100 16.3 /  223 (H)   4.5 25 1.11 \     ALT: 32 AST: 27 AP: 95 TBILI: 0.4   ALB: 4.2 TOT PROTEIN: 6.6 LIPASE: 163 (H)     Trop: 183 (HH) BNP: <36     INR:  N/A PTT:  N/A   D-dimer:  0.32 Fibrinogen:  N/A       Imaging results reviewed over the past 24 hrs:   Recent Results (from the past 24 hour(s))   Chest XR,  PA & LAT    Narrative    EXAM: XR CHEST 2 VIEWS  LOCATION: [facility]  DATE: 05/12/2024    INDICATION: Chest pain, right side.  COMPARISON: None.      Impression    IMPRESSION: Negative chest.

## 2024-05-13 ENCOUNTER — APPOINTMENT (OUTPATIENT)
Dept: CARDIOLOGY | Facility: HOSPITAL | Age: 54
DRG: 233 | End: 2024-05-13
Attending: INTERNAL MEDICINE
Payer: COMMERCIAL

## 2024-05-13 ENCOUNTER — ANESTHESIA EVENT (OUTPATIENT)
Dept: SURGERY | Facility: HOSPITAL | Age: 54
DRG: 233 | End: 2024-05-13
Payer: COMMERCIAL

## 2024-05-13 ENCOUNTER — APPOINTMENT (OUTPATIENT)
Dept: ULTRASOUND IMAGING | Facility: HOSPITAL | Age: 54
DRG: 233 | End: 2024-05-13
Attending: STUDENT IN AN ORGANIZED HEALTH CARE EDUCATION/TRAINING PROGRAM
Payer: COMMERCIAL

## 2024-05-13 LAB
ABO/RH(D): NORMAL
ANTIBODY SCREEN: NEGATIVE
CHOLEST SERPL-MCNC: 143 MG/DL
GLUCOSE BLDC GLUCOMTR-MCNC: 207 MG/DL (ref 70–99)
GLUCOSE BLDC GLUCOMTR-MCNC: 229 MG/DL (ref 70–99)
GLUCOSE BLDC GLUCOMTR-MCNC: 245 MG/DL (ref 70–99)
GLUCOSE BLDC GLUCOMTR-MCNC: 251 MG/DL (ref 70–99)
HDLC SERPL-MCNC: 37 MG/DL
HOLD SPECIMEN: NORMAL
LDLC SERPL CALC-MCNC: 65 MG/DL
LVEF ECHO: NORMAL
NONHDLC SERPL-MCNC: 106 MG/DL
SPECIMEN EXPIRATION DATE: NORMAL
TRIGL SERPL-MCNC: 206 MG/DL
UFH PPP CHRO-ACNC: 0.22 IU/ML
UFH PPP CHRO-ACNC: 0.36 IU/ML

## 2024-05-13 PROCEDURE — 93458 L HRT ARTERY/VENTRICLE ANGIO: CPT | Performed by: INTERNAL MEDICINE

## 2024-05-13 PROCEDURE — 36415 COLL VENOUS BLD VENIPUNCTURE: CPT | Performed by: INTERNAL MEDICINE

## 2024-05-13 PROCEDURE — 250N000011 HC RX IP 250 OP 636: Performed by: INTERNAL MEDICINE

## 2024-05-13 PROCEDURE — 250N000012 HC RX MED GY IP 250 OP 636 PS 637: Performed by: INTERNAL MEDICINE

## 2024-05-13 PROCEDURE — 86900 BLOOD TYPING SEROLOGIC ABO: CPT | Performed by: GENERAL ACUTE CARE HOSPITAL

## 2024-05-13 PROCEDURE — 93880 EXTRACRANIAL BILAT STUDY: CPT

## 2024-05-13 PROCEDURE — 85520 HEPARIN ASSAY: CPT | Performed by: INTERNAL MEDICINE

## 2024-05-13 PROCEDURE — 93306 TTE W/DOPPLER COMPLETE: CPT | Mod: 26 | Performed by: INTERNAL MEDICINE

## 2024-05-13 PROCEDURE — 99223 1ST HOSP IP/OBS HIGH 75: CPT | Performed by: GENERAL ACUTE CARE HOSPITAL

## 2024-05-13 PROCEDURE — 93306 TTE W/DOPPLER COMPLETE: CPT

## 2024-05-13 PROCEDURE — 80061 LIPID PANEL: CPT | Performed by: INTERNAL MEDICINE

## 2024-05-13 PROCEDURE — 255N000002 HC RX 255 OP 636: Performed by: INTERNAL MEDICINE

## 2024-05-13 PROCEDURE — 93458 L HRT ARTERY/VENTRICLE ANGIO: CPT | Mod: 26 | Performed by: INTERNAL MEDICINE

## 2024-05-13 PROCEDURE — C1887 CATHETER, GUIDING: HCPCS | Performed by: INTERNAL MEDICINE

## 2024-05-13 PROCEDURE — 250N000011 HC RX IP 250 OP 636: Performed by: GENERAL ACUTE CARE HOSPITAL

## 2024-05-13 PROCEDURE — 250N000009 HC RX 250: Performed by: INTERNAL MEDICINE

## 2024-05-13 PROCEDURE — 272N000001 HC OR GENERAL SUPPLY STERILE: Performed by: INTERNAL MEDICINE

## 2024-05-13 PROCEDURE — 250N000013 HC RX MED GY IP 250 OP 250 PS 637: Performed by: INTERNAL MEDICINE

## 2024-05-13 PROCEDURE — 99152 MOD SED SAME PHYS/QHP 5/>YRS: CPT | Performed by: INTERNAL MEDICINE

## 2024-05-13 PROCEDURE — 86923 COMPATIBILITY TEST ELECTRIC: CPT

## 2024-05-13 PROCEDURE — 99233 SBSQ HOSP IP/OBS HIGH 50: CPT | Performed by: STUDENT IN AN ORGANIZED HEALTH CARE EDUCATION/TRAINING PROGRAM

## 2024-05-13 PROCEDURE — B211YZZ FLUOROSCOPY OF MULTIPLE CORONARY ARTERIES USING OTHER CONTRAST: ICD-10-PCS | Performed by: INTERNAL MEDICINE

## 2024-05-13 PROCEDURE — C1894 INTRO/SHEATH, NON-LASER: HCPCS | Performed by: INTERNAL MEDICINE

## 2024-05-13 PROCEDURE — 4A023N7 MEASUREMENT OF CARDIAC SAMPLING AND PRESSURE, LEFT HEART, PERCUTANEOUS APPROACH: ICD-10-PCS | Performed by: INTERNAL MEDICINE

## 2024-05-13 PROCEDURE — 86923 COMPATIBILITY TEST ELECTRIC: CPT | Performed by: ANESTHESIOLOGY

## 2024-05-13 PROCEDURE — 250N000013 HC RX MED GY IP 250 OP 250 PS 637: Performed by: GENERAL ACUTE CARE HOSPITAL

## 2024-05-13 PROCEDURE — 99223 1ST HOSP IP/OBS HIGH 75: CPT | Mod: 57 | Performed by: STUDENT IN AN ORGANIZED HEALTH CARE EDUCATION/TRAINING PROGRAM

## 2024-05-13 PROCEDURE — 120N000013 HC R&B IMCU

## 2024-05-13 RX ORDER — FENTANYL CITRATE 50 UG/ML
25 INJECTION, SOLUTION INTRAMUSCULAR; INTRAVENOUS
Status: DISCONTINUED | OUTPATIENT
Start: 2024-05-13 | End: 2024-05-14

## 2024-05-13 RX ORDER — METOPROLOL TARTRATE 25 MG/1
25 TABLET, FILM COATED ORAL 2 TIMES DAILY
Status: DISCONTINUED | OUTPATIENT
Start: 2024-05-13 | End: 2024-05-14

## 2024-05-13 RX ORDER — ATROPINE SULFATE 0.1 MG/ML
0.5 INJECTION INTRAVENOUS
Status: ACTIVE | OUTPATIENT
Start: 2024-05-13 | End: 2024-05-13

## 2024-05-13 RX ORDER — SODIUM CHLORIDE 9 MG/ML
INJECTION, SOLUTION INTRAVENOUS CONTINUOUS
Status: DISCONTINUED | OUTPATIENT
Start: 2024-05-13 | End: 2024-05-13 | Stop reason: HOSPADM

## 2024-05-13 RX ORDER — ACETAMINOPHEN 325 MG/1
650 TABLET ORAL EVERY 4 HOURS PRN
Status: DISCONTINUED | OUTPATIENT
Start: 2024-05-13 | End: 2024-05-14

## 2024-05-13 RX ORDER — OXYCODONE HYDROCHLORIDE 5 MG/1
5 TABLET ORAL EVERY 4 HOURS PRN
Status: DISCONTINUED | OUTPATIENT
Start: 2024-05-13 | End: 2024-05-14

## 2024-05-13 RX ORDER — NALOXONE HYDROCHLORIDE 0.4 MG/ML
0.2 INJECTION, SOLUTION INTRAMUSCULAR; INTRAVENOUS; SUBCUTANEOUS
Status: ACTIVE | OUTPATIENT
Start: 2024-05-13 | End: 2024-05-13

## 2024-05-13 RX ORDER — FENTANYL CITRATE 50 UG/ML
INJECTION, SOLUTION INTRAMUSCULAR; INTRAVENOUS
Status: DISCONTINUED | OUTPATIENT
Start: 2024-05-13 | End: 2024-05-13 | Stop reason: HOSPADM

## 2024-05-13 RX ORDER — DIAZEPAM 5 MG
10 TABLET ORAL
Status: COMPLETED | OUTPATIENT
Start: 2024-05-13 | End: 2024-05-13

## 2024-05-13 RX ORDER — ATORVASTATIN CALCIUM 40 MG/1
80 TABLET, FILM COATED ORAL EVERY EVENING
Status: DISCONTINUED | OUTPATIENT
Start: 2024-05-13 | End: 2024-05-21 | Stop reason: HOSPADM

## 2024-05-13 RX ORDER — ASPIRIN 81 MG/1
243 TABLET, CHEWABLE ORAL ONCE
Status: COMPLETED | OUTPATIENT
Start: 2024-05-13 | End: 2024-05-13

## 2024-05-13 RX ORDER — FLUMAZENIL 0.1 MG/ML
0.2 INJECTION, SOLUTION INTRAVENOUS
Status: ACTIVE | OUTPATIENT
Start: 2024-05-13 | End: 2024-05-13

## 2024-05-13 RX ORDER — FENTANYL CITRATE 50 UG/ML
25 INJECTION, SOLUTION INTRAMUSCULAR; INTRAVENOUS
Status: DISCONTINUED | OUTPATIENT
Start: 2024-05-13 | End: 2024-05-13 | Stop reason: HOSPADM

## 2024-05-13 RX ORDER — IODIXANOL 320 MG/ML
INJECTION, SOLUTION INTRAVASCULAR
Status: DISCONTINUED | OUTPATIENT
Start: 2024-05-13 | End: 2024-05-13 | Stop reason: HOSPADM

## 2024-05-13 RX ORDER — SODIUM CHLORIDE 9 MG/ML
75 INJECTION, SOLUTION INTRAVENOUS CONTINUOUS
Status: ACTIVE | OUTPATIENT
Start: 2024-05-13 | End: 2024-05-13

## 2024-05-13 RX ORDER — FENTANYL CITRATE-0.9 % NACL/PF 10 MCG/ML
PLASTIC BAG, INJECTION (ML) INTRAVENOUS
Status: DISCONTINUED | OUTPATIENT
Start: 2024-05-13 | End: 2024-05-13 | Stop reason: HOSPADM

## 2024-05-13 RX ORDER — LIDOCAINE 40 MG/G
CREAM TOPICAL
Status: DISCONTINUED | OUTPATIENT
Start: 2024-05-13 | End: 2024-05-13 | Stop reason: HOSPADM

## 2024-05-13 RX ORDER — NICOTINE POLACRILEX 4 MG
15-30 LOZENGE BUCCAL
Status: DISCONTINUED | OUTPATIENT
Start: 2024-05-13 | End: 2024-05-14

## 2024-05-13 RX ORDER — OXYCODONE HYDROCHLORIDE 5 MG/1
10 TABLET ORAL EVERY 4 HOURS PRN
Status: DISCONTINUED | OUTPATIENT
Start: 2024-05-13 | End: 2024-05-14

## 2024-05-13 RX ORDER — NALOXONE HYDROCHLORIDE 0.4 MG/ML
0.4 INJECTION, SOLUTION INTRAMUSCULAR; INTRAVENOUS; SUBCUTANEOUS
Status: ACTIVE | OUTPATIENT
Start: 2024-05-13 | End: 2024-05-13

## 2024-05-13 RX ORDER — HYDRALAZINE HYDROCHLORIDE 20 MG/ML
10 INJECTION INTRAMUSCULAR; INTRAVENOUS
Status: DISCONTINUED | OUTPATIENT
Start: 2024-05-13 | End: 2024-05-14

## 2024-05-13 RX ORDER — LORAZEPAM 2 MG/ML
1 INJECTION INTRAMUSCULAR ONCE
Status: COMPLETED | OUTPATIENT
Start: 2024-05-13 | End: 2024-05-13

## 2024-05-13 RX ORDER — DEXTROSE MONOHYDRATE 25 G/50ML
25-50 INJECTION, SOLUTION INTRAVENOUS
Status: DISCONTINUED | OUTPATIENT
Start: 2024-05-13 | End: 2024-05-14

## 2024-05-13 RX ORDER — ASPIRIN 325 MG
325 TABLET ORAL ONCE
Status: COMPLETED | OUTPATIENT
Start: 2024-05-13 | End: 2024-05-13

## 2024-05-13 RX ADMIN — METOPROLOL TARTRATE 25 MG: 25 TABLET, FILM COATED ORAL at 09:10

## 2024-05-13 RX ADMIN — ATORVASTATIN CALCIUM 80 MG: 40 TABLET, FILM COATED ORAL at 20:47

## 2024-05-13 RX ADMIN — BUSPIRONE HYDROCHLORIDE 10 MG: 10 TABLET ORAL at 08:29

## 2024-05-13 RX ADMIN — LORAZEPAM 1 MG: 2 INJECTION INTRAMUSCULAR; INTRAVENOUS at 08:58

## 2024-05-13 RX ADMIN — TAMSULOSIN HYDROCHLORIDE 0.4 MG: 0.4 CAPSULE ORAL at 20:48

## 2024-05-13 RX ADMIN — LISINOPRIL 40 MG: 20 TABLET ORAL at 08:29

## 2024-05-13 RX ADMIN — PANTOPRAZOLE SODIUM 40 MG: 40 TABLET, DELAYED RELEASE ORAL at 08:29

## 2024-05-13 RX ADMIN — TRAZODONE HYDROCHLORIDE 150 MG: 50 TABLET ORAL at 20:48

## 2024-05-13 RX ADMIN — INSULIN ASPART 1 UNITS: 100 INJECTION, SOLUTION INTRAVENOUS; SUBCUTANEOUS at 12:33

## 2024-05-13 RX ADMIN — INSULIN ASPART 2 UNITS: 100 INJECTION, SOLUTION INTRAVENOUS; SUBCUTANEOUS at 09:06

## 2024-05-13 RX ADMIN — AMLODIPINE BESYLATE 10 MG: 5 TABLET ORAL at 08:28

## 2024-05-13 RX ADMIN — HEPARIN SODIUM 1100 UNITS/HR: 10000 INJECTION, SOLUTION INTRAVENOUS at 08:33

## 2024-05-13 RX ADMIN — METOPROLOL TARTRATE 25 MG: 25 TABLET, FILM COATED ORAL at 20:48

## 2024-05-13 RX ADMIN — ONDANSETRON 4 MG: 4 TABLET, ORALLY DISINTEGRATING ORAL at 08:25

## 2024-05-13 RX ADMIN — DIAZEPAM 10 MG: 5 TABLET ORAL at 12:32

## 2024-05-13 RX ADMIN — FLUOXETINE HYDROCHLORIDE 20 MG: 20 CAPSULE ORAL at 08:29

## 2024-05-13 RX ADMIN — ASPIRIN 81 MG CHEWABLE TABLET 243 MG: 81 TABLET CHEWABLE at 11:16

## 2024-05-13 RX ADMIN — ASPIRIN 81 MG: 81 TABLET, COATED ORAL at 08:29

## 2024-05-13 ASSESSMENT — ACTIVITIES OF DAILY LIVING (ADL)
ADLS_ACUITY_SCORE: 36
ADLS_ACUITY_SCORE: 21
ADLS_ACUITY_SCORE: 36

## 2024-05-13 ASSESSMENT — LIFESTYLE VARIABLES: TOBACCO_USE: 1

## 2024-05-13 ASSESSMENT — ENCOUNTER SYMPTOMS: DYSRHYTHMIAS: 0

## 2024-05-13 ASSESSMENT — COPD QUESTIONNAIRES: COPD: 0

## 2024-05-13 ASSESSMENT — EJECTION FRACTION: EF_VALUE: .33

## 2024-05-13 NOTE — CONSULTS
We have been asked to see Drew Monsivais at [facility] by Dr. Shireen Anderson for evaluation of chest pain.    Impression and Plan     Assessment:  Non-ST elevation myocardial infarction. I suspect he has multivessel coronary artery disease.  Essential hypertension. Elevated.  Hyperlipidemia. Last LDL 88 mg/dL.  Non insulin-dependent diabetes mellitus type 2. Last hemoglobin A1c 9.3%.  Overweight with body mass index 26.02 kg/m .    Plan:  Proceed with coronary angiography. Explained the risks and benefits to the patient. He demonstrates understanding and wishes to proceed.  Transthoracic echocardiogram.  Continue intravenous heparin infusion  Increase atorvastatin to 80 mg daily  Start metoprolol tartrate 25 mg twice daily.  Aspirin 81 mg daily    Primary Cardiologist: can establish with me    Clinically Significant Risk Factors Present on Admission                  # Hypertension: Noted on problem list     # DMII: A1C = 9.3 % (Ref range: <5.7 %) within past 6 months              History of Present Illness      Mr. Drew Monsivais is a 53 year old male with a history of HTN, HLD and NIDDM2 admitted with 2 days of chest pain. He was in bed and felt heaviness in his chest and shoulders. Sitting up helped relieve the pain only slightly. He also felt nauseous. The symptoms seem to reside but then returned the following day prompting him to come to the ED.    Blood pressure was elevated in the ED. ECG showed SR with no ischemic changes. Hs-troponin was elevated 54 -> 110 -> 183. CXR was unremarkable. He was started on IV heparin. He currently denies any symptoms.    He has noted getting out of breath easily for the past few months. He additionally notes numbness in both hands which seems worse with exertion. No prior exertional chest pain/pressure/tightness, shortness of breath at rest, light headedness/dizziness, pre-syncope, syncope, lower extremity swelling, palpitations, paroxysmal nocturnal  dyspnea (PND), or orthopnea.    He was previously seen for cardiology consultation 11/25/2020 by my partner Dr. Chuck Guillen. At that time, he was having chest and arm discomfort of a different nature which was determined to be musculoskeletal. He later had a stress echo on 1/8/2021 which showed no ischemia.    Review of Systems:  Further review of systems is otherwise negative/noncontributory (based on review of medical record (admission H&P) and 13 point review of systems reviewed. Pertinent positives noted).    Cardiac Diagnostics     ECG 5/12/2024 (personally reviewed and interpreted): SR, left axis deviation    Telemetry (personally reviewed): SR, no arrhythmias    Cardiac Stress Testing 1/8/2021 (results reviewed):     The stress echocardiogram is negative for inducible ischemia.    The stress electrocardiogram is negative for inducible ischemic EKG changes.    The estimated left ventricular ejection fraction is 60%.    The patient's exercise tolerance is mildly impaired for age.    No significant valvular abnormalities are noted on screening Doppler exam.    No previous study for comparison.      Medical History  Surgical History Family History Social History   Past Medical History:   Diagnosis Date     Calculus of kidney     at least 4 episodes most recent 2012 once surgically removed Stones present both kidneys       Closed fracture of metatarsal bone(s)           Diabetes (H)      Headache     Frontal-?migraine Triggers: no obvious,  excedrin light senstive Chiro      Hypertension      PONV (postoperative nausea and vomiting)      Past Surgical History:   Procedure Laterality Date     ESOPHAGOSCOPY, GASTROSCOPY, DUODENOSCOPY (EGD), COMBINED N/A 2/15/2023    Procedure: ESOPHAGOGASTRODUODENOSCOPY with BIOPSY;  Surgeon: Brandt Duong MD;  Location: [facility] Main OR     Family History   Problem Relation Age of Onset     Cerebrovascular Disease Mother      Aneurysm Father         cerebral           Social  History     Socioeconomic History     Marital status:      Spouse name: Not on file     Number of children: 1     Years of education: Not on file     Highest education level: Not on file   Occupational History     Occupation: Unload trucks   Tobacco Use     Smoking status: Former     Types: Cigarettes     Smokeless tobacco: Never     Tobacco comments:     College   Vaping Use     Vaping status: Never Used   Substance and Sexual Activity     Alcohol use: No     Drug use: Yes     Comment: Drug use: Cannabis     Sexual activity: Yes     Partners: Female   Other Topics Concern     Not on file   Social History Narrative    Diet-            Exercise-work is physical          No health insurance     Social Determinants of Health     Financial Resource Strain: Not on file   Food Insecurity: Not on file   Transportation Needs: Not on file   Physical Activity: Not on file   Stress: Not on file   Social Connections: Not on file   Interpersonal Safety: Not on file   Housing Stability: Not on file             Physical Examination   VITALS: BP (!) 156/93 (BP Location: Left arm)   Pulse 73   Temp 98  F (36.7  C) (Oral)   Resp 18   Wt 77.6 kg (171 lb 1.6 oz)   SpO2 96%   BMI 26.02 kg/m    BMI: Body mass index is 26.02 kg/m .  Wt Readings from Last 3 Encounters:   05/13/24 77.6 kg (171 lb 1.6 oz)   05/12/24 78.9 kg (174 lb)   02/15/23 72.2 kg (159 lb 2.8 oz)         Intake/Output Summary (Last 24 hours) at 5/13/2024 0822  Last data filed at 5/13/2024 0613  Gross per 24 hour   Intake 240 ml   Output 300 ml   Net -60 ml       General Appearance:  Alert, cooperative, no distress, appears stated age    Head:  Normocephalic, without obvious abnormality, atraumatic   Eyes:  PERRL, conjunctiva/corneas clear, EOM's intact   Ears:  Normal external ear canals bilaterally   Nose: Nares normal, septum midline, no drainage   Throat: Lips, mucosa, and tongue normal; teeth and gums normal   Neck: Supple, symmetrical, trachea midline,  no adenopathy, no carotid bruit or JVD    Back:   Symmetric, no abnormal curvature, ROM normal   Lungs:   Clear to auscultation bilaterally, respirations unlabored   Chest Wall:  No tenderness or deformity   Heart:  Regular rate and rhythm , S1, S2 normal,no murmur, rub or gallop   Abdomen:   Soft, non-tender, bowel sounds active all four quadrants,  no masses, no organomegaly   Extremities: Extremities normal, atraumatic, no cyanosis or edema   Skin: Skin color, texture, turgor normal, no rashes or lesions   Psychiatric: Normal affect, pleasant and cooperative    Neurologic: Alert and oriented X 3, Moves all 4 extremities            Imaging      CT chest/abdomen/pelvis 2/14/2023 (report reviewed):  1.  Mild wall thickening in the esophagus can be seen with esophagitis.  2.  There are a few patchy groundglass nodular opacities in the right upper and lower lobes suggestive of infectious or inflammatory bronchiolitis. Recommend follow-up in 3 months to ensure resolution.  3.  Otherwise, no CT evidence for source of symptoms.  4.  Nephrolithiasis.  5.  Hepatic steatosis.  6.  Mild coronary artery calcification.    CXR 5/12/2024 (report reviewed):  Negative chest.      Lab Results    Chemistry/lipid CBC Cardiac Enzymes/BNP/TSH/INR   Recent Labs   Lab Test 02/15/23  1248   CHOL 182   HDL 45   LDL 88   TRIG 245*     Recent Labs   Lab Test 02/15/23  1248 09/12/22  1106 06/09/22  1057   LDL 88 92 163*     Recent Labs   Lab Test 05/12/24  2112 05/12/24  1407 05/12/24  0948   NA  --   --  133*   POTASSIUM  --   --  4.5   CHLORIDE  --   --  100   CO2  --   --  25   *   < > 329*   BUN  --   --  16.3   CR  --   --  1.11   GFRESTIMATED  --   --  78   LEISA  --   --  8.9    < > = values in this interval not displayed.     Recent Labs   Lab Test 05/12/24  0948 02/17/23  1307 02/16/23  1450   CR 1.11 0.86 1.07     Recent Labs   Lab Test 05/12/24  1638 12/27/22  1233 09/12/22  1106   A1C 9.3* 8.9* 7.7*          Recent Labs   Lab  Test 05/12/24  1638   WBC 7.8   HGB 15.1   HCT 42.3   MCV 79        Recent Labs   Lab Test 05/12/24  1638 05/12/24  0948 02/18/23  0914   HGB 15.1 15.0 11.5*    Recent Labs   Lab Test 02/14/23  0802 01/03/23  0733 12/27/22  1233   TROPONINI <0.01 <0.01 <0.01     Recent Labs   Lab Test 05/12/24  0948 11/25/20  1324   BNP  --  <10   NTBNPI <36  --      Recent Labs   Lab Test 03/09/22  1202   TSH 0.83     Recent Labs   Lab Test 02/14/23  0802   INR 0.96           Current Inpatient Scheduled Medications   Scheduled Meds:  Current Facility-Administered Medications   Medication Dose Route Frequency Provider Last Rate Last Admin     amLODIPine (NORVASC) tablet 10 mg  10 mg Oral Daily Grecia Granger MD   10 mg at 05/12/24 1653     aspirin EC tablet 81 mg  81 mg Oral Daily Grecia Granger MD         atorvastatin (LIPITOR) tablet 40 mg  40 mg Oral QPM Grecia Granger MD   40 mg at 05/12/24 2057     busPIRone (BUSPAR) tablet 10 mg  10 mg Oral Daily Grecia Granger MD         FLUoxetine (PROzac) capsule 20 mg  20 mg Oral Daily Grecia Granger MD         insulin aspart (NovoLOG) injection (RAPID ACTING)  1-3 Units Subcutaneous TID AC Grecia Granger MD         insulin aspart (NovoLOG) injection (RAPID ACTING)  1-3 Units Subcutaneous At Bedtime Grecia Granger MD   1 Units at 05/12/24 2129     lisinopril (ZESTRIL) tablet 40 mg  40 mg Oral Daily Grecia Granger MD   40 mg at 05/12/24 1653     pantoprazole (PROTONIX) EC tablet 40 mg  40 mg Oral QAM AC Grecia Granger MD   40 mg at 05/12/24 1704     sodium chloride (PF) 0.9% PF flush 3 mL  3 mL Intracatheter Q8H Grecia Granger MD         tamsulosin (FLOMAX) capsule 0.4 mg  0.4 mg Oral QPM Grecia Granger MD   0.4 mg at 05/12/24 2057     traZODone (DESYREL) tablet 150 mg  150 mg Oral At Bedtime Grecia Granger MD   150 mg at 05/12/24 2056     Continuous Infusions:  Current Facility-Administered Medications   Medication Dose  Route Frequency Provider Last Rate Last Admin     heparin 25,000 units in 0.45% NaCl 250 mL ANTICOAGULANT infusion  0-5,000 Units/hr Intravenous Continuous Grecia Granger MD 11 mL/hr at 05/13/24 0234 1,100 Units/hr at 05/13/24 0234     Patient is already receiving anticoagulation with heparin, enoxaparin (LOVENOX), warfarin (COUMADIN)  or other anticoagulant medication   Does not apply Continuous PRN Grecia Granger MD                Medications Prior to Admission   Prior to Admission medications    Medication Sig Start Date End Date Taking? Authorizing Provider   amLODIPine (NORVASC) 10 MG tablet Take 1 tablet (10 mg) by mouth daily 12/29/22   Sandrita Wagner MD   atorvastatin (LIPITOR) 40 MG tablet Take 40 mg by mouth daily 8/25/23 8/24/24  Unknown, Entered By History   busPIRone (BUSPAR) 10 MG tablet Take 10 mg by mouth daily    Unknown, Entered By History   busPIRone (BUSPAR) 10 MG tablet Take 10 mg by mouth daily as needed (anxiety) For afternoon/evening if needed.    Unknown, Entered By History   FLUoxetine (PROZAC) 20 MG capsule Take 20 mg by mouth daily 3/14/24   Unknown, Entered By History   glipiZIDE (GLUCOTROL XL) 5 MG 24 hr tablet Take 1 tablet (5 mg) by mouth 2 times daily Take 1 tablet twice daily for diabetes 3/27/23   Haroon Farmer MD   lisinopril (ZESTRIL) 40 MG tablet Take 1 tablet (40 mg) by mouth daily 12/29/22   Sandrita Wagner MD   pantoprazole (PROTONIX) 40 MG EC tablet Take 40 mg by mouth daily    Unknown, Entered By History   tamsulosin (FLOMAX) 0.4 MG capsule TAKE 1 CAPSULE BY MOUTH EVERY DAY 6/9/23   Claire Carranza APRN CNP   traZODone (DESYREL) 50 MG tablet Take 3 tablets (150 mg) by mouth At Bedtime 1/6/23   Azra Tovar MD Brent E. White, MD [facility]  Non-Invasive Cardiologist  [facility]  Pager [phone]

## 2024-05-13 NOTE — PRE-PROCEDURE
GENERAL PRE-PROCEDURE:   Procedure:  Coronary angiogram with possible PCI, left heart catheterization  Date/Time:  5/13/2024 12:49 PM    Written consent obtained?: Yes    Risks and benefits: Risks, benefits and alternatives were discussed    Consent given by:  Patient  Patient states understanding of procedure being performed: Yes    Patient's understanding of procedure matches consent: Yes    Procedure consent matches procedure scheduled: Yes    Expected level of sedation:  Moderate  Appropriately NPO:  Yes  ASA Class:  4 (NSTEMI, HTN, HLD, NIDDM, overweight; BMI 26.02kg/m2)  Mallampati  :  Grade 2- soft palate, base of uvula, tonsillar pillars, and portion of posterior pharyngeal wall visible  Lungs:  Lungs clear with good breath sounds bilaterally  Heart:  Normal heart sounds and rate  History & Physical reviewed:  History and physical reviewed and updates made (see comment)  H&P Comments:  Clinically Significant Risk Factors Present on Admission    Cardiovascular : NSTEMI, HTN, HLD, NIDDM, overweight; BMI 26.02kg/m2    Fluid & Electrolyte Disorders : Not present on admission    Gastroenterology : Not present on admission    Hematology/Oncology : Not present on admission    Nephrology : Not present on admission    Neurology : Not present on admission    Pulmonology : Not present on admission    Systemic : Not present on admission        Statement of review:  I have reviewed the lab findings, diagnostic data, medications, and the plan for sedation

## 2024-05-13 NOTE — PLAN OF CARE
Second attempt at removing TR band at 1627 failed. Third attempt successful all air out at 1800 and TR band off completely at 1830. Patient NPO at midnight. Heparin to be restarted at previous rate 1100 units at 20:00. Plan for CABG tomorrow afternoon.

## 2024-05-13 NOTE — PROGRESS NOTES
[facility]    Medicine Progress Note - Hospitalist Service    Date of Admission:  5/12/2024    Assessment & Plan   Drew Monsivais is a 53 year old male with PMH of kidney stones, hepatic steatosis, DM2, hypertension, BPH, GERD, anxiety who presented with chest tightness and elevated troponin     NSTEMI.    On IV heparin  Echo shows EF 45 to 50%, mid to distal inferior lateral wall appears hypokinetic.  AV is bicuspid with no regurgitation or stenosis  Seen by cardiology, plan for coronary angiography today  NPO  Asa, statin, metoprolol    HTN  PTA Amlodipine, lisinopril 40 mg    HLD  Inc Atorvastatin 80mg    DM2 without long-term insulin use.    HbA1c 9.3  Home regimen: Glipizide 5 mg twice daily  Hold PTA glipizide  Insulin sliding scale low to medium, monitor blood glucose and titrate up as needed    BPH  PTA tamsulosin    Psych  PTA Buspar, Prozac, trazodone    Hepatic steatosis  Lifestyle modifications    History of pancreatitis x 2.    Etiology was not clear per the patient.  Lipase is elevated 163 however symptoms are not consistent with pancreatitis          Diet: NPO per Anesthesia Guidelines for Procedure/Surgery Except for: Meds    DVT Prophylaxis: Heparin gtt  Still Catheter: Not present  Lines: None     Cardiac Monitoring: ACTIVE order. Indication: AMI (NSTEMI/ STEMI) (48 hours)  Code Status: Full Code      Clinically Significant Risk Factors Present on Admission                  # Hypertension: Noted on problem list     # DMII: A1C = 9.3 % (Ref range: <5.7 %) within past 6 months               Disposition Plan     Medically Ready for Discharge: Anticipated Tomorrow             Shireen Anderson MD  Hospitalist Service  [facility]  Securely message with [employer] (more info)  Text page via [employer] Paging/Directory   ______________________________________________________________________    Interval History   Patient was examined at the bedside, awaiting  coronary angiography.  Discussed clinical course and answered all questions    Physical Exam   Vital Signs: Temp: 98  F (36.7  C) Temp src: Oral BP: (!) 156/93 Pulse: 73   Resp: 18 SpO2: 96 % O2 Device: None (Room air)    Weight: 171 lbs 1.6 oz    General Appearance:  No acute distress, appears anxious  Respiratory: Respirations unlabored, lungs are clear  Cardiovascular: Regular rate and rhythm.  Normal S1-S2  GI: Abdomen soft and nontender  Skin: No rashes on exposed areas  Other:  Awake and alert, nonfocal    Medical Decision Making       50 MINUTES SPENT BY ME on the date of service doing chart review, history, exam, documentation & further activities per the note.      Data     I have personally reviewed the following data over the past 24 hrs:    7.8  \   15.1   / 212     N/A N/A N/A /  207 (H)   N/A N/A N/A \     Trop: 183 (HH) BNP: N/A     TSH: N/A T4: N/A A1C: 9.3 (H)       Imaging results reviewed over the past 24 hrs:   Recent Results (from the past 24 hour(s))   Echocardiogram Complete   Result Value    LVEF  45-50% (mildly reduced)    Narrative    [id number]  [id number]  [id number]  [id number]^[place] [address]     Name: UMM ESPINOZA  MRN: [id number]  : 1970  Study Date: 2024 07:25 AM  Age: 53 yrs  Gender: Male  Patient Location: [facility]  Reason For Study: MI - Acute  Ordering Physician: DEREJE WALTERS  Referring Physician: DOMINGO LI  Performed By: BRADLEY     BSA: 1.9 m2  Height: 68 in  Weight: 171 lb  HR: 65  ______________________________________________________________________________  Procedure  Complete Echo Adult. Adequate quality two-dimensional was performed and  interpreted. There is no comparison study available.  ______________________________________________________________________________  Interpretation Summary     1. The left ventricle is normal in size with normal left ventricular wall  thickness.  2.  Left ventricular function is decreased. The ejection fraction is 45-50%  (mildly reduced). The mid to distal inferolateral wall appears hypokinetic.  3. Normal right ventricle size and systolic function.  4. The aortic valve is bicuspid with no regurgitation or stenosis.  5. There is no comparison study available.  ______________________________________________________________________________  Left Ventricle  The left ventricle is normal in size. Left ventricular function is decreased.  The ejection fraction is 45-50% (mildly reduced). There is normal left  ventricular wall thickness. Left ventricular diastolic function is normal. The  mid to distal inferolateral wall appears hypokinetic.     Right Ventricle  Normal right ventricle size and systolic function.     Atria  Normal left atrial size. Right atrial size is normal. There is no color  Doppler evidence of an atrial shunt.     Mitral Valve  Mitral valve leaflets appear normal. There is no evidence of mitral stenosis  or clinically significant mitral regurgitation.     Tricuspid Valve  Tricuspid valve leaflets appear normal. There is no evidence of tricuspid  stenosis or clinically significant tricuspid regurgitation. Right ventricular  systolic pressure could not be approximated due to inadequate tricuspid  regurgitation.     Aortic Valve  The aortic valve is bicuspid. No aortic regurgitation is present. No aortic  stenosis is present.     Pulmonic Valve  The pulmonic valve is not well seen, but is grossly normal. This degree of  valvular regurgitation is within normal limits. There is trace pulmonic  valvular regurgitation.     Vessels  The aorta root is normal. Normal size ascending aorta. IVC diameter <2.1 cm  collapsing >50% with sniff suggests a normal RA pressure of 3 mmHg.     Pericardium  There is no pericardial effusion.     Rhythm  Sinus rhythm was noted.  ______________________________________________________________________________  MMode/2D  Measurements & Calculations     IVSd: 1.1 cm  LVIDd: 4.5 cm  LVIDs: 3.0 cm  LVPWd: 1.1 cm  FS: 33.2 %  LV mass(C)d: 176.6 grams  LV mass(C)dI: 92.3 grams/m2  Ao root diam: 3.2 cm  LA dimension: 4.2 cm  asc Aorta Diam: 3.3 cm  LA/Ao: 1.3  LVOT diam: 2.1 cm  LVOT area: 3.5 cm2  Ao root diam index Ht(cm/m): 1.9  Ao root diam index BSA (cm/m2): 1.7  Asc Ao diam index BSA (cm/m2): 1.7  Asc Ao diam index Ht(cm/m): 1.9     LA Volume Indexed (AL/bp): 27.0 ml/m2  RV Base: 3.1 cm  RWT: 0.51  TAPSE: 2.3 cm     Time Measurements  MM HR: 69.0 BPM     Doppler Measurements & Calculations  MV E max harsha: 94.9 cm/sec  MV A max harsha: 115.0 cm/sec  MV E/A: 0.83  MV max P.3 mmHg  MV mean P.0 mmHg  MV V2 VTI: 30.0 cm  MVA(VTI): 2.6 cm2  MV dec slope: 451.0 cm/sec2  MV dec time: 0.21 sec  Ao V2 max: 133.5 cm/sec  Ao max P.0 mmHg  Ao V2 mean: 105.0 cm/sec  Ao mean P.0 mmHg  Ao V2 VTI: 29.2 cm  RIRI(I,D): 2.6 cm2  RIRI(V,D): 2.9 cm2  LV V1 max P.9 mmHg  LV V1 max: 111.0 cm/sec  LV V1 VTI: 22.1 cm  SV(LVOT): 76.5 ml  SI(LVOT): 40.0 ml/m2     PA acc time: 0.15 sec  AV Harsha Ratio (DI): 0.83  RIRI Index (cm2/m2): 1.4  E/E': 12.5  E/E' avg: 10.9  Lateral E/e': 9.3  Medial E/e': 12.5  Peak E' Harsha: 7.6 cm/sec  RV S Harsha: 11.3 cm/sec     ______________________________________________________________________________  Report approved by: Izabela Jeffrey 2024 09:05 AM

## 2024-05-13 NOTE — CONSULTS
Cardiac Surgery Consultation    Drew Monsivais MRN# [id number]   YOB: 1970 Age: 53 year old   Date of Admission: 5/12/2024     Reason for consult: NSTEMI, multivessel CAD           Assessment and Plan:   Drew Monsivais is a 53-year-old man with a history of HTN, HLD, and DM2 (A1c 9.3) who presented with an NSTEMI and was found to have severe, multivessel coronary artery disease with EF 45-50%. I recommend surgical revascularization with coronary artery bypass grafting with LIMA, saphenous vein, and possible radial artery for conduits. He would not be a candidate for bilateral internal mammary arteries due to severe DM2 with A1c 9.3. Due to his very severe coronary artery disease and occluded LAD and RPDA would recommend IABP placement prior to CABG.    I described the technical details, as well as the expected postoperative course and recovery to the patient. I also discussed the risks and benefits of the procedure. The risks include but are not limited to bleeding, infection, stroke, heart or graft failure with myocardial infarction, dysrhythmia, respiratory failure, kidney or liver injury, bowel or limb ischemia, and death. The patient understands these risks and wishes to undergo the operation.     Surgery will be scheduled for tomorrow, 5/14/24.    After carotid US the preoperative evaluation will be complete.         History of Present Illness:   This patient is a 53-year-old man with a history of HTN, HLD, and DM2 (A1c 9.3) who presented after an episode of chest pain at rest and was found to have an NSTEMI. Cardiac catheterization revealed severe, multivessel coronary artery disease. TTE reveals EF 45-50%, inferolateral hypokinesis, and no major valvular abnormalities.    He is extremely anxious about the prospect of having surgery but denies chest pain at present.                Past Medical History:     Past Medical History:   Diagnosis Date    Calculus of kidney     at least 4  episodes most recent 2012 once surgically removed Stones present both kidneys      Closed fracture of metatarsal bone(s)          Diabetes (H)     Headache     Frontal-?migraine Triggers: no obvious,  excedrin light senstive Chiro     Hypertension     PONV (postoperative nausea and vomiting)              Past Surgical History:     Past Surgical History:   Procedure Laterality Date    ESOPHAGOSCOPY, GASTROSCOPY, DUODENOSCOPY (EGD), COMBINED N/A 2/15/2023    Procedure: ESOPHAGOGASTRODUODENOSCOPY with BIOPSY;  Surgeon: Brandt Duong MD;  Location: [facility] Main OR               Family History:     Family History   Problem Relation Age of Onset    Cerebrovascular Disease Mother     Aneurysm Father         cerebral              Social History:     Social History     Socioeconomic History    Marital status:      Spouse name: Not on file    Number of children: 1    Years of education: Not on file    Highest education level: Not on file   Occupational History    Occupation: [email or web address]   Tobacco Use    Smoking status: Former     Types: Cigarettes    Smokeless tobacco: Never    Tobacco comments:     College   Vaping Use    Vaping status: Never Used   Substance and Sexual Activity    Alcohol use: No    Drug use: Yes     Comment: Drug use: Cannabis    Sexual activity: Yes     Partners: Female   Other Topics Concern    Not on file   Social History Narrative    Diet-            Exercise-work is physical          No health insurance     Social Determinants of Health     Financial Resource Strain: Not on file   Food Insecurity: Not on file   Transportation Needs: Not on file   Physical Activity: Not on file   Stress: Not on file   Social Connections: Not on file   Interpersonal Safety: Not on file   Housing Stability: Not on file              Allergies:    No Known Allergies           Medications:     Current Facility-Administered Medications   Medication Dose Route Frequency Provider Last Rate Last Admin     acetaminophen (TYLENOL) Suppository 650 mg  650 mg Rectal Q4H PRN Grecia Granger MD        acetaminophen (TYLENOL) tablet 650 mg  650 mg Oral Q4H PRN Argentina Davila CNP        amLODIPine (NORVASC) tablet 10 mg  10 mg Oral Daily Grecia Granger MD   10 mg at 05/13/24 0828    aspirin EC tablet 81 mg  81 mg Oral Daily Grecia Granger MD   81 mg at 05/13/24 0829    atorvastatin (LIPITOR) tablet 80 mg  80 mg Oral QPM Chris Anaya MD        atropine injection 0.5 mg  0.5 mg Intravenous Once PRN Argentina Davila CNP        busPIRone (BUSPAR) tablet 10 mg  10 mg Oral Daily Grecia Granger MD   10 mg at 05/13/24 0829    busPIRone (BUSPAR) tablet 10 mg  10 mg Oral Daily PRN Grecia Granger MD        calcium carbonate (TUMS) chewable tablet 1,000 mg  1,000 mg Oral 4x Daily PRN Grecia Granger MD        glucose gel 15-30 g  15-30 g Oral Q15 Min PRN Chris Anaya MD        Or    dextrose 50 % injection 25-50 mL  25-50 mL Intravenous Q15 Min PRN Chris Anaya MD        Or    glucagon injection 1 mg  1 mg Subcutaneous Q15 Min PRN Chris Anaya MD        fentaNYL (PF) (SUBLIMAZE) injection 25 mcg  25 mcg Intravenous Q15 Min PRN Argentina Davila CNP        flumazenil (ROMAZICON) injection 0.2 mg  0.2 mg Intravenous q1 min prn Argenitna Davila CNP        FLUoxetine (PROzac) capsule 20 mg  20 mg Oral Daily Grecia Granger MD   20 mg at 05/13/24 0829    heparin 25,000 units in 0.45% NaCl 250 mL ANTICOAGULANT infusion  0-5,000 Units/hr Intravenous Continuous Grecia Granger MD   Stopped at 05/13/24 1250    HOLD:  Metformin and metformin containing medications if patient received IV contrast with acute kidney injury or severe chronic kidney disease (stage IV or stage V; i.e., eGFR less than 30)   Does not apply HOLD Argentina Davila, AURELIA        hydrALAZINE (APRESOLINE) injection 10 mg  10 mg Intravenous Once PRN Argentina Davila, AURELIA        insulin aspart  (NovoLOG) injection (RAPID ACTING)  1-7 Units Subcutaneous TID AC Shireen Anderson MD   2 Units at 05/13/24 1648    insulin aspart (NovoLOG) injection (RAPID ACTING)  1-5 Units Subcutaneous At Bedtime Shireen Anderson MD        lidocaine (LMX4) cream   Topical Q1H PRN Grecia Granger MD        lidocaine 1 % 0.1-1 mL  0.1-1 mL Other Q1H PRN Grecia Granger MD        lisinopril (ZESTRIL) tablet 40 mg  40 mg Oral Daily Grecia Granger MD   40 mg at 05/13/24 0829    metoprolol tartrate (LOPRESSOR) tablet 25 mg  25 mg Oral BID Chris Anaya MD   25 mg at 05/13/24 0910    midazolam (VERSED) injection 0.5 mg  0.5 mg Intravenous Q5 Min PRN Argentina Davila CNP        naloxone (NARCAN) injection 0.2 mg  0.2 mg Intravenous Q2 Min PRN Argentina Davila CNP        Or    naloxone (NARCAN) injection 0.4 mg  0.4 mg Intravenous Q2 Min PRN Argentina Davila C, CNP        Or    naloxone (NARCAN) injection 0.2 mg  0.2 mg Intramuscular Q2 Min PRN Argentina Davila, CNP        Or    naloxone (NARCAN) injection 0.4 mg  0.4 mg Intramuscular Q2 Min PRN Argentina Davila, CNP        nitroGLYcerin (NITROSTAT) sublingual tablet 0.4 mg  0.4 mg Sublingual Q5 Min PRN Grecia Granger MD        ondansetron (ZOFRAN ODT) ODT tab 4 mg  4 mg Oral Q6H PRN Grecia Granger MD   4 mg at 05/13/24 0825    Or    ondansetron (ZOFRAN) injection 4 mg  4 mg Intravenous Q6H PRN Grecia Granger MD        oxyCODONE (ROXICODONE) tablet 5 mg  5 mg Oral Q4H PRN Argentina Davila CNP        Or    oxyCODONE (ROXICODONE) tablet 10 mg  10 mg Oral Q4H PRN Argentina Davila, AURELIA        pantoprazole (PROTONIX) EC tablet 40 mg  40 mg Oral QAM AC Grecia Granger MD   40 mg at 05/13/24 0876    Patient is already receiving anticoagulation with heparin, enoxaparin (LOVENOX), warfarin (COUMADIN)  or other anticoagulant medication   Does not apply Continuous PRGrecia Russo MD        senna-docusate  (SENOKOT-S/PERICOLACE) 8.6-50 MG per tablet 1 tablet  1 tablet Oral BID PRN Grecia Granger MD        Or    senna-docusate (SENOKOT-S/PERICOLACE) 8.6-50 MG per tablet 2 tablet  2 tablet Oral BID PRN Grecia Granger MD        sodium chloride (PF) 0.9% PF flush 3 mL  3 mL Intracatheter Q8H Grecia Granger MD   3 mL at 05/13/24 1614    sodium chloride (PF) 0.9% PF flush 3 mL  3 mL Intracatheter q1 min prn Grecia Granger MD        sodium chloride 0.9 % infusion  75 mL/hr Intravenous Continuous Argentina Davila CNP        sodium chloride 0.9% BOLUS 250 mL  250 mL Intravenous Once PRN Argentina Davila CNP        tamsulosin (FLOMAX) capsule 0.4 mg  0.4 mg Oral QPM Grecia Granger MD   0.4 mg at 05/12/24 2057    traZODone (DESYREL) tablet 150 mg  150 mg Oral At Bedtime Grecia Granger MD   150 mg at 05/12/24 2056            Review of Systems:   ROS: 10 point review of systems was performed and negative except as described above.         Physical Exam:   /79   Pulse 75   Temp 98  F (36.7  C) (Oral)   Resp 16   Wt 77.6 kg (171 lb 1.6 oz)   SpO2 96%   BMI 26.02 kg/m    Alert, pleasant, in no acute distress  Sclera anicteric  Neck supple JVD flat  Trachea midline  No prior chest incisions  Breathing unlabored on room air  Regular rate and rhythm  Abdomen soft, non-tender  Extremities warm, no edema          Labs:   CBC RESULTS:   Recent Labs   Lab Test 05/12/24  1638   WBC 7.8   RBC 5.35   HGB 15.1   HCT 42.3   MCV 79   MCH 28.2   MCHC 35.7   RDW 11.9         Last Comprehensive Metabolic Panel:  Lab Results   Component Value Date     (L) 05/12/2024    POTASSIUM 4.5 05/12/2024    CHLORIDE 100 05/12/2024    CO2 25 05/12/2024    ANIONGAP 8 05/12/2024     (H) 05/13/2024    BUN 16.3 05/12/2024    CR 1.11 05/12/2024    GFRESTIMATED 78 05/12/2024    LEISA 8.9 05/12/2024        Ha1c: 9.3         Imaging:   All imaging studies were reviewed and interpreted by  me.    Cardiac catheterization 5/13/24:    1st Mrg lesion is 90% stenosed.    Prox LAD to Mid LAD lesion is 80% stenosed.    Mid LAD lesion is 100% stenosed.    2nd Diag lesion is 90% stenosed.    Mid Cx lesion is 30% stenosed.    2nd Mrg lesion is 90% stenosed.    Prox RCA lesion is 90% stenosed.    Mid RCA to Dist RCA lesion is 90% stenosed.    RPDA lesion is 100% stenosed.    Left ventricular filling pressures are mildly elevated.  1.  Diffuse multivessel coronary disease as outlined above.  2.  LAD occluded in mid segment.  3.  Severe disease in OM1 and OM 2 branches of LCx  4.  Sequential 90% proximal and distal RCA lesions.  Native vessel fills small SANJAY branch.  PDA occluded,  fills retrograde from septal collaterals.    TTE 5/13/24:  1. The left ventricle is normal in size with normal left ventricular wall  thickness.  2. Left ventricular function is decreased. The ejection fraction is 45-50%  (mildly reduced). The mid to distal inferolateral wall appears hypokinetic.  3. Normal right ventricle size and systolic function.  4. The aortic valve is bicuspid with no regurgitation or stenosis.  5. There is no comparison study available.      Thank you very much for this consult,    Tuyet Azar MD

## 2024-05-13 NOTE — PLAN OF CARE
Attempted to start taking down TR band 60 minutes after sheath removal Site bled immediately after second 3ml removed. Reinserted 3ml bleeding stopped. Will wait to attempt again at until 1600.

## 2024-05-13 NOTE — PROGRESS NOTES
Brief CV progress note:    Patient underwent coronary angiogram which demonstrated severe multi-vessel CAD warranting CTS consultation. Discussed with Dr. Rich. Will restart heparin gtt. Await CTS recommendations.

## 2024-05-13 NOTE — PLAN OF CARE
Problem: Adult Inpatient Plan of Care  Goal: Optimal Comfort and Wellbeing  Outcome: Progressing     Problem: Chest Pain  Goal: Resolution of Chest Pain Symptoms  Outcome: Progressing     Pt arrived via EMS from [facility] @ 1600. A/Ox4. Denied pain and SOB. Pts tele briefly showed ST elevation In lead 2. Dr. Granger at the bedside and EKG/troponin ordered. Per MD, not a concern. Heparin gtt at 950u/hr. Lab to check anti-xa @ 1900 per orders. Plan for potential angio 5/13. NPO at midnight.

## 2024-05-13 NOTE — PROGRESS NOTES
Rate for heparin was increased overnight to 1100.  Next  XA is about 825 AM.  Pt. Has been shaved. And read handout on angio.  Sister is flying in this AM to sit with him.   Not on angio schedule yet as of this AM.  Denies chest pain.

## 2024-05-14 ENCOUNTER — ANESTHESIA (OUTPATIENT)
Dept: SURGERY | Facility: HOSPITAL | Age: 54
DRG: 233 | End: 2024-05-14
Payer: COMMERCIAL

## 2024-05-14 ENCOUNTER — APPOINTMENT (OUTPATIENT)
Dept: RADIOLOGY | Facility: HOSPITAL | Age: 54
DRG: 233 | End: 2024-05-14
Payer: COMMERCIAL

## 2024-05-14 LAB
ALBUMIN SERPL BCG-MCNC: 3.2 G/DL (ref 3.5–5.2)
ALBUMIN SERPL BCG-MCNC: 3.7 G/DL (ref 3.5–5.2)
ALP SERPL-CCNC: 79 U/L (ref 40–150)
ALP SERPL-CCNC: 88 U/L (ref 40–150)
ALT SERPL W P-5'-P-CCNC: 25 U/L (ref 0–70)
ALT SERPL W P-5'-P-CCNC: 30 U/L (ref 0–70)
ANION GAP SERPL CALCULATED.3IONS-SCNC: 11 MMOL/L (ref 7–15)
ANION GAP SERPL CALCULATED.3IONS-SCNC: 8 MMOL/L (ref 7–15)
APTT PPP: 39 SECONDS (ref 22–38)
APTT PPP: 40 SECONDS (ref 22–38)
APTT PPP: 55 SECONDS (ref 22–38)
AST SERPL W P-5'-P-CCNC: 20 U/L (ref 0–45)
AST SERPL W P-5'-P-CCNC: 37 U/L (ref 0–45)
ATRIAL RATE - MUSE: 75 BPM
BASE EXCESS BLDA CALC-SCNC: -7 MMOL/L (ref -3–3)
BASE EXCESS BLDA CALC-SCNC: 0 MMOL/L (ref -3–3)
BASE EXCESS BLDA CALC-SCNC: 1 MMOL/L (ref -3–3)
BASE EXCESS BLDA CALC-SCNC: 1.1 MMOL/L (ref -3–3)
BASE EXCESS BLDA CALC-SCNC: 1.3 MMOL/L (ref -3–3)
BASE EXCESS BLDA CALC-SCNC: 2.7 MMOL/L (ref -3–3)
BASE EXCESS BLDV CALC-SCNC: 1.7 MMOL/L (ref -3–3)
BILIRUB SERPL-MCNC: 0.6 MG/DL
BILIRUB SERPL-MCNC: 1 MG/DL
BLD PROD TYP BPU: NORMAL
BLOOD COMPONENT TYPE: NORMAL
BUN SERPL-MCNC: 12 MG/DL (ref 6–20)
BUN SERPL-MCNC: 14.2 MG/DL (ref 6–20)
CA-I BLD-MCNC: 4.1 MG/DL (ref 4.4–5.2)
CA-I BLD-MCNC: 4.2 MG/DL (ref 4.4–5.2)
CA-I BLD-MCNC: 4.2 MG/DL (ref 4.4–5.2)
CA-I BLD-MCNC: 4.6 MG/DL (ref 4.4–5.2)
CA-I BLD-MCNC: 4.7 MG/DL (ref 4.4–5.2)
CA-I BLD-MCNC: 5.2 MG/DL (ref 4.4–5.2)
CALCIUM SERPL-MCNC: 8.4 MG/DL (ref 8.6–10)
CALCIUM SERPL-MCNC: 8.6 MG/DL (ref 8.6–10)
CHLORIDE SERPL-SCNC: 100 MMOL/L (ref 98–107)
CHLORIDE SERPL-SCNC: 108 MMOL/L (ref 98–107)
CODING SYSTEM: NORMAL
COHGB MFR BLD: 97.4 % (ref 96–97)
CPB POCT: NO
CREAT SERPL-MCNC: 1.14 MG/DL (ref 0.67–1.17)
CREAT SERPL-MCNC: 1.33 MG/DL (ref 0.67–1.17)
CROSSMATCH: NORMAL
DEPRECATED HCO3 PLAS-SCNC: 21 MMOL/L (ref 22–29)
DEPRECATED HCO3 PLAS-SCNC: 26 MMOL/L (ref 22–29)
DIASTOLIC BLOOD PRESSURE - MUSE: NORMAL MMHG
EGFRCR SERPLBLD CKD-EPI 2021: 63 ML/MIN/1.73M2
EGFRCR SERPLBLD CKD-EPI 2021: 76 ML/MIN/1.73M2
ERYTHROCYTE [DISTWIDTH] IN BLOOD BY AUTOMATED COUNT: 11.9 % (ref 10–15)
ERYTHROCYTE [DISTWIDTH] IN BLOOD BY AUTOMATED COUNT: 12 % (ref 10–15)
ERYTHROCYTE [DISTWIDTH] IN BLOOD BY AUTOMATED COUNT: 12.1 % (ref 10–15)
FIBRINOGEN PPP-MCNC: 209 MG/DL (ref 170–490)
FIBRINOGEN PPP-MCNC: 253 MG/DL (ref 170–490)
GLUCOSE BLD-MCNC: 100 MG/DL (ref 70–99)
GLUCOSE BLD-MCNC: 106 MG/DL (ref 70–99)
GLUCOSE BLD-MCNC: 161 MG/DL (ref 70–99)
GLUCOSE BLD-MCNC: 99 MG/DL (ref 70–99)
GLUCOSE BLD-MCNC: 99 MG/DL (ref 70–99)
GLUCOSE BLDC GLUCOMTR-MCNC: 103 MG/DL (ref 70–99)
GLUCOSE BLDC GLUCOMTR-MCNC: 106 MG/DL (ref 70–99)
GLUCOSE BLDC GLUCOMTR-MCNC: 124 MG/DL (ref 70–99)
GLUCOSE BLDC GLUCOMTR-MCNC: 153 MG/DL (ref 70–99)
GLUCOSE BLDC GLUCOMTR-MCNC: 155 MG/DL (ref 70–99)
GLUCOSE BLDC GLUCOMTR-MCNC: 166 MG/DL (ref 70–99)
GLUCOSE BLDC GLUCOMTR-MCNC: 166 MG/DL (ref 70–99)
GLUCOSE BLDC GLUCOMTR-MCNC: 186 MG/DL (ref 70–99)
GLUCOSE BLDC GLUCOMTR-MCNC: 214 MG/DL (ref 70–99)
GLUCOSE SERPL-MCNC: 176 MG/DL (ref 70–99)
GLUCOSE SERPL-MCNC: 186 MG/DL (ref 70–99)
HCO3 BLD-SCNC: 27 MMOL/L (ref 21–28)
HCO3 BLDA-SCNC: 20 MMOL/L (ref 21–28)
HCO3 BLDA-SCNC: 25 MMOL/L (ref 21–28)
HCO3 BLDA-SCNC: 26 MMOL/L (ref 21–28)
HCO3 BLDV-SCNC: 25 MMOL/L (ref 21–28)
HCT VFR BLD AUTO: 33 % (ref 40–53)
HCT VFR BLD AUTO: 34.2 % (ref 40–53)
HCT VFR BLD AUTO: 38.1 % (ref 40–53)
HCT VFR BLD CALC: 36 % (ref 40–54)
HGB BLD-MCNC: 10 G/DL (ref 13.3–17.7)
HGB BLD-MCNC: 10.6 G/DL (ref 13.3–17.7)
HGB BLD-MCNC: 10.7 G/DL (ref 13.3–17.7)
HGB BLD-MCNC: 11.2 G/DL (ref 13.3–17.7)
HGB BLD-MCNC: 11.6 G/DL (ref 13.3–17.7)
HGB BLD-MCNC: 12.2 G/DL (ref 13.3–17.7)
HGB BLD-MCNC: 12.3 G/DL (ref 13.3–17.7)
HGB BLD-MCNC: 13.2 G/DL (ref 13.3–17.7)
HGB BLD-MCNC: 14.4 G/DL (ref 13.3–17.7)
INR PPP: 1.05 (ref 0.85–1.15)
INR PPP: 1.23 (ref 0.85–1.15)
INR PPP: 1.27 (ref 0.85–1.15)
INTERPRETATION ECG - MUSE: NORMAL
ISSUE DATE AND TIME: NORMAL
ISSUE DATE AND TIME: NORMAL
LACTATE BLD-SCNC: 1 MMOL/L (ref 0.7–2)
LACTATE BLD-SCNC: 1.1 MMOL/L (ref 0.7–2)
LACTATE BLD-SCNC: 1.2 MMOL/L (ref 0.7–2)
LACTATE BLD-SCNC: 1.3 MMOL/L (ref 0.7–2)
LACTATE BLD-SCNC: 2.4 MMOL/L (ref 0.7–2)
LACTATE SERPL-SCNC: 3.5 MMOL/L (ref 0.7–2)
MAGNESIUM SERPL-MCNC: 2.5 MG/DL (ref 1.7–2.3)
MCH RBC QN AUTO: 28.3 PG (ref 26.5–33)
MCH RBC QN AUTO: 28.4 PG (ref 26.5–33)
MCH RBC QN AUTO: 29.1 PG (ref 26.5–33)
MCHC RBC AUTO-ENTMCNC: 34.6 G/DL (ref 31.5–36.5)
MCHC RBC AUTO-ENTMCNC: 35.2 G/DL (ref 31.5–36.5)
MCHC RBC AUTO-ENTMCNC: 36 G/DL (ref 31.5–36.5)
MCV RBC AUTO: 81 FL (ref 78–100)
MCV RBC AUTO: 81 FL (ref 78–100)
MCV RBC AUTO: 82 FL (ref 78–100)
O2/TOTAL GAS SETTING VFR VENT: 40 %
OXYHGB MFR BLDA: 99 % (ref 92–100)
OXYHGB MFR BLDV: 87 % (ref 70–75)
P AXIS - MUSE: 45 DEGREES
PCO2 BLD: 38 MM HG (ref 35–45)
PCO2 BLDA: 36 MM HG (ref 35–45)
PCO2 BLDA: 38 MM HG (ref 35–45)
PCO2 BLDA: 42 MM HG (ref 35–45)
PCO2 BLDA: 44 MM HG (ref 35–45)
PCO2 BLDA: 50 MM HG (ref 35–45)
PCO2 BLDV: 63 MM HG (ref 40–50)
PEEP: 5 CM H2O
PH BLD: 7.46 [PH] (ref 7.35–7.45)
PH BLDA: 7.27 [PH] (ref 7.35–7.45)
PH BLDA: 7.34 [PH] (ref 7.35–7.45)
PH BLDA: 7.39 [PH] (ref 7.35–7.45)
PH BLDA: 7.44 [PH] (ref 7.35–7.45)
PH BLDA: 7.44 [PH] (ref 7.35–7.45)
PH BLDV: 7.27 [PH] (ref 7.32–7.43)
PHOSPHATE SERPL-MCNC: 2.6 MG/DL (ref 2.5–4.5)
PLATELET # BLD AUTO: 139 10E3/UL (ref 150–450)
PLATELET # BLD AUTO: 160 10E3/UL (ref 150–450)
PLATELET # BLD AUTO: 181 10E3/UL (ref 150–450)
PO2 BLD: 93 MM HG (ref 80–105)
PO2 BLDA: 247 MM HG (ref 80–105)
PO2 BLDA: 292 MM HG (ref 80–105)
PO2 BLDA: 350 MM HG (ref 80–105)
PO2 BLDA: 371 MM HG (ref 80–105)
PO2 BLDA: 489 MM HG (ref 80–105)
PO2 BLDV: 60 MM HG (ref 25–47)
POTASSIUM BLD-SCNC: 3.6 MMOL/L (ref 3.4–5.3)
POTASSIUM BLD-SCNC: 4.1 MMOL/L (ref 3.4–5.3)
POTASSIUM BLD-SCNC: 4.9 MMOL/L (ref 3.4–5.3)
POTASSIUM BLD-SCNC: 5 MMOL/L (ref 3.4–5.3)
POTASSIUM BLD-SCNC: 5.1 MMOL/L (ref 3.4–5.3)
POTASSIUM BLD-SCNC: 5.2 MMOL/L (ref 3.4–5.3)
POTASSIUM SERPL-SCNC: 4 MMOL/L (ref 3.4–5.3)
POTASSIUM SERPL-SCNC: 4 MMOL/L (ref 3.4–5.3)
POTASSIUM SERPL-SCNC: 4.8 MMOL/L (ref 3.4–5.3)
PR INTERVAL - MUSE: 198 MS
PREALB SERPL-MCNC: 28.9 MG/DL (ref 20–40)
PROT SERPL-MCNC: 5.2 G/DL (ref 6.4–8.3)
PROT SERPL-MCNC: 6 G/DL (ref 6.4–8.3)
QRS DURATION - MUSE: 104 MS
QT - MUSE: 378 MS
QTC - MUSE: 422 MS
R AXIS - MUSE: -68 DEGREES
RBC # BLD AUTO: 4.08 10E6/UL (ref 4.4–5.9)
RBC # BLD AUTO: 4.23 10E6/UL (ref 4.4–5.9)
RBC # BLD AUTO: 4.67 10E6/UL (ref 4.4–5.9)
SAO2 % BLDA: 100 % (ref 92–100)
SAO2 % BLDA: 100 % (ref 96–97)
SAO2 % BLDA: 101 % (ref 96–97)
SAO2 % BLDA: 97 % (ref 92–100)
SAO2 % BLDV: 89 % (ref 70–75)
SODIUM BLD-SCNC: 136 MMOL/L (ref 135–145)
SODIUM BLD-SCNC: 137 MMOL/L (ref 135–145)
SODIUM BLD-SCNC: 137 MMOL/L (ref 135–145)
SODIUM BLD-SCNC: 139 MMOL/L (ref 135–145)
SODIUM SERPL-SCNC: 134 MMOL/L (ref 135–145)
SODIUM SERPL-SCNC: 140 MMOL/L (ref 135–145)
SYSTOLIC BLOOD PRESSURE - MUSE: NORMAL MMHG
T AXIS - MUSE: 38 DEGREES
UFH PPP CHRO-ACNC: 0.2 IU/ML
UFH PPP CHRO-ACNC: <0.1 IU/ML
UNIT ABO/RH: NORMAL
UNIT NUMBER: NORMAL
UNIT STATUS: NORMAL
UNIT TYPE ISBT: 9500
VENTRICULAR RATE- MUSE: 75 BPM
WBC # BLD AUTO: 14.3 10E3/UL (ref 4–11)
WBC # BLD AUTO: 15.6 10E3/UL (ref 4–11)
WBC # BLD AUTO: 8.8 10E3/UL (ref 4–11)

## 2024-05-14 PROCEDURE — 82805 BLOOD GASES W/O2 SATURATION: CPT

## 2024-05-14 PROCEDURE — 250N000009 HC RX 250: Performed by: ANESTHESIOLOGY

## 2024-05-14 PROCEDURE — 84100 ASSAY OF PHOSPHORUS: CPT

## 2024-05-14 PROCEDURE — 33967 INSERT I-AORT PERCUT DEVICE: CPT | Performed by: INTERNAL MEDICINE

## 2024-05-14 PROCEDURE — 250N000013 HC RX MED GY IP 250 OP 250 PS 637: Performed by: INTERNAL MEDICINE

## 2024-05-14 PROCEDURE — 85610 PROTHROMBIN TIME: CPT | Performed by: STUDENT IN AN ORGANIZED HEALTH CARE EDUCATION/TRAINING PROGRAM

## 2024-05-14 PROCEDURE — 360N000079 HC SURGERY LEVEL 6, PER MIN: Performed by: STUDENT IN AN ORGANIZED HEALTH CARE EDUCATION/TRAINING PROGRAM

## 2024-05-14 PROCEDURE — C1725 CATH, TRANSLUMIN NON-LASER: HCPCS | Performed by: INTERNAL MEDICINE

## 2024-05-14 PROCEDURE — 02110AW BYPASS CORONARY ARTERY, TWO ARTERIES FROM AORTA WITH AUTOLOGOUS ARTERIAL TISSUE, OPEN APPROACH: ICD-10-PCS | Performed by: STUDENT IN AN ORGANIZED HEALTH CARE EDUCATION/TRAINING PROGRAM

## 2024-05-14 PROCEDURE — 370N000017 HC ANESTHESIA TECHNICAL FEE, PER MIN: Performed by: STUDENT IN AN ORGANIZED HEALTH CARE EDUCATION/TRAINING PROGRAM

## 2024-05-14 PROCEDURE — 5A02210 ASSISTANCE WITH CARDIAC OUTPUT USING BALLOON PUMP, CONTINUOUS: ICD-10-PCS | Performed by: INTERNAL MEDICINE

## 2024-05-14 PROCEDURE — 84134 ASSAY OF PREALBUMIN: CPT

## 2024-05-14 PROCEDURE — 272N000001 HC OR GENERAL SUPPLY STERILE: Performed by: INTERNAL MEDICINE

## 2024-05-14 PROCEDURE — 999N000157 HC STATISTIC RCP TIME EA 10 MIN

## 2024-05-14 PROCEDURE — 999N000065 XR CHEST PORT 1 VIEW

## 2024-05-14 PROCEDURE — 5A1221Z PERFORMANCE OF CARDIAC OUTPUT, CONTINUOUS: ICD-10-PCS | Performed by: STUDENT IN AN ORGANIZED HEALTH CARE EDUCATION/TRAINING PROGRAM

## 2024-05-14 PROCEDURE — 410N000004: Performed by: STUDENT IN AN ORGANIZED HEALTH CARE EDUCATION/TRAINING PROGRAM

## 2024-05-14 PROCEDURE — 272N000001 HC OR GENERAL SUPPLY STERILE: Performed by: STUDENT IN AN ORGANIZED HEALTH CARE EDUCATION/TRAINING PROGRAM

## 2024-05-14 PROCEDURE — 84155 ASSAY OF PROTEIN SERUM: CPT

## 2024-05-14 PROCEDURE — 93005 ELECTROCARDIOGRAM TRACING: CPT

## 2024-05-14 PROCEDURE — 93010 ELECTROCARDIOGRAM REPORT: CPT | Performed by: STUDENT IN AN ORGANIZED HEALTH CARE EDUCATION/TRAINING PROGRAM

## 2024-05-14 PROCEDURE — 250N000009 HC RX 250: Performed by: INTERNAL MEDICINE

## 2024-05-14 PROCEDURE — 85027 COMPLETE CBC AUTOMATED: CPT

## 2024-05-14 PROCEDURE — 85730 THROMBOPLASTIN TIME PARTIAL: CPT

## 2024-05-14 PROCEDURE — 85027 COMPLETE CBC AUTOMATED: CPT | Performed by: STUDENT IN AN ORGANIZED HEALTH CARE EDUCATION/TRAINING PROGRAM

## 2024-05-14 PROCEDURE — 410N000003 HC PER-PERFUSION 1ST 30 MIN: Performed by: STUDENT IN AN ORGANIZED HEALTH CARE EDUCATION/TRAINING PROGRAM

## 2024-05-14 PROCEDURE — 258N000003 HC RX IP 258 OP 636

## 2024-05-14 PROCEDURE — 5A1223Z PERFORMANCE OF CARDIAC PACING, CONTINUOUS: ICD-10-PCS | Performed by: STUDENT IN AN ORGANIZED HEALTH CARE EDUCATION/TRAINING PROGRAM

## 2024-05-14 PROCEDURE — 99152 MOD SED SAME PHYS/QHP 5/>YRS: CPT | Performed by: INTERNAL MEDICINE

## 2024-05-14 PROCEDURE — 85384 FIBRINOGEN ACTIVITY: CPT

## 2024-05-14 PROCEDURE — 250N000009 HC RX 250

## 2024-05-14 PROCEDURE — 85730 THROMBOPLASTIN TIME PARTIAL: CPT | Performed by: STUDENT IN AN ORGANIZED HEALTH CARE EDUCATION/TRAINING PROGRAM

## 2024-05-14 PROCEDURE — 250N000011 HC RX IP 250 OP 636

## 2024-05-14 PROCEDURE — 84295 ASSAY OF SERUM SODIUM: CPT

## 2024-05-14 PROCEDURE — 82330 ASSAY OF CALCIUM: CPT

## 2024-05-14 PROCEDURE — 250N000011 HC RX IP 250 OP 636: Performed by: NURSE ANESTHETIST, CERTIFIED REGISTERED

## 2024-05-14 PROCEDURE — 03BC4ZZ EXCISION OF LEFT RADIAL ARTERY, PERCUTANEOUS ENDOSCOPIC APPROACH: ICD-10-PCS | Performed by: STUDENT IN AN ORGANIZED HEALTH CARE EDUCATION/TRAINING PROGRAM

## 2024-05-14 PROCEDURE — 250N000011 HC RX IP 250 OP 636: Performed by: INTERNAL MEDICINE

## 2024-05-14 PROCEDURE — 76984 DX INTRAOP THORACIC AORTA US: CPT | Mod: 26 | Performed by: STUDENT IN AN ORGANIZED HEALTH CARE EDUCATION/TRAINING PROGRAM

## 2024-05-14 PROCEDURE — 250N000011 HC RX IP 250 OP 636: Performed by: ANESTHESIOLOGY

## 2024-05-14 PROCEDURE — 250N000013 HC RX MED GY IP 250 OP 250 PS 637

## 2024-05-14 PROCEDURE — 250N000009 HC RX 250: Performed by: NURSE PRACTITIONER

## 2024-05-14 PROCEDURE — 36415 COLL VENOUS BLD VENIPUNCTURE: CPT

## 2024-05-14 PROCEDURE — 94002 VENT MGMT INPAT INIT DAY: CPT

## 2024-05-14 PROCEDURE — 258N000003 HC RX IP 258 OP 636: Performed by: INTERNAL MEDICINE

## 2024-05-14 PROCEDURE — 272N000004 HC RX 272: Performed by: STUDENT IN AN ORGANIZED HEALTH CARE EDUCATION/TRAINING PROGRAM

## 2024-05-14 PROCEDURE — 33534 CABG ARTERIAL TWO: CPT | Performed by: STUDENT IN AN ORGANIZED HEALTH CARE EDUCATION/TRAINING PROGRAM

## 2024-05-14 PROCEDURE — 250N000013 HC RX MED GY IP 250 OP 250 PS 637: Performed by: NURSE PRACTITIONER

## 2024-05-14 PROCEDURE — 250N000011 HC RX IP 250 OP 636: Performed by: STUDENT IN AN ORGANIZED HEALTH CARE EDUCATION/TRAINING PROGRAM

## 2024-05-14 PROCEDURE — 258N000003 HC RX IP 258 OP 636: Performed by: ANESTHESIOLOGY

## 2024-05-14 PROCEDURE — 85610 PROTHROMBIN TIME: CPT

## 2024-05-14 PROCEDURE — 250N000013 HC RX MED GY IP 250 OP 250 PS 637: Performed by: GENERAL ACUTE CARE HOSPITAL

## 2024-05-14 PROCEDURE — 36415 COLL VENOUS BLD VENIPUNCTURE: CPT | Performed by: STUDENT IN AN ORGANIZED HEALTH CARE EDUCATION/TRAINING PROGRAM

## 2024-05-14 PROCEDURE — 83605 ASSAY OF LACTIC ACID: CPT

## 2024-05-14 PROCEDURE — P9016 RBC LEUKOCYTES REDUCED: HCPCS | Performed by: ANESTHESIOLOGY

## 2024-05-14 PROCEDURE — 200N000001 HC R&B ICU

## 2024-05-14 PROCEDURE — 999N000253 HC STATISTIC WEANING TRIALS

## 2024-05-14 PROCEDURE — 250N000009 HC RX 250: Performed by: STUDENT IN AN ORGANIZED HEALTH CARE EDUCATION/TRAINING PROGRAM

## 2024-05-14 PROCEDURE — 5A1935Z RESPIRATORY VENTILATION, LESS THAN 24 CONSECUTIVE HOURS: ICD-10-PCS

## 2024-05-14 PROCEDURE — 33509 NDSC HRV UXTR ART 1 SGM CAB: CPT | Mod: XU | Performed by: STUDENT IN AN ORGANIZED HEALTH CARE EDUCATION/TRAINING PROGRAM

## 2024-05-14 PROCEDURE — C1898 LEAD, PMKR, OTHER THAN TRANS: HCPCS | Performed by: STUDENT IN AN ORGANIZED HEALTH CARE EDUCATION/TRAINING PROGRAM

## 2024-05-14 PROCEDURE — 85384 FIBRINOGEN ACTIVITY: CPT | Performed by: STUDENT IN AN ORGANIZED HEALTH CARE EDUCATION/TRAINING PROGRAM

## 2024-05-14 PROCEDURE — 250N000011 HC RX IP 250 OP 636: Performed by: NURSE PRACTITIONER

## 2024-05-14 PROCEDURE — C1894 INTRO/SHEATH, NON-LASER: HCPCS | Performed by: INTERNAL MEDICINE

## 2024-05-14 PROCEDURE — 02100Z9 BYPASS CORONARY ARTERY, ONE ARTERY FROM LEFT INTERNAL MAMMARY, OPEN APPROACH: ICD-10-PCS | Performed by: STUDENT IN AN ORGANIZED HEALTH CARE EDUCATION/TRAINING PROGRAM

## 2024-05-14 PROCEDURE — 250N000012 HC RX MED GY IP 250 OP 636 PS 637: Mod: JZ | Performed by: STUDENT IN AN ORGANIZED HEALTH CARE EDUCATION/TRAINING PROGRAM

## 2024-05-14 PROCEDURE — 99233 SBSQ HOSP IP/OBS HIGH 50: CPT | Performed by: STUDENT IN AN ORGANIZED HEALTH CARE EDUCATION/TRAINING PROGRAM

## 2024-05-14 PROCEDURE — 85520 HEPARIN ASSAY: CPT | Performed by: STUDENT IN AN ORGANIZED HEALTH CARE EDUCATION/TRAINING PROGRAM

## 2024-05-14 PROCEDURE — 33508 ENDOSCOPIC VEIN HARVEST: CPT | Mod: XU | Performed by: STUDENT IN AN ORGANIZED HEALTH CARE EDUCATION/TRAINING PROGRAM

## 2024-05-14 PROCEDURE — 84460 ALANINE AMINO (ALT) (SGPT): CPT

## 2024-05-14 PROCEDURE — 33517 CABG ARTERY-VEIN SINGLE: CPT | Performed by: STUDENT IN AN ORGANIZED HEALTH CARE EDUCATION/TRAINING PROGRAM

## 2024-05-14 PROCEDURE — 250N000013 HC RX MED GY IP 250 OP 250 PS 637: Performed by: HOSPITALIST

## 2024-05-14 PROCEDURE — 250N000025 HC SEVOFLURANE, PER MIN: Performed by: STUDENT IN AN ORGANIZED HEALTH CARE EDUCATION/TRAINING PROGRAM

## 2024-05-14 PROCEDURE — 83735 ASSAY OF MAGNESIUM: CPT

## 2024-05-14 PROCEDURE — 82803 BLOOD GASES ANY COMBINATION: CPT

## 2024-05-14 PROCEDURE — 99291 CRITICAL CARE FIRST HOUR: CPT | Performed by: NURSE PRACTITIONER

## 2024-05-14 PROCEDURE — 06BQ4ZZ EXCISION OF LEFT SAPHENOUS VEIN, PERCUTANEOUS ENDOSCOPIC APPROACH: ICD-10-PCS | Performed by: STUDENT IN AN ORGANIZED HEALTH CARE EDUCATION/TRAINING PROGRAM

## 2024-05-14 RX ORDER — ASPIRIN 81 MG/1
162 TABLET, CHEWABLE ORAL ONCE
Status: COMPLETED | OUTPATIENT
Start: 2024-05-14 | End: 2024-05-14

## 2024-05-14 RX ORDER — LIDOCAINE HYDROCHLORIDE 10 MG/ML
INJECTION, SOLUTION INFILTRATION; PERINEURAL PRN
Status: DISCONTINUED | OUTPATIENT
Start: 2024-05-14 | End: 2024-05-14

## 2024-05-14 RX ORDER — NOREPINEPHRINE BITARTRATE 0.02 MG/ML
.01-.1 INJECTION, SOLUTION INTRAVENOUS CONTINUOUS
Status: DISCONTINUED | OUTPATIENT
Start: 2024-05-14 | End: 2024-05-15

## 2024-05-14 RX ORDER — SODIUM CHLORIDE, SODIUM GLUCONATE, SODIUM ACETATE, POTASSIUM CHLORIDE AND MAGNESIUM CHLORIDE 526; 502; 368; 37; 30 MG/100ML; MG/100ML; MG/100ML; MG/100ML; MG/100ML
1000 INJECTION, SOLUTION INTRAVENOUS
Status: ACTIVE | OUTPATIENT
Start: 2024-05-14 | End: 2024-05-14

## 2024-05-14 RX ORDER — METHADONE HYDROCHLORIDE 10 MG/ML
INJECTION, SOLUTION INTRAMUSCULAR; INTRAVENOUS; SUBCUTANEOUS PRN
Status: DISCONTINUED | OUTPATIENT
Start: 2024-05-14 | End: 2024-05-14

## 2024-05-14 RX ORDER — HEPARIN SODIUM 1000 [USP'U]/ML
INJECTION, SOLUTION INTRAVENOUS; SUBCUTANEOUS PRN
Status: DISCONTINUED | OUTPATIENT
Start: 2024-05-14 | End: 2024-05-14

## 2024-05-14 RX ORDER — CEFAZOLIN SODIUM 2 G/100ML
2 INJECTION, SOLUTION INTRAVENOUS SEE ADMIN INSTRUCTIONS
Status: DISCONTINUED | OUTPATIENT
Start: 2024-05-14 | End: 2024-05-14

## 2024-05-14 RX ORDER — HYDROMORPHONE HCL IN WATER/PF 6 MG/30 ML
0.2 PATIENT CONTROLLED ANALGESIA SYRINGE INTRAVENOUS
Status: DISCONTINUED | OUTPATIENT
Start: 2024-05-14 | End: 2024-05-16

## 2024-05-14 RX ORDER — METHADONE HYDROCHLORIDE 10 MG/ML
0.2 INJECTION, SOLUTION INTRAMUSCULAR; INTRAVENOUS; SUBCUTANEOUS ONCE
Status: DISCONTINUED | OUTPATIENT
Start: 2024-05-14 | End: 2024-05-14

## 2024-05-14 RX ORDER — ASPIRIN 300 MG/1
300 SUPPOSITORY RECTAL DAILY
Status: DISCONTINUED | OUTPATIENT
Start: 2024-05-15 | End: 2024-05-15

## 2024-05-14 RX ORDER — PHENYLEPHRINE HCL IN 0.9% NACL 50MG/250ML
.1-4 PLASTIC BAG, INJECTION (ML) INTRAVENOUS CONTINUOUS PRN
Status: DISCONTINUED | OUTPATIENT
Start: 2024-05-14 | End: 2024-05-14

## 2024-05-14 RX ORDER — NALOXONE HYDROCHLORIDE 0.4 MG/ML
0.4 INJECTION, SOLUTION INTRAMUSCULAR; INTRAVENOUS; SUBCUTANEOUS
Status: DISCONTINUED | OUTPATIENT
Start: 2024-05-14 | End: 2024-05-14

## 2024-05-14 RX ORDER — GLYCOPYRROLATE 0.2 MG/ML
INJECTION, SOLUTION INTRAMUSCULAR; INTRAVENOUS PRN
Status: DISCONTINUED | OUTPATIENT
Start: 2024-05-14 | End: 2024-05-14

## 2024-05-14 RX ORDER — CHLORHEXIDINE GLUCONATE ORAL RINSE 1.2 MG/ML
10 SOLUTION DENTAL ONCE
Status: COMPLETED | OUTPATIENT
Start: 2024-05-14 | End: 2024-05-14

## 2024-05-14 RX ORDER — CEFAZOLIN SODIUM 1 G/3ML
1 INJECTION, POWDER, FOR SOLUTION INTRAMUSCULAR; INTRAVENOUS EVERY 8 HOURS
Qty: 15 ML | Refills: 0 | Status: COMPLETED | OUTPATIENT
Start: 2024-05-14 | End: 2024-05-15

## 2024-05-14 RX ORDER — AMOXICILLIN 250 MG
1 CAPSULE ORAL 2 TIMES DAILY
Status: DISCONTINUED | OUTPATIENT
Start: 2024-05-14 | End: 2024-05-21 | Stop reason: HOSPADM

## 2024-05-14 RX ORDER — ETOMIDATE 2 MG/ML
INJECTION INTRAVENOUS PRN
Status: DISCONTINUED | OUTPATIENT
Start: 2024-05-14 | End: 2024-05-14

## 2024-05-14 RX ORDER — DEXMEDETOMIDINE HYDROCHLORIDE 4 UG/ML
INJECTION, SOLUTION INTRAVENOUS CONTINUOUS PRN
Status: DISCONTINUED | OUTPATIENT
Start: 2024-05-14 | End: 2024-05-14

## 2024-05-14 RX ORDER — DEXMEDETOMIDINE HYDROCHLORIDE 4 UG/ML
.1-1.2 INJECTION, SOLUTION INTRAVENOUS CONTINUOUS
Status: DISCONTINUED | OUTPATIENT
Start: 2024-05-14 | End: 2024-05-15

## 2024-05-14 RX ORDER — CALCIUM GLUCONATE 20 MG/ML
2 INJECTION, SOLUTION INTRAVENOUS
Status: DISCONTINUED | OUTPATIENT
Start: 2024-05-14 | End: 2024-05-21 | Stop reason: HOSPADM

## 2024-05-14 RX ORDER — NALOXONE HYDROCHLORIDE 0.4 MG/ML
0.2 INJECTION, SOLUTION INTRAMUSCULAR; INTRAVENOUS; SUBCUTANEOUS
Status: DISCONTINUED | OUTPATIENT
Start: 2024-05-14 | End: 2024-05-14

## 2024-05-14 RX ORDER — FENTANYL CITRATE 50 UG/ML
100 INJECTION, SOLUTION INTRAMUSCULAR; INTRAVENOUS
Status: DISCONTINUED | OUTPATIENT
Start: 2024-05-14 | End: 2024-05-15

## 2024-05-14 RX ORDER — PROTAMINE SULFATE 10 MG/ML
INJECTION, SOLUTION INTRAVENOUS PRN
Status: DISCONTINUED | OUTPATIENT
Start: 2024-05-14 | End: 2024-05-14

## 2024-05-14 RX ORDER — ASPIRIN 300 MG/1
300 SUPPOSITORY RECTAL ONCE
Status: COMPLETED | OUTPATIENT
Start: 2024-05-14 | End: 2024-05-14

## 2024-05-14 RX ORDER — NALOXONE HYDROCHLORIDE 0.4 MG/ML
0.2 INJECTION, SOLUTION INTRAMUSCULAR; INTRAVENOUS; SUBCUTANEOUS
Status: DISCONTINUED | OUTPATIENT
Start: 2024-05-14 | End: 2024-05-21 | Stop reason: HOSPADM

## 2024-05-14 RX ORDER — FENTANYL CITRATE 50 UG/ML
INJECTION, SOLUTION INTRAMUSCULAR; INTRAVENOUS PRN
Status: DISCONTINUED | OUTPATIENT
Start: 2024-05-14 | End: 2024-05-14

## 2024-05-14 RX ORDER — PHENYLEPHRINE HCL IN 0.9% NACL 50MG/250ML
.1-6 PLASTIC BAG, INJECTION (ML) INTRAVENOUS CONTINUOUS
Status: DISCONTINUED | OUTPATIENT
Start: 2024-05-14 | End: 2024-05-16

## 2024-05-14 RX ORDER — SODIUM CHLORIDE 9 MG/ML
INJECTION, SOLUTION INTRAVENOUS CONTINUOUS PRN
Status: DISCONTINUED | OUTPATIENT
Start: 2024-05-14 | End: 2024-05-14

## 2024-05-14 RX ORDER — POLYETHYLENE GLYCOL 3350 17 G/17G
17 POWDER, FOR SOLUTION ORAL DAILY
Status: DISCONTINUED | OUTPATIENT
Start: 2024-05-15 | End: 2024-05-21 | Stop reason: HOSPADM

## 2024-05-14 RX ORDER — LORAZEPAM 1 MG/1
1 TABLET ORAL ONCE
Status: COMPLETED | OUTPATIENT
Start: 2024-05-14 | End: 2024-05-14

## 2024-05-14 RX ORDER — ASPIRIN 81 MG/1
162 TABLET, CHEWABLE ORAL
Status: COMPLETED | OUTPATIENT
Start: 2024-05-14 | End: 2024-05-14

## 2024-05-14 RX ORDER — HEPARIN SODIUM 10000 [USP'U]/100ML
0-5000 INJECTION, SOLUTION INTRAVENOUS CONTINUOUS
Status: CANCELLED | OUTPATIENT
Start: 2024-05-14

## 2024-05-14 RX ORDER — DEXTROSE MONOHYDRATE 25 G/50ML
25-50 INJECTION, SOLUTION INTRAVENOUS
Status: DISCONTINUED | OUTPATIENT
Start: 2024-05-14 | End: 2024-05-21 | Stop reason: HOSPADM

## 2024-05-14 RX ORDER — PANTOPRAZOLE SODIUM 40 MG/1
40 TABLET, DELAYED RELEASE ORAL
Status: DISCONTINUED | OUTPATIENT
Start: 2024-05-15 | End: 2024-05-21 | Stop reason: HOSPADM

## 2024-05-14 RX ORDER — ASPIRIN 81 MG/1
162 TABLET, CHEWABLE ORAL DAILY
Status: DISCONTINUED | OUTPATIENT
Start: 2024-05-15 | End: 2024-05-15

## 2024-05-14 RX ORDER — HYDROMORPHONE HCL IN WATER/PF 6 MG/30 ML
0.4 PATIENT CONTROLLED ANALGESIA SYRINGE INTRAVENOUS
Status: DISCONTINUED | OUTPATIENT
Start: 2024-05-14 | End: 2024-05-16

## 2024-05-14 RX ORDER — ASPIRIN 81 MG/1
81 TABLET, CHEWABLE ORAL
Status: COMPLETED | OUTPATIENT
Start: 2024-05-14 | End: 2024-05-14

## 2024-05-14 RX ORDER — MAGNESIUM SULFATE 4 G/50ML
4 INJECTION INTRAVENOUS ONCE
Status: DISCONTINUED | OUTPATIENT
Start: 2024-05-14 | End: 2024-05-14

## 2024-05-14 RX ORDER — HYDRALAZINE HYDROCHLORIDE 20 MG/ML
10 INJECTION INTRAMUSCULAR; INTRAVENOUS EVERY 30 MIN PRN
Status: DISCONTINUED | OUTPATIENT
Start: 2024-05-14 | End: 2024-05-21 | Stop reason: HOSPADM

## 2024-05-14 RX ORDER — METHADONE HYDROCHLORIDE 10 MG/ML
INJECTION, SOLUTION INTRAMUSCULAR; INTRAVENOUS; SUBCUTANEOUS
Status: COMPLETED
Start: 2024-05-14 | End: 2024-05-14

## 2024-05-14 RX ORDER — ACETAMINOPHEN 325 MG/1
650 TABLET ORAL EVERY 4 HOURS PRN
Status: CANCELLED | OUTPATIENT
Start: 2024-05-14

## 2024-05-14 RX ORDER — NALOXONE HYDROCHLORIDE 0.4 MG/ML
0.4 INJECTION, SOLUTION INTRAMUSCULAR; INTRAVENOUS; SUBCUTANEOUS
Status: DISCONTINUED | OUTPATIENT
Start: 2024-05-14 | End: 2024-05-21 | Stop reason: HOSPADM

## 2024-05-14 RX ORDER — ACETAMINOPHEN 325 MG/1
975 TABLET ORAL EVERY 8 HOURS SCHEDULED
Status: DISPENSED | OUTPATIENT
Start: 2024-05-14 | End: 2024-05-17

## 2024-05-14 RX ORDER — CEFAZOLIN SODIUM 2 G/100ML
2 INJECTION, SOLUTION INTRAVENOUS
Status: DISCONTINUED | OUTPATIENT
Start: 2024-05-14 | End: 2024-05-14

## 2024-05-14 RX ORDER — LIDOCAINE HYDROCHLORIDE 10 MG/ML
INJECTION, SOLUTION INFILTRATION; PERINEURAL
Status: COMPLETED | OUTPATIENT
Start: 2024-05-14 | End: 2024-05-14

## 2024-05-14 RX ORDER — ONDANSETRON 2 MG/ML
INJECTION INTRAMUSCULAR; INTRAVENOUS
Status: DISCONTINUED | OUTPATIENT
Start: 2024-05-14 | End: 2024-05-14

## 2024-05-14 RX ORDER — HEPARIN SODIUM 5000 [USP'U]/.5ML
5000 INJECTION, SOLUTION INTRAVENOUS; SUBCUTANEOUS EVERY 8 HOURS
Status: DISCONTINUED | OUTPATIENT
Start: 2024-05-15 | End: 2024-05-21 | Stop reason: HOSPADM

## 2024-05-14 RX ORDER — NICOTINE POLACRILEX 4 MG
15-30 LOZENGE BUCCAL
Status: DISCONTINUED | OUTPATIENT
Start: 2024-05-14 | End: 2024-05-21 | Stop reason: HOSPADM

## 2024-05-14 RX ORDER — OXYCODONE HYDROCHLORIDE 5 MG/1
10 TABLET ORAL EVERY 4 HOURS PRN
Status: DISCONTINUED | OUTPATIENT
Start: 2024-05-14 | End: 2024-05-16

## 2024-05-14 RX ORDER — PROCHLORPERAZINE MALEATE 5 MG
10 TABLET ORAL EVERY 6 HOURS PRN
Status: DISCONTINUED | OUTPATIENT
Start: 2024-05-14 | End: 2024-05-20

## 2024-05-14 RX ORDER — ACETAMINOPHEN 325 MG/1
650 TABLET ORAL EVERY 4 HOURS PRN
Status: DISCONTINUED | OUTPATIENT
Start: 2024-05-17 | End: 2024-05-18

## 2024-05-14 RX ORDER — BISACODYL 10 MG
10 SUPPOSITORY, RECTAL RECTAL DAILY PRN
Status: DISCONTINUED | OUTPATIENT
Start: 2024-05-17 | End: 2024-05-21 | Stop reason: HOSPADM

## 2024-05-14 RX ORDER — SODIUM CHLORIDE, SODIUM LACTATE, POTASSIUM CHLORIDE, CALCIUM CHLORIDE 600; 310; 30; 20 MG/100ML; MG/100ML; MG/100ML; MG/100ML
INJECTION, SOLUTION INTRAVENOUS CONTINUOUS PRN
Status: DISCONTINUED | OUTPATIENT
Start: 2024-05-14 | End: 2024-05-14

## 2024-05-14 RX ORDER — CHLORHEXIDINE GLUCONATE ORAL RINSE 1.2 MG/ML
15 SOLUTION DENTAL EVERY 12 HOURS
Status: DISCONTINUED | OUTPATIENT
Start: 2024-05-14 | End: 2024-05-16

## 2024-05-14 RX ORDER — ONDANSETRON 4 MG/1
4 TABLET, ORALLY DISINTEGRATING ORAL EVERY 6 HOURS PRN
Status: DISCONTINUED | OUTPATIENT
Start: 2024-05-14 | End: 2024-05-19

## 2024-05-14 RX ORDER — LIDOCAINE 40 MG/G
CREAM TOPICAL
Status: DISCONTINUED | OUTPATIENT
Start: 2024-05-14 | End: 2024-05-14

## 2024-05-14 RX ORDER — LIDOCAINE 4 G/G
1-2 PATCH TOPICAL EVERY 24 HOURS
Status: DISCONTINUED | OUTPATIENT
Start: 2024-05-14 | End: 2024-05-21 | Stop reason: HOSPADM

## 2024-05-14 RX ORDER — FENTANYL CITRATE 50 UG/ML
INJECTION, SOLUTION INTRAMUSCULAR; INTRAVENOUS
Status: COMPLETED
Start: 2024-05-14 | End: 2024-05-14

## 2024-05-14 RX ORDER — CALCIUM GLUCONATE 20 MG/ML
1 INJECTION, SOLUTION INTRAVENOUS
Status: DISCONTINUED | OUTPATIENT
Start: 2024-05-14 | End: 2024-05-21 | Stop reason: HOSPADM

## 2024-05-14 RX ORDER — CEFAZOLIN SODIUM/WATER 2 G/20 ML
SYRINGE (ML) INTRAVENOUS PRN
Status: DISCONTINUED | OUTPATIENT
Start: 2024-05-14 | End: 2024-05-14

## 2024-05-14 RX ORDER — OXYCODONE HYDROCHLORIDE 5 MG/1
5 TABLET ORAL EVERY 4 HOURS PRN
Status: DISCONTINUED | OUTPATIENT
Start: 2024-05-14 | End: 2024-05-16

## 2024-05-14 RX ORDER — ONDANSETRON 2 MG/ML
4 INJECTION INTRAMUSCULAR; INTRAVENOUS EVERY 6 HOURS PRN
Status: DISCONTINUED | OUTPATIENT
Start: 2024-05-14 | End: 2024-05-19

## 2024-05-14 RX ORDER — DEXMEDETOMIDINE HYDROCHLORIDE 4 UG/ML
.1-1.2 INJECTION, SOLUTION INTRAVENOUS CONTINUOUS
Status: DISCONTINUED | OUTPATIENT
Start: 2024-05-14 | End: 2024-05-14

## 2024-05-14 RX ORDER — SODIUM CHLORIDE, SODIUM LACTATE, POTASSIUM CHLORIDE, CALCIUM CHLORIDE 600; 310; 30; 20 MG/100ML; MG/100ML; MG/100ML; MG/100ML
INJECTION, SOLUTION INTRAVENOUS CONTINUOUS
Status: DISCONTINUED | OUTPATIENT
Start: 2024-05-14 | End: 2024-05-14

## 2024-05-14 RX ORDER — VANCOMYCIN HYDROCHLORIDE 1 G/20ML
INJECTION, POWDER, LYOPHILIZED, FOR SOLUTION INTRAVENOUS PRN
Status: DISCONTINUED | OUTPATIENT
Start: 2024-05-14 | End: 2024-05-15

## 2024-05-14 RX ORDER — FENTANYL CITRATE 50 UG/ML
INJECTION, SOLUTION INTRAMUSCULAR; INTRAVENOUS
Status: DISCONTINUED | OUTPATIENT
Start: 2024-05-14 | End: 2024-05-14

## 2024-05-14 RX ORDER — MAGNESIUM HYDROXIDE 1200 MG/15ML
LIQUID ORAL PRN
Status: DISCONTINUED | OUTPATIENT
Start: 2024-05-14 | End: 2024-05-14

## 2024-05-14 RX ORDER — SODIUM CHLORIDE 9 MG/ML
INJECTION, SOLUTION INTRAVENOUS CONTINUOUS
Status: DISCONTINUED | OUTPATIENT
Start: 2024-05-14 | End: 2024-05-15

## 2024-05-14 RX ADMIN — LIDOCAINE HYDROCHLORIDE 2 ML: 10 INJECTION, SOLUTION INFILTRATION; PERINEURAL at 12:11

## 2024-05-14 RX ADMIN — NICARDIPINE HYDROCHLORIDE 0.5 MG/HR: 0.2 INJECTION, SOLUTION INTRAVENOUS at 15:35

## 2024-05-14 RX ADMIN — PHENYLEPHRINE HYDROCHLORIDE 100 MCG: 10 INJECTION INTRAVENOUS at 12:49

## 2024-05-14 RX ADMIN — MIDAZOLAM 2 MG: 1 INJECTION INTRAMUSCULAR; INTRAVENOUS at 12:09

## 2024-05-14 RX ADMIN — CHLORHEXIDINE GLUCONATE 15 ML: 1.2 SOLUTION ORAL at 22:29

## 2024-05-14 RX ADMIN — FENTANYL CITRATE 100 MCG: 50 INJECTION, SOLUTION INTRAMUSCULAR; INTRAVENOUS at 18:00

## 2024-05-14 RX ADMIN — ASPIRIN 300 MG: 300 SUPPOSITORY RECTAL at 21:52

## 2024-05-14 RX ADMIN — FENTANYL CITRATE 100 MCG: 50 INJECTION, SOLUTION INTRAMUSCULAR; INTRAVENOUS at 23:10

## 2024-05-14 RX ADMIN — SODIUM CHLORIDE: 9 INJECTION, SOLUTION INTRAVENOUS at 12:46

## 2024-05-14 RX ADMIN — BUSPIRONE HYDROCHLORIDE 10 MG: 10 TABLET ORAL at 01:06

## 2024-05-14 RX ADMIN — GLYCOPYRROLATE 0.2 MG: 0.2 INJECTION INTRAMUSCULAR; INTRAVENOUS at 13:57

## 2024-05-14 RX ADMIN — SODIUM CHLORIDE, POTASSIUM CHLORIDE, SODIUM LACTATE AND CALCIUM CHLORIDE 250 ML: 600; 310; 30; 20 INJECTION, SOLUTION INTRAVENOUS at 22:28

## 2024-05-14 RX ADMIN — PHENYLEPHRINE HYDROCHLORIDE 100 MCG: 10 INJECTION INTRAVENOUS at 12:45

## 2024-05-14 RX ADMIN — HYDROMORPHONE HYDROCHLORIDE 0.4 MG: 0.2 INJECTION, SOLUTION INTRAMUSCULAR; INTRAVENOUS; SUBCUTANEOUS at 19:24

## 2024-05-14 RX ADMIN — AMINOCAPROIC ACID 5 G: 250 INJECTION, SOLUTION INTRAVENOUS at 13:22

## 2024-05-14 RX ADMIN — Medication 50 MEQ: at 18:16

## 2024-05-14 RX ADMIN — PHENYLEPHRINE HYDROCHLORIDE 100 MCG: 10 INJECTION INTRAVENOUS at 13:13

## 2024-05-14 RX ADMIN — HYDROMORPHONE HYDROCHLORIDE 0.2 MG: 0.2 INJECTION, SOLUTION INTRAMUSCULAR; INTRAVENOUS; SUBCUTANEOUS at 22:08

## 2024-05-14 RX ADMIN — FENTANYL CITRATE 100 MCG: 50 INJECTION INTRAMUSCULAR; INTRAVENOUS at 12:11

## 2024-05-14 RX ADMIN — SODIUM BICARBONATE 50 MEQ: 84 INJECTION, SOLUTION INTRAVENOUS at 18:16

## 2024-05-14 RX ADMIN — CEFAZOLIN 1 G: 1 INJECTION, POWDER, FOR SOLUTION INTRAMUSCULAR; INTRAVENOUS at 21:29

## 2024-05-14 RX ADMIN — DEXMEDETOMIDINE HYDROCHLORIDE 0.5 MCG/KG/HR: 400 INJECTION INTRAVENOUS at 13:31

## 2024-05-14 RX ADMIN — SUGAMMADEX 200 MG: 100 INJECTION, SOLUTION INTRAVENOUS at 17:21

## 2024-05-14 RX ADMIN — Medication 2 G: at 13:16

## 2024-05-14 RX ADMIN — FENTANYL CITRATE 100 MCG: 50 INJECTION, SOLUTION INTRAMUSCULAR; INTRAVENOUS at 19:08

## 2024-05-14 RX ADMIN — PANTOPRAZOLE SODIUM 40 MG: 40 TABLET, DELAYED RELEASE ORAL at 08:40

## 2024-05-14 RX ADMIN — PHENYLEPHRINE HYDROCHLORIDE 100 MCG: 10 INJECTION INTRAVENOUS at 12:53

## 2024-05-14 RX ADMIN — CHLORHEXIDINE GLUCONATE 10 ML: 1.2 SOLUTION ORAL at 10:47

## 2024-05-14 RX ADMIN — SODIUM CHLORIDE: 9 INJECTION, SOLUTION INTRAVENOUS at 11:53

## 2024-05-14 RX ADMIN — ASPIRIN 162 MG: 81 TABLET, COATED ORAL at 09:42

## 2024-05-14 RX ADMIN — Medication 10 MG: at 12:44

## 2024-05-14 RX ADMIN — HYDROMORPHONE HYDROCHLORIDE 0.4 MG: 0.2 INJECTION, SOLUTION INTRAMUSCULAR; INTRAVENOUS; SUBCUTANEOUS at 17:51

## 2024-05-14 RX ADMIN — EPINEPHRINE 0.02 MCG/KG/MIN: 1 INJECTION INTRAMUSCULAR; INTRAVENOUS; SUBCUTANEOUS at 15:54

## 2024-05-14 RX ADMIN — SODIUM CHLORIDE, POTASSIUM CHLORIDE, SODIUM LACTATE AND CALCIUM CHLORIDE 250 ML: 600; 310; 30; 20 INJECTION, SOLUTION INTRAVENOUS at 23:33

## 2024-05-14 RX ADMIN — AMINOCAPROIC ACID 1 G/HR: 250 INJECTION, SOLUTION INTRAVENOUS at 14:22

## 2024-05-14 RX ADMIN — HEPARIN SODIUM 30000 UNITS: 1000 INJECTION INTRAVENOUS; SUBCUTANEOUS at 14:16

## 2024-05-14 RX ADMIN — INSULIN HUMAN 3 UNITS/HR: 1 INJECTION, SOLUTION INTRAVENOUS at 13:36

## 2024-05-14 RX ADMIN — DESMOPRESSIN ACETATE 23.28 MCG: 4 INJECTION, SOLUTION INTRAVENOUS; SUBCUTANEOUS at 16:34

## 2024-05-14 RX ADMIN — PHENYLEPHRINE HYDROCHLORIDE 100 MCG: 10 INJECTION INTRAVENOUS at 13:01

## 2024-05-14 RX ADMIN — LIDOCAINE HYDROCHLORIDE 5 ML: 10 INJECTION, SOLUTION INFILTRATION; PERINEURAL at 12:29

## 2024-05-14 RX ADMIN — LORAZEPAM 1 MG: 1 TABLET ORAL at 04:52

## 2024-05-14 RX ADMIN — PHENYLEPHRINE HYDROCHLORIDE 100 MCG: 10 INJECTION INTRAVENOUS at 13:17

## 2024-05-14 RX ADMIN — LIDOCAINE 1 PATCH: 4 PATCH TOPICAL at 18:03

## 2024-05-14 RX ADMIN — PHENYLEPHRINE HYDROCHLORIDE 100 MCG: 10 INJECTION INTRAVENOUS at 14:12

## 2024-05-14 RX ADMIN — DEXMEDETOMIDINE HYDROCHLORIDE 0.7 MCG/KG/HR: 400 INJECTION INTRAVENOUS at 19:14

## 2024-05-14 RX ADMIN — PROTAMINE SULFATE 220 MG: 10 INJECTION, SOLUTION INTRAVENOUS at 16:06

## 2024-05-14 RX ADMIN — PHENYLEPHRINE HYDROCHLORIDE 0.3 MCG/KG/MIN: 10 INJECTION INTRAVENOUS at 13:27

## 2024-05-14 RX ADMIN — SODIUM CHLORIDE, POTASSIUM CHLORIDE, SODIUM LACTATE AND CALCIUM CHLORIDE: 600; 310; 30; 20 INJECTION, SOLUTION INTRAVENOUS at 12:27

## 2024-05-14 RX ADMIN — ROCURONIUM BROMIDE 50 MG: 50 INJECTION, SOLUTION INTRAVENOUS at 14:12

## 2024-05-14 RX ADMIN — SODIUM CHLORIDE: 9 INJECTION, SOLUTION INTRAVENOUS at 17:38

## 2024-05-14 RX ADMIN — ROCURONIUM BROMIDE 100 MG: 50 INJECTION, SOLUTION INTRAVENOUS at 12:29

## 2024-05-14 RX ADMIN — GLYCOPYRROLATE 0.2 MG: 0.2 INJECTION INTRAMUSCULAR; INTRAVENOUS at 14:12

## 2024-05-14 RX ADMIN — HYDROMORPHONE HYDROCHLORIDE 0.4 MG: 0.2 INJECTION, SOLUTION INTRAMUSCULAR; INTRAVENOUS; SUBCUTANEOUS at 23:51

## 2024-05-14 RX ADMIN — ETOMIDATE 20 MG: 2 INJECTION, SOLUTION INTRAVENOUS at 12:29

## 2024-05-14 RX ADMIN — METOPROLOL TARTRATE 25 MG: 25 TABLET, FILM COATED ORAL at 09:42

## 2024-05-14 RX ADMIN — Medication 10 MG: at 13:25

## 2024-05-14 ASSESSMENT — ACTIVITIES OF DAILY LIVING (ADL)
ADLS_ACUITY_SCORE: 25
ADLS_ACUITY_SCORE: 21
ADLS_ACUITY_SCORE: 25
ADLS_ACUITY_SCORE: 21
ADLS_ACUITY_SCORE: 25
ADLS_ACUITY_SCORE: 21
ADLS_ACUITY_SCORE: 25
ADLS_ACUITY_SCORE: 21
ADLS_ACUITY_SCORE: 25
ADLS_ACUITY_SCORE: 25
ADLS_ACUITY_SCORE: 21
ADLS_ACUITY_SCORE: 25
ADLS_ACUITY_SCORE: 25

## 2024-05-14 NOTE — ANESTHESIA CARE TRANSFER NOTE
Patient: Drew Monsivais    Procedure: Procedure(s):  CORONARY ARTERY BYPASS GRAFT TIMES THREE, WITH LEFT LEG ENDOSCOPIC VESSEL PROCUREMENT, LEFT RADIAL ARTERY HARVEST, ANESTHESIA TRANSESOPHAGEAL ECHOCARDIOGRAM, EPIAORTIC ULTRASOUND       Diagnosis: NSTEMI (non-ST elevated myocardial infarction) (H) [I21.4]  Diagnosis Additional Information: No value filed.    Anesthesia Type:   General     Note:    Oropharynx: endotracheal tube in place  Level of Consciousness: iatrogenic sedation    Level of Supplemental Oxygen (L/min / FiO2): 100  Independent Airway: airway patency not satisfactory and stable  Dentition: dentition unchanged  Vital Signs Stable: post-procedure vital signs reviewed and stable  Report to RN Given: handoff report given  Patient transferred to: ICU  Comments: Connected to vent by RT, BBS +, ETT secured at 23, vent settings 500x18 PEEP 5 100%FiO2, report to team. Full monitors and ambu vent with 100% O2 to ICU.  ICU Handoff: Call for PAUSE to initiate/utilize ICU HANDOFF, Identified Patient, Identified Responsible Provider, Reviewed the Pertinent Medical History, Discussed Surgical Course, Reviewed Intra-OP Anesthesia Management and Issues during Anesthesia, Set Expectations for Post Procedure Period and Allowed Opportunity for Questions and Acknowledgement of Understanding      Vitals:  Vitals Value Taken Time   /61 05/14/24 1726   Temp 36.5  C (97.7  F) 05/14/24 1726   Pulse 80 05/14/24 1726   Resp 0 05/14/24 1723   SpO2 100 % 05/14/24 1726   Vitals shown include unfiled device data.    Electronically Signed By: MAYELA Marrufo CRNA  May 14, 2024  5:27 PM

## 2024-05-14 NOTE — ANESTHESIA PROCEDURE NOTES
Central Line/PA Catheter Placement    Pre-Procedure   Staff -        Anesthesiologist:  Renata Hutchinsno MD       Performed By: anesthesiologist       Location: OR       Pre-Anesthestic Checklist: patient identified, IV checked, site marked, risks and benefits discussed, informed consent, monitors and equipment checked, pre-op evaluation and at physician/surgeon's request  Timeout:       Correct Patient: Yes        Correct Procedure: Yes        Correct Site: Yes        Correct Position: Yes        Correct Laterality: Yes   Line Placement:   This line was placed Post Induction starting at 5/14/2024 12:35 PM and ending at 5/14/2024 12:52 AM    Procedure   Procedure: central line and elective       Laterality: right       Insertion Site: internal jugular.       Patient Position: Trendelenburg  Sterile Prep        All elements of maximal sterile barrier technique followed       Patient Prep/Sterile Barriers: draped, hand hygiene, gloves , hat , mask , draped, gown, sterile gel and probe cover       Skin prep: Chloraprep  Insertion/Injection        Technique: ultrasound guided and Seldinger Technique        1. Ultrasound was used to evaluate the access site.       2. Vein evaluated via ultrasound for patency/adequacy.       3. Using real-time ultrasound the needle/catheter was observed entering the artery/vein.       4. Permanent image was captured and entered into the patient's record.       5. The visualized structures were anatomically normal.       6. There were no apparent abnormal pathologic findings.       Introducer Type: 9 Fr, 2-lumen MAC        Type: CVC       Number of Lumens: double lumen  Narrative         Secured by: suture       Tegaderm and Biopatch dressing used.       Complications: None apparent,        blood aspirated from all lumens,        All lumens flushed: Yes       Verification method: Placement to be verified post-op and Ultrasound

## 2024-05-14 NOTE — ANESTHESIA PROCEDURE NOTES
Perioperative KESHAWN Procedure Note    Staff -        Anesthesiologist:  Renata Hutchinson MD       Performed By: anesthesiologist  Preanesthesia Checklist:  Patient identified, IV assessed, risks and benefits discussed, monitors and equipment assessed, procedure being performed at surgeon's request and anesthesia consent obtained.    KESHAWN Probe Insertion    Probe Status PRE Insertion: NO obvious damage  Probe type:  Adult 3D  Bite block used:   Soft  Insertion Technique: Easy, no oropharyngeal manipulation  Insertion complications: None obvious  Billing Report:A KESHAWN report is NOT being generated.  Probe Status POST Removal: NO obvious damage

## 2024-05-14 NOTE — ANESTHESIA POSTPROCEDURE EVALUATION
Patient: Drew Monsivais    Procedure: Procedure(s):  CORONARY ARTERY BYPASS GRAFT TIMES THREE, WITH LEFT LEG ENDOSCOPIC VESSEL PROCUREMENT, LEFT RADIAL ARTERY HARVEST, ANESTHESIA TRANSESOPHAGEAL ECHOCARDIOGRAM, EPIAORTIC ULTRASOUND       Anesthesia Type:  General    Note:  Disposition: ICU            ICU Sign Out: Anesthesiologist/ICU physician sign out WAS performed   Postop Pain Control: Uneventful            Sign Out: Well controlled pain   PONV: No   Neuro/Psych:             Sign Out: PLANNED postop sedation   Airway/Respiratory:             Sign Out: AIRWAY IN SITU/Resp. Support               Airway in situ/Resp. Support: ETT                 Reason: Planned Pre-op   CV/Hemodynamics:             Sign Out: Detailed CV status (IABP)               Blood Pressure: Pressors; Normal               Rate/Rhythm: Normal HR               Perfusion:  Adequate perfusion indices; Inotropes   Other NRE: NONE   DID A NON-ROUTINE EVENT OCCUR? No    Event details/Postop Comments:  Nicardipine gtt for radial artery graft protocol.  Per surgeon, keep epinephrine gtt and IABP overnoc.  Okay to fast track and wean ventilator.             Last vitals:  Vitals:    05/14/24 1130 05/14/24 1720 05/14/24 1726   BP: 122/80  109/61   Pulse: 60 62 80   Resp: (!) 9 18    Temp:   36.5  C (97.7  F)   SpO2: 100% 100% 100%       Electronically Signed By: Renata Hutchinson MD  May 14, 2024  5:37 PM

## 2024-05-14 NOTE — ANESTHESIA PROCEDURE NOTES
Airway       Patient location during procedure: OR       Procedure Start/Stop Times: 5/14/2024 12:31 PM  Staff -        CRNA: Toni Small APRN CRNA       Performed By: CRNA  Consent for Airway        Urgency: elective  Indications and Patient Condition       Indications for airway management: misty-procedural       Induction type:intravenous       Mask difficulty assessment: 1 - vent by mask    Final Airway Details       Final airway type: endotracheal airway       Successful airway: Single subglottic suction  Endotracheal Airway Details        ETT size (mm): 8.0       Cuffed: yes       Cuff volume (mL): 6       Successful intubation technique: video laryngoscopy       VL Blade Size: Glidescope 3       Grade View of Cords: 1       Adjucts: stylet       Position: Right       Measured from: lips       Secured at (cm): 23       Bite block used: None    Post intubation assessment        Placement verified by: capnometry, equal breath sounds and chest rise        Number of attempts at approach: 1       Number of other approaches attempted: 0       Secured with: silk tape       Ease of procedure: easy       Dentition: Intact and Unchanged       Dental guard used and removed. Dental Guard Type: Standard White.    Medication(s) Administered   Medication Administration Time: 5/14/2024 12:31 PM

## 2024-05-14 NOTE — ANESTHESIA PREPROCEDURE EVALUATION
Anesthesia Pre-Procedure Evaluation    Patient: Drew Monsivais   MRN: [id number] : 1970        Procedure : Procedure(s):  CORONARY ARTERY BYPASS GRAFT, WITH ENDOSCOPIC VESSEL PROCUREMENT, Possible radial artery harvest          Past Medical History:   Diagnosis Date     Calculus of kidney     at least 4 episodes most recent  once surgically removed Stones present both kidneys       Closed fracture of metatarsal bone(s)           Diabetes (H)      Headache     Frontal-?migraine Triggers: no obvious,  excedrin light senstive Chiro      Hypertension      PONV (postoperative nausea and vomiting)       Past Surgical History:   Procedure Laterality Date     ESOPHAGOSCOPY, GASTROSCOPY, DUODENOSCOPY (EGD), COMBINED N/A 2/15/2023    Procedure: ESOPHAGOGASTRODUODENOSCOPY with BIOPSY;  Surgeon: Brandt Duong MD;  Location: [facility] Main OR      No Known Allergies   Social History     Tobacco Use     Smoking status: Former     Types: Cigarettes     Smokeless tobacco: Never     Tobacco comments:     College   Substance Use Topics     Alcohol use: No      Wt Readings from Last 1 Encounters:   24 77.6 kg (171 lb 1.6 oz)        Anesthesia Evaluation   Pt has had prior anesthetic. Type: MAC.    History of anesthetic complications  - PONV.      ROS/MED HX  ENT/Pulmonary:     (+)                tobacco use,                     (-) asthma, COPD, sleep apnea and EDENILSON risk factors   Neurologic:  - neg neurologic ROS     Cardiovascular: Comment: Echo 24-  Interpretation Summary     1. The left ventricle is normal in size with normal left ventricular wall  thickness.  2. Left ventricular function is decreased. The ejection fraction is 45-50%  (mildly reduced). The mid to distal inferolateral wall appears hypokinetic.  3. Normal right ventricle size and systolic function.  4. The aortic valve is bicuspid with no regurgitation or stenosis.  5. There is no comparison study available.    CV Cath 24-  1.   Diffuse multivessel coronary disease as outlined above.  2.  LAD occluded in mid segment.  3.  Severe disease in OM1 and OM 2 branches of LCx  4.  Sequential 90% proximal and distal RCA lesions.  Native vessel fills small SANJAY branch.  PDA occluded,  fills retrograde from septal collaterals.        (+) Dyslipidemia hypertension- -  CAD (severe multivessel disease) - past MI (NSTEMI, on heparin gtt, plan for IABP in AM) - -   Taking blood thinners   CHF  Last EF: 40-45%                           (-) angina, pacemaker, arrhythmias, PVD (<50% bilateral carotid stenosis), pacemaker, ICD and angina   METS/Exercise Tolerance:     Hematologic:    (-) anemia (HGb 15)   Musculoskeletal:  - neg musculoskeletal ROS     GI/Hepatic:     (+)   esophageal disease (erosive esophagitis in setting of cyclical n/v, 2023, resolved),          liver disease,       Renal/Genitourinary:     (+) renal disease, type: CRI,            Endo:     (+)  type II DM, Last HgA1c: 9.3,  Using insulin,              (-) obesity   Psychiatric/Substance Use:     (+) psychiatric history anxiety alcohol abuse  Recreational drug usage: Cannabis.    Infectious Disease:  - neg infectious disease ROS     Malignancy:  - neg malignancy ROS     Other:            Physical Exam    Airway        Mallampati: II   TM distance: > 3 FB   Neck ROM: full   Mouth opening: > 3 cm    Respiratory Devices and Support         Dental       (+) Multiple visibly decayed, broken teeth      Cardiovascular          Rhythm and rate: regular and normal     Pulmonary   pulmonary exam normal            Other findings: Extremely anxious, perseverant on ICU status and possibility of awareness under anesthesia    OUTSIDE LABS:  CBC:   Lab Results   Component Value Date    WBC 7.8 05/12/2024    WBC 7.2 05/12/2024    HGB 15.1 05/12/2024    HGB 15.0 05/12/2024    HCT 42.3 05/12/2024    HCT 42.4 05/12/2024     05/12/2024     05/12/2024     BMP:   Lab Results   Component Value Date  "    (L) 05/12/2024     02/17/2023    POTASSIUM 4.5 05/12/2024    POTASSIUM 3.8 02/18/2023    CHLORIDE 100 05/12/2024    CHLORIDE 103 02/17/2023    CO2 25 05/12/2024    CO2 25 02/17/2023    BUN 16.3 05/12/2024    BUN 13 02/17/2023    CR 1.11 05/12/2024    CR 0.86 02/17/2023     (H) 05/13/2024     (H) 05/13/2024     COAGS:   Lab Results   Component Value Date    PTT 26 02/14/2023    INR 0.96 02/14/2023     POC: No results found for: \"BGM\", \"HCG\", \"HCGS\"  HEPATIC:   Lab Results   Component Value Date    ALBUMIN 4.2 05/12/2024    PROTTOTAL 6.6 05/12/2024    ALT 32 05/12/2024    AST 27 05/12/2024    ALKPHOS 95 05/12/2024    BILITOTAL 0.4 05/12/2024     OTHER:   Lab Results   Component Value Date    LACT 2.5 (H) 02/14/2023    A1C 9.3 (H) 05/12/2024    LEISA 8.9 05/12/2024    MAG 2.0 05/12/2024    LIPASE 163 (H) 05/12/2024    TSH 0.83 03/09/2022       Anesthesia Plan    ASA Status:  4       Anesthesia Type: General.     - Airway: ETT   Induction: Intravenous.   Maintenance: Balanced.   Techniques and Equipment:     - Airway: Video-Laryngoscope     - Lines/Monitors: 2nd IV, Arterial Line, Central Line, NIRS, CVP, KESHAWN, PAC            KESHAWN Absolute Contra-indication: NONE            KESHAWN Relative Contra-indication: Esophagitis (Remote hx, 2023, resolved)     - Blood: Blood in Room, PRBC, Cell Saver, T&C     - Drips/Meds: Dexmed. infusion, Ketamine, Fentanyl, Phenylephrine, Epinephrine, Nicardipine     Consents    Anesthesia Plan(s) and associated risks, benefits, and realistic alternatives discussed. Questions answered and patient/representative(s) expressed understanding.     - Discussed: Risks, Benefits and Alternatives for BOTH SEDATION and the PROCEDURE were discussed     - Discussed with:  Patient      - Extended Intubation/Ventilatory Support Discussed: Yes.      Use of blood products discussed: Yes.     - Discussed with: Patient.     - Consented: consented to blood products     Postoperative " Care    Pain management: Multi-modal analgesia.        Comments:    Other Comments: Highly recommend preop versed due to degree of preoperative anxiety exhibited by patient this PM.     Glidescope    Phenylephrine inline for induction               Doreen Jay MD    I have reviewed the pertinent notes and labs in the chart from the past 30 days and (re)examined the patient.  Any updates or changes from those notes are reflected in this note.

## 2024-05-14 NOTE — PLAN OF CARE
Goal Outcome Evaluation:  Problem: Adult Inpatient Plan of Care  Goal: Absence of Hospital-Acquired Illness or Injury  Intervention: Identify and Manage Fall Risk  Recent Flowsheet Documentation  Taken 5/13/2024 2026 by Gela Alaniz RN  Safety Promotion/Fall Prevention:   activity supervised   assistive device/personal items within reach   clutter free environment maintained   nonskid shoes/slippers when out of bed   patient and family education   room organization consistent   safety round/check completed   supervised activity  Intervention: Prevent Skin Injury  Recent Flowsheet Documentation  Taken 5/13/2024 2026 by Gela Alaniz RN  Body Position: position changed independently  Device Skin Pressure Protection: tubing/devices free from skin contact  Intervention: Prevent Infection  Recent Flowsheet Documentation  Taken 5/13/2024 2026 by Gela Alaniz RN  Infection Prevention: rest/sleep promoted

## 2024-05-14 NOTE — PLAN OF CARE
Goal Outcome Evaluation:         [facility] - ICU    RN Progress Note:            Pertinent Assessments:      Please refer to flowsheet rows for full assessment     Very anxious about surgery today. PRNs given. Patient otherwise pleasant and cooperative.           Key Events - This Shift:       Anxiety.                 Barriers to Discharge / Downgrade:     Balloon Pump and surgery today         Point of Contact Update YES-OR-NO: No, updated by patient

## 2024-05-14 NOTE — CONSULTS
PULMONARY / CRITICAL CARE CONSULTATION NOTE      Consultation - Pulmonary/Critical Care Medicine  Drew Monsivais,  1970, MRN [id number]  Date / Time of Admission:  2024  4:02 PM    Admitting Dx: NSTEMI  NSTEMI (non-ST elevated myocardial infarction) (H)    PCP: Per [facility], [phone]  Consulting physician:  Tuyet Azar MD   Code status:  Full Code       Extended Emergency Contact Information  Primary Emergency Contact: Enid Monsivais  Mobile Phone: [phone]  Relation: Sibling  Secondary Emergency Contact: Elli Monsivais   [facility]  Home Phone: [phone]  Relation: Sister       ID:  Drew Monsivais is a 53 year old male with CAD,MARY JANE, HTN, HLD, T2DM. Underwent CABG x 3 with Dr. Azar today.         ASSESSMENT & PLAN BY SYSTEM   Systems to Assess:   Pulmonary: Post op vent management; no underlying lung disease.   Wean supplemental O2 as tolerated; goal O2 sat > 92%.  HOB > 30 degrees to limit aspiration risk.    Check ABG and adjust support as indicated; avoid acidotic state.   Check CXR  Once hemodynamically stable, plan to proceed to wake, wean, and extubate with goal < 6-hours.  Goal extubation by 2315  Cardiovascular: CAD s/p CABG x 3  Cardiac monitoring.   SBP goal > 90 mmHg, MAP goal > 65 mmHg.    Goal CI 2-3   Goal PAD 18  Vasoactive gtts per CV-surgery.   Temporary pacing wires present; will use in setting of symptomatic arrhythmia.   Currently paced @ back up 60  Underlying rhythm: sinus rhythm  Neurological: sedation for vent synchrony and comfort.    Neuro checks per ICU protocol.   Sedate with precedex until ready to wean and extubate.   Pain control: PRN  Goal RASS 0 to -1   GI/: no acute issues  NPO until extubated and fully awake.   Still catheter in place for accurate measurement of I/O.   GI prophylaxis:  Omeprazole  Renal: MARY JANE   Monitor I/O's.  Electrolyte repletion PRN.  Avoid/limit nephrotoxic agents.   IVFs: per CV-surgery  Heme/Coag:  Acute blood loss anemia; coagulopathy secondary to pump run.   Monitor H/H.   Transfuse per CV-surgery.   Monitor chest tube output hourly; notify CV-surgery for excessive output or abrupt stop in output.   DVT prophylaxis:  SubQ heparin  Infectious disease:   General precautions.   Remove lines and drains once no longer required.   Endocrine: T2DM with hyperglycemia   RHI gtt per ICU protocol.   Musculoskeletal:   Bedrest; initiate mobility protocol.     Lines: A-line, Day# 1, Central line, Day# 1 or [place] Jasmin, Day# 1      Code Status:  FULL CODE    Thank you for this consultation.  Please call if any questions or concerns.        This patient is considered critically ill and requires ICU level of care due to immediate post CV-surgical procedure. High risk for acute hemodynamic collapse and death.     Total Critical Care time, not including separate billable procedure time:  33 minutes.    Doris Anguiano, CNP  [facility] Pulmonary/Critical Care      Chief Complaint chief complaint       HPI    Drew Monsivais is a 53 year old male with CAD, MARY JANE, HTN, HLD, T2DM. Admitted 5/12 with chest tightness and NSTEMI. Underwent CABG x 3 with Dr. Azar today with IABP placement prior. Procedure reported as straight forward. No difficulty with line placement or intubation. Received 20mg IV methadone up front. Preop EF showed good function with EF in the 40s; this was preserved to slightly improved post procedure. No difficulty going on nor coming off pump. Radial artery graft was used so small dose of Cardene running along with epi and phenyl. EBL 300mL; received 140mL Cell Saver and a dose of DDAVP. No bleeding concerns at end of case.   Direct sign out received at the bedside from Dr. Hutchinson.          Review of Systems   Review of systems not obtained due to patient factors.     Active Problem List   Patient Active Problem List    Diagnosis Date Noted    NSTEMI (non-ST elevated myocardial infarction) (H)  05/12/2024     Priority: Medium    Gastroesophageal reflux disease with esophagitis 03/09/2023     Priority: Medium    Hypomagnesemia 02/14/2023     Priority: Medium    Elevated glucose 02/14/2023     Priority: Medium    Diffuse abdominal pain 02/14/2023     Priority: Medium    Nausea and vomiting, unspecified vomiting type 02/14/2023     Priority: Medium    Intractable pain 12/27/2022     Priority: Medium    Intractable nausea and vomiting 12/27/2022     Priority: Medium    Acute pancreatitis, unspecified complication status, unspecified pancreatitis type 12/27/2022     Priority: Medium    Dysphagia 10/03/2022     Priority: Medium     Added automatically from request for surgery [id number]      Stomach burning 10/03/2022     Priority: Medium     Added automatically from request for surgery [id number]      Depression with anxiety      Priority: Medium     Symptoms:  Racing thoughts, worry, worse after divorce.  Probably MARIA DOLORES  Prior beta blocker-did not like  Sertraline-restart Jan 2022  Prozac, December 2020-did not seem to help  Seroquel, January 2021-weaned off Jan 2022        Dyslipidemia      Priority: Medium     Diagnosed in 2012    HDL 31; triglycerides to 1063  TriCor, December 2020        Diabetes mellitus, type 2 (H) 12/16/2020     Priority: Medium     Dx Nov 2020  Random 385, A1c 11.9  Metformin, December 2020        Fatty liver 12/16/2020     Priority: Medium     Dx Nov 2020  Presumed Metabolic        Male erectile disorder 12/16/2020     Priority: Medium     Began 50's  Viagra-did not help          Proctalgia      Priority: Medium     [facility]  unknown etiology  alleve helps        Benign Adenomatous Polyp Of The Large Intestine      Priority: Medium    Hypertension 01/29/2015     Priority: Medium     2010s  Atenolol-did not tolerate  Lisinopril, December 2020  Amlodipine, Feb 2022             Medical/Surgical History   Past Medical History:   Diagnosis Date    Calculus of kidney     at  least 4 episodes most recent 2012 once surgically removed Stones present both kidneys      Closed fracture of metatarsal bone(s)          Diabetes (H)     Headache     Frontal-?migraine Triggers: no obvious,  excedrin light senstive Chiro     Hypertension     PONV (postoperative nausea and vomiting)      Past Surgical History:   Procedure Laterality Date    CV CORONARY ANGIOGRAM N/A 5/13/2024    Procedure: Coronary Angiogram;  Surgeon: Gino Rich MD;  Location: [facility] CV    CV LEFT HEART CATH N/A 5/13/2024    Procedure: Left Heart Catheterization;  Surgeon: Gino Rich MD;  Location: [facility] CV    CV PCI N/A 5/13/2024    Procedure: Percutaneous Coronary Intervention;  Surgeon: Gino Rich MD;  Location: [facility] CV    ESOPHAGOSCOPY, GASTROSCOPY, DUODENOSCOPY (EGD), COMBINED N/A 2/15/2023    Procedure: ESOPHAGOGASTRODUODENOSCOPY with BIOPSY;  Surgeon: Brandt Duong MD;  Location: [facility] Main OR         Allergies   No Known Allergies      Medications:  OUTpatient medications   Prior to Admission medications    Medication Sig Start Date End Date Taking? Authorizing Provider   amLODIPine (NORVASC) 10 MG tablet Take 1 tablet (10 mg) by mouth daily 12/29/22   Sandrita Wagner MD   atorvastatin (LIPITOR) 40 MG tablet Take 40 mg by mouth daily 8/25/23 8/24/24  Unknown, Entered By History   busPIRone (BUSPAR) 10 MG tablet Take 10 mg by mouth daily    Unknown, Entered By History   busPIRone (BUSPAR) 10 MG tablet Take 10 mg by mouth daily as needed (anxiety) For afternoon/evening if needed.    Unknown, Entered By History   FLUoxetine (PROZAC) 20 MG capsule Take 20 mg by mouth daily 3/14/24   Unknown, Entered By History   glipiZIDE (GLUCOTROL XL) 5 MG 24 hr tablet Take 1 tablet (5 mg) by mouth 2 times daily Take 1 tablet twice daily for diabetes 3/27/23   Haroon Farmer MD   lisinopril (ZESTRIL) 40 MG tablet Take 1 tablet (40 mg) by mouth daily 12/29/22   Bernard  Sandrita TORREZ MD   pantoprazole (PROTONIX) 40 MG EC tablet Take 40 mg by mouth daily    Unknown, Entered By History   tamsulosin (FLOMAX) 0.4 MG capsule TAKE 1 CAPSULE BY MOUTH EVERY DAY 6/9/23   Claire Carranza APRN CNP   traZODone (DESYREL) 50 MG tablet Take 3 tablets (150 mg) by mouth At Bedtime 1/6/23   Azra Tovar MD           Medications:  INpatient medications   Current Facility-Administered Medications   Medication Dose Route Frequency Provider Last Rate Last Admin    acetaminophen (TYLENOL) tablet 975 mg  975 mg Oral Q8H Shirley Lebron PA-C        aminocaproic acid (AMICAR) 5 g in sodium chloride 0.9 % 100 mL bolus  5 g Intravenous Once Shirley Lebron PA-C        aspirin (ASA) chewable tablet 162 mg  162 mg Oral or NG Tube Once Shirley Lebron PA-C        Or    aspirin (ASA) Suppository 300 mg  300 mg Rectal Once Shirley Lebron PA-C        [START ON 5/15/2024] aspirin (ASA) chewable tablet 162 mg  162 mg Oral or NG Tube Daily Shirley Lebron PA-C        Or    [START ON 5/15/2024] aspirin (ASA) Suppository 300 mg  300 mg Rectal Daily Shirley Lebron PA-C        atorvastatin (LIPITOR) tablet 80 mg  80 mg Oral QPM Vincent Castañeda MD   80 mg at 05/13/24 2047    busPIRone (BUSPAR) tablet 10 mg  10 mg Oral Daily Vincent Castañeda MD   10 mg at 05/13/24 0829    ceFAZolin (ANCEF) 1 g vial to attach to  ml bag for ADULT or 50 ml bag for PEDS  1 g Intravenous Q8H Shirley Lebron PA-C        chlorhexidine (PERIDEX) 0.12 % solution 15 mL  15 mL Mouth/Throat Q12H Doris Anguiano APRN CNP        FLUoxetine (PROzac) capsule 20 mg  20 mg Oral Daily Vincent Castañeda MD   20 mg at 05/13/24 0829    [START ON 5/15/2024] heparin ANTICOAGULANT injection 5,000 Units  5,000 Units Subcutaneous Q8H Shirley Lebron PA-C        Lidocaine (LIDOCARE) 4 % Patch 1-2 patch  1-2 patch Transdermal Q24H Shirley Lebron PA-C        [START ON 5/15/2024] pantoprazole  (PROTONIX) 2 mg/mL suspension 40 mg  40 mg Oral or NG Tube QAM AC Shirley Lebron PA-C        Or    [START ON 5/15/2024] pantoprazole (PROTONIX) EC tablet 40 mg  40 mg Oral QAM AC Shirley Lebron PA-C        Plasma-Lyte A (electrolyte A) BOLUS 1,000 mL  1,000 mL Intravenous Q15 Min Shirley Lebron PA-C        [START ON 5/15/2024] polyethylene glycol (MIRALAX) Packet 17 g  17 g Oral Daily Shirley Lebron PA-C        senna-docusate (SENOKOT-S/PERICOLACE) 8.6-50 MG per tablet 1 tablet  1 tablet Oral BID Shirley Lebron PA-C        tamsulosin (FLOMAX) capsule 0.4 mg  0.4 mg Oral QPM Vincent Castañeda MD   0.4 mg at 05/13/24 2048           Family History  Social History   Reviewed  Family History   Problem Relation Age of Onset    Cerebrovascular Disease Mother     Aneurysm Father         cerebral     Reviewed  Social History     Socioeconomic History    Marital status:      Spouse name: Not on file    Number of children: 1    Years of education: Not on file    Highest education level: Not on file   Occupational History    Occupation: [id number]   Tobacco Use    Smoking status: Former     Types: Cigarettes    Smokeless tobacco: Never    Tobacco comments:     College   Vaping Use    Vaping status: Never Used   Substance and Sexual Activity    Alcohol use: No    Drug use: Yes     Comment: Drug use: Cannabis    Sexual activity: Yes     Partners: Female   Other Topics Concern    Not on file   Social History Narrative    Diet-            Exercise-work is physical          No health insurance     Social Determinants of Health     Financial Resource Strain: Not on file   Food Insecurity: Not on file   Transportation Needs: Not on file   Physical Activity: Not on file   Stress: Not on file   Social Connections: Not on file   Interpersonal Safety: Not on file   Housing Stability: Not on file      Psychosocial Needs  Social History     Social History Narrative    Diet-             "Exercise-work is physical          No health insurance     Additional psychosocial needs reviewed per nursing assessment.      Physical Exam   VITALS:  /61   Pulse 80   Temp 97.7  F (36.5  C)   Resp (!) 9   Ht 1.676 m (5' 6\")   Wt 79 kg (174 lb 1.6 oz)   SpO2 100%   BMI 28.10 kg/m         PHYSICAL EXAM:   GEN: intubated and sedated  HEENT:  OETT in place; AT/NC  NECK: Supple.  RIJ introducer with PA-C in place  PULM:  unlabored on full vent; lungs  are clear equal. No wheeze.   CVS:   RRR S1S2; chest tubes with sanguinous output.   ABDOMEN:   soft ntnd  EXTREMITIES/SKIN:    visible skin intact  NEURO:  sedated     I&O:    Intake/Output Summary (Last 24 hours) at 5/14/2024 1734  Last data filed at 5/14/2024 1648  Gross per 24 hour   Intake 2314 ml   Output 1625 ml   Net 689 ml        Pertinent Labs   Lab Results: personally reviewed.      CBC:  Recent Labs   Lab 05/14/24  1643 05/14/24  1614 05/14/24  1542 05/14/24  1309 05/14/24  0402 05/12/24  1638   WBC 14.3*  --   --   --  8.8 7.8   HGB 11.6* 11.2* 10.7*   < > 13.2* 15.1   HCT 33.0*  --   --   --  38.1* 42.3   *  --   --   --  181 212    < > = values in this interval not displayed.     Chemistry:  Recent Labs   Lab 05/14/24  1614 05/14/24  1542 05/14/24  1514 05/14/24  1309 05/14/24  0402 05/12/24  0948    137 136   < > 134* 133*   CO2  --   --   --   --  26 25   BUN  --   --   --   --  14.2 16.3   ALBUMIN  --   --   --   --  3.7 4.2   ALT  --   --   --   --  30 32   AST  --   --   --   --  20 27   ALKPHOS  --   --   --   --  88 95    < > = values in this interval not displayed.     Coags:  Lab Results   Component Value Date    INR 1.27 (H) 05/14/2024    INR 1.05 05/14/2024    INR 0.96 02/14/2023        Microbiology:  None this admission       Radiology:  Chest X-Ray: post op film not yet done    "

## 2024-05-14 NOTE — PRE-PROCEDURE
GENERAL PRE-PROCEDURE:   Procedure:  IABP insertion  Date/Time:  5/14/2024 8:36 AM    Written consent obtained?: Yes    Risks and benefits: Risks, benefits and alternatives were discussed    Consent given by:  Patient  Patient states understanding of procedure being performed: Yes    Patient's understanding of procedure matches consent: Yes    Procedure consent matches procedure scheduled: Yes    Expected level of sedation:  Moderate  Appropriately NPO:  Yes  ASA Class:  4 (severe multi-vessel CAD, NSTEMI, HTN, HLD, NIDDM, overweight; BMI 26.02kg/m2)  Mallampati  :  Grade 2- soft palate, base of uvula, tonsillar pillars, and portion of posterior pharyngeal wall visible  Lungs:  Lungs clear with good breath sounds bilaterally  Heart:  Normal heart sounds and rate  History & Physical reviewed:  History and physical reviewed and updates made (see comment)  H&P Comments:  Clinically Significant Risk Factors Present on Admission    Cardiovascular : severe multi-vessel CAD, NSTEMI, HTN, HLD, NIDDM, overweight; BMI 26.02kg/m2    Fluid & Electrolyte Disorders : Not present on admission    Gastroenterology : Not present on admission    Hematology/Oncology : Not present on admission    Nephrology : Not present on admission    Neurology : Not present on admission    Pulmonology : Not present on admission    Systemic : Not present on admission        Statement of review:  I have reviewed the lab findings, diagnostic data, medications, and the plan for sedation

## 2024-05-14 NOTE — PROGRESS NOTES
[facility]    Medicine Progress Note - Hospitalist Service    Date of Admission:  5/12/2024    Assessment & Plan   Drew Monsivais is a 53 year old male with PMH of kidney stones, hepatic steatosis, DM2, hypertension, BPH, GERD, anxiety who presented with chest tightness and elevated troponin     Multivessel CAD  Echo shows EF 45 to 50%, mid to distal inferior lateral wall appears hypokinetic.  AV is bicuspid with no regurgitation or stenosis  Cardiac cath shows severe multivessel CAD  CTSx consulted, IABP placed  pending CABG today 05/14  Continue IV Heparin  Asa, statin, metoprolol    MARY JANE  Cr 1.33, baseline ? 1.1  Likely decreased perfusion and contrast 05/13  Monitor Cr    Acute drop in Hb  Hb 15.1 -> 13.2  No evidence of bleeding  Monitor Hb    HTN  PTA Amlodipine, Metoprolol, lisinopril 40 mg    HLD  Inc Atorvastatin 80mg    DM2 without long-term insulin use.    HbA1c 9.3  Home regimen: Glipizide 5 mg twice daily  Hold PTA glipizide  Insulin sliding scale low to medium, monitor blood glucose and titrate up as needed  Patient is NPO for CABG, adjust as needed    BPH  PTA tamsulosin    Psych  PTA Buspar, Prozac, trazodone    Hepatic steatosis  Lifestyle modifications    History of pancreatitis x 2.    Etiology was not clear per the patient.  Lipase is elevated 163 however symptoms are not consistent with pancreatitis          Diet: NPO per Anesthesia Guidelines for Procedure/Surgery Except for: Meds    DVT Prophylaxis: Heparin gtt  Still Catheter: Not present  Lines: None     Cardiac Monitoring: ACTIVE order. Indication: Post- PCI/Angiogram (24 hours)  Code Status: Full Code      Clinically Significant Risk Factors          # Hypocalcemia: Lowest Ca = 8.4 mg/dL in last 2 days, will monitor and replace as appropriate         # Hypertension: Noted on problem list       # DMII: A1C = 9.3 % (Ref range: <5.7 %) within past 6 months, PRESENT ON ADMISSION  # Overweight: Estimated body mass  "index is 28.1 kg/m  as calculated from the following:    Height as of this encounter: 1.676 m (5' 6\").    Weight as of this encounter: 79 kg (174 lb 1.6 oz)., PRESENT ON ADMISSION            Disposition Plan     Medically Ready for Discharge: Anticipated in 5+ Days             Shireen Anderson MD  Hospitalist Service  [facility]  Securely message with [employer] (more info)  Text page via [employer] Paging/Directory   ______________________________________________________________________    Interval History   Patient was examined at the bedside, states he feels well denies any complaints.  Is anxious about surgery today    Physical Exam   Vital Signs: Temp: 98.1  F (36.7  C) Temp src: Oral BP: 120/79 Pulse: 61   Resp: (!) 8 SpO2: 98 % O2 Device: None (Room air) Oxygen Delivery: 2 LPM  Weight: 174 lbs 1.6 oz    General Appearance:  No acute distress, appears anxious  Respiratory: Respirations unlabored, lungs are clear  Cardiovascular: Regular rate and rhythm.  Normal S1-S2  GI: Abdomen soft and nontender  Skin: No rashes on exposed areas  Other:  Awake and alert, nonfocal    Medical Decision Making       50 MINUTES SPENT BY ME on the date of service doing chart review, history, exam, documentation & further activities per the note.      Data     I have personally reviewed the following data over the past 24 hrs:    8.8  \   10.6 (L)   / 181     136 100 14.2 /  99   5.2 26 1.33 (H) \     ALT: 30 AST: 20 AP: 88 TBILI: 0.6   ALB: 3.7 TOT PROTEIN: 6.0 (L) LIPASE: N/A     Procal: N/A CRP: N/A Lactic Acid: 1.2       INR:  1.05 PTT:  55 (H)   D-dimer:  N/A Fibrinogen:  253       Imaging results reviewed over the past 24 hrs:   Recent Results (from the past 24 hour(s))   US Carotid Bilateral    Narrative    EXAM: US CAROTID BILATERAL  LOCATION: [facility]  DATE: 5/13/2024    INDICATION: prior to CABG  COMPARISON: CT CAP 2/14/2023.  TECHNIQUE: Duplex exam performed utilizing 2D " gray-scale imaging, Doppler interrogation with color-flow and spectral waveform analysis. The percent diameter stenosis is determined using NASCET criteria and Society of Radiologists in Ultrasound Consensus   Criteria.    FINDINGS:    RIGHT: Mild plaque at the bifurcation. The peak systolic velocity in the ICA is less than 125 cm/sec, consistent with less than 50% stenosis. Normal velocities in the ECA. Antegrade flow within the vertebral artery.     LEFT: Mild plaque at the bifurcation. The peak systolic velocity in the ICA is less than 125 cm/sec, consistent with less than 50% stenosis. Normal velocities in the ECA. Antegrade flow within the vertebral artery.    VELOCITY CHART:  CCA   Right: 72/22 cm/s   Left: 85/27 cm/s  ICA   Right: 78/35 cm/s   Left: 67/31 cm/s  ECA   Right: 128 cm/s   Left: 126 cm/s  ICA/CCA PSV Ratio   Right: 1.1   Left: 0.8      Impression    IMPRESSION:  1.  Mild plaque formation, velocities consistent with less than 50% stenosis in the right internal carotid artery.  2.  Mild plaque formation, velocities consistent with less than 50% stenosis in the left internal carotid artery.  3.  Flow within the vertebral arteries is antegrade.   Cardiac Catheterization    Narrative    R CFA was accessed using US and fluoroscopy for guidance   IABP was placed and secured in place

## 2024-05-14 NOTE — ANESTHESIA PROCEDURE NOTES
Arterial Line Procedure Note    Pre-Procedure   Staff -        Anesthesiologist:  Renata Hutchinson MD       Performed By: anesthesiologist       Location: pre-op       Pre-Anesthestic Checklist: patient identified, IV checked, risks and benefits discussed, informed consent, monitors and equipment checked, pre-op evaluation and at physician/surgeon's request  Timeout:       Correct Patient: Yes        Correct Procedure: Yes        Correct Site: Yes        Correct Position: Yes   Line Placement:   This line was placed Pre Induction starting at 5/14/2024 12:11 PM and ending at 5/14/2024 12:16 PM  Procedure   Procedure: new line, arterial line and elective       Laterality: right       Insertion Site: radial.  Sterile Prep        Standard elements of sterile barrier followed       Skin prep: Chloraprep  Insertion/Injection        Technique: ultrasound guided, Seldinger Technique and Silverio's test completed        1. Ultrasound was used to evaluate the access site.       2. Artery evaluated via ultrasound for patency/adequacy.       3. Using real-time ultrasound the needle/catheter was observed entering the artery/vein.       5. The visualized structures were anatomically normal.       6. There were no apparent abnormal pathologic findings.       Catheter Type/Size: 20 G, 12 cm  Narrative         Secured by: suture       Tegaderm and Biopatch dressing used.       Complications: None apparent,        Arterial waveform: Yes        IBP within 10% of NIBP: Yes   Comments:  No US image was captured.

## 2024-05-14 NOTE — PROGRESS NOTES
RT PROGRESS NOTE    VENT DAY# 1    CURRENT SETTINGS:  Vent Mode: CMV/AC  (Continuous Mandatory Ventilation/ Assist Control)  FiO2 (%): 100 %  Resp Rate (Set): 18 breaths/min  Tidal Volume (Set, mL): 500 mL  PEEP (cm H2O): 5 cmH2O  Resp: 0 (vent)      PATIENT PARAMETERS:  PIP 28  Pplat:  25  Pmean:  12  Compliance: 152  SBT: Yes   Secretions:  None   02 Sats:  100%  BS: clear to auscultations     ETT SIZE 8.0 Secured at 23 cm at teeth/gums    Respiratory Medications:    ABG: @    NOTE / SHIFT SUMMARY:   Patient has been admitted from OR and placed vent with the above mentioned settings and tolerating well with no apparent respiratory distress. Weaning trial will be initiated when appropriate.     Levi Barragan, RRT

## 2024-05-14 NOTE — BRIEF OP NOTE
[facility]    Brief Operative Note    Pre-operative diagnosis: NSTEMI (non-ST elevated myocardial infarction) (H) [I21.4], multivessel coronary artery disease  Post-operative diagnosis Same as pre-operative diagnosis    Procedure: CABG x3 with LIMA to LAD, left radial artery to OM, and rSVG to RPDA, endoscopic harvest of left radial artery and left saphenous vein    Surgeon: Surgeons and Role:     * Tuyet Azar MD - Primary     * Catrachita Woodall PA-C  Anesthesia: General   Estimated Blood Loss: 300 ml    Drains: 2 mediastinal chest tubes and bilateral pleural charles drains  Specimens: * No specimens in log *  Findings:   Diagonal coronary artery was too small to bypass. Vein was large but adequate for use. Function improved to normal after revascularization. .  Complications: None.  Implants: * No implants in log *

## 2024-05-15 ENCOUNTER — APPOINTMENT (OUTPATIENT)
Dept: RADIOLOGY | Facility: HOSPITAL | Age: 54
DRG: 233 | End: 2024-05-15
Attending: INTERNAL MEDICINE
Payer: COMMERCIAL

## 2024-05-15 LAB
ALBUMIN SERPL BCG-MCNC: 3.3 G/DL (ref 3.5–5.2)
ALP SERPL-CCNC: 82 U/L (ref 40–150)
ALT SERPL W P-5'-P-CCNC: 24 U/L (ref 0–70)
ANION GAP SERPL CALCULATED.3IONS-SCNC: 10 MMOL/L (ref 7–15)
AST SERPL W P-5'-P-CCNC: 47 U/L (ref 0–45)
ATRIAL RATE - MUSE: 70 BPM
ATRIAL RATE - MUSE: 85 BPM
BASE EXCESS BLDA CALC-SCNC: -0.3 MMOL/L (ref -3–3)
BASE EXCESS BLDV CALC-SCNC: 3.2 MMOL/L (ref -3–3)
BILIRUB SERPL-MCNC: 0.8 MG/DL
BUN SERPL-MCNC: 12.7 MG/DL (ref 6–20)
CA-I BLD-MCNC: 4.4 MG/DL (ref 4.4–5.2)
CALCIUM SERPL-MCNC: 8.4 MG/DL (ref 8.6–10)
CHLORIDE SERPL-SCNC: 106 MMOL/L (ref 98–107)
COHGB MFR BLD: 97.1 % (ref 96–97)
CREAT SERPL-MCNC: 1.42 MG/DL (ref 0.67–1.17)
DEPRECATED HCO3 PLAS-SCNC: 23 MMOL/L (ref 22–29)
DIASTOLIC BLOOD PRESSURE - MUSE: NORMAL MMHG
DIASTOLIC BLOOD PRESSURE - MUSE: NORMAL MMHG
EGFRCR SERPLBLD CKD-EPI 2021: 58 ML/MIN/1.73M2
ERYTHROCYTE [DISTWIDTH] IN BLOOD BY AUTOMATED COUNT: 12.1 % (ref 10–15)
GLUCOSE BLDC GLUCOMTR-MCNC: 100 MG/DL (ref 70–99)
GLUCOSE BLDC GLUCOMTR-MCNC: 101 MG/DL (ref 70–99)
GLUCOSE BLDC GLUCOMTR-MCNC: 103 MG/DL (ref 70–99)
GLUCOSE BLDC GLUCOMTR-MCNC: 105 MG/DL (ref 70–99)
GLUCOSE BLDC GLUCOMTR-MCNC: 108 MG/DL (ref 70–99)
GLUCOSE BLDC GLUCOMTR-MCNC: 114 MG/DL (ref 70–99)
GLUCOSE BLDC GLUCOMTR-MCNC: 115 MG/DL (ref 70–99)
GLUCOSE BLDC GLUCOMTR-MCNC: 116 MG/DL (ref 70–99)
GLUCOSE BLDC GLUCOMTR-MCNC: 119 MG/DL (ref 70–99)
GLUCOSE BLDC GLUCOMTR-MCNC: 128 MG/DL (ref 70–99)
GLUCOSE BLDC GLUCOMTR-MCNC: 136 MG/DL (ref 70–99)
GLUCOSE BLDC GLUCOMTR-MCNC: 141 MG/DL (ref 70–99)
GLUCOSE BLDC GLUCOMTR-MCNC: 146 MG/DL (ref 70–99)
GLUCOSE BLDC GLUCOMTR-MCNC: 147 MG/DL (ref 70–99)
GLUCOSE BLDC GLUCOMTR-MCNC: 151 MG/DL (ref 70–99)
GLUCOSE BLDC GLUCOMTR-MCNC: 82 MG/DL (ref 70–99)
GLUCOSE BLDC GLUCOMTR-MCNC: 88 MG/DL (ref 70–99)
GLUCOSE BLDC GLUCOMTR-MCNC: 88 MG/DL (ref 70–99)
GLUCOSE BLDC GLUCOMTR-MCNC: 93 MG/DL (ref 70–99)
GLUCOSE BLDC GLUCOMTR-MCNC: 94 MG/DL (ref 70–99)
GLUCOSE SERPL-MCNC: 140 MG/DL (ref 70–99)
HCO3 BLD-SCNC: 23 MMOL/L (ref 21–28)
HCO3 BLDV-SCNC: 27 MMOL/L (ref 21–28)
HCT VFR BLD AUTO: 35.8 % (ref 40–53)
HGB BLD-MCNC: 12.3 G/DL (ref 13.3–17.7)
INTERPRETATION ECG - MUSE: NORMAL
INTERPRETATION ECG - MUSE: NORMAL
LACTATE SERPL-SCNC: 0.8 MMOL/L (ref 0.7–2)
MAGNESIUM SERPL-MCNC: 1.9 MG/DL (ref 1.7–2.3)
MCH RBC QN AUTO: 27.8 PG (ref 26.5–33)
MCHC RBC AUTO-ENTMCNC: 34.4 G/DL (ref 31.5–36.5)
MCV RBC AUTO: 81 FL (ref 78–100)
O2/TOTAL GAS SETTING VFR VENT: 30 %
O2/TOTAL GAS SETTING VFR VENT: 30 %
OXYHGB MFR BLDV: 67 % (ref 70–75)
P AXIS - MUSE: 31 DEGREES
P AXIS - MUSE: 53 DEGREES
PCO2 BLD: 30 MM HG (ref 35–45)
PCO2 BLDV: 38 MM HG (ref 40–50)
PEEP: 5 CM H2O
PH BLD: 7.49 [PH] (ref 7.35–7.45)
PH BLDV: 7.47 [PH] (ref 7.32–7.43)
PHOSPHATE SERPL-MCNC: 2.3 MG/DL (ref 2.5–4.5)
PLATELET # BLD AUTO: 158 10E3/UL (ref 150–450)
PO2 BLD: 86 MM HG (ref 80–105)
PO2 BLDV: 32 MM HG (ref 25–47)
POTASSIUM SERPL-SCNC: 4.5 MMOL/L (ref 3.4–5.3)
PR INTERVAL - MUSE: 188 MS
PR INTERVAL - MUSE: 202 MS
PROT SERPL-MCNC: 5.6 G/DL (ref 6.4–8.3)
QRS DURATION - MUSE: 112 MS
QRS DURATION - MUSE: 88 MS
QT - MUSE: 388 MS
QT - MUSE: 422 MS
QTC - MUSE: 419 MS
QTC - MUSE: 502 MS
R AXIS - MUSE: -83 DEGREES
R AXIS - MUSE: -85 DEGREES
RBC # BLD AUTO: 4.42 10E6/UL (ref 4.4–5.9)
SAO2 % BLDA: 97 % (ref 92–100)
SAO2 % BLDV: 67.9 % (ref 70–75)
SODIUM SERPL-SCNC: 139 MMOL/L (ref 135–145)
SYSTOLIC BLOOD PRESSURE - MUSE: NORMAL MMHG
SYSTOLIC BLOOD PRESSURE - MUSE: NORMAL MMHG
T AXIS - MUSE: 23 DEGREES
T AXIS - MUSE: 38 DEGREES
VENTRICULAR RATE- MUSE: 70 BPM
VENTRICULAR RATE- MUSE: 85 BPM
WBC # BLD AUTO: 11.4 10E3/UL (ref 4–11)

## 2024-05-15 PROCEDURE — 93005 ELECTROCARDIOGRAM TRACING: CPT

## 2024-05-15 PROCEDURE — 250N000013 HC RX MED GY IP 250 OP 250 PS 637

## 2024-05-15 PROCEDURE — 94799 UNLISTED PULMONARY SVC/PX: CPT

## 2024-05-15 PROCEDURE — 250N000013 HC RX MED GY IP 250 OP 250 PS 637: Performed by: NURSE PRACTITIONER

## 2024-05-15 PROCEDURE — 250N000011 HC RX IP 250 OP 636: Performed by: STUDENT IN AN ORGANIZED HEALTH CARE EDUCATION/TRAINING PROGRAM

## 2024-05-15 PROCEDURE — 250N000011 HC RX IP 250 OP 636

## 2024-05-15 PROCEDURE — 258N000003 HC RX IP 258 OP 636

## 2024-05-15 PROCEDURE — 94003 VENT MGMT INPAT SUBQ DAY: CPT

## 2024-05-15 PROCEDURE — 83605 ASSAY OF LACTIC ACID: CPT

## 2024-05-15 PROCEDURE — 999N000157 HC STATISTIC RCP TIME EA 10 MIN

## 2024-05-15 PROCEDURE — 200N000001 HC R&B ICU

## 2024-05-15 PROCEDURE — 80053 COMPREHEN METABOLIC PANEL: CPT | Performed by: STUDENT IN AN ORGANIZED HEALTH CARE EDUCATION/TRAINING PROGRAM

## 2024-05-15 PROCEDURE — 82805 BLOOD GASES W/O2 SATURATION: CPT | Performed by: STUDENT IN AN ORGANIZED HEALTH CARE EDUCATION/TRAINING PROGRAM

## 2024-05-15 PROCEDURE — 93010 ELECTROCARDIOGRAM REPORT: CPT | Performed by: STUDENT IN AN ORGANIZED HEALTH CARE EDUCATION/TRAINING PROGRAM

## 2024-05-15 PROCEDURE — 71045 X-RAY EXAM CHEST 1 VIEW: CPT

## 2024-05-15 PROCEDURE — 999N000253 HC STATISTIC WEANING TRIALS

## 2024-05-15 PROCEDURE — 83735 ASSAY OF MAGNESIUM: CPT

## 2024-05-15 PROCEDURE — 82805 BLOOD GASES W/O2 SATURATION: CPT

## 2024-05-15 PROCEDURE — 999N000009 HC STATISTIC AIRWAY CARE

## 2024-05-15 PROCEDURE — 85027 COMPLETE CBC AUTOMATED: CPT

## 2024-05-15 PROCEDURE — 250N000013 HC RX MED GY IP 250 OP 250 PS 637: Performed by: INTERNAL MEDICINE

## 2024-05-15 PROCEDURE — 99291 CRITICAL CARE FIRST HOUR: CPT | Performed by: NURSE PRACTITIONER

## 2024-05-15 PROCEDURE — 250N000011 HC RX IP 250 OP 636: Performed by: NURSE PRACTITIONER

## 2024-05-15 PROCEDURE — 250N000009 HC RX 250: Performed by: NURSE PRACTITIONER

## 2024-05-15 PROCEDURE — 82330 ASSAY OF CALCIUM: CPT

## 2024-05-15 PROCEDURE — 999N000259 HC STATISTIC EXTUBATION

## 2024-05-15 PROCEDURE — P9045 ALBUMIN (HUMAN), 5%, 250 ML: HCPCS | Mod: JZ

## 2024-05-15 PROCEDURE — 84100 ASSAY OF PHOSPHORUS: CPT

## 2024-05-15 PROCEDURE — 258N000003 HC RX IP 258 OP 636: Performed by: STUDENT IN AN ORGANIZED HEALTH CARE EDUCATION/TRAINING PROGRAM

## 2024-05-15 PROCEDURE — 250N000009 HC RX 250: Performed by: STUDENT IN AN ORGANIZED HEALTH CARE EDUCATION/TRAINING PROGRAM

## 2024-05-15 RX ORDER — ASPIRIN 300 MG/1
300 SUPPOSITORY RECTAL DAILY
Status: DISCONTINUED | OUTPATIENT
Start: 2024-05-16 | End: 2024-05-15

## 2024-05-15 RX ORDER — MAGNESIUM SULFATE HEPTAHYDRATE 40 MG/ML
2 INJECTION, SOLUTION INTRAVENOUS ONCE
Status: COMPLETED | OUTPATIENT
Start: 2024-05-15 | End: 2024-05-15

## 2024-05-15 RX ORDER — HYDROXYZINE HYDROCHLORIDE 25 MG/1
25 TABLET, FILM COATED ORAL EVERY 6 HOURS PRN
Status: DISCONTINUED | OUTPATIENT
Start: 2024-05-15 | End: 2024-05-21 | Stop reason: HOSPADM

## 2024-05-15 RX ORDER — AMLODIPINE BESYLATE 5 MG/1
5 TABLET ORAL DAILY
Status: DISCONTINUED | OUTPATIENT
Start: 2024-05-15 | End: 2024-05-16

## 2024-05-15 RX ORDER — GLIPIZIDE 5 MG/1
5 TABLET, FILM COATED, EXTENDED RELEASE ORAL 2 TIMES DAILY
Status: DISCONTINUED | OUTPATIENT
Start: 2024-05-15 | End: 2024-05-21 | Stop reason: HOSPADM

## 2024-05-15 RX ORDER — ASPIRIN 81 MG/1
81 TABLET, CHEWABLE ORAL DAILY
Status: DISCONTINUED | OUTPATIENT
Start: 2024-05-16 | End: 2024-05-21 | Stop reason: HOSPADM

## 2024-05-15 RX ORDER — ACETAMINOPHEN 650 MG/1
650 SUPPOSITORY RECTAL EVERY 6 HOURS PRN
Status: DISCONTINUED | OUTPATIENT
Start: 2024-05-15 | End: 2024-05-15

## 2024-05-15 RX ORDER — HYDROXYZINE HYDROCHLORIDE 25 MG/1
50 TABLET, FILM COATED ORAL EVERY 6 HOURS PRN
Status: DISCONTINUED | OUTPATIENT
Start: 2024-05-15 | End: 2024-05-16

## 2024-05-15 RX ORDER — METHOCARBAMOL 500 MG/1
500 TABLET, FILM COATED ORAL 4 TIMES DAILY PRN
Status: DISCONTINUED | OUTPATIENT
Start: 2024-05-15 | End: 2024-05-21 | Stop reason: HOSPADM

## 2024-05-15 RX ORDER — CLOPIDOGREL BISULFATE 75 MG/1
75 TABLET ORAL DAILY
Status: DISCONTINUED | OUTPATIENT
Start: 2024-05-15 | End: 2024-05-21 | Stop reason: HOSPADM

## 2024-05-15 RX ADMIN — DEXMEDETOMIDINE HYDROCHLORIDE 1.2 MCG/KG/HR: 400 INJECTION INTRAVENOUS at 14:00

## 2024-05-15 RX ADMIN — DEXMEDETOMIDINE HYDROCHLORIDE 1 MCG/KG/HR: 400 INJECTION INTRAVENOUS at 09:04

## 2024-05-15 RX ADMIN — TAMSULOSIN HYDROCHLORIDE 0.4 MG: 0.4 CAPSULE ORAL at 21:29

## 2024-05-15 RX ADMIN — NICARDIPINE HYDROCHLORIDE 0.5 MG/HR: 0.2 INJECTION INTRAVENOUS at 15:40

## 2024-05-15 RX ADMIN — CALCIUM GLUCONATE 1 G: 20 INJECTION, SOLUTION INTRAVENOUS at 05:50

## 2024-05-15 RX ADMIN — OXYCODONE HYDROCHLORIDE 10 MG: 5 TABLET ORAL at 20:32

## 2024-05-15 RX ADMIN — HYDROMORPHONE HYDROCHLORIDE 0.2 MG: 0.2 INJECTION, SOLUTION INTRAMUSCULAR; INTRAVENOUS; SUBCUTANEOUS at 19:33

## 2024-05-15 RX ADMIN — ACETAMINOPHEN 650 MG: 650 SUPPOSITORY RECTAL at 13:59

## 2024-05-15 RX ADMIN — ALBUMIN HUMAN 12.5 G: 0.05 INJECTION, SOLUTION INTRAVENOUS at 15:13

## 2024-05-15 RX ADMIN — SENNOSIDES AND DOCUSATE SODIUM 1 TABLET: 8.6; 5 TABLET ORAL at 21:29

## 2024-05-15 RX ADMIN — CEFAZOLIN 1 G: 1 INJECTION, POWDER, FOR SOLUTION INTRAMUSCULAR; INTRAVENOUS at 04:23

## 2024-05-15 RX ADMIN — ATORVASTATIN CALCIUM 80 MG: 40 TABLET, FILM COATED ORAL at 21:28

## 2024-05-15 RX ADMIN — DEXMEDETOMIDINE HYDROCHLORIDE 1 MCG/KG/HR: 400 INJECTION INTRAVENOUS at 02:30

## 2024-05-15 RX ADMIN — MAGNESIUM SULFATE HEPTAHYDRATE 2 G: 40 INJECTION, SOLUTION INTRAVENOUS at 07:48

## 2024-05-15 RX ADMIN — ASPIRIN 300 MG: 300 SUPPOSITORY RECTAL at 11:37

## 2024-05-15 RX ADMIN — HYDROMORPHONE HYDROCHLORIDE 0.4 MG: 0.2 INJECTION, SOLUTION INTRAMUSCULAR; INTRAVENOUS; SUBCUTANEOUS at 15:49

## 2024-05-15 RX ADMIN — POLYETHYLENE GLYCOL 3350 17 G: 17 POWDER, FOR SOLUTION ORAL at 16:21

## 2024-05-15 RX ADMIN — ACETAMINOPHEN 650 MG: 650 SUPPOSITORY RECTAL at 06:50

## 2024-05-15 RX ADMIN — ACETAMINOPHEN 650 MG: 650 SUPPOSITORY RECTAL at 00:43

## 2024-05-15 RX ADMIN — FENTANYL CITRATE 100 MCG: 50 INJECTION, SOLUTION INTRAMUSCULAR; INTRAVENOUS at 06:02

## 2024-05-15 RX ADMIN — FLUOXETINE HYDROCHLORIDE 20 MG: 20 CAPSULE ORAL at 17:46

## 2024-05-15 RX ADMIN — BUSPIRONE HYDROCHLORIDE 10 MG: 10 TABLET ORAL at 17:46

## 2024-05-15 RX ADMIN — ALBUMIN HUMAN 12.5 G: 0.05 INJECTION, SOLUTION INTRAVENOUS at 14:34

## 2024-05-15 RX ADMIN — FENTANYL CITRATE 100 MCG: 50 INJECTION, SOLUTION INTRAMUSCULAR; INTRAVENOUS at 11:35

## 2024-05-15 RX ADMIN — OXYCODONE HYDROCHLORIDE 5 MG: 5 TABLET ORAL at 16:21

## 2024-05-15 RX ADMIN — HYDROXYZINE HYDROCHLORIDE 25 MG: 25 TABLET ORAL at 20:32

## 2024-05-15 RX ADMIN — FENTANYL CITRATE 100 MCG: 50 INJECTION, SOLUTION INTRAMUSCULAR; INTRAVENOUS at 08:13

## 2024-05-15 RX ADMIN — PANTOPRAZOLE SODIUM 40 MG: 40 TABLET, DELAYED RELEASE ORAL at 16:21

## 2024-05-15 RX ADMIN — LIDOCAINE 2 PATCH: 4 PATCH TOPICAL at 18:26

## 2024-05-15 RX ADMIN — HYDROMORPHONE HYDROCHLORIDE 0.4 MG: 0.2 INJECTION, SOLUTION INTRAMUSCULAR; INTRAVENOUS; SUBCUTANEOUS at 09:53

## 2024-05-15 RX ADMIN — FENTANYL CITRATE 100 MCG: 50 INJECTION, SOLUTION INTRAMUSCULAR; INTRAVENOUS at 00:43

## 2024-05-15 RX ADMIN — CHLORHEXIDINE GLUCONATE 15 ML: 1.2 SOLUTION ORAL at 09:10

## 2024-05-15 RX ADMIN — SODIUM CHLORIDE, POTASSIUM CHLORIDE, SODIUM LACTATE AND CALCIUM CHLORIDE 250 ML: 600; 310; 30; 20 INJECTION, SOLUTION INTRAVENOUS at 06:57

## 2024-05-15 RX ADMIN — SODIUM PHOSPHATE, MONOBASIC, MONOHYDRATE AND SODIUM PHOSPHATE, DIBASIC, ANHYDROUS 15 MMOL: 142; 276 INJECTION, SOLUTION INTRAVENOUS at 08:49

## 2024-05-15 RX ADMIN — HEPARIN SODIUM 5000 UNITS: 10000 INJECTION, SOLUTION INTRAVENOUS; SUBCUTANEOUS at 19:41

## 2024-05-15 RX ADMIN — ACETAMINOPHEN 975 MG: 325 TABLET ORAL at 21:29

## 2024-05-15 RX ADMIN — HEPARIN SODIUM 5000 UNITS: 10000 INJECTION, SOLUTION INTRAVENOUS; SUBCUTANEOUS at 11:37

## 2024-05-15 RX ADMIN — HYDROMORPHONE HYDROCHLORIDE 0.2 MG: 0.2 INJECTION, SOLUTION INTRAMUSCULAR; INTRAVENOUS; SUBCUTANEOUS at 23:52

## 2024-05-15 RX ADMIN — HYDROMORPHONE HYDROCHLORIDE 0.4 MG: 0.2 INJECTION, SOLUTION INTRAMUSCULAR; INTRAVENOUS; SUBCUTANEOUS at 03:53

## 2024-05-15 RX ADMIN — CEFAZOLIN 1 G: 1 INJECTION, POWDER, FOR SOLUTION INTRAMUSCULAR; INTRAVENOUS at 13:07

## 2024-05-15 RX ADMIN — AMLODIPINE BESYLATE 5 MG: 5 TABLET ORAL at 16:21

## 2024-05-15 RX ADMIN — HYDROMORPHONE HYDROCHLORIDE 0.4 MG: 0.2 INJECTION, SOLUTION INTRAMUSCULAR; INTRAVENOUS; SUBCUTANEOUS at 12:56

## 2024-05-15 ASSESSMENT — ACTIVITIES OF DAILY LIVING (ADL)
ADLS_ACUITY_SCORE: 30
ADLS_ACUITY_SCORE: 25
ADLS_ACUITY_SCORE: 28
DEPENDENT_IADLS:: INDEPENDENT
ADLS_ACUITY_SCORE: 29
ADLS_ACUITY_SCORE: 29
ADLS_ACUITY_SCORE: 25
ADLS_ACUITY_SCORE: 25
ADLS_ACUITY_SCORE: 28
ADLS_ACUITY_SCORE: 25
ADLS_ACUITY_SCORE: 36
ADLS_ACUITY_SCORE: 28
ADLS_ACUITY_SCORE: 29
ADLS_ACUITY_SCORE: 25
ADLS_ACUITY_SCORE: 28
ADLS_ACUITY_SCORE: 25
ADLS_ACUITY_SCORE: 30
ADLS_ACUITY_SCORE: 36
ADLS_ACUITY_SCORE: 25
ADLS_ACUITY_SCORE: 30
ADLS_ACUITY_SCORE: 28
ADLS_ACUITY_SCORE: 25

## 2024-05-15 NOTE — PROGRESS NOTES
"CVTS Daily Progress Note   POD#1 s/p CABG x3 (LIMA to LAD, left RA to OM, rSVG to RPDA), endoscopic left radial artery harvest, LLE EVH)  Attending: Doe  LOS: 3    SUBJECTIVE/INTERVAL EVENTS:    Patient arrived to ICU from OR yesterday afternoon. She was subsequently extubated and weaned from pressors. No acute events overnight. Patient progressing well. Maintaining oxygen saturations on CMV 30% FiO2. IABP removed this AM. Normotensive off pressors, cardene for radial graft protection given no OG for norvasc. Bedrest d/t ventilation and IABP removal. Pain well controlled. - BM / - flatus. NPO d/t above. UOP adequate. Chest tube output appropriate. Hgb 12.3. Patient denies new chest pain, shortness of breath, abdominal pain, calf pain, nausea. Patient has no questions today.     OBJECTIVE:  Temp:  [96.6  F (35.9  C)-101.3  F (38.5  C)] 100.8  F (38.2  C)  Pulse:  [] 71  Resp:  [9-41] 18  BP: (109-128)/(61-91) 109/61  MAP:  [0 mmHg-106 mmHg] 87 mmHg  Arterial Line BP: (0-138)/(0-76) 130/65  FiO2 (%):  [30 %-100 %] 30 %  SpO2:  [96 %-100 %] 100 %  Vitals:    05/13/24 0612 05/14/24 0600 05/15/24 0000   Weight: 77.6 kg (171 lb 1.6 oz) 79 kg (174 lb 1.6 oz) 81.4 kg (179 lb 7.3 oz)         Clinically Significant Risk Factors          # Hypocalcemia: Lowest Ca = 8.4 mg/dL in last 2 days, will monitor and replace as appropriate     # Hypoalbuminemia: Lowest albumin = 3.2 g/dL at 5/14/2024  5:31 PM, will monitor as appropriate  # Coagulation Defect: INR = 1.23 (Ref range: 0.85 - 1.15) and/or PTT = 39 Seconds (Ref range: 22 - 38 Seconds), will monitor for bleeding    # Hypertension: Noted on problem list       # DMII: A1C = 9.3 % (Ref range: <5.7 %) within past 6 months, PRESENT ON ADMISSION  # Overweight: Estimated body mass index is 28.96 kg/m  as calculated from the following:    Height as of this encounter: 1.676 m (5' 6\").    Weight as of this encounter: 81.4 kg (179 lb 7.3 oz)., PRESENT ON ADMISSION      # History " of CABG: noted on surgical history           Current Medications:    Scheduled Meds:  Current Facility-Administered Medications   Medication Dose Route Frequency Provider Last Rate Last Admin    acetaminophen (TYLENOL) tablet 975 mg  975 mg Oral Q8H [facility] Shirley Lebron PA-C        aspirin (ASA) chewable tablet 162 mg  162 mg Oral or NG Tube Daily Shirley Lebron PA-C        Or    aspirin (ASA) Suppository 300 mg  300 mg Rectal Daily Shirley Lebron PA-C        atorvastatin (LIPITOR) tablet 80 mg  80 mg Oral QPM Vincent Castañeda MD   80 mg at 05/13/24 2047    busPIRone (BUSPAR) tablet 10 mg  10 mg Oral Daily Vincent Castañeda MD   10 mg at 05/13/24 0829    ceFAZolin (ANCEF) 1 g vial to attach to  ml bag for ADULT or 50 ml bag for PEDS  1 g Intravenous Q8H Shirley Lebron PA-C   1 g at 05/15/24 0423    chlorhexidine (PERIDEX) 0.12 % solution 15 mL  15 mL Mouth/Throat Q12H Doris Anguiano APRN CNP   15 mL at 05/15/24 0910    FLUoxetine (PROzac) capsule 20 mg  20 mg Oral Daily Vincent Castañeda MD   20 mg at 05/13/24 0829    heparin ANTICOAGULANT injection 5,000 Units  5,000 Units Subcutaneous Q8H Shirley Lebron PA-C        Lidocaine (LIDOCARE) 4 % Patch 1-2 patch  1-2 patch Transdermal Q24H Shirley Lebron PA-C   1 patch at 05/14/24 1803    pantoprazole (PROTONIX) 2 mg/mL suspension 40 mg  40 mg Oral or NG Tube QAM AC Shirley Lebron PA-C        Or    pantoprazole (PROTONIX) EC tablet 40 mg  40 mg Oral QAM AC Shirley Lebron PA-C        polyethylene glycol (MIRALAX) Packet 17 g  17 g Oral Daily Shirley Lebron PA-C        senna-docusate (SENOKOT-S/PERICOLACE) 8.6-50 MG per tablet 1 tablet  1 tablet Oral BID Shirley Lebron PA-C        sodium phosphate 15 mmol in sodium chloride 0.9 % 250 mL intermittent infusion  15 mmol Intravenous Once Watt, MD Tuyet   15 mmol at 05/15/24 0849    tamsulosin (FLOMAX) capsule 0.4 mg  0.4 mg Oral QPM Garry  MD Vincent   0.4 mg at 05/13/24 2048     Continuous Infusions:  Current Facility-Administered Medications   Medication Dose Route Frequency Provider Last Rate Last Admin    dexmedeTOMIDine (PRECEDEX) 4 mcg/mL in sodium chloride 0.9 % 100 mL infusion  0.1-1.2 mcg/kg/hr Intravenous Continuous Doris Anguiano APRN CNP 19.8 mL/hr at 05/15/24 0904 1 mcg/kg/hr at 05/15/24 0904    EPINEPHrine (ADRENALIN) 5 mg in sodium chloride 0.9 % 250 mL infusion CENTRAL  0.01-0.3 mcg/kg/min Intravenous Continuous Shirley Lebron PA-C   Stopped at 05/15/24 0547    insulin regular (MYXREDLIN) 1 unit/mL infusion  0-24 Units/hr Intravenous Continuous Shirley Lebron PA-C   Paused at 05/15/24 0915    niCARdipine 40 mg in 200 mL NS (CARDENE) infusion  0.5-15 mg/hr Intravenous Continuous Shirley Lebron PA-C 10 mL/hr at 05/15/24 0900 2 mg/hr at 05/15/24 0900    norepinephrine (LEVOPHED) 4 mg in  mL infusion PREMIX  0.01-0.1 mcg/kg/min Intravenous Continuous Shirley Lebron PA-C        phenylephrine (RICH-SYNEPHRINE) 50 mg in NaCl 0.9 % 250 mL infusion  0.1-6 mcg/kg/min Intravenous Continuous Shirley Lebron PA-C   Held at 05/14/24 1912    Reason beta blocker order not selected   Does not apply DOES NOT GO TO Shirley Mast PA-C        sodium chloride 0.9 % infusion   Intravenous Continuous Vincent Castañeda MD 30 mL/hr at 05/15/24 0200 Rate Verify at 05/15/24 0200     PRN Meds:.  Current Facility-Administered Medications   Medication Dose Route Frequency Provider Last Rate Last Admin    acetaminophen (TYLENOL) Suppository 650 mg  650 mg Rectal Q6H PRN Donald Martinez MD   650 mg at 05/15/24 0650    [START ON 5/17/2024] acetaminophen (TYLENOL) tablet 650 mg  650 mg Oral Q4H PRN Shirley Lebron PA-C        [START ON 5/17/2024] bisacodyl (DULCOLAX) suppository 10 mg  10 mg Rectal Daily PRN Shirley Lebron PA-C        busPIRone (BUSPAR) tablet 10 mg  10 mg Oral Daily PRN  Vincent Castañeda MD   10 mg at 05/14/24 0106    calcium carbonate (TUMS) chewable tablet 1,000 mg  1,000 mg Oral 4x Daily PRN Vincent Castañeda MD        calcium gluconate 1 g in 50 mL in sodium chloride intermittent infusion  1 g Intravenous Once PRN Shirley Lebron PA-C   1 g at 05/15/24 0550    calcium gluconate 2 g in  mL intermittent infusion  2 g Intravenous Once PRN Shirley Lebron PA-C        calcium gluconate 3 g in sodium chloride 0.9 % 100 mL intermittent infusion  3 g Intravenous Once PRN Shirley Lebron PA-C        glucose gel 15-30 g  15-30 g Oral Q15 Min PRN Shirley Lebron PA-C        Or    dextrose 50 % injection 25-50 mL  25-50 mL Intravenous Q15 Min PRN Shirley Lebron PA-C        Or    glucagon injection 1 mg  1 mg Subcutaneous Q15 Min PRN Shirley Lebron PA-C        fentaNYL (PF) (SUBLIMAZE) injection 100 mcg  100 mcg Intravenous Q1H PRN oDris Anguiano APRN CNP   100 mcg at 05/15/24 0813    hydrALAZINE (APRESOLINE) injection 10 mg  10 mg Intravenous Q30 Min PRN Shirley Lebron PA-C        HYDROmorphone (DILAUDID) injection 0.2 mg  0.2 mg Intravenous Q2H PRN Shirley Lebron PA-C   0.2 mg at 05/14/24 2208    Or    HYDROmorphone (DILAUDID) injection 0.4 mg  0.4 mg Intravenous Q2H PRN Shirley Lebron PA-C   0.4 mg at 05/15/24 0353    lactated ringers BOLUS 250 mL  250 mL Intravenous Q15 Min PRN Shirley Lebron PA-C   250 mL at 05/15/24 0657    [START ON 5/16/2024] magnesium hydroxide (MILK OF MAGNESIA) suspension 30 mL  30 mL Oral Daily PRN Shirley Lebron PA-C        naloxone (NARCAN) injection 0.2 mg  0.2 mg Intravenous Q2 Min PRN Shirley Lebron PA-C        Or    naloxone (NARCAN) injection 0.4 mg  0.4 mg Intravenous Q2 Min PRN Shirley Lebron PA-C        Or    naloxone (NARCAN) injection 0.2 mg  0.2 mg Intramuscular Q2 Min PRN Shirley Lebron PA-C        Or    naloxone (NARCAN) injection 0.4 mg  0.4  mg Intramuscular Q2 Min PRN Shirley Lebron PA-C        ondansetron (ZOFRAN ODT) ODT tab 4 mg  4 mg Oral Q6H PRN Shirley Lebron PA-C        Or    ondansetron (ZOFRAN) injection 4 mg  4 mg Intravenous Q6H PRN Shirley Lebron PA-C        oxyCODONE (ROXICODONE) tablet 5 mg  5 mg Oral Q4H PRN Shirley Lebron PA-C        Or    oxyCODONE (ROXICODONE) tablet 10 mg  10 mg Oral Q4H PRN Shirley Lebron PA-C        Plasma-Lyte A (electrolyte A) 1,000 mL with heparin 10,000 Units    PRN Tuyet Azar MD   300 mL at 05/14/24 1642    prochlorperazine (COMPAZINE) injection 10 mg  10 mg Intravenous Q6H PRN Shirley Lebron PA-C        Or    prochlorperazine (COMPAZINE) tablet 10 mg  10 mg Oral Q6H PRN Shirley Lebron PA-C        Reason beta blocker order not selected   Does not apply DOES NOT GO TO Shirley Mast PA-C        vancomycin (VANCOCIN) topical powder    PRN Tuyet Azar MD   3 g at 05/14/24 1633       Cardiographics:    Telemetry monitoring demonstrates NSR with rates in the 60-70s per my personal review.    Imaging:  Results for orders placed or performed during the hospital encounter of 05/12/24   US Carotid Bilateral    Impression    IMPRESSION:  1.  Mild plaque formation, velocities consistent with less than 50% stenosis in the right internal carotid artery.  2.  Mild plaque formation, velocities consistent with less than 50% stenosis in the left internal carotid artery.  3.  Flow within the vertebral arteries is antegrade.   XR Chest Port 1 View    Impression    IMPRESSION: Poststernotomy and coronary artery bypass grafting. Mediastinal and bilateral pleural drains remain in position. Endotracheal tube in satisfactory position terminating 6.8 cm above the marie. Right internal jugular venous [place]-Jasmin catheter   tip in the right ventricular outflow tract/main pulmonary artery. Radiopaque marker for the intra-aortic balloon pump is located in the proximal  descending thoracic aorta. Mild left basilar atelectasis. No significant pleural fluid. No visible   pneumothorax. Stable cardiac silhouette. Normal pulmonary vascularity..   XR Chest Port 1 View    Impression    IMPRESSION: Endotracheal tube resides over the mid tracheal air column. Right IJ [place]-Jasmin catheter with the tip over the main pulmonary artery. Bilateral chest tubes and mediastinal drain. No pneumothorax. Linear atelectasis right lung. New sternotomy   wires and surgical clips. Osseous structures otherwise intact.   Echocardiogram Complete   Result Value Ref Range    LVEF  45-50% (mildly reduced)        Labs, personally reviewed.  Hemoglobin   Date Value Ref Range Status   05/15/2024 12.3 (L) 13.3 - 17.7 g/dL Final   05/14/2024 12.3 (L) 13.3 - 17.7 g/dL Final   05/14/2024 11.6 (L) 13.3 - 17.7 g/dL Final     Hemoglobin POCT   Date Value Ref Range Status   05/14/2024 12.2 (L) 13.3 - 17.7 g/dL Final   05/14/2024 11.2 (L) 13.3 - 17.7 g/dL Final   05/14/2024 10.7 (L) 13.3 - 17.7 g/dL Final     WBC Count   Date Value Ref Range Status   05/15/2024 11.4 (H) 4.0 - 11.0 10e3/uL Final   05/14/2024 15.6 (H) 4.0 - 11.0 10e3/uL Final   05/14/2024 14.3 (H) 4.0 - 11.0 10e3/uL Final     Platelet Count   Date Value Ref Range Status   05/15/2024 158 150 - 450 10e3/uL Final   05/14/2024 160 150 - 450 10e3/uL Final   05/14/2024 139 (L) 150 - 450 10e3/uL Final     Creatinine   Date Value Ref Range Status   05/15/2024 1.42 (H) 0.67 - 1.17 mg/dL Final   05/14/2024 1.14 0.67 - 1.17 mg/dL Final   05/14/2024 1.33 (H) 0.67 - 1.17 mg/dL Final     Potassium   Date Value Ref Range Status   05/15/2024 4.5 3.4 - 5.3 mmol/L Final   05/14/2024 4.0 3.4 - 5.3 mmol/L Final   05/14/2024 4.0 3.4 - 5.3 mmol/L Final   02/18/2023 3.8 3.5 - 5.0 mmol/L Final   02/17/2023 4.0 3.5 - 5.0 mmol/L Final   02/16/2023 4.3 3.5 - 5.0 mmol/L Final     Potassium POCT   Date Value Ref Range Status   05/14/2024 3.6 3.4 - 5.3 mmol/L Final   05/14/2024 4.1 3.4 -  5.3 mmol/L Final   05/14/2024 5.1 3.4 - 5.3 mmol/L Final     Magnesium   Date Value Ref Range Status   05/15/2024 1.9 1.7 - 2.3 mg/dL Final   05/14/2024 2.5 (H) 1.7 - 2.3 mg/dL Final   05/12/2024 2.0 1.7 - 2.3 mg/dL Final          I/O:  I/O last 3 completed shifts:  In: 4283.15 [I.V.:3122.15; Other:140; IV Piggyback:1021]  Out: 3918 [Urine:3300; Chest Tube:618]       Physical Exam:    General: Patient seen in bed. Intubated/sedated on precedex, NAD. Conversant. Pleasant.   HEENT: REBEKA, no sclera icterus, moist mucosa, ET tube secure, no tracheal deviation  CV: RRR on monitor. 2+ peripheral pulses in all extremities. Mild edema. IABP removed this AM.  Pulm: Non-labored effort on CMV 30% FiO2. Chest tubes in place, serosanguinous output, no air leak. Incision C/D/I.  Abd: Soft, NT, ND  : Still with seun urine  Ext: Mild pedal edema, SCDs in place, warm, distal pulses intact  Neuro: BAZAN, follows commands; sleepy/intubated so unable to fully eval      ASSESSMENT/PLAN:    Drew Monsivais is a 53 year old male with a history of CAD, MARY JANE, HTN, HLD, anxiety, and DM2 who is s/p CABG x3 w/ endoscopic radial harvest.    Active Problems:    NSTEMI (non-ST elevated myocardial infarction) (H)  CAD  HTN  HLD  Type 2 DM  Acute Respiratory Failure  Acute on chronic anxiety      NEURO:   - Scheduled Tylenol suppositories/lidocaine patches and PRN Fentanyl/dilaudid for pain  - PO analgesics on hold given no OG access  - Intermittently agitated/anxious and somnolent. On precedex for anxiety management.   - Holding PTA prozac, 20mg qd,buspar 10 mg qd, and 10 mg qd PRN given no OG access  - Holding PA trazodone unless needed for sleep this admission      CV:   - Pre-op EF 45-50%  - IABP removed this AM (POD#1) (see procedure note)  - Normotensive  - PTA lisinopril discontinued  - Metoprolol 12.5mg two times a day to start when able to take PO (no OG tube present)  - Cardene gtt for radial graft protection, transition to norvasc  when able to take PO.   - ASA suppository until extubated, then 81 mg qd (plavix)  - Plavix to start when able to take PO per surgeon preference for graft patency in s/o NSTEMI (ASA)  - Atorvastatin 80mg daily held until able to take PO  - Chest tubes/TPW to remain today    PULM:   - Vent weaning as able  - Maintaining oxygen saturations on CMV 30% FiO2 - wean as able  - Encourage pulmonary toilet    FEN/GI:  - NPO, MIVF while intubated  - Continue electrolyte replacement protocol  - Diet: NPO while intubated  - Bowel regimen, LBM preop  - Lactate elevated yesterday PM, recheck today.    RENAL:  - Adequate UOP/hr. Continue to monitor closely.  - Cr 1.42, recheck BMP tomorrow  - Still to remain in for close monitoring of I/O and during period of diuresis/relative immobility - plan for removal POD2  - Diuresis PRN today given low CVP, consider concentrated albumin if needs diuresis today  - Holding PTA flomax until able to take PO    HEME:  - Acute blood loss anemia post-op.   - Hgb stable, no bleeding concerns. Hep SQ, ASA    ID:  - Jill op ppx complete, febrile overnight. No concerns for infection at this time. Will monitor.   - Leukocytosis, likely reactive. Improving. WBC 11.4 (15.6)  - Monitor w/ CBC tomorrow    ENDO:   - HbA1c 9.3%  - BG goal < 180 to promote optimal healing  - Continue Insulin gtt for now, consider transition to SSI later today pending extubation  - Holding PTA glipizide at this time, plan to resume POD#3 pending renal function  - Will likely need insulin at discharge to maintain BG goal. Will discuss w/ nursing to have patient receive education and practice taking BG and administering insulin while inpt      PPx:   - DVT: SCDs, SQ heparin TID, ambulation   - GI: Protonix 40mg PO daily (PTA med)    DISPO:   - Continue critical care in ICU (intubation, cardene gtt)  - PT/OT recs at discharge pending  - Medically Ready for Discharge: Anticipated in 5+ Days        Patient discussed with   Yodit and Dr. Azar.        Christiana Lebron PA-C  [facility] Cardiothoracic Surgery  Pager: [phone]  May 15, 2024

## 2024-05-15 NOTE — PROGRESS NOTES
Brief CV Surgery Note        IABP removed at 0812 this AM. Confirmed helium tubing was disconnected prior to removal per IABP tech. Brief free bleeding allowed, followed by 20 minutes of manual pressure. No increase in pressor needs following removal. Posterior tibial and dorsalis pedis pulses intact by doppler and obliterated w/ pressure. Femstop applied w/ 130 mmHg pressure and should be allowed to deflate on its own. Flat bedrest x6 hours post removal (approx 1415), discussed w/ RN. CBC ordered for tomorrow AM. No immediate complications.         Christiana Lebron PA-C  [facility] Cardiothoracic Surgery  Pager: [phone]  May 15, 2024

## 2024-05-15 NOTE — PROGRESS NOTES
Vent Mode: CMV/AC  (Continuous Mandatory Ventilation/ Assist Control)  FiO2 (%): (S) 30 %  Resp Rate (Set): 18 breaths/min  Tidal Volume (Set, mL): 500 mL  PEEP (cm H2O): 5 cmH2O  Pressure Support (cm H2O): (S) 8 cmH2O  Resp: 18     Vent day #2  02 has been decreased down to 30% during shift, sats 98%

## 2024-05-15 NOTE — PROGRESS NOTES
"Critical Care Progress Note  5/15/2024   10:38 AM    Admit Date: 5/12/2024  ICU Admission Day: 5/12  Code Status: FULL CODE      Problem List:   Active Problems:    NSTEMI (non-ST elevated myocardial infarction) (H)         Clinically Significant Risk Factors          # Hypocalcemia: Lowest Ca = 8.4 mg/dL in last 2 days, will monitor and replace as appropriate     # Hypoalbuminemia: Lowest albumin = 3.2 g/dL at 5/14/2024  5:31 PM, will monitor as appropriate  # Coagulation Defect: INR = 1.23 (Ref range: 0.85 - 1.15) and/or PTT = 39 Seconds (Ref range: 22 - 38 Seconds), will monitor for bleeding    # Hypertension: Noted on problem list       # DMII: A1C = 9.3 % (Ref range: <5.7 %) within past 6 months, PRESENT ON ADMISSION  # Overweight: Estimated body mass index is 28.96 kg/m  as calculated from the following:    Height as of this encounter: 1.676 m (5' 6\").    Weight as of this encounter: 81.4 kg (179 lb 7.3 oz)., PRESENT ON ADMISSION     # Financial/Environmental Concerns: none   # History of CABG: noted on surgical history          Systems to Assess:   Pulmonary: Post op vent management; no underlying lung disease.   Wean supplemental O2 as tolerated; goal O2 sat > 92%.  HOB > 30 degrees to limit aspiration risk.    Follow ABG and adjust support as indicated; avoid acidotic state.   Follow CXR  Unable to extubate due to apneic episodes on SBTs. ABG shows resp alkalosis. Will drop rate to drive CO2 back up a bit to trigger RR  Cardiovascular: CAD s/p CABG x 3  Cardiac monitoring.   SBP goal > 90 mmHg, MAP goal > 65 mmHg.    Goal CI 2-3   Goal PAD 18  Vasoactive gtts per CV-surgery.   IABP removed this morning  Temporary pacing wires present; will use in setting of symptomatic arrhythmia.   Currently paced @ back up 60  Underlying rhythm: sinus rhythm  Neurological: sedation for vent synchrony and comfort.    Neuro checks per ICU protocol.   Sedate with precedex until ready to wean and extubate.   Pain control: " PRN  Goal RASS 0 to -1   GI/: no acute issues  NPO until extubated and fully awake.   Still catheter in place for accurate measurement of I/O.   GI prophylaxis:  Omeprazole  Renal: MARY JANE   Monitor I/O's.  Electrolyte repletion PRN.  Avoid/limit nephrotoxic agents.   IVFs: per CV-surgery  Heme/Coag: Acute blood loss anemia; coagulopathy secondary to pump run.   Monitor H/H.   Transfuse per CV-surgery.   Chest tubes in place; management per surgery  DVT prophylaxis:  SubQ heparin  Infectious disease:   General precautions.   Remove lines and drains once no longer required.   Endocrine: T2DM with hyperglycemia   RHI gtt per ICU protocol.   Musculoskeletal:   Bedrest; initiate mobility protocol.       Dispo: ICU    Doris Anguiano, CNP  [facility] Pulmonary/Critical Care    Total critical care time: 33-minutes  I have personally provided 33 minutes of critical care time exclusive of time spent on separately billable procedures.   _______________________________________________________________    HPI: Drew Monsivais is a 53 year old male with CAD, MARY JANE, HTN, HLD, T2DM. Admitted 5/12 with chest tightness and NSTEMI. Underwent CABG x 3 with Dr. Azar today with IABP placement prior. Procedure reported as straight forward. No difficulty with line placement or intubation. Received 20mg IV methadone up front. Preop EF showed good function with EF in the 40s; this was preserved to slightly improved post procedure. No difficulty going on nor coming off pump. Radial artery graft was used so small dose of Cardene running along with epi and phenyl. EBL 300mL; received 140mL Cell Saver and a dose of DDAVP. No bleeding concerns at end of case.   Overnight, periods of agitation requiring more sedation with precedex and fentanyl. Unable to extubate due to apnea on SBTs.     ROS: ALEXANDRU - intubated and sedated    Events over last 24-hours: as above    Objective:     Vitals:    05/15/24 0900 05/15/24 0930 05/15/24 1000 05/15/24  1015   BP:       Pulse: 71 70 71 72   Resp: 18 18 18 16   Temp:   100.4  F (38  C)    TempSrc:   Pulmonary Artery    SpO2: 100% 99% 99% 99%   Weight:       Height:           Vent:   Day of Intubation: 5/14  Ready to wean? Yes  Vent Mode: CMV/AC  (Continuous Mandatory Ventilation/ Assist Control)  FiO2 (%): (S) 30 %  Resp Rate (Set): 18 breaths/min  Tidal Volume (Set, mL): 500 mL  PEEP (cm H2O): 5 cmH2O  Pressure Support (cm H2O): (S) 8 cmH2O  Resp: 16      Sedative Infusion: precedex  Sedation vacation: N/A    I/O:   Intake/Output Summary (Last 24 hours) at 5/15/2024 1038  Last data filed at 5/15/2024 1000  Gross per 24 hour   Intake 4283.15 ml   Output 4273 ml   Net 10.15 ml     Wt Readings from Last 3 Encounters:   05/15/24 81.4 kg (179 lb 7.3 oz)   05/12/24 78.9 kg (174 lb)   02/15/23 72.2 kg (159 lb 2.8 oz)      Weight change: 2.429 kg (5 lb 5.7 oz)    Physical Exam:  Gen: intubated, no distress  HEENMT: AT/NC; RIJ introducer with PA-C in place.   NEURO: moving all extremities.   CARDIOVASCULAR: RRR S1S2  PULMONARY: unlabored on vent; lungs clear, equal, no wheeze  GASTROINTESTINAL: soft ntnd  INTEGUMENT: visible skin intact  PSYCH: sedated, calm     Labs:   Recent Labs   Lab 05/15/24  0511      CO2 23   BUN 12.7   ALKPHOS 82   ALT 24   AST 47*       Recent Labs   Lab 05/15/24  0511   WBC 11.4*   HGB 12.3*   HCT 35.8*          Micro:   None this admission    Imaging: all imaging personalized reviewed   5/15 CXR -  Poststernotomy and coronary artery bypass grafting. Mediastinal and bilateral pleural drains remain in position. Endotracheal tube in satisfactory position terminating 6.8 cm above the marie. Right internal jugular venous [place]-Jasmin catheter   tip in the right ventricular outflow tract/main pulmonary artery. Radiopaque marker for the intra-aortic balloon pump is located in the proximal descending thoracic aorta. Mild left basilar atelectasis. No significant pleural fluid. No visible    pneumothorax. Stable cardiac silhouette. Normal pulmonary vascularity..      Doris Anguiano, CNP  [facility] Pulmonary/Critical Care

## 2024-05-15 NOTE — CONSULTS
Care Management Initial Consult    General Information  Assessment completed with: Family, Sister, Kennedi  Type of CM/SW Visit: Initial Assessment    Primary Care Provider verified and updated as needed: No   Readmission within the last 30 days: no previous admission in last 30 days      Reason for Consult: other (see comments) (High readmission score)  Advance Care Planning:            Communication Assessment  Patient's communication style: spoken language (English or Bilingual)    Hearing Difficulty or Deaf: no   Wear Glasses or Blind: yes    Cognitive  Cognitive/Neuro/Behavioral: .WDL except, all  Level of Consciousness: sedated  Arousal Level: opens eyes spontaneously  Orientation: other (see comments) (ALEXANDRU)  Mood/Behavior: anxious  Best Language: 3 - Mute  Speech: endotracheal tube    Living Environment:   People in home: child(tania), dependent (14 year old son with shared custody)     Current living Arrangements: house      Able to return to prior arrangements: yes       Family/Social Support:  Care provided by: self  Provides care for: child(tania)  Marital Status:   Sibling(s)          Description of Support System: Supportive, Involved         Current Resources:   Patient receiving home care services: No     Community Resources: None  Equipment currently used at home: glucometer  Supplies currently used at home:      Employment/Financial:  Employment Status: employed full-time        Financial Concerns: none           Does the patient's insurance plan have a 3 day qualifying hospital stay waiver?  No    Lifestyle & Psychosocial Needs:  Social Determinants of Health     Food Insecurity: Not on file   Depression: Not at risk (1/10/2024)    Received from [employer]    PHQ-2     PHQ-2 Score: 0   Housing Stability: Not on file   Tobacco Use: Medium Risk (5/14/2024)    Patient History     Smoking Tobacco Use: Former     Smokeless Tobacco Use: Never     Passive Exposure: Not on file   Financial Resource  Strain: Not on file   Alcohol Use: Not on file   Transportation Needs: Not on file   Physical Activity: Not on file   Interpersonal Safety: Not on file   Stress: Not on file   Social Connections: Not on file   Health Literacy: Not on file       Functional Status:  Prior to admission patient needed assistance:   Dependent ADLs:: Independent  Dependent IADLs:: Independent       Mental Health Status:          Chemical Dependency Status:                Values/Beliefs:  Spiritual, Cultural Beliefs, Restorationist Practices, Values that affect care:                 Additional Information:  Assessed with pt's sister Kennedi as pt is sedated/intubated. Pt lives alone in a townhouse with shared custody of his son. Works full time. Independent with ADLs and IADLs. Sister to transport at discharge.    Plan of care discussed in ICU rounds: pt is POD#1 CABGx3, unable to extubate at this time.    RNCM to follow for medical progression, recommendations, and final discharge plan. Cont to wean as tolerated.    RNCM to follow for medical progression, recommendations, and final discharge plan.        Kandy Bridges RN

## 2024-05-15 NOTE — PROGRESS NOTES
[facility] - ICU    RN Progress Note:            Pertinent Assessments:      Please refer to flowsheet rows for full assessment     VS/Hemodynamics/RASS; Respiratory; CV           Key Events - This Shift:   Patient had IABP removed at 8:12 a.m., bedrest x6 hours and then SAT and SBT. Extubated to NC at 15:05. 500 mL % Albumin given for low PAD/CVP/BP. Patent has complaints of severe pain despite Dilaudid and Oxycodone; PRN Robaxin ordered. Patient also noted to be quite anxious. PRN Buspar ad Atarax ordered.      [facility] SAT (Sedation Awakening Trial): For use ONLY if intubated    SAT Safety Screen PASSED   If FAILED why?    SAT Performed PASSED   If FAILED why?               Barriers to Discharge / Downgrade:     Vasocative drips         Point of Contact Update YES-OR-NO: Yes  Name:Enid (Sister)  Summary of Conversation: At bedside throughout the day and when patient extubated. Aware and agreeable to plan of care.        Daiana Mcwilliams, RN

## 2024-05-15 NOTE — PROGRESS NOTES
Patient remains intubated s/p CABG 05/14. Managed by Intensivist.   HMS will sign off, discussed with Intensivist  Re-consult when medically stable for downgrade

## 2024-05-15 NOTE — PROGRESS NOTES
CT EXTUBATION FAST TRACK:    [facility] - ICU     FastTrack Candidate: Yes, Extubation Goal Time ( 6 Hours ) 2315 (5/14/24)     Patient Status: Remains Intubated Wean Stopped, New Goal < 24 Hours 1715 (5/15/24)         Attempted to wean from ventilator x3 this evening.  Patient is very anxious at baseline, decision was made to wean with precedex drip infusing.  Wean #1 started after patient was pulling himself up on his elbows.  Lasted about 2 minutes before we stopped 2/2 apnea.    Wean #2 was on a decreased rate of precedex with no recent pain medication on board.  We attempted to wean with settings 5/8.  Patient was complaining of pain and gagging on ETT.  Alternating between periods of tachypnea and apnea.      Wean #3 was without any sedation.  Attempted wean on settings 5/10.  First couple minutes went well, patient kept eyes open, TV on for distraction and large light on in room.  Decreased settings to 5/8 after a couple minutes and patient started to become apenic again.  He would arouse easily when staff would talk to him.  Alternating periods of apnea and agitation (attempting to sit up, leaning on elbows, bending right leg with IABP).  Wean stopped, precedex gtt resumed and PRN fentanyl administered.      For patient safety 2/2 his agitation and anxiety, we will keep sedated overnight.  Hopeful that IABP will be removed early tomorrow and weaning trials will improve.     Meghann Calero RN 5/15/2024 12:35 AM

## 2024-05-15 NOTE — PLAN OF CARE
[facility] - ICU    RN Progress Note:            Pertinent Assessments:      Please refer to flowsheet rows for full assessment     Anxious when awake but becoming more appropriate and redirectable later in shift.  Follows commands and moves all extremities.  Attempted to write needs to staff but hand writing is difficult to read.  Pupils are equal and brisk.  Pericardial rub present.  Lungs are clear.  Pedal pulses are present via doppler.  Adequate urine output.  Elevated temperature present.           Key Events - This Shift:     Attempted to wean from ventilator 3 times.  Will wake up and tries to sit up in bed.  Once we are able to get patient to calm down, we would start a wean.  While weaning patient would fall asleep.  Periods of apnea are present and not taking in deep breaths.  After 3rd failed attempt, Precedex was restarted.    250 ml lactated ringer bolus given twice for decreased CVP.      Requiring direction to keep right leg straight multiple times, restraint in place.    Provider given routine update at 2100.      [facility] SAT (Sedation Awakening Trial): For use ONLY if intubated    SAT Safety Screen PASSED   If FAILED why?    SAT Performed FAILED   If FAILED why? Increased agitation.              Barriers to Discharge / Downgrade:     Currently on a ventilator.         Point of Contact Update YES-OR-NO: Yes  Name:Enid  Summary of Conversation: Present at start of shift.  Discussed plan of care.       Alton Ann RN

## 2024-05-15 NOTE — PROGRESS NOTES
Vent Mode: CMV/AC  (Continuous Mandatory Ventilation/ Assist Control)  FiO2 (%): 45 %  Resp Rate (Set): 18 breaths/min  Tidal Volume (Set, mL): 500 mL  PEEP (cm H2O): 5 cmH2O  Pressure Support (cm H2O): (S) 8 cmH2O  Resp: 25     Wean attempted x 3 with pt settings 5/5, 5/8, and 5/10 with same results pt would have long apneic periods when people would stop talking with pt to see how he did on his own. Reported this to Dr. Overton. Going to keep pt on vent for the night.

## 2024-05-15 NOTE — PROGRESS NOTES
RESPIRATORY CARE NOTE     Patient Name: Drew Monsivais  Today's Date: 5/15/2024         Patient extubated at 15:05 pm  and placed on 3LNC. BS clear to auscultations and decreased @ bases. patient did exhibit fair productive cough upon extubation.  patient is able to verbalize upon extubation. No complications have been noted at this time. RT will continue to monitor and assess as needed.       Levi Barragan, RRT

## 2024-05-16 ENCOUNTER — APPOINTMENT (OUTPATIENT)
Dept: OCCUPATIONAL THERAPY | Facility: HOSPITAL | Age: 54
DRG: 233 | End: 2024-05-16
Payer: COMMERCIAL

## 2024-05-16 PROBLEM — E11.9 TYPE 2 DIABETES MELLITUS (H): Status: ACTIVE | Noted: 2020-12-16

## 2024-05-16 PROBLEM — K76.0 FATTY LIVER: Status: ACTIVE | Noted: 2020-12-16

## 2024-05-16 PROBLEM — K85.90 ACUTE PANCREATITIS: Status: ACTIVE | Noted: 2022-12-27

## 2024-05-16 PROBLEM — F41.8 DEPRESSION WITH ANXIETY: Status: ACTIVE | Noted: 2018-01-31

## 2024-05-16 PROBLEM — N52.9 MALE ERECTILE DISORDER: Status: ACTIVE | Noted: 2020-12-16

## 2024-05-16 PROBLEM — N20.0 NEPHROLITHIASIS: Status: ACTIVE | Noted: 2020-12-01

## 2024-05-16 PROBLEM — E78.5 DYSLIPIDEMIA: Status: ACTIVE | Noted: 2020-12-01

## 2024-05-16 PROBLEM — R13.10 DYSPHAGIA: Status: ACTIVE | Noted: 2022-10-03

## 2024-05-16 PROBLEM — K22.10 EROSIVE ESOPHAGITIS: Status: ACTIVE | Noted: 2023-12-29

## 2024-05-16 PROBLEM — K62.89 PROCTALGIA: Status: ACTIVE | Noted: 2020-12-01

## 2024-05-16 PROBLEM — D12.8 BENIGN NEOPLASM OF RECTUM: Status: ACTIVE | Noted: 2020-12-01

## 2024-05-16 PROBLEM — K22.10 ULCER OF ESOPHAGUS: Status: ACTIVE | Noted: 2023-02-23

## 2024-05-16 LAB
ANION GAP SERPL CALCULATED.3IONS-SCNC: 10 MMOL/L (ref 7–15)
ATRIAL RATE - MUSE: 83 BPM
BASE EXCESS BLDA CALC-SCNC: -4 MMOL/L (ref -3–3)
BASE EXCESS BLDV CALC-SCNC: -2.7 MMOL/L (ref -3–3)
BUN SERPL-MCNC: 14.2 MG/DL (ref 6–20)
CA-I BLD-MCNC: 4.7 MG/DL (ref 4.4–5.2)
CALCIUM SERPL-MCNC: 8.2 MG/DL (ref 8.6–10)
CHLORIDE SERPL-SCNC: 105 MMOL/L (ref 98–107)
COHGB MFR BLD: 92.1 % (ref 96–97)
CREAT SERPL-MCNC: 1.14 MG/DL (ref 0.67–1.17)
DEPRECATED HCO3 PLAS-SCNC: 22 MMOL/L (ref 22–29)
DIASTOLIC BLOOD PRESSURE - MUSE: NORMAL MMHG
EGFRCR SERPLBLD CKD-EPI 2021: 76 ML/MIN/1.73M2
ERYTHROCYTE [DISTWIDTH] IN BLOOD BY AUTOMATED COUNT: 12.3 % (ref 10–15)
GLUCOSE BLDC GLUCOMTR-MCNC: 100 MG/DL (ref 70–99)
GLUCOSE BLDC GLUCOMTR-MCNC: 107 MG/DL (ref 70–99)
GLUCOSE BLDC GLUCOMTR-MCNC: 111 MG/DL (ref 70–99)
GLUCOSE BLDC GLUCOMTR-MCNC: 122 MG/DL (ref 70–99)
GLUCOSE BLDC GLUCOMTR-MCNC: 122 MG/DL (ref 70–99)
GLUCOSE BLDC GLUCOMTR-MCNC: 153 MG/DL (ref 70–99)
GLUCOSE BLDC GLUCOMTR-MCNC: 163 MG/DL (ref 70–99)
GLUCOSE BLDC GLUCOMTR-MCNC: 195 MG/DL (ref 70–99)
GLUCOSE BLDC GLUCOMTR-MCNC: 94 MG/DL (ref 70–99)
GLUCOSE BLDC GLUCOMTR-MCNC: 97 MG/DL (ref 70–99)
GLUCOSE BLDC GLUCOMTR-MCNC: 99 MG/DL (ref 70–99)
GLUCOSE SERPL-MCNC: 119 MG/DL (ref 70–99)
HCO3 BLD-SCNC: 22 MMOL/L (ref 21–28)
HCO3 BLDV-SCNC: 24 MMOL/L (ref 21–28)
HCT VFR BLD AUTO: 29.9 % (ref 40–53)
HGB BLD-MCNC: 10.1 G/DL (ref 13.3–17.7)
INTERPRETATION ECG - MUSE: NORMAL
MAGNESIUM SERPL-MCNC: 2 MG/DL (ref 1.7–2.3)
MCH RBC QN AUTO: 28.5 PG (ref 26.5–33)
MCHC RBC AUTO-ENTMCNC: 33.8 G/DL (ref 31.5–36.5)
MCV RBC AUTO: 85 FL (ref 78–100)
O2/TOTAL GAS SETTING VFR VENT: 0 %
O2/TOTAL GAS SETTING VFR VENT: 0 %
OXYHGB MFR BLDV: 69 % (ref 70–75)
P AXIS - MUSE: 53 DEGREES
PCO2 BLD: 42 MM HG (ref 35–45)
PCO2 BLDV: 51 MM HG (ref 40–50)
PH BLD: 7.33 [PH] (ref 7.35–7.45)
PH BLDV: 7.28 [PH] (ref 7.32–7.43)
PHOSPHATE SERPL-MCNC: 3.7 MG/DL (ref 2.5–4.5)
PLATELET # BLD AUTO: 128 10E3/UL (ref 150–450)
PO2 BLD: 64 MM HG (ref 80–105)
PO2 BLDV: 39 MM HG (ref 25–47)
POTASSIUM SERPL-SCNC: 4.5 MMOL/L (ref 3.4–5.3)
PR INTERVAL - MUSE: 174 MS
QRS DURATION - MUSE: 108 MS
QT - MUSE: 388 MS
QTC - MUSE: 455 MS
R AXIS - MUSE: -43 DEGREES
RBC # BLD AUTO: 3.54 10E6/UL (ref 4.4–5.9)
SAO2 % BLDA: 91 % (ref 92–100)
SAO2 % BLDV: 71.4 % (ref 70–75)
SODIUM SERPL-SCNC: 137 MMOL/L (ref 135–145)
SYSTOLIC BLOOD PRESSURE - MUSE: NORMAL MMHG
T AXIS - MUSE: 59 DEGREES
VENTRICULAR RATE- MUSE: 83 BPM
WBC # BLD AUTO: 11.9 10E3/UL (ref 4–11)

## 2024-05-16 PROCEDURE — P9045 ALBUMIN (HUMAN), 5%, 250 ML: HCPCS | Mod: JZ

## 2024-05-16 PROCEDURE — 93005 ELECTROCARDIOGRAM TRACING: CPT

## 2024-05-16 PROCEDURE — 82805 BLOOD GASES W/O2 SATURATION: CPT | Performed by: STUDENT IN AN ORGANIZED HEALTH CARE EDUCATION/TRAINING PROGRAM

## 2024-05-16 PROCEDURE — 82805 BLOOD GASES W/O2 SATURATION: CPT | Performed by: INTERNAL MEDICINE

## 2024-05-16 PROCEDURE — 84100 ASSAY OF PHOSPHORUS: CPT | Performed by: STUDENT IN AN ORGANIZED HEALTH CARE EDUCATION/TRAINING PROGRAM

## 2024-05-16 PROCEDURE — 97110 THERAPEUTIC EXERCISES: CPT | Mod: GO

## 2024-05-16 PROCEDURE — 80048 BASIC METABOLIC PNL TOTAL CA: CPT

## 2024-05-16 PROCEDURE — 97535 SELF CARE MNGMENT TRAINING: CPT | Mod: GO

## 2024-05-16 PROCEDURE — 250N000013 HC RX MED GY IP 250 OP 250 PS 637

## 2024-05-16 PROCEDURE — P9047 ALBUMIN (HUMAN), 25%, 50ML: HCPCS

## 2024-05-16 PROCEDURE — 250N000012 HC RX MED GY IP 250 OP 636 PS 637

## 2024-05-16 PROCEDURE — 83735 ASSAY OF MAGNESIUM: CPT | Performed by: STUDENT IN AN ORGANIZED HEALTH CARE EDUCATION/TRAINING PROGRAM

## 2024-05-16 PROCEDURE — 120N000013 HC R&B IMCU

## 2024-05-16 PROCEDURE — 82330 ASSAY OF CALCIUM: CPT

## 2024-05-16 PROCEDURE — 250N000013 HC RX MED GY IP 250 OP 250 PS 637: Performed by: STUDENT IN AN ORGANIZED HEALTH CARE EDUCATION/TRAINING PROGRAM

## 2024-05-16 PROCEDURE — 250N000011 HC RX IP 250 OP 636

## 2024-05-16 PROCEDURE — 93010 ELECTROCARDIOGRAM REPORT: CPT | Performed by: INTERNAL MEDICINE

## 2024-05-16 PROCEDURE — 97165 OT EVAL LOW COMPLEX 30 MIN: CPT | Mod: GO

## 2024-05-16 PROCEDURE — 85027 COMPLETE CBC AUTOMATED: CPT

## 2024-05-16 RX ORDER — AMLODIPINE BESYLATE 2.5 MG/1
2.5 TABLET ORAL DAILY
Status: DISCONTINUED | OUTPATIENT
Start: 2024-05-17 | End: 2024-05-21 | Stop reason: HOSPADM

## 2024-05-16 RX ORDER — MAGNESIUM OXIDE 400 MG/1
400 TABLET ORAL EVERY 4 HOURS
Status: DISCONTINUED | OUTPATIENT
Start: 2024-05-16 | End: 2024-05-16

## 2024-05-16 RX ORDER — ALBUMIN (HUMAN) 12.5 G/50ML
25 SOLUTION INTRAVENOUS ONCE
Status: COMPLETED | OUTPATIENT
Start: 2024-05-16 | End: 2024-05-16

## 2024-05-16 RX ORDER — FUROSEMIDE 10 MG/ML
20 INJECTION INTRAMUSCULAR; INTRAVENOUS ONCE
Status: COMPLETED | OUTPATIENT
Start: 2024-05-16 | End: 2024-05-16

## 2024-05-16 RX ORDER — HYDROMORPHONE HCL IN WATER/PF 6 MG/30 ML
0.1 PATIENT CONTROLLED ANALGESIA SYRINGE INTRAVENOUS
Status: DISCONTINUED | OUTPATIENT
Start: 2024-05-16 | End: 2024-05-17

## 2024-05-16 RX ORDER — HYDROMORPHONE HCL IN WATER/PF 6 MG/30 ML
0.2 PATIENT CONTROLLED ANALGESIA SYRINGE INTRAVENOUS
Status: DISCONTINUED | OUTPATIENT
Start: 2024-05-16 | End: 2024-05-17

## 2024-05-16 RX ORDER — OXYCODONE HYDROCHLORIDE 5 MG/1
5 TABLET ORAL EVERY 4 HOURS PRN
Status: DISCONTINUED | OUTPATIENT
Start: 2024-05-16 | End: 2024-05-21 | Stop reason: HOSPADM

## 2024-05-16 RX ORDER — FUROSEMIDE 10 MG/ML
20 INJECTION INTRAMUSCULAR; INTRAVENOUS 3 TIMES DAILY
Status: DISCONTINUED | OUTPATIENT
Start: 2024-05-16 | End: 2024-05-16

## 2024-05-16 RX ORDER — MAGNESIUM OXIDE 400 MG/1
400 TABLET ORAL EVERY 4 HOURS
Status: COMPLETED | OUTPATIENT
Start: 2024-05-16 | End: 2024-05-16

## 2024-05-16 RX ORDER — FUROSEMIDE 10 MG/ML
40 INJECTION INTRAMUSCULAR; INTRAVENOUS 3 TIMES DAILY
Status: DISCONTINUED | OUTPATIENT
Start: 2024-05-17 | End: 2024-05-17

## 2024-05-16 RX ADMIN — FUROSEMIDE 20 MG: 10 INJECTION, SOLUTION INTRAMUSCULAR; INTRAVENOUS at 08:14

## 2024-05-16 RX ADMIN — METHOCARBAMOL 500 MG: 500 TABLET, FILM COATED ORAL at 07:54

## 2024-05-16 RX ADMIN — FUROSEMIDE 20 MG: 10 INJECTION, SOLUTION INTRAMUSCULAR; INTRAVENOUS at 18:15

## 2024-05-16 RX ADMIN — METOPROLOL TARTRATE 12.5 MG: 25 TABLET, FILM COATED ORAL at 14:54

## 2024-05-16 RX ADMIN — FLUOXETINE HYDROCHLORIDE 20 MG: 20 CAPSULE ORAL at 08:01

## 2024-05-16 RX ADMIN — ONDANSETRON 4 MG: 4 TABLET, ORALLY DISINTEGRATING ORAL at 20:19

## 2024-05-16 RX ADMIN — PANTOPRAZOLE SODIUM 40 MG: 40 TABLET, DELAYED RELEASE ORAL at 07:01

## 2024-05-16 RX ADMIN — LIDOCAINE 2 PATCH: 4 PATCH TOPICAL at 17:01

## 2024-05-16 RX ADMIN — POLYETHYLENE GLYCOL 3350 17 G: 17 POWDER, FOR SOLUTION ORAL at 08:03

## 2024-05-16 RX ADMIN — BUSPIRONE HYDROCHLORIDE 10 MG: 10 TABLET ORAL at 08:01

## 2024-05-16 RX ADMIN — INSULIN ASPART 1 UNITS: 100 INJECTION, SOLUTION INTRAVENOUS; SUBCUTANEOUS at 17:07

## 2024-05-16 RX ADMIN — HEPARIN SODIUM 5000 UNITS: 10000 INJECTION, SOLUTION INTRAVENOUS; SUBCUTANEOUS at 12:54

## 2024-05-16 RX ADMIN — OXYCODONE HYDROCHLORIDE 5 MG: 5 TABLET ORAL at 11:52

## 2024-05-16 RX ADMIN — OXYCODONE HYDROCHLORIDE 5 MG: 5 TABLET ORAL at 17:00

## 2024-05-16 RX ADMIN — ALBUMIN HUMAN 25 G: 0.25 SOLUTION INTRAVENOUS at 08:01

## 2024-05-16 RX ADMIN — OXYCODONE HYDROCHLORIDE 5 MG: 5 TABLET ORAL at 05:48

## 2024-05-16 RX ADMIN — HYDROMORPHONE HYDROCHLORIDE 0.2 MG: 0.2 INJECTION, SOLUTION INTRAMUSCULAR; INTRAVENOUS; SUBCUTANEOUS at 07:00

## 2024-05-16 RX ADMIN — HEPARIN SODIUM 5000 UNITS: 10000 INJECTION, SOLUTION INTRAVENOUS; SUBCUTANEOUS at 20:25

## 2024-05-16 RX ADMIN — HEPARIN SODIUM 5000 UNITS: 10000 INJECTION, SOLUTION INTRAVENOUS; SUBCUTANEOUS at 03:32

## 2024-05-16 RX ADMIN — AMLODIPINE BESYLATE 5 MG: 5 TABLET ORAL at 08:12

## 2024-05-16 RX ADMIN — SENNOSIDES AND DOCUSATE SODIUM 1 TABLET: 8.6; 5 TABLET ORAL at 08:02

## 2024-05-16 RX ADMIN — OXYCODONE HYDROCHLORIDE 5 MG: 5 TABLET ORAL at 02:05

## 2024-05-16 RX ADMIN — CLOPIDOGREL BISULFATE 75 MG: 75 TABLET ORAL at 08:02

## 2024-05-16 RX ADMIN — METOPROLOL TARTRATE 12.5 MG: 25 TABLET, FILM COATED ORAL at 20:24

## 2024-05-16 RX ADMIN — TAMSULOSIN HYDROCHLORIDE 0.4 MG: 0.4 CAPSULE ORAL at 20:24

## 2024-05-16 RX ADMIN — ASPIRIN 81 MG CHEWABLE TABLET 81 MG: 81 TABLET CHEWABLE at 08:01

## 2024-05-16 RX ADMIN — ACETAMINOPHEN 975 MG: 325 TABLET ORAL at 14:52

## 2024-05-16 RX ADMIN — SENNOSIDES AND DOCUSATE SODIUM 1 TABLET: 8.6; 5 TABLET ORAL at 20:24

## 2024-05-16 RX ADMIN — INSULIN ASPART 1 UNITS: 100 INJECTION, SOLUTION INTRAVENOUS; SUBCUTANEOUS at 12:46

## 2024-05-16 RX ADMIN — ONDANSETRON 4 MG: 2 INJECTION INTRAMUSCULAR; INTRAVENOUS at 07:00

## 2024-05-16 RX ADMIN — ACETAMINOPHEN 975 MG: 325 TABLET ORAL at 22:04

## 2024-05-16 RX ADMIN — ALBUMIN HUMAN 12.5 G: 0.05 INJECTION, SOLUTION INTRAVENOUS at 11:44

## 2024-05-16 RX ADMIN — ATORVASTATIN CALCIUM 80 MG: 40 TABLET, FILM COATED ORAL at 20:24

## 2024-05-16 RX ADMIN — FUROSEMIDE 20 MG: 10 INJECTION, SOLUTION INTRAMUSCULAR; INTRAVENOUS at 17:01

## 2024-05-16 RX ADMIN — MAGNESIUM OXIDE TAB 400 MG (241.3 MG ELEMENTAL MG) 400 MG: 400 (241.3 MG) TAB at 05:44

## 2024-05-16 RX ADMIN — FUROSEMIDE 20 MG: 10 INJECTION, SOLUTION INTRAMUSCULAR; INTRAVENOUS at 12:53

## 2024-05-16 RX ADMIN — MAGNESIUM OXIDE TAB 400 MG (241.3 MG ELEMENTAL MG) 400 MG: 400 (241.3 MG) TAB at 08:49

## 2024-05-16 RX ADMIN — OXYCODONE HYDROCHLORIDE 5 MG: 5 TABLET ORAL at 22:10

## 2024-05-16 RX ADMIN — ACETAMINOPHEN 975 MG: 325 TABLET ORAL at 05:43

## 2024-05-16 ASSESSMENT — ACTIVITIES OF DAILY LIVING (ADL)
ADLS_ACUITY_SCORE: 36
ADLS_ACUITY_SCORE: 34
ADLS_ACUITY_SCORE: 37
ADLS_ACUITY_SCORE: 34
ADLS_ACUITY_SCORE: 37
ADLS_ACUITY_SCORE: 36
ADLS_ACUITY_SCORE: 36
ADLS_ACUITY_SCORE: 34
ADLS_ACUITY_SCORE: 37
ADLS_ACUITY_SCORE: 37
ADLS_ACUITY_SCORE: 34
ADLS_ACUITY_SCORE: 34
ADLS_ACUITY_SCORE: 36
ADLS_ACUITY_SCORE: 36
ADLS_ACUITY_SCORE: 34
ADLS_ACUITY_SCORE: 36
ADLS_ACUITY_SCORE: 38
ADLS_ACUITY_SCORE: 34
ADLS_ACUITY_SCORE: 36
ADLS_ACUITY_SCORE: 34
ADLS_ACUITY_SCORE: 34
ADLS_ACUITY_SCORE: 37
ADLS_ACUITY_SCORE: 36

## 2024-05-16 NOTE — PLAN OF CARE
"[facility] - ICU    RN Progress Note:            Pertinent Assessments:      Please refer to flowsheet rows for full assessment     Patient states numbness in left and right leg.  More numbness in left then right.  Pulses are dopplerable.  Strong strength in both extremities with movement.              Key Events - This Shift:       Very anxious when awake.  Focused on chest pain.  Pain increases with breathing exercises or sipping on water.  Patient worried he is having another heart attack.  Rhythm looks WNL.  Discussed frequently re: chest tube associated pain.      1933:  Administered 0.2 mg of IV dilaudid due to 6/10 pain after breathing exercises and pre-med for dressing change.    2032:  Became very anxious prior to activity.  Unable to redirect patient.  Patient complaint of 8/10 chest pain.  10 of oxycodone and 25 of Atarax was given.  Patient was able to dangle and stood bedside with minimal difficulty.  Increased pain and anxiety when laying back down.  Patient requesting medication to \"knock him out\".  Able to redirect patient with conversation.    By 2230 I noticed patient respiration rate was 8-12 breaths a minute.  Started end tidal CO2 monitoring.  ETCO2 in the low 40's when sleeping.    2345:  Having 6/10 chest pain again and unable to get comfortable.  Patient states he take 150 mg of Trazodone at home for insomnia.  Currently medication is on hold.  Contraindicated following MI and can cause hypotension per micromedex.  This was discussed with patient.  I discussed with patient my concern with over sedation and the harm it can cause.  Patient is aware.  Having difficulty getting comfortable.  0.2 mg of dilaudid was give.  Patient was able to rest afterwards, for about a hour.  Continues to have increased pain when awake.  Trying ice pack for comfort.    0150:  Patient awake.  Very anxious regarding chest pain.  I'm still concern at patient appears over medicated.  " Respiration rate continues to be 8-10 when sleeping, ETCO2 in the mid to low 40's.  5 mg of oxycodone was administered, after 5 minutes patient states he was felling better.  Patient does recognize there is an anxiety component to his pain.  Obtained EKG which show NSR with BBB.  Obtained arterial blood gas, pH was 7.33 with CO2 WNL.     0630:  Better pain control since 0200.  Able to wean off phenylephrine. New onset of left hand pain per patient.  Able to get up to chair, steady on feet.  Does not want to advance off of a clear liquid diet.             Barriers to Discharge / Downgrade:     Still on phenylephrine to maintain MAP greater then 65.  Bristol catheter still in place for hemodynamic monitoring.       Alton Ann RN

## 2024-05-16 NOTE — PROGRESS NOTES
Pulmonary/Critical Care  5/16/2024  7:24 AM     Chart reviewed.   Stable night in ICU; progressing with activity though pain and anxiety seem to be a barrier for him.   Our service will sign off.     Doris Anguiano CNP  [facility] Pulmonary/Critical Care

## 2024-05-16 NOTE — PROGRESS NOTES
"CVTS Daily Progress Note   POD#2 s/p CABG x3 (LIMA to LAD, left RA to OM, rSVG to RPDA), endoscopic left radial artery harvest, LLE EVH)  Attending: Doe  LOS: 4    SUBJECTIVE/INTERVAL EVENTS:    No acute events overnight. Patient progressing overall well, although pain and anxiety are significant issues for him and analgesic/anxiolytic medications are causing sedation. Maintaining oxygen saturations on room air at this time. IABP removed POD#1. Normotensive w/ soft BP off pressors, norvasc for radial graft protection. Up to chair this AM. - BM / - flatus. Tolerating clears at this time. UOP adequate. Chest tube output appropriate. Hgb 10.1 this AM, likely dilutional. Patient denies new chest pain, shortness of breath, abdominal pain, calf pain, nausea. Patient inquiring about PTA trazadone for sleep, discussed concerns w/ sedation and overmedication as patient reports being extremely \"loopy\" this AM from pain medications.     OBJECTIVE:  Temp:  [97.9  F (36.6  C)-100.4  F (38  C)] 98.2  F (36.8  C)  Pulse:  [71-84] 71  Resp:  [0-32] 22  BP: (94)/(50) 94/50  MAP:  [60 mmHg-173 mmHg] 69 mmHg  Arterial Line BP: ()/(45-65) 98/52  FiO2 (%):  [30 %-32 %] 32 %  SpO2:  [83 %-100 %] 94 %  Vitals:    05/13/24 0612 05/14/24 0600 05/15/24 0000 05/16/24 0645   Weight: 77.6 kg (171 lb 1.6 oz) 79 kg (174 lb 1.6 oz) 81.4 kg (179 lb 7.3 oz) 83.4 kg (183 lb 14.4 oz)         Clinically Significant Risk Factors          # Hypocalcemia: Lowest Ca = 8.2 mg/dL in last 2 days, will monitor and replace as appropriate     # Hypoalbuminemia: Lowest albumin = 3.2 g/dL at 5/14/2024  5:31 PM, will monitor as appropriate    # Coagulation Defect: INR = 1.23 (Ref range: 0.85 - 1.15) and/or PTT = 39 Seconds (Ref range: 22 - 38 Seconds), will monitor for bleeding  # Thrombocytopenia: Lowest platelets = 128 in last 2 days, will monitor for bleeding   # Hypertension: Noted on problem list       # DMII: A1C = 9.3 % (Ref range: <5.7 %) within past " "6 months, PRESENT ON ADMISSION  # Overweight: Estimated body mass index is 29.68 kg/m  as calculated from the following:    Height as of this encounter: 1.676 m (5' 6\").    Weight as of this encounter: 83.4 kg (183 lb 14.4 oz)., PRESENT ON ADMISSION     # Financial/Environmental Concerns: none   # History of CABG: noted on surgical history           Current Medications:    Scheduled Meds:  Current Facility-Administered Medications   Medication Dose Route Frequency Provider Last Rate Last Admin    acetaminophen (TYLENOL) tablet 975 mg  975 mg Oral Q8H [facility] Shirley Lebron PA-C   975 mg at 05/16/24 0543    [START ON 5/17/2024] amLODIPine (NORVASC) tablet 2.5 mg  2.5 mg Oral Daily Shirley Lebron PA-C        aspirin (ASA) chewable tablet 81 mg  81 mg Oral or NG Tube Daily Shirley Lebron PA-C   81 mg at 05/16/24 0801    atorvastatin (LIPITOR) tablet 80 mg  80 mg Oral QPM Shirley Lebron PA-C   80 mg at 05/15/24 2128    busPIRone (BUSPAR) tablet 10 mg  10 mg Oral Daily Shirley Lebron PA-C   10 mg at 05/16/24 0801    clopidogrel (PLAVIX) tablet 75 mg  75 mg Oral Daily Shirley Lebron PA-C   75 mg at 05/16/24 0802    FLUoxetine (PROzac) capsule 20 mg  20 mg Oral Daily Shirley Lebron PA-C   20 mg at 05/16/24 0801    furosemide (LASIX) injection 20 mg  20 mg Intravenous TID Shirley Lebron PA-C   20 mg at 05/16/24 0814    [Held by provider] glipiZIDE (GLUCOTROL XL) 24 hr tablet 5 mg  5 mg Oral BID Shirley Lebron PA-C        heparin ANTICOAGULANT injection 5,000 Units  5,000 Units Subcutaneous Q8H Shirley Lebron PA-C   5,000 Units at 05/16/24 0332    insulin aspart (NovoLOG) injection (RAPID ACTING)  1-7 Units Subcutaneous TID AC Shirley Lebron PA-C        insulin aspart (NovoLOG) injection (RAPID ACTING)  1-5 Units Subcutaneous At Bedtime Shirley Lebron PA-C        Lidocaine (LIDOCARE) 4 % Patch 1-2 patch  1-2 patch Transdermal Q24H Bernard[place], " Shirley GUERRERO PA-C   2 patch at 05/15/24 1826    metoprolol tartrate (LOPRESSOR) half-tab 12.5 mg  12.5 mg Oral BID Shirley Lebron PA-C        pantoprazole (PROTONIX) 2 mg/mL suspension 40 mg  40 mg Oral or NG Tube QAM AC Shirley Lebron PA-C        Or    pantoprazole (PROTONIX) EC tablet 40 mg  40 mg Oral QAM AC Shirley Lebron PA-C   40 mg at 05/16/24 0701    polyethylene glycol (MIRALAX) Packet 17 g  17 g Oral Daily Shirley Lebron PA-C   17 g at 05/16/24 0803    senna-docusate (SENOKOT-S/PERICOLACE) 8.6-50 MG per tablet 1 tablet  1 tablet Oral BID Shirley Lebron PA-C   1 tablet at 05/16/24 0802    tamsulosin (FLOMAX) capsule 0.4 mg  0.4 mg Oral QPM Shirley Lebron PA-C   0.4 mg at 05/15/24 2129     Continuous Infusions:  Current Facility-Administered Medications   Medication Dose Route Frequency Provider Last Rate Last Admin    EPINEPHrine (ADRENALIN) 5 mg in sodium chloride 0.9 % 250 mL infusion CENTRAL  0.01-0.3 mcg/kg/min Intravenous Continuous Shirley Lebron PA-C   Stopped at 05/15/24 0547    phenylephrine (RICH-SYNEPHRINE) 50 mg in NaCl 0.9 % 250 mL infusion  0.1-6 mcg/kg/min Intravenous Continuous Shirley Lebron PA-C   Stopped at 05/16/24 0603     PRN Meds:.  Current Facility-Administered Medications   Medication Dose Route Frequency Provider Last Rate Last Admin    [START ON 5/17/2024] acetaminophen (TYLENOL) tablet 650 mg  650 mg Oral Q4H PRN Shirley Lebron PA-C        albumin human 5 % injection 12.5 g  12.5 g Intravenous Q1H PRN Shirley Lebron PA-C   12.5 g at 05/15/24 1513    [START ON 5/17/2024] bisacodyl (DULCOLAX) suppository 10 mg  10 mg Rectal Daily PRN Shirley Lebron PA-C        busPIRone (BUSPAR) tablet 10 mg  10 mg Oral Daily PRN Shirley Lebron PA-C   10 mg at 05/14/24 0106    calcium carbonate (TUMS) chewable tablet 1,000 mg  1,000 mg Oral 4x Daily PRN Shirley Lebron PA-C        calcium gluconate 1 g in 50 mL  in sodium chloride intermittent infusion  1 g Intravenous Once PRN Shirley Lebron PA-C   1 g at 05/15/24 0550    calcium gluconate 2 g in  mL intermittent infusion  2 g Intravenous Once PRN Shirley Lebron PA-C        calcium gluconate 3 g in sodium chloride 0.9 % 100 mL intermittent infusion  3 g Intravenous Once PRN Shirley Lebron PA-C        glucose gel 15-30 g  15-30 g Oral Q15 Min PRN Shirley Lebron PA-C        Or    dextrose 50 % injection 25-50 mL  25-50 mL Intravenous Q15 Min PRN Shirley Lebron PA-C        Or    glucagon injection 1 mg  1 mg Subcutaneous Q15 Min PRN Shirley Lebron PA-C        hydrALAZINE (APRESOLINE) injection 10 mg  10 mg Intravenous Q30 Min PRN Shirley Lebron PA-C        HYDROmorphone (DILAUDID) injection 0.1 mg  0.1 mg Intravenous Q2H PRN Shirley Lebron PA-C        Or    HYDROmorphone (DILAUDID) injection 0.2 mg  0.2 mg Intravenous Q2H PRN Shirley Lebron PA-C        hydrOXYzine HCl (ATARAX) tablet 25 mg  25 mg Oral Q6H PRN Shirley Lebron PA-C   25 mg at 05/15/24 2032    Or    hydrOXYzine HCl (ATARAX) tablet 50 mg  50 mg Oral Q6H PRN Shirley Lebron PA-C        magnesium hydroxide (MILK OF MAGNESIA) suspension 30 mL  30 mL Oral Daily PRN Shirley Lebron PA-C        methocarbamol (ROBAXIN) tablet 500 mg  500 mg Oral 4x Daily PRN Shirley Lebron PA-C   500 mg at 05/16/24 0754    naloxone (NARCAN) injection 0.2 mg  0.2 mg Intravenous Q2 Min PRN Shirley Lebron PA-C        Or    naloxone (NARCAN) injection 0.4 mg  0.4 mg Intravenous Q2 Min PRN Shirley Lebron PA-C        Or    naloxone (NARCAN) injection 0.2 mg  0.2 mg Intramuscular Q2 Min PRN Shirley Lebron PA-C        Or    naloxone (NARCAN) injection 0.4 mg  0.4 mg Intramuscular Q2 Min PRN Shirley Lebron PA-C        ondansetron (ZOFRAN ODT) ODT tab 4 mg  4 mg Oral Q6H PRN Shirley Lebron PA-C        Or    ondansetron  (ZOFRAN) injection 4 mg  4 mg Intravenous Q6H PRN Phanie, Shirley K, PA-C   4 mg at 05/16/24 0700    oxyCODONE (ROXICODONE) tablet 5 mg  5 mg Oral Q4H PRN Henshue, Shirley K, PA-C   5 mg at 05/16/24 0548    Or    oxyCODONE (ROXICODONE) tablet 10 mg  10 mg Oral Q4H PRN Rishabhhue, Shirley K, PA-C   10 mg at 05/15/24 2032    prochlorperazine (COMPAZINE) injection 10 mg  10 mg Intravenous Q6H PRN Henshue, Shirley K, PA-C        Or    prochlorperazine (COMPAZINE) tablet 10 mg  10 mg Oral Q6H PRN Rishabhhue, Shirley K, PA-C           Cardiographics:    Telemetry monitoring demonstrates NSR with rates in the 70s per my personal review.    Imaging:  Results for orders placed or performed during the hospital encounter of 05/12/24   US Carotid Bilateral    Impression    IMPRESSION:  1.  Mild plaque formation, velocities consistent with less than 50% stenosis in the right internal carotid artery.  2.  Mild plaque formation, velocities consistent with less than 50% stenosis in the left internal carotid artery.  3.  Flow within the vertebral arteries is antegrade.   XR Chest Port 1 View    Impression    IMPRESSION: Poststernotomy and coronary artery bypass grafting. Mediastinal and bilateral pleural drains remain in position. Endotracheal tube in satisfactory position terminating 6.8 cm above the marie. Right internal jugular venous [place]-Jasmin catheter   tip in the right ventricular outflow tract/main pulmonary artery. Radiopaque marker for the intra-aortic balloon pump is located in the proximal descending thoracic aorta. Mild left basilar atelectasis. No significant pleural fluid. No visible   pneumothorax. Stable cardiac silhouette. Normal pulmonary vascularity..   XR Chest Port 1 View    Impression    IMPRESSION: Endotracheal tube resides over the mid tracheal air column. Right IJ [place]-Jasmin catheter with the tip over the main pulmonary artery. Bilateral chest tubes and mediastinal drain. No pneumothorax. Linear  atelectasis right lung. New sternotomy   wires and surgical clips. Osseous structures otherwise intact.   Echocardiogram Complete   Result Value Ref Range    LVEF  45-50% (mildly reduced)        Labs, personally reviewed.  Hemoglobin   Date Value Ref Range Status   05/16/2024 10.1 (L) 13.3 - 17.7 g/dL Final   05/15/2024 12.3 (L) 13.3 - 17.7 g/dL Final   05/14/2024 12.3 (L) 13.3 - 17.7 g/dL Final     Hemoglobin POCT   Date Value Ref Range Status   05/14/2024 12.2 (L) 13.3 - 17.7 g/dL Final   05/14/2024 11.2 (L) 13.3 - 17.7 g/dL Final   05/14/2024 10.7 (L) 13.3 - 17.7 g/dL Final     WBC Count   Date Value Ref Range Status   05/16/2024 11.9 (H) 4.0 - 11.0 10e3/uL Final   05/15/2024 11.4 (H) 4.0 - 11.0 10e3/uL Final   05/14/2024 15.6 (H) 4.0 - 11.0 10e3/uL Final     Platelet Count   Date Value Ref Range Status   05/16/2024 128 (L) 150 - 450 10e3/uL Final   05/15/2024 158 150 - 450 10e3/uL Final   05/14/2024 160 150 - 450 10e3/uL Final     Creatinine   Date Value Ref Range Status   05/16/2024 1.14 0.67 - 1.17 mg/dL Final   05/15/2024 1.42 (H) 0.67 - 1.17 mg/dL Final   05/14/2024 1.14 0.67 - 1.17 mg/dL Final     Potassium   Date Value Ref Range Status   05/16/2024 4.5 3.4 - 5.3 mmol/L Final   05/15/2024 4.5 3.4 - 5.3 mmol/L Final   05/14/2024 4.0 3.4 - 5.3 mmol/L Final   05/14/2024 4.0 3.4 - 5.3 mmol/L Final   02/18/2023 3.8 3.5 - 5.0 mmol/L Final   02/17/2023 4.0 3.5 - 5.0 mmol/L Final   02/16/2023 4.3 3.5 - 5.0 mmol/L Final     Potassium POCT   Date Value Ref Range Status   05/14/2024 3.6 3.4 - 5.3 mmol/L Final   05/14/2024 4.1 3.4 - 5.3 mmol/L Final   05/14/2024 5.1 3.4 - 5.3 mmol/L Final     Magnesium   Date Value Ref Range Status   05/16/2024 2.0 1.7 - 2.3 mg/dL Final   05/15/2024 1.9 1.7 - 2.3 mg/dL Final   05/14/2024 2.5 (H) 1.7 - 2.3 mg/dL Final          I/O:  I/O last 3 completed shifts:  In: 2213.44 [I.V.:1713.44]  Out: 1970 [Urine:1490; Chest Tube:480]       Physical Exam:    General: Patient seen up in chair  this AM. Mildly disoriented d/t pain medications. NAD. Conversant. Pleasant.   HEENT: REBEKA, no sclera icterus, moist mucosa  CV: RRR on monitor. 2+ peripheral pulses in all extremities. Mild edema.   Pulm: Non-labored effort on room air at this time. Chest tubes in place, serosanguinous output, no air leak. Incision C/D/I.  Abd: Soft, NT, ND  : Still removed.  Ext: Mild pedal edema, SCDs in place, warm, distal pulses intact. IABP site C/D/I w/o hematoma, ecchymosis, or edema.  Neuro: BAZAN, follows commands      ASSESSMENT/PLAN:    Drew Monsivais is a 53 year old male with a history of CAD, MARY JANE, HTN, HLD, anxiety, and DM2 who is s/p CABG x3 w/ endoscopic radial harvest.    Active Problems:    NSTEMI (non-ST elevated myocardial infarction) (H)  CAD  HTN  HLD  Type 2 DM  Acute Respiratory Failure  Acute on chronic anxiety      NEURO:   - Scheduled Tylenol/lidocaine patches and PRN Tylenol/oxycodone/dilaudid for pain  - PRN robaxin  - No toradol given intermittent Cr bumps  - Dichotomy of agitated/anxious and somnolent w/ med admin.   - Minimize narcotics as able  - Narcan available PRN  - PTA prozac, 20mg qd,buspar 10 mg qd, and 10 mg qd PRN given no OG access  - Holding PA trazodone given drowsiness at this time      CV:   - Pre-op EF 45-50%  - IABP removed POD#1  - Normotensive  - PTA lisinopril discontinued  - Metoprolol 12.5mg two times a day to start at 1200  - Norvasc decreased to 2.5 mg qday  - ASA 81 mg (plavix)  - Plavix per surgeon preference for graft patency in s/o NSTEMI (ASA)  - Atorvastatin 80mg daily   - Chest tubes/TPW to remain today    PULM:   - Extubated POD#1  - Maintaining oxygen saturations on room air to 2L NC at this time, wean as able  - Encourage pulmonary toilet    FEN/GI:  - Continue electrolyte replacement protocol  - Diet: ADAT to Cardiac, <2g Na+, Consistent Carb - clears currently  - Bowel regimen, LBM preop  - Lactic acidosis, resolved.    RENAL:  - Adequate UOP/hr. Continue to  monitor closely.  - Cr 1.14 this AM, monitor w/ daily BMP  - Still removed this AM.  - Diuresis w/ 25g 25% albumin + 20 mg IV lasix TID today  - PTA flomax    HEME:  - Acute blood loss anemia post-op.   - Hgb 10.1 this AM, likely dilutional, no bleeding concerns. Hep SQ, ASA    ID:  - Jill op ppx complete, febrile overnight. No concerns for infection at this time. Will monitor.   - Leukocytosis, likely reactive. Improving. WBC stable.  - Monitor w/ CBC tomorrow    ENDO:   - HbA1c 9.3%  - BG goal < 180 to promote optimal healing  - Medium SSI  - Holding PTA glipizide at this time, plan to resume POD#3 pending renal function  - Will likely need insulin at discharge to maintain BG goal. Will discuss w/ nursing to have patient receive education and practice taking BG and administering insulin while inpt    PPx:   - DVT: SCDs, SQ heparin TID, ambulation   - GI: Protonix 40mg PO daily (PTA med)    DISPO:   - Continue critical care in ICU this AM, anticipate transfer OOU this PM pending hemodynamic stability off pressors w/ diuresis  - PT/OT recs at discharge pending  Medically Ready for Discharge: Anticipated in 2-4 Days      Patient discussed with Dr. White and Dr. Azar.        Christiana Lebron PA-C  [facility]  Pager: [phone]  May 16, 2024

## 2024-05-16 NOTE — PROGRESS NOTES
RCAT Treatment Plan    Patient Score: 10  Patient Acuity: 4    Clinical Indication for Therapy: prevent atelectasis    Therapy Ordered: QID Flutter valve and IS     Assessment Summary: Pt on 2L NC with SpO2 >92%, BS clear diminished. Pt has weak cough due to pain. Pt did flutter valve fine and needs encouragement.   Rt will continue to follow.    Medina Keyes, RT  5/15/2024

## 2024-05-16 NOTE — PLAN OF CARE
Goal Outcome Evaluation:  [facility] - ICU    RN Progress Note:            Pertinent Assessments:      Please refer to flowsheet rows for full assessment     Beginning of shift very confused, diaphoretic, crawiling out of bed. Complains of severe pain. Robaxin suggested by PA which made patient more confused. Pain meds were decreased and dose of Albumin Prn give and pt improved mental status throughout day. LS Clear, diminished. NC 2L Had to straight cath with coudee for 600 at 1400. Pt has had very little to drink.            Key Events - This Shift:     PA told nursing that patient will go home on insulin. Unable to teach anything related to Diabetes as he was confused for most of the shift.            Barriers to Discharge / Downgrade:     Has [place] in for continued monitoring.

## 2024-05-16 NOTE — PROGRESS NOTES
Cardiac rehab      05/16/24 1030   Appointment Info   Signing Clinician's Name / Credentials (OT) Debbie Mcintosh OTR/L   Living Environment   People in Home child(tania), dependent  (15 y.o son)   [facility] ([facility] comments[facility]   Home Accessibility no concerns   Transportation Anticipated family or friend will provide   Living Environment Comments Pt very confused throughout session unable to give PLOF info received from chart review- pt does state he lives w/ 15 y.o son   Self-Care   Usual Activity Tolerance good   Current Activity Tolerance fair   Regular Exercise No   Equipment Currently Used at Home none   Fall history within last six months no   Activity/Exercise/Self-Care Comment Per chart review pt is Ind. w/ ADL tasks   Instrumental Activities of Daily Living (IADL)   IADL Comments Pt works full time per chart review   General Information   Onset of Illness/Injury or Date of Surgery 05/12/24   Referring Physician Tuyet Azar MD   Additional Occupational Profile Info/Pertinent History of Current Problem POD#2 s/p CABG x3 (LIMA to LAD, left RA to OM, rSVG to RPDA), endoscopic left radial artery harvest, LLE EVH)   Existing Precautions/Restrictions cardiac;fall;sternal  (chest tubes/art line)   Cognitive Status Examination   Orientation Status person   Affect/Mental Status (Cognitive) confused   Follows Commands 0-24% accuracy;physical/tactile prompts required;repetition of directions required;verbal cues/prompting required   Safety Deficit safety precautions awareness;insight into deficits/self-awareness   Memory Deficit moderate deficit   Range of Motion Comprehensive   Comment, General Range of Motion NT d/t sternal prec.   Bed Mobility   Bed Mobility sit-supine   Sit-Supine [place] (Bed Mobility) minimum assist (75% patient effort);2 person assist   Transfers   Transfers sit-stand transfer   Sit-Stand Transfer   Sit-Stand [place] (Transfers) minimum assist (75% patient  effort);contact guard   Balance   Balance Assessment standing dynamic balance   Standing Balance: Dynamic contact guard;verbal cues;supervision   Activities of Daily Living   BADL Assessment/Intervention lower body dressing   Lower Body Dressing Assessment/Training   [place] Level (Lower Body Dressing) socks;moderate assist (50% patient effort)   Clinical Impression   Criteria for Skilled Therapeutic Interventions Met (OT) Yes, treatment indicated   OT Diagnosis decreased  ADL ind.   OT Problem List-Impairments impacting ADL problems related to;activity tolerance impaired;balance;cognition;post-surgical precautions   Assessment of Occupational Performance 1-3 Performance Deficits   Identified Performance Deficits decreased ADL ind.   Planned Therapy Interventions (OT) ADL retraining;transfer training;strengthening;cognition;balance training   Clinical Decision Making Complexity (OT) problem focused assessment/low complexity   Risk & Benefits of therapy have been explained evaluation/treatment results reviewed;patient   OT Total Evaluation Time   OT Eval, Low Complexity Minutes (17065) 10   OT Goals   Therapy Frequency (OT) 2 times/day   OT Predicted Duration/Target Date for Goal Attainment 05/23/24   OT Goals Cardiac Phase 1   OT: Understanding of cardiac education to maximize quality of life, condition management, and health outcomes Patient;Demonstrate;Verbalize   OT: Perform aerobic activity with stable cardiovascular response continuous;15 minutes;ambulation   OT: Functional/aerobic ambulation tolerance with stable cardiovascular response in order to return to home and community environment Modified independent;Greater than 300 feet   OT: Navigation of stairs simulating home set up with stable cardiovascular response in order to return to home and community environment Modified independent;Greater than 10 stairs   Interventions   Interventions Quick Adds Cardiac Rehab   Self-Care/Home Management    Self-Care/Home Mgmt/ADL, Compensatory, Meal Prep Minutes (41867) 10   Symptoms Noted During/After Treatment (Meal Preparation/Planning Training) fatigue   Treatment Detail/Skilled Intervention Pt intiated ed. on sternal prec and required freq. cues d/t decreased overall safety awareness as pt wanting to push w/ BUE. mult. STSx3 w/ CGA/MIn.A no LOB noted,, pt ambulated short distance w/ CGA cues for sequencing/intiating routine.   Therapeutic Procedures/Exercise   Therapeutic Procedure: strength, endurance, ROM, flexibillity minutes (80291) 8   Treatment Detail/Skilled Intervention LE cals   Treatment Time Includes (CR Only) See specific exercise details intervention group(s)   Calisthenics   Type Hip Abductors;Hip Flexor: kicks;March in place;Knee Flexion;Knee Bends  (ankle/heal pumps)   Symptoms Weakness   Cardiovascular Response Normal   Exercise Details all sets x 10 reps   Vital Signs Details pre/post WFL art line in BP in 115s/60s throughout   Cardiac Education   Education Provided Precautions   Education Packet Given to Patient No   All Patient Education Handouts Reviewed with Patient and/or Family No   Cardiac Rehab Phase II Plan   Phase II Order Received Yes   Phase II Appointment Status Scheduled   Date/Time 5/24/24   Location [facility]   OT Discharge Planning   OT Plan progress ambulation w/ device once lines out   OT Discharge Recommendation (DC Rec) home with assist;home with outpatient cardiac rehab   OT Rationale for DC Rec Pt moving well w/ CGA- limited d/t mult. lines/tubes; pt normally Ind. w/ ADL/IADL tasks at home pending progress w/ mobility/overall safety awareness pt may benefit from TCU. Pt does report he lives w/ teenage son- but stated he may be able to have sister come stay.   Total Session Time   Timed Code Treatment Minutes 18   Total Session Time (sum of timed and untimed services) 28

## 2024-05-16 NOTE — PROGRESS NOTES
Care Management Follow Up    Length of Stay (days): 4    Expected Discharge Date: 05/18/2024     Concerns to be Addressed:  medical progression     Patient plan of care discussed at interdisciplinary rounds: Yes    Anticipated Discharge Disposition: TBD      Anticipated Discharge Services:    Anticipated Discharge DME:      Patient/family educated on Medicare website which has current facility and service quality ratings:    Education Provided on the Discharge Plan:    Patient/Family in Agreement with the Plan:      Referrals Placed by CM/SW:  none at this time  Private pay costs discussed: Not applicable    Additional Information:  Social history:   Pt lives alone in a townhouse with shared custody of his son. Works full time. Independent with ADLs and IADLs. Sister to transport at discharge. Sister lives in [place] and is here visiting.     Plan of care discussed in ICU rounds: pt is POD#2 CABGx3, extubated 5/15.    RNCM to follow for medical progression, recommendations, and final discharge plan.    Kandy Bridges RN

## 2024-05-17 ENCOUNTER — APPOINTMENT (OUTPATIENT)
Dept: OCCUPATIONAL THERAPY | Facility: HOSPITAL | Age: 54
DRG: 233 | End: 2024-05-17
Attending: INTERNAL MEDICINE
Payer: COMMERCIAL

## 2024-05-17 LAB
ANION GAP SERPL CALCULATED.3IONS-SCNC: 7 MMOL/L (ref 7–15)
BUN SERPL-MCNC: 18.1 MG/DL (ref 6–20)
CA-I BLD-MCNC: 4.6 MG/DL (ref 4.4–5.2)
CALCIUM SERPL-MCNC: 8.5 MG/DL (ref 8.6–10)
CHLORIDE SERPL-SCNC: 100 MMOL/L (ref 98–107)
CREAT SERPL-MCNC: 1.4 MG/DL (ref 0.67–1.17)
DEPRECATED HCO3 PLAS-SCNC: 30 MMOL/L (ref 22–29)
EGFRCR SERPLBLD CKD-EPI 2021: 59 ML/MIN/1.73M2
ERYTHROCYTE [DISTWIDTH] IN BLOOD BY AUTOMATED COUNT: 12.2 % (ref 10–15)
GLUCOSE BLDC GLUCOMTR-MCNC: 161 MG/DL (ref 70–99)
GLUCOSE BLDC GLUCOMTR-MCNC: 211 MG/DL (ref 70–99)
GLUCOSE BLDC GLUCOMTR-MCNC: 213 MG/DL (ref 70–99)
GLUCOSE BLDC GLUCOMTR-MCNC: 231 MG/DL (ref 70–99)
GLUCOSE BLDC GLUCOMTR-MCNC: 277 MG/DL (ref 70–99)
GLUCOSE SERPL-MCNC: 157 MG/DL (ref 70–99)
HCT VFR BLD AUTO: 27 % (ref 40–53)
HGB BLD-MCNC: 9 G/DL (ref 13.3–17.7)
MAGNESIUM SERPL-MCNC: 2 MG/DL (ref 1.7–2.3)
MCH RBC QN AUTO: 28.4 PG (ref 26.5–33)
MCHC RBC AUTO-ENTMCNC: 33.3 G/DL (ref 31.5–36.5)
MCV RBC AUTO: 85 FL (ref 78–100)
PHOSPHATE SERPL-MCNC: 3.4 MG/DL (ref 2.5–4.5)
PLATELET # BLD AUTO: 114 10E3/UL (ref 150–450)
POTASSIUM SERPL-SCNC: 4.6 MMOL/L (ref 3.4–5.3)
RBC # BLD AUTO: 3.17 10E6/UL (ref 4.4–5.9)
SODIUM SERPL-SCNC: 137 MMOL/L (ref 135–145)
WBC # BLD AUTO: 7.5 10E3/UL (ref 4–11)

## 2024-05-17 PROCEDURE — 82330 ASSAY OF CALCIUM: CPT

## 2024-05-17 PROCEDURE — 120N000013 HC R&B IMCU

## 2024-05-17 PROCEDURE — 250N000013 HC RX MED GY IP 250 OP 250 PS 637

## 2024-05-17 PROCEDURE — 250N000011 HC RX IP 250 OP 636: Performed by: STUDENT IN AN ORGANIZED HEALTH CARE EDUCATION/TRAINING PROGRAM

## 2024-05-17 PROCEDURE — 84100 ASSAY OF PHOSPHORUS: CPT

## 2024-05-17 PROCEDURE — 97535 SELF CARE MNGMENT TRAINING: CPT | Mod: GO

## 2024-05-17 PROCEDURE — 250N000011 HC RX IP 250 OP 636

## 2024-05-17 PROCEDURE — 36415 COLL VENOUS BLD VENIPUNCTURE: CPT

## 2024-05-17 PROCEDURE — 85027 COMPLETE CBC AUTOMATED: CPT

## 2024-05-17 PROCEDURE — P9045 ALBUMIN (HUMAN), 5%, 250 ML: HCPCS | Mod: JZ | Performed by: STUDENT IN AN ORGANIZED HEALTH CARE EDUCATION/TRAINING PROGRAM

## 2024-05-17 PROCEDURE — 83735 ASSAY OF MAGNESIUM: CPT

## 2024-05-17 PROCEDURE — 97110 THERAPEUTIC EXERCISES: CPT | Mod: GO

## 2024-05-17 PROCEDURE — 80048 BASIC METABOLIC PNL TOTAL CA: CPT

## 2024-05-17 PROCEDURE — 250N000011 HC RX IP 250 OP 636: Mod: JZ | Performed by: STUDENT IN AN ORGANIZED HEALTH CARE EDUCATION/TRAINING PROGRAM

## 2024-05-17 RX ORDER — FUROSEMIDE 10 MG/ML
20 INJECTION INTRAMUSCULAR; INTRAVENOUS EVERY 6 HOURS
Status: DISCONTINUED | OUTPATIENT
Start: 2024-05-17 | End: 2024-05-17

## 2024-05-17 RX ORDER — FUROSEMIDE 10 MG/ML
20 INJECTION INTRAMUSCULAR; INTRAVENOUS EVERY 6 HOURS
Status: DISCONTINUED | OUTPATIENT
Start: 2024-05-17 | End: 2024-05-19

## 2024-05-17 RX ORDER — MAGNESIUM SULFATE HEPTAHYDRATE 40 MG/ML
2 INJECTION, SOLUTION INTRAVENOUS ONCE
Status: COMPLETED | OUTPATIENT
Start: 2024-05-17 | End: 2024-05-17

## 2024-05-17 RX ORDER — METOPROLOL TARTRATE 25 MG/1
25 TABLET, FILM COATED ORAL 2 TIMES DAILY
Status: DISCONTINUED | OUTPATIENT
Start: 2024-05-17 | End: 2024-05-21 | Stop reason: HOSPADM

## 2024-05-17 RX ORDER — FUROSEMIDE 10 MG/ML
20 INJECTION INTRAMUSCULAR; INTRAVENOUS 3 TIMES DAILY
Status: DISCONTINUED | OUTPATIENT
Start: 2024-05-17 | End: 2024-05-17

## 2024-05-17 RX ADMIN — CALCIUM CARBONATE (ANTACID) CHEW TAB 500 MG 1000 MG: 500 CHEW TAB at 23:36

## 2024-05-17 RX ADMIN — BUSPIRONE HYDROCHLORIDE 10 MG: 10 TABLET ORAL at 17:37

## 2024-05-17 RX ADMIN — ALBUMIN HUMAN 25 G: 0.05 INJECTION, SOLUTION INTRAVENOUS at 17:38

## 2024-05-17 RX ADMIN — FUROSEMIDE 40 MG: 10 INJECTION, SOLUTION INTRAMUSCULAR; INTRAVENOUS at 07:01

## 2024-05-17 RX ADMIN — OXYCODONE HYDROCHLORIDE 5 MG: 5 TABLET ORAL at 22:25

## 2024-05-17 RX ADMIN — CLOPIDOGREL BISULFATE 75 MG: 75 TABLET ORAL at 08:17

## 2024-05-17 RX ADMIN — OXYCODONE HYDROCHLORIDE 5 MG: 5 TABLET ORAL at 08:18

## 2024-05-17 RX ADMIN — METOPROLOL TARTRATE 25 MG: 25 TABLET, FILM COATED ORAL at 08:18

## 2024-05-17 RX ADMIN — OXYCODONE HYDROCHLORIDE 5 MG: 5 TABLET ORAL at 04:25

## 2024-05-17 RX ADMIN — HEPARIN SODIUM 5000 UNITS: 10000 INJECTION, SOLUTION INTRAVENOUS; SUBCUTANEOUS at 20:52

## 2024-05-17 RX ADMIN — METHOCARBAMOL 500 MG: 500 TABLET, FILM COATED ORAL at 23:45

## 2024-05-17 RX ADMIN — ONDANSETRON 4 MG: 2 INJECTION INTRAMUSCULAR; INTRAVENOUS at 14:49

## 2024-05-17 RX ADMIN — OXYCODONE HYDROCHLORIDE 5 MG: 5 TABLET ORAL at 14:47

## 2024-05-17 RX ADMIN — HYDROXYZINE HYDROCHLORIDE 25 MG: 25 TABLET ORAL at 22:25

## 2024-05-17 RX ADMIN — ASPIRIN 81 MG CHEWABLE TABLET 81 MG: 81 TABLET CHEWABLE at 08:21

## 2024-05-17 RX ADMIN — ONDANSETRON 4 MG: 4 TABLET, ORALLY DISINTEGRATING ORAL at 04:55

## 2024-05-17 RX ADMIN — PANTOPRAZOLE SODIUM 40 MG: 40 TABLET, DELAYED RELEASE ORAL at 07:01

## 2024-05-17 RX ADMIN — GLIPIZIDE 5 MG: 5 TABLET, FILM COATED, EXTENDED RELEASE ORAL at 20:53

## 2024-05-17 RX ADMIN — MAGNESIUM SULFATE HEPTAHYDRATE 2 G: 40 INJECTION, SOLUTION INTRAVENOUS at 05:49

## 2024-05-17 RX ADMIN — SENNOSIDES AND DOCUSATE SODIUM 1 TABLET: 8.6; 5 TABLET ORAL at 20:52

## 2024-05-17 RX ADMIN — FUROSEMIDE 20 MG: 10 INJECTION, SOLUTION INTRAMUSCULAR; INTRAVENOUS at 12:56

## 2024-05-17 RX ADMIN — AMLODIPINE BESYLATE 2.5 MG: 2.5 TABLET ORAL at 08:22

## 2024-05-17 RX ADMIN — METHOCARBAMOL 500 MG: 500 TABLET, FILM COATED ORAL at 15:38

## 2024-05-17 RX ADMIN — LIDOCAINE 1 PATCH: 4 PATCH TOPICAL at 17:40

## 2024-05-17 RX ADMIN — ONDANSETRON 4 MG: 4 TABLET, ORALLY DISINTEGRATING ORAL at 16:35

## 2024-05-17 RX ADMIN — INSULIN ASPART 3 UNITS: 100 INJECTION, SOLUTION INTRAVENOUS; SUBCUTANEOUS at 16:19

## 2024-05-17 RX ADMIN — INSULIN ASPART 1 UNITS: 100 INJECTION, SOLUTION INTRAVENOUS; SUBCUTANEOUS at 08:18

## 2024-05-17 RX ADMIN — FLUOXETINE HYDROCHLORIDE 20 MG: 20 CAPSULE ORAL at 08:17

## 2024-05-17 RX ADMIN — ONDANSETRON 4 MG: 4 TABLET, ORALLY DISINTEGRATING ORAL at 22:30

## 2024-05-17 RX ADMIN — GLIPIZIDE 5 MG: 5 TABLET, FILM COATED, EXTENDED RELEASE ORAL at 11:23

## 2024-05-17 RX ADMIN — ATORVASTATIN CALCIUM 80 MG: 40 TABLET, FILM COATED ORAL at 20:52

## 2024-05-17 RX ADMIN — BUSPIRONE HYDROCHLORIDE 10 MG: 10 TABLET ORAL at 08:22

## 2024-05-17 RX ADMIN — HEPARIN SODIUM 5000 UNITS: 10000 INJECTION, SOLUTION INTRAVENOUS; SUBCUTANEOUS at 04:55

## 2024-05-17 RX ADMIN — TAMSULOSIN HYDROCHLORIDE 0.4 MG: 0.4 CAPSULE ORAL at 20:52

## 2024-05-17 RX ADMIN — HEPARIN SODIUM 5000 UNITS: 10000 INJECTION, SOLUTION INTRAVENOUS; SUBCUTANEOUS at 11:24

## 2024-05-17 RX ADMIN — ACETAMINOPHEN 975 MG: 325 TABLET ORAL at 05:49

## 2024-05-17 RX ADMIN — METOPROLOL TARTRATE 12.5 MG: 25 TABLET, FILM COATED ORAL at 12:56

## 2024-05-17 RX ADMIN — POLYETHYLENE GLYCOL 3350 17 G: 17 POWDER, FOR SOLUTION ORAL at 08:22

## 2024-05-17 RX ADMIN — SENNOSIDES AND DOCUSATE SODIUM 1 TABLET: 8.6; 5 TABLET ORAL at 08:21

## 2024-05-17 RX ADMIN — INSULIN ASPART 2 UNITS: 100 INJECTION, SOLUTION INTRAVENOUS; SUBCUTANEOUS at 11:39

## 2024-05-17 RX ADMIN — ACETAMINOPHEN 975 MG: 325 TABLET ORAL at 12:56

## 2024-05-17 ASSESSMENT — ACTIVITIES OF DAILY LIVING (ADL)
ADLS_ACUITY_SCORE: 36
ADLS_ACUITY_SCORE: 38
ADLS_ACUITY_SCORE: 36
ADLS_ACUITY_SCORE: 37
ADLS_ACUITY_SCORE: 37
ADLS_ACUITY_SCORE: 38
ADLS_ACUITY_SCORE: 36
ADLS_ACUITY_SCORE: 38
ADLS_ACUITY_SCORE: 36
ADLS_ACUITY_SCORE: 36
ADLS_ACUITY_SCORE: 37
ADLS_ACUITY_SCORE: 36
ADLS_ACUITY_SCORE: 38
ADLS_ACUITY_SCORE: 37
ADLS_ACUITY_SCORE: 38
ADLS_ACUITY_SCORE: 38
ADLS_ACUITY_SCORE: 37
ADLS_ACUITY_SCORE: 36
ADLS_ACUITY_SCORE: 37
ADLS_ACUITY_SCORE: 38

## 2024-05-17 NOTE — DISCHARGE SUMMARY
Cardiovascular Surgery Discharge Summary    Primary Care Physician:  Per [facility]  Discharge Provider: Shirley Lebron PA-C   Admission Date: 5/12/2024  Admission Diagnoses: NSTEMI  NSTEMI (non-ST elevated myocardial infarction) (H)  Discharge Date: 5/21/2024  Disposition: Home   Condition at Discharge: Good  Code Status: Full Code     Principal Diagnosis:   NSTEMI (non-ST elevated myocardial infarction) (H) s/p CABG x3 (LIMA to LAD, left RA to OM, rSVG to RPDA), endoscopic left radial artery harvest, LLE EVH)     Discharge Diagnoses:    Active Problems:      Patient Active Problem List   Diagnosis    Depression with anxiety    Proctalgia    Dyslipidemia    Benign neoplasm of rectum    Hypertension    Type 2 diabetes mellitus (H)    Fatty liver    Male erectile disorder    Dysphagia    Stomach burning    Intractable pain    Intractable nausea and vomiting    Acute pancreatitis    Hypomagnesemia    Increased glucose level    Diffuse abdominal pain    Nausea and vomiting, unspecified vomiting type    Gastroesophageal reflux disease with esophagitis    NSTEMI (non-ST elevated myocardial infarction) (H)    Erosive esophagitis    Nephrolithiasis    Ulcer of esophagus   Moderate Protein-Calorie Malnutrition, dietary consult as below.     Consult/s: Dietary, critical care medicine, cardiology, urology    Surgery:   5/14/2024 with Dr. Azar  PROCEDURES PERFORMED:    1) Median sternotomy  2) Left internal mammary artery harvest  3) Endoscopic harvest of left radial artery and left saphenous vein   4) Epiaortic ultrasound of the ascending aorta  5) Placement on central cardiopulmonary bypass  6) Coronary artery bypass grafting x3 (in-situ left internal mammary artery to left anterior descending, left radial artery to obtuse marginal, and reverse saphenous vein graft to right posterior descending)   7) Placement of temporary ventricular pacing wires  8) Transesophageal echocardiography      FINDINGS:  Diagonal coronary artery was too small to bypass. Vein was large but adequate for use. Heart function improved to normal after revascularization with IABP in place.       Discharge Medications:      Review of your medicines        START taking        Dose / Directions   acetaminophen 325 MG tablet  Commonly known as: TYLENOL  Used for: S/P CABG (coronary artery bypass graft)      Dose: 650 mg  Take 2 tablets (650 mg) by mouth every 6 hours as needed for mild pain  Quantity: 60 tablet  Refills: 0     Alcohol Swabs Pads  Used for: S/P CABG (coronary artery bypass graft)      Use to swab the area of the injection or марина as directed Per insurance coverage  Quantity: 200 each  Refills: 1     aspirin 81 MG chewable tablet  Commonly known as: ASA  Used for: S/P CABG (coronary artery bypass graft)      Dose: 81 mg  Start taking on: May 22, 2024  1 tablet (81 mg) by Oral or NG Tube route daily  Quantity: 90 tablet  Refills: 3     blood glucose calibration solution  Commonly known as: NO BRAND SPECIFIED  Used for: S/P CABG (coronary artery bypass graft)      Used to calibrate the blood glucose monitor as needed and as directed.  To accompany  blood glucose brands per insurance coverage  Quantity: 1 each  Refills: 0     blood glucose lancets standard  Commonly known as: NO BRAND SPECIFIED  Used for: S/P CABG (coronary artery bypass graft)      To use to test glucose level in the blood Use to test blood sugar  3  times daily as directed. To accompany glucose monitor brands per insurance coverage.  Quantity: 150 each  Refills: 0     blood glucose monitoring meter device kit  Commonly known as: NO BRAND SPECIFIED  Used for: S/P CABG (coronary artery bypass graft)      Use as directed Per insurance coverage  Quantity: 1 kit  Refills: 0     blood glucose test strip  Commonly known as: NO BRAND SPECIFIED  Used for: S/P CABG (coronary artery bypass graft)      To use to test glucose level in the blood. Use to test  blood sugar  3 times daily as directed. To accompany glucose monitor brands per insurance coverage.  Quantity: 150 strip  Refills: 0     clopidogrel 75 MG tablet  Commonly known as: PLAVIX  Used for: S/P CABG (coronary artery bypass graft)      Dose: 75 mg  Start taking on: May 22, 2024  Take 1 tablet (75 mg) by mouth daily  Quantity: 90 tablet  Refills: 3     glucose 4 g chewable tablet  Commonly known as: BD GLUCOSE  Used for: S/P CABG (coronary artery bypass graft)      Dose: 4 tablet  Take 4 tablets by mouth every 15 minutes as needed for low blood sugar  Quantity: 50 tablet  Refills: 0     Insulin Aspart FlexPen 100 UNIT/ML Sopn  Used for: S/P CABG (coronary artery bypass graft)      Dose: 1-10 Units  Inject 1-10 Units Subcutaneous 3 times daily (with meals)  Quantity: 15 mL  Refills: 0     insulin pen needle 32G X 4 MM miscellaneous  Commonly known as: 32G X 4 MM  Used for: S/P CABG (coronary artery bypass graft)      Use as directed by provider Per insurance coverage  Quantity: 150 each  Refills: 0     Lidocaine 4 % Patch  Commonly known as: LIDOCARE  Used for: S/P CABG (coronary artery bypass graft)      Dose: 1-2 patch  Place 1-2 patches onto the skin every 24 hours To prevent lidocaine toxicity, patient should be patch free for 12 hrs daily.  Quantity: 10 patch  Refills: 0     metoprolol tartrate 25 MG tablet  Commonly known as: LOPRESSOR  Used for: S/P CABG (coronary artery bypass graft)      Dose: 25 mg  Take 1 tablet (25 mg) by mouth 2 times daily  Quantity: 180 tablet  Refills: 3     ondansetron 4 MG ODT tab  Commonly known as: ZOFRAN ODT  Used for: S/P CABG (coronary artery bypass graft)      Dose: 4 mg  Take 1 tablet (4 mg) by mouth every 6 hours as needed for nausea or vomiting  Quantity: 10 tablet  Refills: 0     oxyCODONE 5 MG tablet  Commonly known as: ROXICODONE  Used for: S/P CABG (coronary artery bypass graft)      Dose: 2.5-5 mg  Take 0.5-1 tablets (2.5-5 mg) by mouth every 6 hours as needed  for moderate to severe pain  Quantity: 12 tablet  Refills: 0     polyethylene glycol 17 GM/Dose powder  Commonly known as: MIRALAX  Used for: S/P CABG (coronary artery bypass graft)      Dose: 17 g  Take 17 g by mouth daily  Quantity: 510 g  Refills: 0     Sharps Container Misc  Used for: S/P CABG (coronary artery bypass graft)      Use as directed to dispose of needles, lancets and other sharps  Quantity: 1 each  Refills: 1            CONTINUE these medicines which may have CHANGED, or have new prescriptions. If we are uncertain of the size of tablets/capsules you have at home, strength may be listed as something that might have changed.        Dose / Directions   amLODIPine 2.5 MG tablet  Commonly known as: NORVASC  This may have changed:   medication strength  how much to take  Used for: S/P CABG (coronary artery bypass graft)      Dose: 2.5 mg  Start taking on: May 22, 2024  Take 1 tablet (2.5 mg) by mouth daily  Quantity: 90 tablet  Refills: 3     atorvastatin 80 MG tablet  Commonly known as: LIPITOR  This may have changed:   medication strength  how much to take  when to take this  Used for: S/P CABG (coronary artery bypass graft)      Dose: 80 mg  Take 1 tablet (80 mg) by mouth every evening  Quantity: 90 tablet  Refills: 3            CONTINUE these medicines which have NOT CHANGED        Dose / Directions   * busPIRone 10 MG tablet  Commonly known as: BUSPAR      Dose: 10 mg  Take 10 mg by mouth daily  Refills: 0     * busPIRone 10 MG tablet  Commonly known as: BUSPAR      Dose: 10 mg  Take 10 mg by mouth daily as needed (anxiety) For afternoon/evening if needed.  Refills: 0     FLUoxetine 20 MG capsule  Commonly known as: PROzac      Dose: 20 mg  Take 20 mg by mouth daily  Refills: 0     glipiZIDE 5 MG 24 hr tablet  Commonly known as: GLUCOTROL XL  Used for: Type 2 diabetes mellitus with other specified complication, without long-term current use of insulin (H)      Dose: 5 mg  Take 1 tablet (5 mg) by mouth  2 times daily Take 1 tablet twice daily for diabetes  Quantity: 180 tablet  Refills: 0     pantoprazole 40 MG EC tablet  Commonly known as: PROTONIX      Dose: 40 mg  Take 40 mg by mouth daily  Refills: 0     tamsulosin 0.4 MG capsule  Commonly known as: FLOMAX  Used for: Flank pain, H/O renal calculi      TAKE 1 CAPSULE BY MOUTH EVERY DAY  Quantity: 90 capsule  Refills: 0     traZODone 50 MG tablet  Commonly known as: DESYREL  Used for: Insomnia, unspecified type      Dose: 150 mg  Take 3 tablets (150 mg) by mouth At Bedtime  Quantity: 40 tablet  Refills: 0           * This list has 2 medication(s) that are the same as other medications prescribed for you. Read the directions carefully, and ask your doctor or other care provider to review them with you.                STOP taking      lisinopril 40 MG tablet  Commonly known as: ZESTRIL                  Where to get your medicines        These medications were sent to [facility] [address] [address]      Phone: [phone]   acetaminophen 325 MG tablet  Alcohol Swabs Pads  amLODIPine 2.5 MG tablet  aspirin 81 MG chewable tablet  atorvastatin 80 MG tablet  blood glucose calibration solution  blood glucose lancets standard  blood glucose monitoring meter device kit  blood glucose test strip  clopidogrel 75 MG tablet  glucose 4 g chewable tablet  Insulin Aspart FlexPen 100 UNIT/ML Sopn  insulin pen needle 32G X 4 MM miscellaneous  Lidocaine 4 % Patch  metoprolol tartrate 25 MG tablet  ondansetron 4 MG ODT tab  oxyCODONE 5 MG tablet  polyethylene glycol 17 GM/Dose powder  Sharps Container Misc         Discharge Instructions:    Follow up appointment with Primary Care Physician: Per [facility] within 7 days of discharge.  Follow up appointment with Specialist:    Follow with CV Surgery as scheduled.    Follow-up with cardiology as scheduled    Diet:  "Cardiac    Activity/Restrictions: As tolerated with sternal precautions in mind (see below). No driving for 4 weeks or while on pain medication.     - Shower and wash your incisions daily with soap and water. No tub baths/hot tubs for 4 weeks. An antibacterial soap such as Dial or Safeguard is recommended.    - Check your incisions every day. If you notice any redness, drainage, or anything unusual, please call the surgeons office.    - No driving for 4 weeks after surgery or while on pain medication     - Do not lift anything more than 10 pounds for 8 weeks after surgery. After 8 weeks, advance lifting gradually as tolerated.    - You may have watery drainage from your chest tube site for 2-3 weeks after surgery. Your may cover with a Band-Aid to protect your clothing. Remove the Band-Aid every day and wash the site.    - If you have a leg lesion, you may have swelling for 2-3 months. Elevate your leg any time you are not walking.    - If you feel any \"popping\" or \"clicking\" sensations in your chest, your arms are out too far or you are putting too much weight into arm movements. Do not reach over your head or out to the side to pull something. Do not do any arm exercises or use any exercise equipment that involves arm movement. If you feel your sternum moving, call the surgeon's office.    - Increase your daily activity as explained by Cardiac Rehab. You are encouraged to enroll in an Outpatient Cardiac Rehab Program.    - No active sports using your upper arms for 3 months. This includes fishing, hunting, bowling, swimming, tennis or golf.    - No physical activity such as cutting the grass, raking, vacuuming, changing sheets on your bed, snow shoveling, or using a  for 3 months.    - Use incentive spirometer 6-8 times per day for 2 weeks.       Hospital Summary:   Drew Monsivais is a 53 year old male who was admitted to [facility] on 5/12/2024 for chest pain and found to have an NSTEMI " w/ coronary angiogram demonstrating severe multivessel disease. He was referred to CV surgery for evaluation for possible coronary artery revascularization.     Patient was deemed a candidate for coronary artery bypass surgery, and was taken to the operating room on 5/14/2024 where patient underwent triple vessel coronary artery bypass and endoscopic vein harvest from the left leg as well as left radial artery endoscopic harvest. Surgery was uneventful and patient was brought to the ICU post-operatively. He was extubated on POD#1 and weaned from pressors. Patient was awake and alert, afebrile, and with stable vitals. Insulin drip was discontinued and he was transitioned to a sliding scale. He was transferred to general telemetry status on POD#2 where patient has had return of bowel function, is maintaining oxygen saturations on room air, had his chest tubes removed, and has no complaints of chest pain or shortness of breath. On 05/21/24, patient was stable enough to be discharged to home.    Of note:    - Patient is below preop weight w/ normal LV function so will not be sent w/ lasix or K+  - Patient gets very drowsy w/ multiple pain medications, so only sent w/ tramadol  - IABP placed preop which was removed POD#1 w/o issue.   - Patient is on amlodipine for radial graft protection. This should be maintained a minimum of 6 months, ideally 1 year if BP will tolerate.   - Plavix 75 mg qday to be maintained x1 year postop per surgeon preference for graft patency. ASA should be 81 mg for this duration. When plavix is stopped, ASA should be increased to 162 mg qday indefinitely.   - Patient w/ significant anxiety postop relieved w/ restarting PTA meds and breathing exercises  - Retention after ramos removed POD#2. Ramos replaced POD#3 to POD#5. Required straight cath x3 again so ramos was replaced and urology consulted. Plan for outpt TOV w/ urology in 1-2 weeks  - Insulin initiated as inpatient. He will require close  "follow-up w/ PCP for DM management    Vital signs:  Temp: 98.6  F (37  C) Temp src: Oral BP: 110/79 Pulse: 89   Resp: 18 SpO2: 93 % O2 Device: None (Room air) Oxygen Delivery: 1.5 LPM Height: 170.2 cm (5' 7\") Weight: 76.4 kg (168 lb 6.4 oz)  Estimated body mass index is 26.38 kg/m  as calculated from the following:    Height as of this encounter: 1.702 m (5' 7\").    Weight as of this encounter: 76.4 kg (168 lb 6.4 oz).      Physical Exam:    Pertinent exam findings on day of discharge include:  Gen: Seen up in chair. NAD. Pleasant and conversant.   CV: RRR on monitor. No edema.  Pulm: Non-labored breathing on room air.  Abd: Soft, non-tender, non-distended  Neuro: CNs grossly intact  Inc: C/D/I      Christiana Lebron PA-C  [facility] Cardiothoracic Surgery  Pager: [phone]  May 21, 2024    "

## 2024-05-17 NOTE — PROGRESS NOTES
"CVTS Daily Progress Note   POD#3 s/p CABG x3 (LIMA to LAD, left RA to OM, rSVG to RPDA), endoscopic left radial artery harvest, LLE EVH)  Attending: Doe  LOS: 5    SUBJECTIVE/INTERVAL EVENTS:    Still replaced overnight d/t retention. Patient progressing overall well, pain and anxiety currently managed well w/ PRNs w/o oversedation this AM. Maintaining oxygen saturations on 2L NC at this time. IABP removed POD#1. Normotensive, norvasc for radial graft protection. Up to chair this AM. - BM / + flatus. Tolerating diet w/o nausea. UOP adequate. Chest tube output appropriate. Hgb 9.0 this AM. Patient denies new chest pain, shortness of breath, abdominal pain, calf pain, nausea. Patient denies questions this AM.    OBJECTIVE:  Temp:  [98.3  F (36.8  C)-99  F (37.2  C)] 98.7  F (37.1  C)  Pulse:  [64-79] 71  Resp:  [2-22] 13  BP: (100-156)/(51-85) 100/67  MAP:  [58 mmHg-82 mmHg] 64 mmHg  Arterial Line BP: ()/(43-61) 91/49  SpO2:  [86 %-99 %] 92 %  Vitals:    05/13/24 0612 05/14/24 0600 05/15/24 0000 05/16/24 0645   Weight: 77.6 kg (171 lb 1.6 oz) 79 kg (174 lb 1.6 oz) 81.4 kg (179 lb 7.3 oz) 83.4 kg (183 lb 14.4 oz)    05/17/24 0541   Weight: 81.5 kg (179 lb 9.6 oz)         Clinically Significant Risk Factors              # Hypoalbuminemia: Lowest albumin = 3.2 g/dL at 5/14/2024  5:31 PM, will monitor as appropriate     # Thrombocytopenia: Lowest platelets = 114 in last 2 days, will monitor for bleeding   # Hypertension: Noted on problem list       # DMII: A1C = 9.3 % (Ref range: <5.7 %) within past 6 months   # Overweight: Estimated body mass index is 28.99 kg/m  as calculated from the following:    Height as of this encounter: 1.676 m (5' 6\").    Weight as of this encounter: 81.5 kg (179 lb 9.6 oz).      # Financial/Environmental Concerns: none   # History of CABG: noted on surgical history           Current Medications:    Scheduled Meds:  Current Facility-Administered Medications   Medication Dose Route " Frequency Provider Last Rate Last Admin    acetaminophen (TYLENOL) tablet 975 mg  975 mg Oral Q8H [facility] Shirley Lebron PA-C   975 mg at 05/17/24 0549    amLODIPine (NORVASC) tablet 2.5 mg  2.5 mg Oral Daily Shirley Lebron PA-C   2.5 mg at 05/17/24 0822    aspirin (ASA) chewable tablet 81 mg  81 mg Oral or NG Tube Daily Shirley Lebron PA-C   81 mg at 05/17/24 0821    atorvastatin (LIPITOR) tablet 80 mg  80 mg Oral QPM Shirley Lebron PA-C   80 mg at 05/16/24 2024    busPIRone (BUSPAR) tablet 10 mg  10 mg Oral Daily Shirley Lebron PA-C   10 mg at 05/17/24 0822    clopidogrel (PLAVIX) tablet 75 mg  75 mg Oral Daily Shirley Lebron PA-C   75 mg at 05/17/24 0817    FLUoxetine (PROzac) capsule 20 mg  20 mg Oral Daily Shirley Lebron PA-C   20 mg at 05/17/24 0817    furosemide (LASIX) injection 20 mg  20 mg Intravenous TID Shirley Lebron PA-C        [Held by provider] glipiZIDE (GLUCOTROL XL) 24 hr tablet 5 mg  5 mg Oral BID Shirley Lebron PA-C        heparin ANTICOAGULANT injection 5,000 Units  5,000 Units Subcutaneous Q8H Shirley Lebron PA-C   5,000 Units at 05/17/24 0455    insulin aspart (NovoLOG) injection (RAPID ACTING)  1-7 Units Subcutaneous TID AC Shirley Lebron PA-C   1 Units at 05/17/24 0818    insulin aspart (NovoLOG) injection (RAPID ACTING)  1-5 Units Subcutaneous At Bedtime Shirley Lebron PA-C        Lidocaine (LIDOCARE) 4 % Patch 1-2 patch  1-2 patch Transdermal Q24H Shirley Lebron PA-C   2 patch at 05/16/24 1701    metoprolol tartrate (LOPRESSOR) tablet 25 mg  25 mg Oral BID Shirley Lebron PA-C   25 mg at 05/17/24 0818    pantoprazole (PROTONIX) 2 mg/mL suspension 40 mg  40 mg Oral or NG Tube Shirley Barajas PA-C        Or    pantoprazole (PROTONIX) EC tablet 40 mg  40 mg Oral Shirley Barajas PA-C   40 mg at 05/17/24 0701    polyethylene glycol (MIRALAX) Packet 17 g  17 g Oral Daily  Shirley Lebron PA-C   17 g at 05/17/24 0822    senna-docusate (SENOKOT-S/PERICOLACE) 8.6-50 MG per tablet 1 tablet  1 tablet Oral BID Shirley Lebron PA-C   1 tablet at 05/17/24 0821    tamsulosin (FLOMAX) capsule 0.4 mg  0.4 mg Oral QPM Shirley Lebron PA-C   0.4 mg at 05/16/24 2024     Continuous Infusions:  Current Facility-Administered Medications   Medication Dose Route Frequency Provider Last Rate Last Admin     PRN Meds:.  Current Facility-Administered Medications   Medication Dose Route Frequency Provider Last Rate Last Admin    acetaminophen (TYLENOL) tablet 650 mg  650 mg Oral Q4H PRN Shirley Lebron PA-C        benzocaine-menthol (CHLORASEPTIC) 6-10 MG lozenge 1 lozenge  1 lozenge Buccal Q1H PRN Shirley eLbron PA-C        bisacodyl (DULCOLAX) suppository 10 mg  10 mg Rectal Daily PRN Shirley Lebron PA-C        busPIRone (BUSPAR) tablet 10 mg  10 mg Oral Daily PRN Shirley Lebron PA-C   10 mg at 05/14/24 0106    calcium carbonate (TUMS) chewable tablet 1,000 mg  1,000 mg Oral 4x Daily PRN Shirley Lebron PA-C        calcium gluconate 1 g in 50 mL in sodium chloride intermittent infusion  1 g Intravenous Once PRN Shirley Lebron PA-C   1 g at 05/15/24 0550    calcium gluconate 2 g in  mL intermittent infusion  2 g Intravenous Once PRN Shirley Lebron PA-C        calcium gluconate 3 g in sodium chloride 0.9 % 100 mL intermittent infusion  3 g Intravenous Once PRN Shirley Lebron PA-C        glucose gel 15-30 g  15-30 g Oral Q15 Min PRN Shirley Lebron PA-C        Or    dextrose 50 % injection 25-50 mL  25-50 mL Intravenous Q15 Min PRN Shirley Lebron PA-C        Or    glucagon injection 1 mg  1 mg Subcutaneous Q15 Min PRN Shirley Lebron PA-C        hydrALAZINE (APRESOLINE) injection 10 mg  10 mg Intravenous Q30 Min PRN Shirley Lebron PA-C        HYDROmorphone (DILAUDID) injection 0.1 mg  0.1 mg Intravenous  Q2H PRN Shirley Lebron PA-C        Or    HYDROmorphone (DILAUDID) injection 0.2 mg  0.2 mg Intravenous Q2H PRN Shirley Lebron PA-C        hydrOXYzine HCl (ATARAX) tablet 25 mg  25 mg Oral Q6H PRN Shirley Lebron PA-C   25 mg at 05/15/24 2032    magnesium hydroxide (MILK OF MAGNESIA) suspension 30 mL  30 mL Oral Daily PRN Shirley Lebron PA-C        methocarbamol (ROBAXIN) tablet 500 mg  500 mg Oral 4x Daily PRN Shirley Lebron PA-C   500 mg at 05/16/24 0754    naloxone (NARCAN) injection 0.2 mg  0.2 mg Intravenous Q2 Min PRN Shirley Lebron PA-C        Or    naloxone (NARCAN) injection 0.4 mg  0.4 mg Intravenous Q2 Min PRN Shirley Lebron PA-C        Or    naloxone (NARCAN) injection 0.2 mg  0.2 mg Intramuscular Q2 Min PRN Shirley Lebron PA-C        Or    naloxone (NARCAN) injection 0.4 mg  0.4 mg Intramuscular Q2 Min PRN Shirley Lebron PA-C        ondansetron (ZOFRAN ODT) ODT tab 4 mg  4 mg Oral Q6H PRN Shirley Lebron PA-C   4 mg at 05/17/24 0455    Or    ondansetron (ZOFRAN) injection 4 mg  4 mg Intravenous Q6H PRN Shirley Lebron PA-C   4 mg at 05/16/24 0700    oxyCODONE IR (ROXICODONE) half-tab 2.5 mg  2.5 mg Oral Q4H PRN Shirley Lebron PA-C        Or    oxyCODONE (ROXICODONE) tablet 5 mg  5 mg Oral Q4H PRN Shirley Lebron PA-C   5 mg at 05/17/24 0818    prochlorperazine (COMPAZINE) injection 10 mg  10 mg Intravenous Q6H PRN Shirley Lebron PA-C        Or    prochlorperazine (COMPAZINE) tablet 10 mg  10 mg Oral Q6H PRN Shirley Lebron PA-C           Cardiographics:    Telemetry monitoring demonstrates NSR with rates in the 70s per my personal review.    Imaging:  Results for orders placed or performed during the hospital encounter of 05/12/24   US Carotid Bilateral    Impression    IMPRESSION:  1.  Mild plaque formation, velocities consistent with less than 50% stenosis in the right internal carotid artery.  2.  Mild  plaque formation, velocities consistent with less than 50% stenosis in the left internal carotid artery.  3.  Flow within the vertebral arteries is antegrade.   XR Chest Port 1 View    Impression    IMPRESSION: Poststernotomy and coronary artery bypass grafting. Mediastinal and bilateral pleural drains remain in position. Endotracheal tube in satisfactory position terminating 6.8 cm above the marie. Right internal jugular venous Glencoe-Jasmin catheter   tip in the right ventricular outflow tract/main pulmonary artery. Radiopaque marker for the intra-aortic balloon pump is located in the proximal descending thoracic aorta. Mild left basilar atelectasis. No significant pleural fluid. No visible   pneumothorax. Stable cardiac silhouette. Normal pulmonary vascularity..   XR Chest Port 1 View    Impression    IMPRESSION: Endotracheal tube resides over the mid tracheal air column. Right IJ Glencoe-Jasmin catheter with the tip over the main pulmonary artery. Bilateral chest tubes and mediastinal drain. No pneumothorax. Linear atelectasis right lung. New sternotomy   wires and surgical clips. Osseous structures otherwise intact.   Echocardiogram Complete   Result Value Ref Range    LVEF  45-50% (mildly reduced)        Labs, personally reviewed.  Hemoglobin   Date Value Ref Range Status   05/17/2024 9.0 (L) 13.3 - 17.7 g/dL Final   05/16/2024 10.1 (L) 13.3 - 17.7 g/dL Final   05/15/2024 12.3 (L) 13.3 - 17.7 g/dL Final     WBC Count   Date Value Ref Range Status   05/17/2024 7.5 4.0 - 11.0 10e3/uL Final   05/16/2024 11.9 (H) 4.0 - 11.0 10e3/uL Final   05/15/2024 11.4 (H) 4.0 - 11.0 10e3/uL Final     Platelet Count   Date Value Ref Range Status   05/17/2024 114 (L) 150 - 450 10e3/uL Final   05/16/2024 128 (L) 150 - 450 10e3/uL Final   05/15/2024 158 150 - 450 10e3/uL Final     Creatinine   Date Value Ref Range Status   05/17/2024 1.40 (H) 0.67 - 1.17 mg/dL Final   05/16/2024 1.14 0.67 - 1.17 mg/dL Final   05/15/2024 1.42 (H) 0.67 -  1.17 mg/dL Final     Potassium   Date Value Ref Range Status   05/17/2024 4.6 3.4 - 5.3 mmol/L Final   05/16/2024 4.5 3.4 - 5.3 mmol/L Final   05/15/2024 4.5 3.4 - 5.3 mmol/L Final   02/18/2023 3.8 3.5 - 5.0 mmol/L Final   02/17/2023 4.0 3.5 - 5.0 mmol/L Final   02/16/2023 4.3 3.5 - 5.0 mmol/L Final     Potassium POCT   Date Value Ref Range Status   05/14/2024 3.6 3.4 - 5.3 mmol/L Final   05/14/2024 4.1 3.4 - 5.3 mmol/L Final   05/14/2024 5.1 3.4 - 5.3 mmol/L Final     Magnesium   Date Value Ref Range Status   05/17/2024 2.0 1.7 - 2.3 mg/dL Final   05/16/2024 2.0 1.7 - 2.3 mg/dL Final   05/15/2024 1.9 1.7 - 2.3 mg/dL Final          I/O:  I/O last 3 completed shifts:  In: 1270 [P.O.:560; I.V.:610]  Out: 2360 [Urine:1820; Chest Tube:540]       Physical Exam:    General: Patient seen up in chair this AM. NAD. Conversant. Pleasant.   HEENT: REBEKA, no sclera icterus, moist mucosa  CV: RRR on monitor. 2+ peripheral pulses in all extremities. Mild edema.   Pulm: Non-labored effort on 2L NC at this time. Chest tubes in place, serosanguinous output, no air leak. Incision C/D/I.  Abd: Soft, NT, ND  : Still w/ seun urine  Ext: Mild pedal edema, SCDs in place, warm, distal pulses intact. IABP site C/D/I w/o hematoma, ecchymosis, or edema.  Neuro: BAZAN, follows commands, A&Ox3      ASSESSMENT/PLAN:    Drew Monsivais is a 53 year old male with a history of CAD, MARY JANE, HTN, HLD, anxiety, and DM2 who is s/p CABG x3 w/ endoscopic radial harvest.    Active Problems:    NSTEMI (non-ST elevated myocardial infarction) (H)  CAD  HTN  HLD  Type 2 DM  Acute Respiratory Failure  Acute on chronic anxiety      NEURO:   - Scheduled Tylenol/lidocaine patches and PRN Tylenol/oxycodone for pain  - PRN robaxin  - No toradol given intermittent Cr bumps  - Minimize narcotics as able as patient gets disoriented when receiving multiple medications  - Narcan available PRN  - PTA prozac, 20mg qd,buspar 10 mg qd, and 10 mg qd PRN   - Holding PA  trazodone given drowsiness at this time      CV:   - Pre-op EF 45-50%  - IABP removed POD#1  - Normotensive  - PTA lisinopril discontinued  - Metoprolol 25 BID w/ PRN available  - Norvasc 2.5 mg qday  - ASA 81 mg (plavix)  - Plavix per surgeon preference for graft patency in s/o NSTEMI (ASA)  - Atorvastatin 80mg daily   - Chest tubes/TPW to remain for now, output downtrending, will reassess later today    PULM:   - Extubated POD#1  - Maintaining oxygen saturations on room air to 2L NC at this time, wean as able  - Encourage pulmonary toilet    FEN/GI:  - Continue electrolyte replacement protocol  - Diet: ADAT to Cardiac, <2g Na+, Consistent Carb  - Bowel regimen, LBM preop  - Lactic acidosis, resolved.    RENAL:  - Adequate UOP/hr. Continue to monitor closely.  - Cr 1.41 this AM, baseline 1.1-1.4. Monitor w/ BMP  - Still reinserted overnight d/t retention  - Diuresis w/ 20 mg IV lasix q6 today  - PTA flomax    HEME:  - Acute blood loss anemia post-op.   - Hgb 9.0 this AM, likely dilutional component, no bleeding concerns. Hep SQ, ASA  - CBC in AM    ID:  - Ijll op ppx complete, febrile overnight. No concerns for infection at this time. Will monitor.   - Reactive leukocytosis, resolved.    ENDO:   - HbA1c 9.3%  - BG goal < 180 to promote optimal healing  - Medium SSI  - PTA glipizide   - Will likely need insulin at discharge to maintain BG goal. Will discuss w/ nursing to have patient receive education and practice taking BG and administering insulin while inpt    PPx:   - DVT: SCDs, SQ heparin TID, ambulation   - GI: Protonix 40mg PO daily (PTA med)    DISPO:   - Continue general tele care (Transferred POD#2)  - PT/OT recommending home w/ outpt cardiac rehab vs TCU pending progression  Medically Ready for Discharge: Anticipated in 2-4 Days      Patient discussed with Dr. Monsivais and Dr. Azar.        Christiana Lebron PA-C  [facility] Cardiothoracic Surgery  Pager: [phone]  May 17, 2024

## 2024-05-17 NOTE — PROGRESS NOTES
NUTRITION EDUCATION      REASON :  Provider order for diet education after cardiovascular surgery    NUTRITION HISTORY:  Patient is s/p CABG 5/14/24.  History of DM.  Patient reports he has worked with dietitians for his diabetic diet.  HgbA1c 9.3  5/12/24.  Patient avoids most dairy foods as he does not tolerate dairy.  Patient does not drink soda, he drinks water and crystal lite.  Patient states that heart disease is new for him, he is concerned about recovery and getting back to work.      NUTRITION DIAGNOSIS:  Food- and nutrition-related knowledge deficit R/t heart disease.    INTERVENTIONS:    Nutrition Prescription:  Heart Healthy Consistent Carb diet.    Implementation:      *  Nutrition Education (Content):   A)  Provided handout Heart Healthy Consistent carb diet, Label reading tips, Diabetic Meal plate.   B)  Discussed building a healthy meal plate with 1/2 plate vegetables, 1/4 plate meat/protein, 1/4 plate whole grain carb, low sugar beverage and fruit for dessert.      *  Nutrition Education (Application):   A)  Discussed current eating habits and recommended alternative food choices      *  Anticipate compliance      *  Diet Education - refer to Education Flowsheet    Goals:      *  Patient participated in diet education.  Plan diet follow-up in cardiac rehab.        *  All of the above goals met during the education session    Follow Up/Monitoring:      *  Provided RD contact information for future questions      *  Recommended Out-Patient Nutrition Referral, if further diet instructions are needed

## 2024-05-17 NOTE — PLAN OF CARE
[facility] - ICU    RN Progress Note:            Pertinent Assessments:      Please refer to flowsheet rows for full assessment     VSS, PRN oxy given x2 with adequate results. Pt did have some intermittent confusion when awoken from sleep but got much better after pt was awake for a few minuets. Still replaced after consulting CV surgery on call d/t urinary retention. Pt up to chair this morning, ambulating well, electrolytes replaced.           Key Events - This Shift:       Uneventful, pain well controlled with PRN medication             Barriers to Discharge / Downgrade:     none

## 2024-05-17 NOTE — PROVIDER NOTIFICATION
CVC paged for pt having hypotension prior to transferring to P3.    Dr. Mulvihill ordered 500ml of albumin and to cancel transfer off ICU.    Pt reports feeling tired, nausea at 1630 PRN zofran given with effectiveness.  NSR on telemetry.      Sandrita Huang RN    
No assistance needed

## 2024-05-17 NOTE — OP NOTE
DATE OF PROCEDURE: 5/14/2024    PREOPERATIVE DIAGNOSIS: Severe, multivessel coronary artery disease, NSTEMI    POSTOPERATIVE DIAGNOSIS: Same    ATTENDING SURGEON:Mayra Azar MD    SURGICAL ASSISTANTS: RAMONA Brandt, Catrachita Woodall PA-C    PROCEDURES PERFORMED:    1) Median sternotomy  2) Left internal mammary artery harvest  3) Endoscopic harvest of left radial artery and left saphenous vein   4) Epiaortic ultrasound of the ascending aorta  5) Placement on central cardiopulmonary bypass  6) Coronary artery bypass grafting x3 (in-situ left internal mammary artery to left anterior descending, left radial artery to obtuse marginal, and reverse saphenous vein graft to right posterior descending)   7) Placement of temporary ventricular pacing wires  8) Transesophageal echocardiography    INDICATION:   The patient is a 53-year-old man with a history of HTN, HLD, and DM2 (A1c 9.3) who presented with an NSTEMI and was found to have severe, multivessel coronary artery disease with EF 45-50%. The remainder of the preoperative workup was performed without contraindication to surgery. The patient was counseled on and understood the potential risks, alternatives, and benefits associated with surgical revascularization and elected to proceed. Due to the severity of his coronary stenosis I recommended placement of IABP prior to surgical revascularization.     FINDINGS:  Diagonal coronary artery was too small to bypass. Vein was large but adequate for use. Heart function improved to normal after revascularization with IABP in place.     DESCRIPTION OF OPERATION:  A timeout procedure was performed confirming the correct patient, surgical site, and procedure. The patient underwent uneventful general endotracheal anesthesia and invasive hemodynamic monitoring lines were placed. The neck, chest, left arm, abdomen, groins, and legs were prepped and draped in the usual sterile fashion.     A median sternotomy was performed and the  left internal mammary artery was harvested in a pedicled fashion while the left radial artery and left greater saphenous vein were harvested endoscopically. 300 units per kilogram of sodium heparin was administered. A sternal retractor was inserted and a pericardial well was created. Epiaortic ultrasound was utilized to evaluate the aorta for calcification and guide placement of the aortic cannulation site and cross-clamp. The intra-aortic balloon pump was paused and the patient was cannulated for cardiopulmonary bypass using the high ascending aorta for infusion and venous drainage was accomplished via a two-stage cannula in the right atrial appendage. A retrograde cardioplegia catheter was placed in the coronary sinus. An antegrade cardioplegia catheter was placed in the mid ascending aorta. When a satisfactory activating clotting time was confirmed, cardiopulmonary bypass was initiated, and the patient was cooled to 34 degrees. The aortic cross-clamp was applied and cardioplegic arrest was initiated with 1 liter of cold blood cardioplegia delivered antegrade and 300 milliliters delivered retrograde. Satisfactory diastolic arrest was achieved. Ice slush was used for topical hypothermia. Cardioplegia was re-dosed throughout the case with both antegrade and retrograde cardioplegia.    The right posterior descending coronary artery was then identified and incised. The vessel was approximately 1.4 millimeters in size and of good quality. A reverse saphenous vein graft was anastomosed in a running, end-to-side fashion with 7-0 Prolene suture. The graft was gently distended and found to be widely patent and hemostatic. Cold blood cardioplegia was then delivered in an antegrade fashion and the graft was cut to size. A 4 millimeter punch was created in the ascending aorta and the proximal anastomosis performed in a running fashion with 6-0 Prolene suture while delivering continuous retrograde cardioplegia.      The obtuse  marginal coronary artery was then identified and incised. The vessel was approximately 1.6 millimeters in size and of good quality. A left radial artery graft was anastomosed in a running, end-to-side fashion with 7-0 Prolene suture. The graft was gently distended and found to be widely patent and hemostatic. Cold blood cardioplegia was then delivered in an antegrade fashion and the graft was cut to size. A 4 millimeter punch was created in the ascending aorta and the proximal anastomosis performed in a running fashion with 6-0 Prolene suture while delivering continuous retrograde cardioplegia.      The diagonal coronary artery was identified and evaluated but deemed too small to bypass.      The left anterior descending coronary artery was then identified in the distal third and was approximately 1.4 millimeters in size and of fair quality. The left internal mammary artery was brought into the field and spatulated. It had excellent flow. The anastomosis was performed in a running, end-to-side fashion with 7-0 Prolene suture. The temporary clamp on the internal mammary artery was released and excellent flow could be seen distally and the anastomosis appeared hemostatic.     A terminal dose of warm cardioplegia was delivered in retrograde fashion, de-airing maneurvers were performed, and the aortic cross-clamp was removed. Total aortic cross-clamp time was 63 minutes. The heart resumed normal sinus rhythm. A ventricular pacing wire was placed on the diaphragmatic surface of the heart. The intra-aortic balloon pump was resumed at 1:1 support. Ventilation was resumed, the patient was systemically warmed and he weaned from cardiopulmonary bypass on the first attempt. Post-procedure transesophageal echocardiography revealed improved biventricular function and no new valvular abnormalities. Protamine was administered to reverse the anticoagulation and all cannulae for bypass were removed. Hemostasis was satisfactory.  Total cardiopulmonary bypass time was 78 minutes. The fat within the superior mediastinum was closed over the aorta. Two mediastinal chest tubes and bilateral pleural charles drains were placed. The sternum was closed with stainless steel wires and the remainder of the closure was routine.    The needles, instruments, and sponges were counted and correct at the end of the case. Patient was transferred back to ICU in stable condition.    Tuyet Azar MD

## 2024-05-17 NOTE — PLAN OF CARE
Goal Outcome Evaluation:  Pain and anxiety still an issue but seems better today-- CT out and as well as pacer wires-working on activity and diet now

## 2024-05-18 LAB
ANION GAP SERPL CALCULATED.3IONS-SCNC: 8 MMOL/L (ref 7–15)
BUN SERPL-MCNC: 16.6 MG/DL (ref 6–20)
CA-I BLD-MCNC: 4.4 MG/DL (ref 4.4–5.2)
CALCIUM SERPL-MCNC: 8.6 MG/DL (ref 8.6–10)
CHLORIDE SERPL-SCNC: 96 MMOL/L (ref 98–107)
CREAT SERPL-MCNC: 1.26 MG/DL (ref 0.67–1.17)
DEPRECATED HCO3 PLAS-SCNC: 31 MMOL/L (ref 22–29)
EGFRCR SERPLBLD CKD-EPI 2021: 67 ML/MIN/1.73M2
ERYTHROCYTE [DISTWIDTH] IN BLOOD BY AUTOMATED COUNT: 12.1 % (ref 10–15)
GLUCOSE BLDC GLUCOMTR-MCNC: 145 MG/DL (ref 70–99)
GLUCOSE BLDC GLUCOMTR-MCNC: 168 MG/DL (ref 70–99)
GLUCOSE BLDC GLUCOMTR-MCNC: 178 MG/DL (ref 70–99)
GLUCOSE BLDC GLUCOMTR-MCNC: 187 MG/DL (ref 70–99)
GLUCOSE SERPL-MCNC: 208 MG/DL (ref 70–99)
HCT VFR BLD AUTO: 26.8 % (ref 40–53)
HGB BLD-MCNC: 9.5 G/DL (ref 13.3–17.7)
MAGNESIUM SERPL-MCNC: 2.1 MG/DL (ref 1.7–2.3)
MCH RBC QN AUTO: 29.1 PG (ref 26.5–33)
MCHC RBC AUTO-ENTMCNC: 35.4 G/DL (ref 31.5–36.5)
MCV RBC AUTO: 82 FL (ref 78–100)
PHOSPHATE SERPL-MCNC: 2.5 MG/DL (ref 2.5–4.5)
PLATELET # BLD AUTO: 172 10E3/UL (ref 150–450)
POTASSIUM SERPL-SCNC: 4 MMOL/L (ref 3.4–5.3)
RBC # BLD AUTO: 3.26 10E6/UL (ref 4.4–5.9)
SODIUM SERPL-SCNC: 135 MMOL/L (ref 135–145)
WBC # BLD AUTO: 8 10E3/UL (ref 4–11)

## 2024-05-18 PROCEDURE — 250N000013 HC RX MED GY IP 250 OP 250 PS 637: Performed by: PHYSICIAN ASSISTANT

## 2024-05-18 PROCEDURE — 250N000013 HC RX MED GY IP 250 OP 250 PS 637

## 2024-05-18 PROCEDURE — 84100 ASSAY OF PHOSPHORUS: CPT | Performed by: STUDENT IN AN ORGANIZED HEALTH CARE EDUCATION/TRAINING PROGRAM

## 2024-05-18 PROCEDURE — 250N000009 HC RX 250: Performed by: STUDENT IN AN ORGANIZED HEALTH CARE EDUCATION/TRAINING PROGRAM

## 2024-05-18 PROCEDURE — 210N000001 HC R&B IMCU HEART CARE

## 2024-05-18 PROCEDURE — 83735 ASSAY OF MAGNESIUM: CPT | Performed by: STUDENT IN AN ORGANIZED HEALTH CARE EDUCATION/TRAINING PROGRAM

## 2024-05-18 PROCEDURE — 80048 BASIC METABOLIC PNL TOTAL CA: CPT

## 2024-05-18 PROCEDURE — 36415 COLL VENOUS BLD VENIPUNCTURE: CPT

## 2024-05-18 PROCEDURE — 258N000003 HC RX IP 258 OP 636: Performed by: STUDENT IN AN ORGANIZED HEALTH CARE EDUCATION/TRAINING PROGRAM

## 2024-05-18 PROCEDURE — 85027 COMPLETE CBC AUTOMATED: CPT

## 2024-05-18 PROCEDURE — 250N000011 HC RX IP 250 OP 636

## 2024-05-18 PROCEDURE — 82330 ASSAY OF CALCIUM: CPT

## 2024-05-18 RX ORDER — ACETAMINOPHEN 325 MG/1
975 TABLET ORAL EVERY 6 HOURS
Status: DISCONTINUED | OUTPATIENT
Start: 2024-05-18 | End: 2024-05-21 | Stop reason: HOSPADM

## 2024-05-18 RX ADMIN — METOPROLOL TARTRATE 12.5 MG: 25 TABLET, FILM COATED ORAL at 02:48

## 2024-05-18 RX ADMIN — BUSPIRONE HYDROCHLORIDE 10 MG: 10 TABLET ORAL at 08:29

## 2024-05-18 RX ADMIN — OXYCODONE HYDROCHLORIDE 5 MG: 5 TABLET ORAL at 15:08

## 2024-05-18 RX ADMIN — ACETAMINOPHEN 975 MG: 325 TABLET ORAL at 19:45

## 2024-05-18 RX ADMIN — INSULIN ASPART 1 UNITS: 100 INJECTION, SOLUTION INTRAVENOUS; SUBCUTANEOUS at 18:14

## 2024-05-18 RX ADMIN — GLIPIZIDE 5 MG: 5 TABLET, FILM COATED, EXTENDED RELEASE ORAL at 08:30

## 2024-05-18 RX ADMIN — PROCHLORPERAZINE MALEATE 10 MG: 10 TABLET ORAL at 05:11

## 2024-05-18 RX ADMIN — ACETAMINOPHEN 975 MG: 325 TABLET ORAL at 12:41

## 2024-05-18 RX ADMIN — OXYCODONE HYDROCHLORIDE 5 MG: 5 TABLET ORAL at 10:16

## 2024-05-18 RX ADMIN — ASPIRIN 81 MG CHEWABLE TABLET 81 MG: 81 TABLET CHEWABLE at 08:28

## 2024-05-18 RX ADMIN — OXYCODONE HYDROCHLORIDE 5 MG: 5 TABLET ORAL at 22:15

## 2024-05-18 RX ADMIN — AMLODIPINE BESYLATE 2.5 MG: 2.5 TABLET ORAL at 08:28

## 2024-05-18 RX ADMIN — MAGNESIUM HYDROXIDE 30 ML: 400 SUSPENSION ORAL at 08:30

## 2024-05-18 RX ADMIN — CALCIUM CARBONATE (ANTACID) CHEW TAB 500 MG 1000 MG: 500 CHEW TAB at 10:18

## 2024-05-18 RX ADMIN — FUROSEMIDE 20 MG: 10 INJECTION, SOLUTION INTRAMUSCULAR; INTRAVENOUS at 12:40

## 2024-05-18 RX ADMIN — GLIPIZIDE 5 MG: 5 TABLET, FILM COATED, EXTENDED RELEASE ORAL at 21:40

## 2024-05-18 RX ADMIN — HEPARIN SODIUM 5000 UNITS: 10000 INJECTION, SOLUTION INTRAVENOUS; SUBCUTANEOUS at 12:40

## 2024-05-18 RX ADMIN — CLOPIDOGREL BISULFATE 75 MG: 75 TABLET ORAL at 08:29

## 2024-05-18 RX ADMIN — SENNOSIDES AND DOCUSATE SODIUM 1 TABLET: 8.6; 5 TABLET ORAL at 20:22

## 2024-05-18 RX ADMIN — FUROSEMIDE 20 MG: 10 INJECTION, SOLUTION INTRAMUSCULAR; INTRAVENOUS at 01:53

## 2024-05-18 RX ADMIN — TAMSULOSIN HYDROCHLORIDE 0.4 MG: 0.4 CAPSULE ORAL at 20:22

## 2024-05-18 RX ADMIN — ACETAMINOPHEN 975 MG: 325 TABLET ORAL at 08:28

## 2024-05-18 RX ADMIN — INSULIN ASPART 1 UNITS: 100 INJECTION, SOLUTION INTRAVENOUS; SUBCUTANEOUS at 08:30

## 2024-05-18 RX ADMIN — METOPROLOL TARTRATE 12.5 MG: 25 TABLET, FILM COATED ORAL at 17:30

## 2024-05-18 RX ADMIN — OXYCODONE HYDROCHLORIDE 5 MG: 5 TABLET ORAL at 02:51

## 2024-05-18 RX ADMIN — POLYETHYLENE GLYCOL 3350 17 G: 17 POWDER, FOR SOLUTION ORAL at 08:30

## 2024-05-18 RX ADMIN — FUROSEMIDE 20 MG: 10 INJECTION, SOLUTION INTRAMUSCULAR; INTRAVENOUS at 19:46

## 2024-05-18 RX ADMIN — SENNOSIDES AND DOCUSATE SODIUM 1 TABLET: 8.6; 5 TABLET ORAL at 08:31

## 2024-05-18 RX ADMIN — ATORVASTATIN CALCIUM 80 MG: 40 TABLET, FILM COATED ORAL at 20:23

## 2024-05-18 RX ADMIN — ONDANSETRON 4 MG: 2 INJECTION INTRAMUSCULAR; INTRAVENOUS at 22:31

## 2024-05-18 RX ADMIN — FLUOXETINE HYDROCHLORIDE 20 MG: 20 CAPSULE ORAL at 08:29

## 2024-05-18 RX ADMIN — INSULIN ASPART 1 UNITS: 100 INJECTION, SOLUTION INTRAVENOUS; SUBCUTANEOUS at 12:37

## 2024-05-18 RX ADMIN — LIDOCAINE 1 PATCH: 4 PATCH TOPICAL at 17:37

## 2024-05-18 RX ADMIN — METHOCARBAMOL 500 MG: 500 TABLET, FILM COATED ORAL at 17:30

## 2024-05-18 RX ADMIN — METOPROLOL TARTRATE 25 MG: 25 TABLET, FILM COATED ORAL at 20:22

## 2024-05-18 RX ADMIN — METOPROLOL TARTRATE 25 MG: 25 TABLET, FILM COATED ORAL at 08:30

## 2024-05-18 RX ADMIN — SODIUM PHOSPHATE, MONOBASIC, MONOHYDRATE AND SODIUM PHOSPHATE, DIBASIC, ANHYDROUS 9 MMOL: 142; 276 INJECTION, SOLUTION INTRAVENOUS at 08:31

## 2024-05-18 RX ADMIN — HEPARIN SODIUM 5000 UNITS: 10000 INJECTION, SOLUTION INTRAVENOUS; SUBCUTANEOUS at 03:59

## 2024-05-18 RX ADMIN — HEPARIN SODIUM 5000 UNITS: 10000 INJECTION, SOLUTION INTRAVENOUS; SUBCUTANEOUS at 19:45

## 2024-05-18 RX ADMIN — PANTOPRAZOLE SODIUM 40 MG: 40 TABLET, DELAYED RELEASE ORAL at 06:28

## 2024-05-18 RX ADMIN — BUSPIRONE HYDROCHLORIDE 10 MG: 10 TABLET ORAL at 20:23

## 2024-05-18 ASSESSMENT — ACTIVITIES OF DAILY LIVING (ADL)
ADLS_ACUITY_SCORE: 36

## 2024-05-18 NOTE — PLAN OF CARE
Albumin given for hypotension.  Held scheduled lasix and metoprolol due to low BP.  Pt comfortable throughout night after robaxin PRN given.  Nausea treated with PRN zofran and effective.    Needs encouragement as pt verbalizes wanting to do better and get back to work.  Needs reminder to not use Ue's.    Sandrita Huang RN

## 2024-05-18 NOTE — PROGRESS NOTES
"CVTS Daily Progress Note   POD#4 s/p CABG x3 (LIMA to LAD, left RA to OM, rSVG to RPDA), endoscopic left radial artery harvest, LLE EVH)  Attending: Doe  LOS: 6    SUBJECTIVE/INTERVAL EVENTS:    No acute events overnight. Patient progressing overall well. Pain and anxiety currently managed well w/ PRNs. Maintaining oxygen saturations on room air. Normotensive. Up to chair this AM and working with therapy. - BM / + flatus. Tolerating diet w/o nausea. UOP adequate. Chest tubes and TPW removed yesterday. Hgb 9.5 this AM. Patient denies new chest pain, shortness of breath, abdominal pain, calf pain, nausea. Patient denies questions this AM.    OBJECTIVE:  Temp:  [97.5  F (36.4  C)-98.1  F (36.7  C)] 98  F (36.7  C)  Pulse:  [66-72] 71  Resp:  [15-16] 16  BP: ()/(55-76) 107/67  SpO2:  [93 %-97 %] 96 %  Vitals:    05/14/24 0600 05/15/24 0000 05/16/24 0645 05/17/24 0541   Weight: 79 kg (174 lb 1.6 oz) 81.4 kg (179 lb 7.3 oz) 83.4 kg (183 lb 14.4 oz) 81.5 kg (179 lb 9.6 oz)    05/18/24 0516   Weight: 81.6 kg (179 lb 12.8 oz)         Clinically Significant Risk Factors              # Hypoalbuminemia: Lowest albumin = 3.2 g/dL at 5/14/2024  5:31 PM, will monitor as appropriate     # Thrombocytopenia: Lowest platelets = 114 in last 2 days, will monitor for bleeding   # Hypertension: Noted on problem list       # DMII: A1C = 9.3 % (Ref range: <5.7 %) within past 6 months   # Overweight: Estimated body mass index is 29.02 kg/m  as calculated from the following:    Height as of this encounter: 1.676 m (5' 6\").    Weight as of this encounter: 81.6 kg (179 lb 12.8 oz).      # Financial/Environmental Concerns: none   # History of CABG: noted on surgical history           Current Medications:    Scheduled Meds:  Current Facility-Administered Medications   Medication Dose Route Frequency Provider Last Rate Last Admin    acetaminophen (TYLENOL) tablet 975 mg  975 mg Oral Q6H Myriam Streeter PA-C   975 mg at 05/18/24 0828 "    amLODIPine (NORVASC) tablet 2.5 mg  2.5 mg Oral Daily Shirley Lebron PA-C   2.5 mg at 05/18/24 0828    aspirin (ASA) chewable tablet 81 mg  81 mg Oral or NG Tube Daily Shirley Lebron PA-C   81 mg at 05/18/24 0828    atorvastatin (LIPITOR) tablet 80 mg  80 mg Oral QPM Shirley Lebron PA-C   80 mg at 05/17/24 2052    busPIRone (BUSPAR) tablet 10 mg  10 mg Oral Daily Shirley Lebron PA-C   10 mg at 05/18/24 0829    clopidogrel (PLAVIX) tablet 75 mg  75 mg Oral Daily Shirley Leborn PA-C   75 mg at 05/18/24 0829    FLUoxetine (PROzac) capsule 20 mg  20 mg Oral Daily Shirley Lebron PA-C   20 mg at 05/18/24 0829    furosemide (LASIX) injection 20 mg  20 mg Intravenous Q6H Shirley Lebron PA-C   20 mg at 05/18/24 0153    glipiZIDE (GLUCOTROL XL) 24 hr tablet 5 mg  5 mg Oral BID Shirley Lebron PA-C   5 mg at 05/18/24 0830    heparin ANTICOAGULANT injection 5,000 Units  5,000 Units Subcutaneous Q8H Shirley Lebron PA-C   5,000 Units at 05/18/24 0359    insulin aspart (NovoLOG) injection (RAPID ACTING)  1-7 Units Subcutaneous TID AC Shirley Lebron PA-C   1 Units at 05/18/24 0830    insulin aspart (NovoLOG) injection (RAPID ACTING)  1-5 Units Subcutaneous At Bedtime Shirley Lebron PA-C   1 Units at 05/17/24 2225    Lidocaine (LIDOCARE) 4 % Patch 1-2 patch  1-2 patch Transdermal Q24H Shirley Lebron PA-C   1 patch at 05/17/24 1740    metoprolol tartrate (LOPRESSOR) half-tab 12.5 mg  12.5 mg Oral TID Shirley Lebron PA-C   12.5 mg at 05/18/24 0248    metoprolol tartrate (LOPRESSOR) tablet 25 mg  25 mg Oral BID Shirley Lebron PA-C   25 mg at 05/18/24 0830    pantoprazole (PROTONIX) 2 mg/mL suspension 40 mg  40 mg Oral or NG Tube QAM AC Shirley Lebron PA-C        Or    pantoprazole (PROTONIX) EC tablet 40 mg  [facility]   40 mg at 05/18/24 0628    polyethylene glycol (MIRALAX) Packet 17 g  17 g  Oral Daily Shirley Lebron PA-C   17 g at 05/18/24 0830    senna-docusate (SENOKOT-S/PERICOLACE) 8.6-50 MG per tablet 1 tablet  1 tablet Oral BID Shirley Lebron PA-C   1 tablet at 05/18/24 0831    sodium phosphate 9 mmol in sodium chloride 0.9 % 250 mL intermittent infusion  9 mmol Intravenous Once Tuyet Azar MD   9 mmol at 05/18/24 0831    tamsulosin (FLOMAX) capsule 0.4 mg  0.4 mg Oral QPM Shirley Lebron PA-C   0.4 mg at 05/17/24 2052     Continuous Infusions:  Current Facility-Administered Medications   Medication Dose Route Frequency Provider Last Rate Last Admin     PRN Meds:.  Current Facility-Administered Medications   Medication Dose Route Frequency Provider Last Rate Last Admin    benzocaine-menthol (CHLORASEPTIC) 6-10 MG lozenge 1 lozenge  1 lozenge Buccal Q1H PRN Shirley Lebron PA-C        bisacodyl (DULCOLAX) suppository 10 mg  10 mg Rectal Daily PRN Shirley Lebron PA-C        busPIRone (BUSPAR) tablet 10 mg  10 mg Oral Daily PRN Shirley Lebron PA-C   10 mg at 05/17/24 1737    calcium carbonate (TUMS) chewable tablet 1,000 mg  1,000 mg Oral 4x Daily PRN Shirley Lebron PA-C   1,000 mg at 05/17/24 2336    calcium gluconate 1 g in 50 mL in sodium chloride intermittent infusion  1 g Intravenous Once PRN Shirley Lebron PA-C   1 g at 05/15/24 0550    calcium gluconate 2 g in  mL intermittent infusion  2 g Intravenous Once PRN Shirley Lebron PA-C        calcium gluconate 3 g in sodium chloride 0.9 % 100 mL intermittent infusion  3 g Intravenous Once PRN Shirley Lebron PA-C        glucose gel 15-30 g  15-30 g Oral Q15 Min PRN Shirley Lebron PA-C        Or    dextrose 50 % injection 25-50 mL  25-50 mL Intravenous Q15 Min PRN Shirley Lebron PA-C        Or    glucagon injection 1 mg  1 mg Subcutaneous Q15 Min PRN Shirley Lebron PA-C        hydrALAZINE (APRESOLINE) injection 10 mg  10 mg Intravenous Q30 Min PRN Bernard[place],  Shirley GUERRERO PA-C        hydrOXYzine HCl (ATARAX) tablet 25 mg  25 mg Oral Q6H PRN Shirley Lebron PA-C   25 mg at 05/17/24 2225    magnesium hydroxide (MILK OF MAGNESIA) suspension 30 mL  30 mL Oral Daily PRN Shirley Lebron PA-C   30 mL at 05/18/24 0830    methocarbamol (ROBAXIN) tablet 500 mg  500 mg Oral 4x Daily PRN Shirley Lebron PA-C   500 mg at 05/17/24 2345    naloxone (NARCAN) injection 0.2 mg  0.2 mg Intravenous Q2 Min PRN Shirley Lebron PA-C        Or    naloxone (NARCAN) injection 0.4 mg  0.4 mg Intravenous Q2 Min PRN Shirley Lebron PA-C        Or    naloxone (NARCAN) injection 0.2 mg  0.2 mg Intramuscular Q2 Min PRN Shirley Lebron PA-C        Or    naloxone (NARCAN) injection 0.4 mg  0.4 mg Intramuscular Q2 Min PRN Shirley Lebron PA-C        ondansetron (ZOFRAN ODT) ODT tab 4 mg  4 mg Oral Q6H PRN Shirley Lebron PA-C   4 mg at 05/17/24 2230    Or    ondansetron (ZOFRAN) injection 4 mg  4 mg Intravenous Q6H PRN Shirley Lebron PA-C   4 mg at 05/17/24 1449    oxyCODONE IR (ROXICODONE) half-tab 2.5 mg  2.5 mg Oral Q4H PRN Shirley Lebron PA-C        Or    oxyCODONE (ROXICODONE) tablet 5 mg  5 mg Oral Q4H PRN Shirley Lebron PA-C   5 mg at 05/18/24 0251    prochlorperazine (COMPAZINE) injection 10 mg  10 mg Intravenous Q6H PRN Shirley Lebron PA-C        Or    prochlorperazine (COMPAZINE) tablet 10 mg  10 mg Oral Q6H PRN Shirley Lebron PA-C   10 mg at 05/18/24 0511       Cardiographics:    Telemetry monitoring demonstrates NSR with rates in the 70s per my personal review.    Imaging:  Results for orders placed or performed during the hospital encounter of 05/12/24   US Carotid Bilateral    Impression    IMPRESSION:  1.  Mild plaque formation, velocities consistent with less than 50% stenosis in the right internal carotid artery.  2.  Mild plaque formation, velocities consistent with less than 50% stenosis in the left  internal carotid artery.  3.  Flow within the vertebral arteries is antegrade.   XR Chest Port 1 View    Impression    IMPRESSION: Poststernotomy and coronary artery bypass grafting. Mediastinal and bilateral pleural drains remain in position. Endotracheal tube in satisfactory position terminating 6.8 cm above the marie. Right internal jugular venous [place]-Jasmin catheter   tip in the right ventricular outflow tract/main pulmonary artery. Radiopaque marker for the intra-aortic balloon pump is located in the proximal descending thoracic aorta. Mild left basilar atelectasis. No significant pleural fluid. No visible   pneumothorax. Stable cardiac silhouette. Normal pulmonary vascularity..   XR Chest Port 1 View    Impression    IMPRESSION: Endotracheal tube resides over the mid tracheal air column. Right IJ [place]-Jasmin catheter with the tip over the main pulmonary artery. Bilateral chest tubes and mediastinal drain. No pneumothorax. Linear atelectasis right lung. New sternotomy   wires and surgical clips. Osseous structures otherwise intact.   Echocardiogram Complete   Result Value Ref Range    LVEF  45-50% (mildly reduced)        Labs, personally reviewed.  Hemoglobin   Date Value Ref Range Status   05/18/2024 9.5 (L) 13.3 - 17.7 g/dL Final   05/17/2024 9.0 (L) 13.3 - 17.7 g/dL Final   05/16/2024 10.1 (L) 13.3 - 17.7 g/dL Final     WBC Count   Date Value Ref Range Status   05/18/2024 8.0 4.0 - 11.0 10e3/uL Final   05/17/2024 7.5 4.0 - 11.0 10e3/uL Final   05/16/2024 11.9 (H) 4.0 - 11.0 10e3/uL Final     Platelet Count   Date Value Ref Range Status   05/18/2024 172 150 - 450 10e3/uL Final   05/17/2024 114 (L) 150 - 450 10e3/uL Final   05/16/2024 128 (L) 150 - 450 10e3/uL Final     Creatinine   Date Value Ref Range Status   05/18/2024 1.26 (H) 0.67 - 1.17 mg/dL Final   05/17/2024 1.40 (H) 0.67 - 1.17 mg/dL Final   05/16/2024 1.14 0.67 - 1.17 mg/dL Final     Potassium   Date Value Ref Range Status   05/18/2024 4.0 3.4 - 5.3  mmol/L Final   05/17/2024 4.6 3.4 - 5.3 mmol/L Final   05/16/2024 4.5 3.4 - 5.3 mmol/L Final   02/18/2023 3.8 3.5 - 5.0 mmol/L Final   02/17/2023 4.0 3.5 - 5.0 mmol/L Final   02/16/2023 4.3 3.5 - 5.0 mmol/L Final     Potassium POCT   Date Value Ref Range Status   05/14/2024 3.6 3.4 - 5.3 mmol/L Final   05/14/2024 4.1 3.4 - 5.3 mmol/L Final   05/14/2024 5.1 3.4 - 5.3 mmol/L Final     Magnesium   Date Value Ref Range Status   05/18/2024 2.1 1.7 - 2.3 mg/dL Final   05/17/2024 2.0 1.7 - 2.3 mg/dL Final   05/16/2024 2.0 1.7 - 2.3 mg/dL Final          I/O:  I/O last 3 completed shifts:  In: 480 [P.O.:480]  Out: 2325 [Urine:2325]       Physical Exam:    General: Patient seen up in chair this AM. NAD. Conversant. Pleasant.   HEENT: REBEKA, no sclera icterus, moist mucosa  CV: RRR on monitor. 2+ peripheral pulses in all extremities. Mild edema.   Pulm: Non-labored effort on room air. Incision C/D/I.  Abd: Soft, NT, ND  : Still w/ seun urine  Ext: Mild pedal edema, SCDs in place, warm, distal pulses intact. IABP site C/D/I w/o hematoma, ecchymosis, or edema.  Neuro: BAZAN, follows commands, A&Ox3      ASSESSMENT/PLAN:    Drew Monsivais is a 53 year old male with a history of CAD, MARY JANE, HTN, HLD, anxiety, and DM2 who is s/p CABG x3 w/ endoscopic radial harvest.    Principal Problem:    NSTEMI (non-ST elevated myocardial infarction) (H)  CAD  HTN  HLD  Type 2 DM  Acute Respiratory Failure  Acute on chronic anxiety      NEURO:   - Scheduled Tylenol/lidocaine patches and PRN Tylenol/oxycodone for pain  - PRN Robaxin  - No toradol given intermittent Cr bumps  - Minimize narcotics as able as patient gets disoriented when receiving multiple medications  - Narcan available PRN  - PTA Prozac, 20mg qd, Buspar 10mg qd, and 10mg qd PRN   - Holding PA Trazodone given drowsiness      CV:   - Pre-op EF 45-50%  - IABP removed POD#1  - Normotensive  - PTA lisinopril discontinued  - Metoprolol 25 BID w/ PRN available  - Norvasc 2.5 mg  qday  - ASA 81 mg (Decreased due to Plavix)  - Plavix per surgeon preference for graft patency in s/o NSTEMI (ASA)  - Atorvastatin 80mg daily   - Chest tubes/TPW removed 5/17    PULM:   - Extubated POD#1  - Maintaining oxygen saturations on room air  - Encourage pulmonary toilet    FEN/GI:  - Continue electrolyte replacement protocol  - Diet: ADAT to Cardiac, <2g Na+, Consistent Carb  - Bowel regimen, LBM preop    RENAL:  - Adequate UOP/hr. Continue to monitor closely.  - Cr 1.26 this AM, baseline 1.1-1.4. Monitor w/ BMP  - Still yesterday early AM d/t retention  - Diuresis w/ 20 mg IV Lasix q6 today  - PTA Flomax  - Plan for trial of voiding tomorrow AM    HEME:  - Acute blood loss anemia post-op.   - Hgb 9.5. No bleeding concerns. Hep SQ, ASA    ID:  - Jill op ppx complete, afebrile. No concerns for infection at this time.   - Reactive leukocytosis, resolved.    ENDO:   - HbA1c 9.3%  - BG goal < 180 to promote optimal healing  - Medium SSI  - PTA Glipizide     PPx:   - DVT: SCDs, SQ heparin TID, ambulation   - GI: Protonix 40mg PO daily (PTA med)    DISPO:   - Continue general tele care (Transferred POD#2)  - PT/OT recommending home w/ outpt cardiac rehab vs TCU pending progression  Medically Ready for Discharge: 1-2 days.      Patient discussed with Dr. Rosa        _______  Myriam Streeter PA-C  Cardiothoracic Surgery  [phone]

## 2024-05-18 NOTE — PLAN OF CARE
Nausea throughout night, PRN zofran, compazine and repositioning.  AM protonix given.  PRN oxycodone and robaxin for chest discomfort.  Up in chair for comfort  NSR on telemetry.  0700 lasix held for SBP<100.  Sleeping between cares and rounding.    Sandrita Huang RN

## 2024-05-18 NOTE — PROGRESS NOTES
"Care Management Follow Up    Length of Stay (days): 6    Expected Discharge Date: 05/18/2024     Concerns to be Addressed:     Care progression   Patient plan of care discussed at interdisciplinary rounds: Yes    Anticipated Discharge Disposition:  Home with outpatient cardiac rehab     Anticipated Discharge Services:  NA  Anticipated Discharge DME:  NA    Patient/family educated on Medicare website which has current facility and service quality ratings:  NA  Education Provided on the Discharge Plan:  Yes per team  Patient/Family in Agreement with the Plan:  NA    Referrals Placed by CM/SW:  JAREN  Private pay costs discussed: Not applicable    Additional Information:  Rec'd a message from bedside nurse, Liudmila, patient's sister, Enid, is here and has questions.    Met with patient and his sister, Enid at bedside. Enid asked:  1) when will patient transfer out of ICU? Per bedside nurse today.  2) when is the discharge date? Per bedside nurse, TBD, but maybe Mon/Tue  3) patient has stairs and would like help to train. Paged rehab to inform  4) sleep position? Does patient need a recliner? Paged rehab to discuss  5) want a current medication list. Per bedside nurse, will give at discharge   6) what is diet? Per bedside nurse low fat DM and low sodium  7) what are the follow up plans who/when? Per bedside nurse, CV surgery will have plans at discharge.  8) driving, lifting, positioning restrictions? Per bedside nurse, yes, patient will have restriction and CV surgery will have that on the discharge order  9) is patient going home or TCU? Informed per OT discharge rec: Home with outpatient cardiac rehab  10) is patient scheduled to see rehab today? Yes has schedule at 10 and 11 today.    All questions addressed  Patient's sister, Enid, will be here to discuss further with rehab    Social Hx: \"Lives alone in a townhouse with shared custody of his son. Works full time. Independent with ADLs and IADLs. Sister to " "transport at discharge.\"    RNCM to follow for medical progression, recommendations, and final discharge plan.     Gela Enriquez RN     "

## 2024-05-19 ENCOUNTER — APPOINTMENT (OUTPATIENT)
Dept: OCCUPATIONAL THERAPY | Facility: HOSPITAL | Age: 54
DRG: 233 | End: 2024-05-19
Attending: INTERNAL MEDICINE
Payer: COMMERCIAL

## 2024-05-19 ENCOUNTER — APPOINTMENT (OUTPATIENT)
Dept: RADIOLOGY | Facility: HOSPITAL | Age: 54
DRG: 233 | End: 2024-05-19
Attending: PHYSICIAN ASSISTANT
Payer: COMMERCIAL

## 2024-05-19 LAB
ANION GAP SERPL CALCULATED.3IONS-SCNC: 12 MMOL/L (ref 7–15)
BUN SERPL-MCNC: 13.8 MG/DL (ref 6–20)
CA-I BLD-MCNC: 4.4 MG/DL (ref 4.4–5.2)
CALCIUM SERPL-MCNC: 9.2 MG/DL (ref 8.6–10)
CHLORIDE SERPL-SCNC: 95 MMOL/L (ref 98–107)
CREAT SERPL-MCNC: 1.21 MG/DL (ref 0.67–1.17)
DEPRECATED HCO3 PLAS-SCNC: 32 MMOL/L (ref 22–29)
EGFRCR SERPLBLD CKD-EPI 2021: 71 ML/MIN/1.73M2
GLUCOSE BLDC GLUCOMTR-MCNC: 171 MG/DL (ref 70–99)
GLUCOSE BLDC GLUCOMTR-MCNC: 174 MG/DL (ref 70–99)
GLUCOSE BLDC GLUCOMTR-MCNC: 182 MG/DL (ref 70–99)
GLUCOSE BLDC GLUCOMTR-MCNC: 182 MG/DL (ref 70–99)
GLUCOSE SERPL-MCNC: 147 MG/DL (ref 70–99)
MAGNESIUM SERPL-MCNC: 1.9 MG/DL (ref 1.7–2.3)
PHOSPHATE SERPL-MCNC: 3.3 MG/DL (ref 2.5–4.5)
POTASSIUM SERPL-SCNC: 3.8 MMOL/L (ref 3.4–5.3)
SODIUM SERPL-SCNC: 139 MMOL/L (ref 135–145)

## 2024-05-19 PROCEDURE — 97110 THERAPEUTIC EXERCISES: CPT | Mod: GO

## 2024-05-19 PROCEDURE — 250N000013 HC RX MED GY IP 250 OP 250 PS 637

## 2024-05-19 PROCEDURE — 80048 BASIC METABOLIC PNL TOTAL CA: CPT

## 2024-05-19 PROCEDURE — 83735 ASSAY OF MAGNESIUM: CPT | Performed by: STUDENT IN AN ORGANIZED HEALTH CARE EDUCATION/TRAINING PROGRAM

## 2024-05-19 PROCEDURE — 250N000013 HC RX MED GY IP 250 OP 250 PS 637: Performed by: PHYSICIAN ASSISTANT

## 2024-05-19 PROCEDURE — 84100 ASSAY OF PHOSPHORUS: CPT | Performed by: STUDENT IN AN ORGANIZED HEALTH CARE EDUCATION/TRAINING PROGRAM

## 2024-05-19 PROCEDURE — 250N000013 HC RX MED GY IP 250 OP 250 PS 637: Performed by: STUDENT IN AN ORGANIZED HEALTH CARE EDUCATION/TRAINING PROGRAM

## 2024-05-19 PROCEDURE — 74018 RADEX ABDOMEN 1 VIEW: CPT

## 2024-05-19 PROCEDURE — 210N000001 HC R&B IMCU HEART CARE

## 2024-05-19 PROCEDURE — 36415 COLL VENOUS BLD VENIPUNCTURE: CPT

## 2024-05-19 PROCEDURE — 250N000011 HC RX IP 250 OP 636

## 2024-05-19 PROCEDURE — 82330 ASSAY OF CALCIUM: CPT

## 2024-05-19 PROCEDURE — 250N000011 HC RX IP 250 OP 636: Performed by: PHYSICIAN ASSISTANT

## 2024-05-19 RX ORDER — FUROSEMIDE 10 MG/ML
20 INJECTION INTRAMUSCULAR; INTRAVENOUS 3 TIMES DAILY
Status: DISCONTINUED | OUTPATIENT
Start: 2024-05-19 | End: 2024-05-20

## 2024-05-19 RX ORDER — POTASSIUM CHLORIDE 1500 MG/1
20 TABLET, EXTENDED RELEASE ORAL ONCE
Qty: 1 TABLET | Refills: 0 | Status: COMPLETED | OUTPATIENT
Start: 2024-05-19 | End: 2024-05-19

## 2024-05-19 RX ORDER — ONDANSETRON 2 MG/ML
4 INJECTION INTRAMUSCULAR; INTRAVENOUS EVERY 6 HOURS PRN
Status: DISCONTINUED | OUTPATIENT
Start: 2024-05-19 | End: 2024-05-20

## 2024-05-19 RX ORDER — BISACODYL 10 MG
10 SUPPOSITORY, RECTAL RECTAL ONCE
Status: COMPLETED | OUTPATIENT
Start: 2024-05-19 | End: 2024-05-19

## 2024-05-19 RX ORDER — MAGNESIUM OXIDE 400 MG/1
400 TABLET ORAL EVERY 4 HOURS
Status: COMPLETED | OUTPATIENT
Start: 2024-05-19 | End: 2024-05-19

## 2024-05-19 RX ORDER — LORAZEPAM 2 MG/ML
0.5 INJECTION INTRAMUSCULAR
Status: DISCONTINUED | OUTPATIENT
Start: 2024-05-19 | End: 2024-05-20

## 2024-05-19 RX ORDER — ONDANSETRON 4 MG/1
4-8 TABLET, ORALLY DISINTEGRATING ORAL EVERY 6 HOURS PRN
Status: DISCONTINUED | OUTPATIENT
Start: 2024-05-19 | End: 2024-05-21 | Stop reason: HOSPADM

## 2024-05-19 RX ADMIN — BISACODYL 10 MG: 10 SUPPOSITORY RECTAL at 08:45

## 2024-05-19 RX ADMIN — ACETAMINOPHEN 975 MG: 325 TABLET ORAL at 01:05

## 2024-05-19 RX ADMIN — INSULIN ASPART 1 UNITS: 100 INJECTION, SOLUTION INTRAVENOUS; SUBCUTANEOUS at 18:15

## 2024-05-19 RX ADMIN — LORAZEPAM 0.5 MG: 2 INJECTION INTRAMUSCULAR; INTRAVENOUS at 10:29

## 2024-05-19 RX ADMIN — ONDANSETRON 4 MG: 2 INJECTION INTRAMUSCULAR; INTRAVENOUS at 07:45

## 2024-05-19 RX ADMIN — PROCHLORPERAZINE EDISYLATE 10 MG: 5 INJECTION INTRAMUSCULAR; INTRAVENOUS at 15:18

## 2024-05-19 RX ADMIN — HEPARIN SODIUM 5000 UNITS: 10000 INJECTION, SOLUTION INTRAVENOUS; SUBCUTANEOUS at 21:42

## 2024-05-19 RX ADMIN — PANTOPRAZOLE SODIUM 40 MG: 40 TABLET, DELAYED RELEASE ORAL at 06:36

## 2024-05-19 RX ADMIN — INSULIN ASPART 1 UNITS: 100 INJECTION, SOLUTION INTRAVENOUS; SUBCUTANEOUS at 12:40

## 2024-05-19 RX ADMIN — MAGNESIUM OXIDE TAB 400 MG (241.3 MG ELEMENTAL MG) 400 MG: 400 (241.3 MG) TAB at 05:44

## 2024-05-19 RX ADMIN — HEPARIN SODIUM 5000 UNITS: 10000 INJECTION, SOLUTION INTRAVENOUS; SUBCUTANEOUS at 03:43

## 2024-05-19 RX ADMIN — INSULIN ASPART 1 UNITS: 100 INJECTION, SOLUTION INTRAVENOUS; SUBCUTANEOUS at 08:43

## 2024-05-19 RX ADMIN — HYDROXYZINE HYDROCHLORIDE 25 MG: 25 TABLET ORAL at 08:44

## 2024-05-19 RX ADMIN — LORAZEPAM 0.5 MG: 2 INJECTION INTRAMUSCULAR; INTRAVENOUS at 21:38

## 2024-05-19 RX ADMIN — FUROSEMIDE 20 MG: 10 INJECTION, SOLUTION INTRAVENOUS at 12:40

## 2024-05-19 RX ADMIN — HEPARIN SODIUM 5000 UNITS: 10000 INJECTION, SOLUTION INTRAVENOUS; SUBCUTANEOUS at 12:41

## 2024-05-19 RX ADMIN — FUROSEMIDE 20 MG: 10 INJECTION, SOLUTION INTRAMUSCULAR; INTRAVENOUS at 01:05

## 2024-05-19 RX ADMIN — FUROSEMIDE 20 MG: 10 INJECTION, SOLUTION INTRAVENOUS at 17:23

## 2024-05-19 RX ADMIN — FUROSEMIDE 20 MG: 10 INJECTION, SOLUTION INTRAMUSCULAR; INTRAVENOUS at 06:36

## 2024-05-19 RX ADMIN — PROCHLORPERAZINE EDISYLATE 10 MG: 5 INJECTION INTRAMUSCULAR; INTRAVENOUS at 08:55

## 2024-05-19 RX ADMIN — PROCHLORPERAZINE EDISYLATE 10 MG: 5 INJECTION INTRAMUSCULAR; INTRAVENOUS at 21:51

## 2024-05-19 ASSESSMENT — ACTIVITIES OF DAILY LIVING (ADL)
ADLS_ACUITY_SCORE: 36

## 2024-05-19 NOTE — PLAN OF CARE
POD 5 - CABG x 3      Problem: Adult Inpatient Plan of Care  Goal: Plan of Care Review  Outcome: Progressing  Flowsheets (Taken 5/19/2024 1433)  Plan of Care Reviewed With:   patient   family  Overall Patient Progress: improving  Patient and sisterEnid, updated on POC at bedside by writer and extensively by CV surgery PA. Verbalized understanding. Anticipate discharge home with home care tomorrow if stable. Patient will need to be able to void spontaneously since Still removed this morning at 0600, nausea resolved, no cardiac arrythmias or any other issues overnight.   Patient has been having severe n/v all day today despite PRN IV Zofran, PRN IV Compazine, IV Ativan and suppository. Abd XR done this afternoon to r/o bowel obstruction, pending results. He did have a small BM this afternoon and passing gas, BS active; he states he feels a little bit better after but still having nausea, occasional gagging and hiccups. He was not able to tolerate any PO meds today d/t n/v, notified SUGAR Miguel via text page.       Problem: Cardiovascular Surgery  Goal: Effective Bowel Elimination  Outcome: Progressing  Intervention: Enhance Bowel Motility and Elimination  Recent Flowsheet Documentation  Taken 5/19/2024 0844 by Carolina Tee, RN  Bowel Motility Enhancement:   ambulation promoted   fluid intake encouraged   oral intake encouraged  Bowel Elimination Management:   relaxation techniques promoted   sitting position facilitated   suppository given   toileting offered  Goal: Fluid and Electrolyte Balance  Outcome: Progressing  Wt Readings from Last 3 Encounters:   05/19/24 79.6 kg (175 lb 6.4 oz)   05/12/24 78.9 kg (174 lb)   02/15/23 72.2 kg (159 lb 2.8 oz)   IV Lasix tid. LS clear, diminished, trace edema.      Goal: Blood Glucose Level Within Targeted Range  Outcome: Progressing   and 174, sliding scale Novolog coverage given.   Has not been able to eat/drink anything for breakfast or lunch d/t n/v. Sipping on  Cranberry juice now per patient request.     Goal: Nausea and Vomiting Relief  Outcome: Progressing  Intervention: Prevent or Manage Nausea and Vomiting  Recent Flowsheet Documentation  Taken 5/19/2024 0844 by Carolina Tee RN  Nausea/Vomiting Interventions:   antiemetic   cool cloth applied   nausea triggers minimized   slow deep breathing encouraged   stimuli minimized    Goal: Effective Urinary Elimination  Outcome: Progressing  Intervention: Monitor and Manage Urinary Retention  Recent Flowsheet Documentation  Taken 5/19/2024 0844 by Carolina Tee RN  Urinary Elimination Promotion:   frequent voiding encouraged   toileting offered   toileting scheduled   voiding relaxation promoted  Still removed at 0600. Has been up to the toilet frequently all morning but was not able to void anything. 1200 bladder scan showed >600cc of urine retention. Writer attempted to straight cath with standard kit, however, could not advance catheter past prostate d/t resistance. While waiting for coude catheter, patient up to the bathroom and was able to spontaneously void 800cc and had a small, loose BM. PVR 264cc. Will bladder scan in a few hours to keep track of retention. Has been receiving Flomax at [facility] since admission.          Goal Outcome Evaluation:      Plan of Care Reviewed With: patient, family    Overall Patient Progress: improvingOverall Patient Progress: improving

## 2024-05-19 NOTE — PLAN OF CARE
sc  Patient Name: Drew Monsivais   MRN: [id number]   Date of Admission: 5/12/2024    Procedure: Procedure(s):  CORONARY ARTERY BYPASS GRAFT TIMES THREE, WITH LEFT LEG ENDOSCOPIC VESSEL PROCUREMENT, LEFT RADIAL ARTERY HARVEST,  ANESTHESIA TRANSESOPHAGEAL ECHOCARDIOGRAM,  EPIAORTIC ULTRASOUND    Post Op day #:5    Subjective (Patient focus/Primary Problem for shift): sleep          Pain Goal0 Pain Rating0           Pain Medication/ Regime effective to reduce patient painyes    Objective (Physical assessment):           Rhythm: normal sinus rhythm            Bowel Activity: no if Yes indicate when: na          Bowel Medications: yes            Incision: healing well                  Epicardial Pacing Wires:  no            Patient Activity:           Up to chair for meals: no          Ambulation with RN x2 (Not including CR): no            Is patient in home clothes:no             Chest Tubes   Pleural: no Draining: no               Suction: no              Mediastinal: no Draining: no               Suction: no   Dressing Change Daily:no If No, why?after shower today his chest tube sites will all be open to air.                      Urinary Catheter: yes           Preventative WOC consult (need MD order): no       Assessment (Nursing primary shift focus): sleep    Plan (Patient Care Plan/focus): sleep    A&O x4. Slept well over Noc shift. Per orders the pt's ramos was removed at 0600. He can make his needs known. Call-light within reach. Will continue to monitor.     Mag was 1.9- replaced- Am Lolis fernandez.      Eunice Herbert RN   5/19/2024   3:52 AM

## 2024-05-19 NOTE — PLAN OF CARE
sc  Patient Name: Drew Monsivais   MRN: [id number]   Date of Admission: 5/12/2024    Procedure: Procedure(s):  CORONARY ARTERY BYPASS GRAFT TIMES THREE, WITH LEFT LEG ENDOSCOPIC VESSEL PROCUREMENT, LEFT RADIAL ARTERY HARVEST,  ANESTHESIA TRANSESOPHAGEAL ECHOCARDIOGRAM,  EPIAORTIC ULTRASOUND    Post Op day #:4    Subjective (Patient focus/Primary Problem for shift): anxiety control, pain control          Pain Goal0 Pain [id number]           Pain Medication/ Regime effective to reduce patient pain yes    Objective (Physical assessment):           Rhythm: normal sinus rhythm            Bowel Activity: no if Yes indicate when: na          Bowel Medications: yes            Incision: healing well                  Epicardial Pacing Wires:  no            Patient Activity:           Up to chair for meals: yes          Ambulation with RN x2 (Not including CR): yes            Is patient in home clothes:no             Chest Tubes   Pleural: no Draining: no               Suction: no              Mediastinal: no Draining: no               Suction: no   Dressing Change Daily:no If No, why? Covered but taking off tomorrow                     Urinary Catheter: yes           Preventative WOC consult (need MD order): no       Assessment (Nursing primary shift focus): walking, pain control    A&O x4. C/o 10/10 chest/incisional/ penis (catheter). PRN Oxy given x1 but pt promptly vomited after. PRN Zofran given x1. This was effective per pt. He didn't report any more pain, just feeling anxious. Will continue to monitor.       Eunice Herbert RN   5/18/2024   10:38 PM

## 2024-05-19 NOTE — PLAN OF CARE
Problem: Nausea and Vomiting  Goal: Nausea and Vomiting Relief  Outcome: Progressing  Intervention: Prevent and Manage Nausea and Vomiting  Recent Flowsheet Documentation  Taken 5/19/2024 1600 by Eunice Zendejas RN  Environmental Support: calm environment promoted     Problem: Pain Acute  Goal: Optimal Pain Control and Function  Intervention: Prevent or Manage Pain  Recent Flowsheet Documentation  Taken 5/19/2024 1600 by Eunice Zendejas RN  Sensory Stimulation Regulation: care clustered  Intervention: Optimize Psychosocial Wellbeing  Recent Flowsheet Documentation  Taken 5/19/2024 1600 by Eunice Zendejas RN  Supportive Measures: active listening utilized     Problem: Cardiovascular Surgery  Goal: Improved Activity Tolerance  Intervention: Optimize Tolerance for Activity  Recent Flowsheet Documentation  Taken 5/19/2024 1600 by Eunice Zendejas RN  Environmental Support: calm environment promoted     Problem: Adult Inpatient Plan of Care  Goal: Readiness for Transition of Care  Outcome: Progressing   Goal Outcome Evaluation:      A&O x4. C/o 3/10 incisional chest pain, and abdominal discomfort. He refused all oral meds this evening d/t N/V. The pt had tried eating after getting Compazine around 1500-promptly vomited. At 2150, the pt had another episode of N/V, shortly after receiving ativan for anxiety. At supper time the pt sipped on some chicken broth and cranberry juice. The pt voided 175 mL At 1745 and was BS for 400. Per voiding protocol the pt should be straight cathed if PVR is >300, but the pt asked for a little more time to try to go. At 1921 the pt voided 500 mL and was BS for 198. Will continue to monitor.     Abdominal XR showed no evidence of bowel obstruction- see report.                          [Other: ____] : [unfilled] [FreeTextEntry1] : María is a 77-year-old female with history of HTN, HL, AF on Xarelto, obesity, bilateral CATY and TKA, sedentary lifestyle, plantars fasciitis.  No history of CAD, MI, revascularization, VHD, CHF, TIA, CVA, diabetes, PVD, DVT, PE.  Patient has dyspnea with moderate exertion.  Cardiovascular review of symptoms is negative for exertional chest pain, palpitations, dizziness or syncope.  No PND or orthopnea leg edema.  No bleeding or black stool.  No exercise routine.  Patient is walking less than 5 minutes.  Lexiscan Myoview stress test May 2024 LVEF 79%, normal perfusion, nonischemic EKG response, no chest pain.  Echocardiogram May 2024 LVEF 63%, mild MR and TR, normal RVSP.  Carotid and abdominal ultrasound May 2024, mild nonobstructive plaque, proximal abdominal aorta 3.3 cm, CTA ordered.

## 2024-05-19 NOTE — PROGRESS NOTES
"Care Management Follow Up    Length of Stay (days): 7    Expected Discharge Date: 05/20/2024     Concerns to be Addressed:     Care progression   Patient plan of care discussed at interdisciplinary rounds: Yes    Anticipated Discharge Disposition:  Home with outpatient cardiac rehab     Anticipated Discharge Services:  NA  Anticipated Discharge DME:  JAREN    Patient/family educated on Medicare website which has current facility and service quality ratings:  NA  Education Provided on the Discharge Plan:  Yes per team  Patient/Family in Agreement with the Plan:  NA    Referrals Placed by CM/SW:  JAREN  Private pay costs discussed: Not applicable    Additional Information:  Patient's sister, Enid, met RNCM in the hallway in front of patient's room and asked what services is recommended for patient.  Informed Enid, currently OT rec home with outpatient cardiac rehab.    Social Hx: \"Lives alone in a townhouse with shared custody of his son. Works full time. Independent with ADLs and IADLs. Sister to transport at discharge.\"    RNCM to follow for medical progression, recommendations, and final discharge plan.     Gela Enriquez, RN     3331 Myriam from cardiothoracic called. Would like patient to go home with home care for RN/PT/OT. Myriam already spoke with patient and family. They agreed.    Referral sent    Rec'd referral from OT with discharge rec for Transitional Care.  Spoke with patient's sister, Enid, and she declined. Patient is in the shower. She prefer home with home care.    Paged and left a message for Traci STUBBS OT  "

## 2024-05-19 NOTE — PROGRESS NOTES
"CVTS Daily Progress Note   POD#5 s/p CABG x3 (LIMA to LAD, left RA to OM, rSVG to RPDA), endoscopic left radial artery harvest, LLE EVH)  Attending: Doe  LOS: 7    SUBJECTIVE/INTERVAL EVENTS:    No acute events overnight. Extremely anxious this AM. Also with nausea. Patient progressing overall well otherwise. Maintaining oxygen saturations on room air. Normotensive. Up to chair this AM and working with therapy. - BM / + flatus. UOP adequate. Still removed this AM for TOV. Patient denies new chest pain, shortness of breath, abdominal pain, calf pain, nausea. Patient denies questions this AM.    OBJECTIVE:  Temp:  [98.1  F (36.7  C)-98.8  F (37.1  C)] 98.1  F (36.7  C)  Pulse:  [66-87] 87  Resp:  [18-20] 18  BP: (105-142)/(60-87) 139/80  SpO2:  [92 %-96 %] 96 %  Vitals:    05/15/24 0000 05/16/24 0645 05/17/24 0541 05/18/24 0516   Weight: 81.4 kg (179 lb 7.3 oz) 83.4 kg (183 lb 14.4 oz) 81.5 kg (179 lb 9.6 oz) 81.6 kg (179 lb 12.8 oz)    05/19/24 0500   Weight: 79.6 kg (175 lb 6.4 oz)         Clinically Significant Risk Factors              # Hypoalbuminemia: Lowest albumin = 3.2 g/dL at 5/14/2024  5:31 PM, will monitor as appropriate       # Hypertension: Noted on problem list       # DMII: A1C = 9.3 % (Ref range: <5.7 %) within past 6 months   # Overweight: Estimated body mass index is 27.47 kg/m  as calculated from the following:    Height as of this encounter: 1.702 m (5' 7\").    Weight as of this encounter: 79.6 kg (175 lb 6.4 oz).      # Financial/Environmental Concerns: none   # History of CABG: noted on surgical history           Current Medications:    Scheduled Meds:  Current Facility-Administered Medications   Medication Dose Route Frequency Provider Last Rate Last Admin    acetaminophen (TYLENOL) tablet 975 mg  975 mg Oral Q6H Myriam Streeter PA-C   975 mg at 05/19/24 0105    amLODIPine (NORVASC) tablet 2.5 mg  2.5 mg Oral Daily Shirley Lebron PA-C   2.5 mg at 05/18/24 0828    aspirin (ASA) " chewable tablet 81 mg  81 mg Oral or NG Tube Daily Shirley Lebron PA-C   81 mg at 05/18/24 0828    atorvastatin (LIPITOR) tablet 80 mg  80 mg Oral QPM Shirley Lebron PA-C   80 mg at 05/18/24 2023    busPIRone (BUSPAR) tablet 10 mg  10 mg Oral Daily Shirley Lebron PA-C   10 mg at 05/18/24 0829    clopidogrel (PLAVIX) tablet 75 mg  75 mg Oral Daily Shirley Lebron PA-C   75 mg at 05/18/24 0829    FLUoxetine (PROzac) capsule 20 mg  20 mg Oral Daily Shirley Lebron PA-C   20 mg at 05/18/24 0829    furosemide (LASIX) injection 20 mg  20 mg Intravenous TID Myriam Streeter PA-C        glipiZIDE (GLUCOTROL XL) 24 hr tablet 5 mg  5 mg Oral BID Shirley Lebron PA-C   5 mg at 05/18/24 2140    heparin ANTICOAGULANT injection 5,000 Units  5,000 Units Subcutaneous Q8H Shirley Lebron PA-C   5,000 Units at 05/19/24 0343    insulin aspart (NovoLOG) injection (RAPID ACTING)  1-7 Units Subcutaneous TID AC Shirley Lebron PA-C   1 Units at 05/18/24 1814    insulin aspart (NovoLOG) injection (RAPID ACTING)  1-5 Units Subcutaneous At Bedtime Shirley Lebron PA-C   1 Units at 05/17/24 2225    Lidocaine (LIDOCARE) 4 % Patch 1-2 patch  1-2 patch Transdermal Q24H Shirley Lebron PA-C   1 patch at 05/18/24 1737    magnesium oxide (MAG-OX) tablet 400 mg  400 mg Oral Q4H Tuyet Azar MD   400 mg at 05/19/24 0544    metoprolol tartrate (LOPRESSOR) half-tab 12.5 mg  12.5 mg Oral TID Shirley Lebron PA-C   12.5 mg at 05/18/24 1730    metoprolol tartrate (LOPRESSOR) tablet 25 mg  25 mg Oral BID Shirley Lebron PA-C   25 mg at 05/18/24 2022    pantoprazole (PROTONIX) 2 mg/mL suspension 40 mg  40 mg Oral or NG Tube QAM AC Shirley Lebron PA-C        Or    pantoprazole (PROTONIX) EC tablet 40 mg  40 mg Oral QAM  Shirley Lebron PA-C   40 mg at 05/19/24 0636    polyethylene glycol (MIRALAX) Packet 17 g  17 g Oral Daily Shirley Lebron PA-C   17 g at  05/18/24 0830    potassium chloride ariel ER (KLOR-CON M20) CR tablet 20 mEq  20 mEq Oral Once Tuyet Azar MD        senna-docusate (SENOKOT-S/PERICOLACE) 8.6-50 MG per tablet 1 tablet  1 tablet Oral BID Shirley Lebron PA-C   1 tablet at 05/18/24 2022    tamsulosin (FLOMAX) capsule 0.4 mg  0.4 mg Oral QPM Shirley Lebron PA-C   0.4 mg at 05/18/24 2022     Continuous Infusions:  Current Facility-Administered Medications   Medication Dose Route Frequency Provider Last Rate Last Admin     PRN Meds:.  Current Facility-Administered Medications   Medication Dose Route Frequency Provider Last Rate Last Admin    benzocaine-menthol (CHLORASEPTIC) 6-10 MG lozenge 1 lozenge  1 lozenge Buccal Q1H PRN Shirley Lebron PA-C        bisacodyl (DULCOLAX) suppository 10 mg  10 mg Rectal Daily PRN Shirley Lebron PA-C        busPIRone (BUSPAR) tablet 10 mg  10 mg Oral Daily PRN Shirley Lebron PA-C   10 mg at 05/18/24 2023    calcium carbonate (TUMS) chewable tablet 1,000 mg  1,000 mg Oral 4x Daily PRN Shirley Lebron PA-C   1,000 mg at 05/18/24 1018    calcium gluconate 1 g in 50 mL in sodium chloride intermittent infusion  1 g Intravenous Once PRN Shirley Lebron PA-C   1 g at 05/15/24 0550    calcium gluconate 2 g in  mL intermittent infusion  2 g Intravenous Once PRN Shirley Lebron PA-C        calcium gluconate 3 g in sodium chloride 0.9 % 100 mL intermittent infusion  3 g Intravenous Once PRN Shirley Lebron PA-C        glucose gel 15-30 g  15-30 g Oral Q15 Min PRN Shirley Lebron PA-C        Or    dextrose 50 % injection 25-50 mL  25-50 mL Intravenous Q15 Min PRN Shirley Lebron PA-C        Or    glucagon injection 1 mg  1 mg Subcutaneous Q15 Min PRN Shirley Lebron PA-C        hydrALAZINE (APRESOLINE) injection 10 mg  10 mg Intravenous Q30 Min PRN Shirley Lebron PA-C        hydrOXYzine HCl (ATARAX) tablet 25 mg  25 mg Oral Q6H PRN Bernard[place],  Shirley GUERRERO PA-C   25 mg at 05/17/24 2225    magnesium hydroxide (MILK OF MAGNESIA) suspension 30 mL  30 mL Oral Daily PRN Shirley Lebron PA-C   30 mL at 05/18/24 0830    methocarbamol (ROBAXIN) tablet 500 mg  500 mg Oral 4x Daily PRN Shirley Lebron PA-C   500 mg at 05/18/24 1730    naloxone (NARCAN) injection 0.2 mg  0.2 mg Intravenous Q2 Min PRN Shirley Lebron PA-C        Or    naloxone (NARCAN) injection 0.4 mg  0.4 mg Intravenous Q2 Min PRN Shirley Lebron PA-C        Or    naloxone (NARCAN) injection 0.2 mg  0.2 mg Intramuscular Q2 Min PRN Shirley Lebron PA-C        Or    naloxone (NARCAN) injection 0.4 mg  0.4 mg Intramuscular Q2 Min PRN Shirley Lebron PA-C        ondansetron (ZOFRAN ODT) ODT tab 4 mg  4 mg Oral Q6H PRN Shirley Lebron PA-C   4 mg at 05/17/24 2230    Or    ondansetron (ZOFRAN) injection 4 mg  4 mg Intravenous Q6H PRN Shirley Lebron PA-C   4 mg at 05/19/24 0745    oxyCODONE IR (ROXICODONE) half-tab 2.5 mg  2.5 mg Oral Q4H PRN Shirley Lebron PA-C        Or    oxyCODONE (ROXICODONE) tablet 5 mg  5 mg Oral Q4H PRN Shirley Lebron PA-C   5 mg at 05/18/24 2215    prochlorperazine (COMPAZINE) injection 10 mg  10 mg Intravenous Q6H PRN Shirley Lebron PA-C        Or    prochlorperazine (COMPAZINE) tablet 10 mg  10 mg Oral Q6H PRN Shirley Lebron PA-C   10 mg at 05/18/24 0511       Cardiographics:    Telemetry monitoring demonstrates NSR with rates in the 70s per my personal review.    Imaging:  Results for orders placed or performed during the hospital encounter of 05/12/24   US Carotid Bilateral    Impression    IMPRESSION:  1.  Mild plaque formation, velocities consistent with less than 50% stenosis in the right internal carotid artery.  2.  Mild plaque formation, velocities consistent with less than 50% stenosis in the left internal carotid artery.  3.  Flow within the vertebral arteries is antegrade.   XR Chest Port 1  View    Impression    IMPRESSION: Poststernotomy and coronary artery bypass grafting. Mediastinal and bilateral pleural drains remain in position. Endotracheal tube in satisfactory position terminating 6.8 cm above the marie. Right internal jugular venous [place]-Jasmin catheter   tip in the right ventricular outflow tract/main pulmonary artery. Radiopaque marker for the intra-aortic balloon pump is located in the proximal descending thoracic aorta. Mild left basilar atelectasis. No significant pleural fluid. No visible   pneumothorax. Stable cardiac silhouette. Normal pulmonary vascularity..   XR Chest Port 1 View    Impression    IMPRESSION: Endotracheal tube resides over the mid tracheal air column. Right IJ [place]-Jasmin catheter with the tip over the main pulmonary artery. Bilateral chest tubes and mediastinal drain. No pneumothorax. Linear atelectasis right lung. New sternotomy   wires and surgical clips. Osseous structures otherwise intact.   Echocardiogram Complete   Result Value Ref Range    LVEF  45-50% (mildly reduced)        Labs, personally reviewed.  Hemoglobin   Date Value Ref Range Status   05/18/2024 9.5 (L) 13.3 - 17.7 g/dL Final   05/17/2024 9.0 (L) 13.3 - 17.7 g/dL Final   05/16/2024 10.1 (L) 13.3 - 17.7 g/dL Final     WBC Count   Date Value Ref Range Status   05/18/2024 8.0 4.0 - 11.0 10e3/uL Final   05/17/2024 7.5 4.0 - 11.0 10e3/uL Final   05/16/2024 11.9 (H) 4.0 - 11.0 10e3/uL Final     Platelet Count   Date Value Ref Range Status   05/18/2024 172 150 - 450 10e3/uL Final   05/17/2024 114 (L) 150 - 450 10e3/uL Final   05/16/2024 128 (L) 150 - 450 10e3/uL Final     Creatinine   Date Value Ref Range Status   05/19/2024 1.21 (H) 0.67 - 1.17 mg/dL Final   05/18/2024 1.26 (H) 0.67 - 1.17 mg/dL Final   05/17/2024 1.40 (H) 0.67 - 1.17 mg/dL Final     Potassium   Date Value Ref Range Status   05/19/2024 3.8 3.4 - 5.3 mmol/L Final   05/18/2024 4.0 3.4 - 5.3 mmol/L Final   05/17/2024 4.6 3.4 - 5.3 mmol/L Final    02/18/2023 3.8 3.5 - 5.0 mmol/L Final   02/17/2023 4.0 3.5 - 5.0 mmol/L Final   02/16/2023 4.3 3.5 - 5.0 mmol/L Final     Potassium POCT   Date Value Ref Range Status   05/14/2024 3.6 3.4 - 5.3 mmol/L Final   05/14/2024 4.1 3.4 - 5.3 mmol/L Final   05/14/2024 5.1 3.4 - 5.3 mmol/L Final     Magnesium   Date Value Ref Range Status   05/19/2024 1.9 1.7 - 2.3 mg/dL Final   05/18/2024 2.1 1.7 - 2.3 mg/dL Final   05/17/2024 2.0 1.7 - 2.3 mg/dL Final          I/O:  I/O last 3 completed shifts:  In: 900 [P.O.:900]  Out: 3350 [Urine:3350]       Physical Exam:    General: Patient seen up in chair this AM. Anxious.  HEENT: REBEKA, no sclera icterus, moist mucosa  CV: RRR on monitor. 2+ peripheral pulses in all extremities. Mild edema.   Pulm: Non-labored effort on room air. Incision C/D/I.  Abd: Soft, NT, ND  Ext: Mild pedal edema, SCDs in place, warm, distal pulses intact. IABP site C/D/I w/o hematoma, ecchymosis, or edema.  Neuro: BAZAN, follows commands, A&Ox3      ASSESSMENT/PLAN:    Drew Monsivais is a 53 year old male with a history of CAD, MARY JANE, HTN, HLD, anxiety, and DM2 who is s/p CABG x3 w/ endoscopic radial harvest.    Principal Problem:    NSTEMI (non-ST elevated myocardial infarction) (H)  CAD  HTN  HLD  Type 2 DM  Acute Respiratory Failure  Acute on chronic anxiety      NEURO:   - Scheduled Tylenol/lidocaine patches and PRN Tylenol/oxycodone for pain  - PRN Robaxin  - No toradol given intermittent Cr bumps  - Minimize narcotics as able as patient gets disoriented when receiving multiple medications  - Narcan available PRN  - PTA Prozac, 20mg qd, Buspar 10mg qd, and 10mg qd PRN   - Holding PA Trazodone given drowsiness      CV:   - Pre-op EF 45-50%  - IABP removed POD#1  - Normotensive  - Metoprolol 25 BID w/ PRN available  - Norvasc 2.5 mg qday  - ASA 81 mg (decreased due to Plavix)  - Plavix per surgeon preference for graft patency in s/o NSTEMI (ASA)  - Atorvastatin 80mg daily   - Chest tubes/TPW removed  5/17    PULM:   - Extubated POD#1  - Maintaining oxygen saturations on room air  - Encourage pulmonary toilet    FEN/GI:  - Continue electrolyte replacement protocol  - Diet: ADAT to Cardiac, <2g Na+, Consistent Carb  - Bowel regimen, advancing today    RENAL:  - Adequate UOP/hr. Continue to monitor closely.  - Cr 1.21 this AM, baseline 1.1-1.4. Monitor w/ BMP  - Still removed this AM for trial of voiding  - Diuresis w/ 20mg IV Lasix TID  - PTA Flomax    HEME:  - Acute blood loss anemia post-op.   - No bleeding concerns. Hep SQ, ASA    ID:  - Jill op ppx complete, afebrile. No concerns for infection at this time.   - Reactive leukocytosis, resolved.    ENDO:   - HbA1c 9.3%  - BG goal < 180 to promote optimal healing  - Medium SSI  - PTA Glipizide     PPx:   - DVT: SCDs, SQ heparin TID, ambulation   - GI: Protonix 40mg PO daily (PTA med)    DISPO:   - Continue general tele care (Transferred POD#2)  - PT/OT recommending home w/ outpt cardiac rehab vs TCU pending progression  Medically Ready for Discharge: 1-2 days.      Patient discussed with Dr. Rosa.        _______  Myriam Streeter PA-C  Cardiothoracic Surgery  [phone]

## 2024-05-20 ENCOUNTER — APPOINTMENT (OUTPATIENT)
Dept: OCCUPATIONAL THERAPY | Facility: HOSPITAL | Age: 54
DRG: 233 | End: 2024-05-20
Attending: INTERNAL MEDICINE
Payer: COMMERCIAL

## 2024-05-20 ENCOUNTER — APPOINTMENT (OUTPATIENT)
Dept: RADIOLOGY | Facility: HOSPITAL | Age: 54
DRG: 233 | End: 2024-05-20
Payer: COMMERCIAL

## 2024-05-20 LAB
ANION GAP SERPL CALCULATED.3IONS-SCNC: 13 MMOL/L (ref 7–15)
BUN SERPL-MCNC: 16.7 MG/DL (ref 6–20)
CA-I BLD-MCNC: 4.5 MG/DL (ref 4.4–5.2)
CALCIUM SERPL-MCNC: 9.7 MG/DL (ref 8.6–10)
CHLORIDE SERPL-SCNC: 92 MMOL/L (ref 98–107)
CREAT SERPL-MCNC: 1.28 MG/DL (ref 0.67–1.17)
DEPRECATED HCO3 PLAS-SCNC: 32 MMOL/L (ref 22–29)
EGFRCR SERPLBLD CKD-EPI 2021: 66 ML/MIN/1.73M2
GLUCOSE BLDC GLUCOMTR-MCNC: 200 MG/DL (ref 70–99)
GLUCOSE BLDC GLUCOMTR-MCNC: 229 MG/DL (ref 70–99)
GLUCOSE BLDC GLUCOMTR-MCNC: 240 MG/DL (ref 70–99)
GLUCOSE BLDC GLUCOMTR-MCNC: 330 MG/DL (ref 70–99)
GLUCOSE SERPL-MCNC: 182 MG/DL (ref 70–99)
MAGNESIUM SERPL-MCNC: 1.9 MG/DL (ref 1.7–2.3)
PHOSPHATE SERPL-MCNC: 4.3 MG/DL (ref 2.5–4.5)
POTASSIUM SERPL-SCNC: 3.9 MMOL/L (ref 3.4–5.3)
SODIUM SERPL-SCNC: 137 MMOL/L (ref 135–145)

## 2024-05-20 PROCEDURE — 250N000013 HC RX MED GY IP 250 OP 250 PS 637

## 2024-05-20 PROCEDURE — 210N000001 HC R&B IMCU HEART CARE

## 2024-05-20 PROCEDURE — 250N000013 HC RX MED GY IP 250 OP 250 PS 637: Performed by: STUDENT IN AN ORGANIZED HEALTH CARE EDUCATION/TRAINING PROGRAM

## 2024-05-20 PROCEDURE — 36415 COLL VENOUS BLD VENIPUNCTURE: CPT

## 2024-05-20 PROCEDURE — 97110 THERAPEUTIC EXERCISES: CPT | Mod: GO

## 2024-05-20 PROCEDURE — 97535 SELF CARE MNGMENT TRAINING: CPT | Mod: GO

## 2024-05-20 PROCEDURE — 250N000013 HC RX MED GY IP 250 OP 250 PS 637: Performed by: PHYSICIAN ASSISTANT

## 2024-05-20 PROCEDURE — 71046 X-RAY EXAM CHEST 2 VIEWS: CPT

## 2024-05-20 PROCEDURE — 250N000011 HC RX IP 250 OP 636: Performed by: PHYSICIAN ASSISTANT

## 2024-05-20 PROCEDURE — 83735 ASSAY OF MAGNESIUM: CPT | Performed by: STUDENT IN AN ORGANIZED HEALTH CARE EDUCATION/TRAINING PROGRAM

## 2024-05-20 PROCEDURE — 80048 BASIC METABOLIC PNL TOTAL CA: CPT

## 2024-05-20 PROCEDURE — 84100 ASSAY OF PHOSPHORUS: CPT | Performed by: STUDENT IN AN ORGANIZED HEALTH CARE EDUCATION/TRAINING PROGRAM

## 2024-05-20 PROCEDURE — 82330 ASSAY OF CALCIUM: CPT

## 2024-05-20 PROCEDURE — 250N000011 HC RX IP 250 OP 636

## 2024-05-20 RX ORDER — MAGNESIUM OXIDE 400 MG/1
400 TABLET ORAL EVERY 4 HOURS
Qty: 2 TABLET | Refills: 0 | Status: COMPLETED | OUTPATIENT
Start: 2024-05-20 | End: 2024-05-20

## 2024-05-20 RX ORDER — TRAZODONE HYDROCHLORIDE 50 MG/1
150 TABLET, FILM COATED ORAL AT BEDTIME
Status: DISCONTINUED | OUTPATIENT
Start: 2024-05-20 | End: 2024-05-21 | Stop reason: HOSPADM

## 2024-05-20 RX ORDER — FUROSEMIDE 20 MG
20 TABLET ORAL DAILY
Status: DISCONTINUED | OUTPATIENT
Start: 2024-05-21 | End: 2024-05-21 | Stop reason: HOSPADM

## 2024-05-20 RX ADMIN — INSULIN ASPART 3 UNITS: 100 INJECTION, SOLUTION INTRAVENOUS; SUBCUTANEOUS at 07:46

## 2024-05-20 RX ADMIN — CLOPIDOGREL BISULFATE 75 MG: 75 TABLET ORAL at 09:19

## 2024-05-20 RX ADMIN — GLIPIZIDE 5 MG: 5 TABLET, FILM COATED, EXTENDED RELEASE ORAL at 09:19

## 2024-05-20 RX ADMIN — ATORVASTATIN CALCIUM 80 MG: 40 TABLET, FILM COATED ORAL at 20:21

## 2024-05-20 RX ADMIN — METOPROLOL TARTRATE 25 MG: 25 TABLET, FILM COATED ORAL at 09:19

## 2024-05-20 RX ADMIN — SENNOSIDES AND DOCUSATE SODIUM 1 TABLET: 8.6; 5 TABLET ORAL at 09:19

## 2024-05-20 RX ADMIN — HEPARIN SODIUM 5000 UNITS: 10000 INJECTION, SOLUTION INTRAVENOUS; SUBCUTANEOUS at 04:03

## 2024-05-20 RX ADMIN — ACETAMINOPHEN 975 MG: 325 TABLET ORAL at 20:16

## 2024-05-20 RX ADMIN — METOPROLOL TARTRATE 12.5 MG: 25 TABLET, FILM COATED ORAL at 04:02

## 2024-05-20 RX ADMIN — ASPIRIN 81 MG CHEWABLE TABLET 81 MG: 81 TABLET CHEWABLE at 09:18

## 2024-05-20 RX ADMIN — AMLODIPINE BESYLATE 2.5 MG: 2.5 TABLET ORAL at 09:19

## 2024-05-20 RX ADMIN — ACETAMINOPHEN 975 MG: 325 TABLET ORAL at 14:04

## 2024-05-20 RX ADMIN — FLUOXETINE HYDROCHLORIDE 20 MG: 20 CAPSULE ORAL at 09:18

## 2024-05-20 RX ADMIN — HEPARIN SODIUM 5000 UNITS: 10000 INJECTION, SOLUTION INTRAVENOUS; SUBCUTANEOUS at 11:22

## 2024-05-20 RX ADMIN — GLIPIZIDE 5 MG: 5 TABLET, FILM COATED, EXTENDED RELEASE ORAL at 20:26

## 2024-05-20 RX ADMIN — BUSPIRONE HYDROCHLORIDE 10 MG: 10 TABLET ORAL at 09:19

## 2024-05-20 RX ADMIN — PANTOPRAZOLE SODIUM 40 MG: 40 TABLET, DELAYED RELEASE ORAL at 07:03

## 2024-05-20 RX ADMIN — OXYCODONE HYDROCHLORIDE 2.5 MG: 5 TABLET ORAL at 18:11

## 2024-05-20 RX ADMIN — POLYETHYLENE GLYCOL 3350 17 G: 17 POWDER, FOR SOLUTION ORAL at 09:26

## 2024-05-20 RX ADMIN — HEPARIN SODIUM 5000 UNITS: 10000 INJECTION, SOLUTION INTRAVENOUS; SUBCUTANEOUS at 20:22

## 2024-05-20 RX ADMIN — FUROSEMIDE 20 MG: 10 INJECTION, SOLUTION INTRAVENOUS at 07:04

## 2024-05-20 RX ADMIN — METOPROLOL TARTRATE 25 MG: 25 TABLET, FILM COATED ORAL at 20:21

## 2024-05-20 RX ADMIN — ONDANSETRON 4 MG: 4 TABLET, ORALLY DISINTEGRATING ORAL at 16:13

## 2024-05-20 RX ADMIN — MAGNESIUM OXIDE TAB 400 MG (241.3 MG ELEMENTAL MG) 400 MG: 400 (241.3 MG) TAB at 07:46

## 2024-05-20 RX ADMIN — LIDOCAINE 2 PATCH: 4 PATCH TOPICAL at 18:13

## 2024-05-20 RX ADMIN — SENNOSIDES AND DOCUSATE SODIUM 1 TABLET: 8.6; 5 TABLET ORAL at 20:22

## 2024-05-20 RX ADMIN — TAMSULOSIN HYDROCHLORIDE 0.4 MG: 0.4 CAPSULE ORAL at 20:20

## 2024-05-20 RX ADMIN — MAGNESIUM OXIDE TAB 400 MG (241.3 MG ELEMENTAL MG) 400 MG: 400 (241.3 MG) TAB at 11:22

## 2024-05-20 RX ADMIN — ACETAMINOPHEN 975 MG: 325 TABLET ORAL at 07:46

## 2024-05-20 RX ADMIN — TRAZODONE HYDROCHLORIDE 150 MG: 50 TABLET ORAL at 20:23

## 2024-05-20 ASSESSMENT — ACTIVITIES OF DAILY LIVING (ADL)
ADLS_ACUITY_SCORE: 36

## 2024-05-20 NOTE — PROGRESS NOTES
"CVTS Daily Progress Note   POD#6 s/p CABG x3 (LIMA to LAD, left RA to OM, rSVG to RPDA), endoscopic left radial artery harvest, LLE EVH)  Attending: Doe  LOS: 8    SUBJECTIVE/INTERVAL EVENTS:    No acute events overnight. Less anxious this AM. Nausea improved. Patient progressing overall well otherwise. Maintaining oxygen saturations on room air. Normotensive. Up to chair this AM and working with therapy. - BM / + flatus. UOP adequate, needed straight cath this AM although has intermittently voided. Patient denies new chest pain, shortness of breath, abdominal pain, calf pain, nausea. Patient denies questions this AM.    OBJECTIVE:  Temp:  [98  F (36.7  C)-98.8  F (37.1  C)] 98.6  F (37  C)  Pulse:  [] 84  Resp:  [18-20] 20  BP: (116-168)/(68-92) 156/83  SpO2:  [92 %-96 %] 94 %  Vitals:    05/16/24 0645 05/17/24 0541 05/18/24 0516 05/19/24 0500   Weight: 83.4 kg (183 lb 14.4 oz) 81.5 kg (179 lb 9.6 oz) 81.6 kg (179 lb 12.8 oz) 79.6 kg (175 lb 6.4 oz)    05/20/24 0356   Weight: 76.1 kg (167 lb 11.2 oz)         Clinically Significant Risk Factors              # Hypoalbuminemia: Lowest albumin = 3.2 g/dL at 5/14/2024  5:31 PM, will monitor as appropriate       # Hypertension: Noted on problem list       # DMII: A1C = 9.3 % (Ref range: <5.7 %) within past 6 months   # Overweight: Estimated body mass index is 26.27 kg/m  as calculated from the following:    Height as of this encounter: 1.702 m (5' 7\").    Weight as of this encounter: 76.1 kg (167 lb 11.2 oz).      # Financial/Environmental Concerns: none   # History of CABG: noted on surgical history           Current Medications:    Scheduled Meds:  Current Facility-Administered Medications   Medication Dose Route Frequency Provider Last Rate Last Admin    acetaminophen (TYLENOL) tablet 975 mg  975 mg Oral Q6H Myriam Streeter PA-C   975 mg at 05/20/24 0746    amLODIPine (NORVASC) tablet 2.5 mg  2.5 mg Oral Daily Shirley Lberon PA-C   2.5 mg at " 05/18/24 0828    aspirin (ASA) chewable tablet 81 mg  81 mg Oral or NG Tube Daily Shirley Lebron PA-C   81 mg at 05/18/24 0828    atorvastatin (LIPITOR) tablet 80 mg  80 mg Oral QPM Shirley Lebron PA-C   80 mg at 05/18/24 2023    busPIRone (BUSPAR) tablet 10 mg  10 mg Oral Daily Shirley Lebron PA-C   10 mg at 05/18/24 0829    clopidogrel (PLAVIX) tablet 75 mg  75 mg Oral Daily Shirley Lebron PA-C   75 mg at 05/18/24 0829    FLUoxetine (PROzac) capsule 20 mg  20 mg Oral Daily Shirley Lebron PA-C   20 mg at 05/18/24 0829    furosemide (LASIX) injection 20 mg  20 mg Intravenous TID Myriam Streeter PA-C   20 mg at 05/20/24 0704    glipiZIDE (GLUCOTROL XL) 24 hr tablet 5 mg  5 mg Oral BID Shirley Lebron PA-C   5 mg at 05/18/24 2140    heparin ANTICOAGULANT injection 5,000 Units  5,000 Units Subcutaneous Q8H Shirley Lebron PA-C   5,000 Units at 05/20/24 0403    insulin aspart (NovoLOG) injection (RAPID ACTING)  1-7 Units Subcutaneous TID AC Shirley Lebron PA-C   3 Units at 05/20/24 0746    insulin aspart (NovoLOG) injection (RAPID ACTING)  1-5 Units Subcutaneous At Bedtime Shirley Lebron PA-C   1 Units at 05/17/24 2225    Lidocaine (LIDOCARE) 4 % Patch 1-2 patch  1-2 patch Transdermal Q24H Shirley Lebron PA-C   1 patch at 05/18/24 1737    magnesium oxide (MAG-OX) tablet 400 mg  400 mg Oral Q4H Tuyet Azar MD   400 mg at 05/20/24 0746    metoprolol tartrate (LOPRESSOR) half-tab 12.5 mg  12.5 mg Oral TID Shirley Lebron PA-C   12.5 mg at 05/20/24 0402    metoprolol tartrate (LOPRESSOR) tablet 25 mg  25 mg Oral BID Shirley Lebron PA-C   25 mg at 05/18/24 2022    pantoprazole (PROTONIX) 2 mg/mL suspension 40 mg  40 mg Oral or NG Tube QAM AC Shirley Lebron PA-C        Or    pantoprazole (PROTONIX) EC tablet 40 mg  40 mg Oral QATORI  Shirley Lebron PA-C   40 mg at 05/20/24 0703    polyethylene glycol (MIRALAX) Packet 17 g   17 g Oral Daily Shirley Lebron PA-C   17 g at 05/18/24 0830    senna-docusate (SENOKOT-S/PERICOLACE) 8.6-50 MG per tablet 1 tablet  1 tablet Oral BID Shirley Lebron PA-C   1 tablet at 05/18/24 2022    tamsulosin (FLOMAX) capsule 0.4 mg  0.4 mg Oral QPM Shirley Lebron PA-C   0.4 mg at 05/18/24 2022     Continuous Infusions:  Current Facility-Administered Medications   Medication Dose Route Frequency Provider Last Rate Last Admin     PRN Meds:.  Current Facility-Administered Medications   Medication Dose Route Frequency Provider Last Rate Last Admin    benzocaine-menthol (CHLORASEPTIC) 6-10 MG lozenge 1 lozenge  1 lozenge Buccal Q1H PRN Shirley Lebron PA-C        bisacodyl (DULCOLAX) suppository 10 mg  10 mg Rectal Daily PRN Shirley Lebron PA-C        busPIRone (BUSPAR) tablet 10 mg  10 mg Oral Daily PRN Shirley Lebron PA-C   10 mg at 05/18/24 2023    calcium carbonate (TUMS) chewable tablet 1,000 mg  1,000 mg Oral 4x Daily PRN Shirley Lebron PA-C   1,000 mg at 05/18/24 1018    calcium gluconate 1 g in 50 mL in sodium chloride intermittent infusion  1 g Intravenous Once PRN Shirley Lebron PA-C   1 g at 05/15/24 0550    calcium gluconate 2 g in  mL intermittent infusion  2 g Intravenous Once PRN Shirley Lebron PA-C        calcium gluconate 3 g in sodium chloride 0.9 % 100 mL intermittent infusion  3 g Intravenous Once PRN Shirley Lebron PA-C        glucose gel 15-30 g  15-30 g Oral Q15 Min PRN Shirley Lebron PA-C        Or    dextrose 50 % injection 25-50 mL  25-50 mL Intravenous Q15 Min PRN Shirley Lebron PA-C        Or    glucagon injection 1 mg  1 mg Subcutaneous Q15 Min PRN Shirley Lebron PA-C        hydrALAZINE (APRESOLINE) injection 10 mg  10 mg Intravenous Q30 Min PRN Shirley Lebron PA-C        hydrOXYzine HCl (ATARAX) tablet 25 mg  25 mg Oral Q6H PRN Shirley Lebron PA-C   25 mg at 05/19/24 0844     LORazepam (ATIVAN) injection 0.5 mg  0.5 mg Intravenous Once May Repeat x1 Myriam Streeter PA-C   0.5 mg at 05/19/24 2138    magnesium hydroxide (MILK OF MAGNESIA) suspension 30 mL  30 mL Oral Daily PRN Shirley Lebron PA-C   30 mL at 05/18/24 0830    methocarbamol (ROBAXIN) tablet 500 mg  500 mg Oral 4x Daily PRN Shirley Lebron PA-C   500 mg at 05/18/24 1730    naloxone (NARCAN) injection 0.2 mg  0.2 mg Intravenous Q2 Min PRN Shirley Lebron PA-C        Or    naloxone (NARCAN) injection 0.4 mg  0.4 mg Intravenous Q2 Min PRN Shirley Lebron PA-C        Or    naloxone (NARCAN) injection 0.2 mg  0.2 mg Intramuscular Q2 Min PRN Shirley Lebron PA-C        Or    naloxone (NARCAN) injection 0.4 mg  0.4 mg Intramuscular Q2 Min PRN Shirley Lebron PA-C        ondansetron (ZOFRAN ODT) ODT tab 4-8 mg  4-8 mg Oral Q6H PRN Myriam Streeter PA-C        Or    ondansetron (ZOFRAN) injection 4 mg  4 mg Intravenous Q6H PRN Myriam Streeter PA-C        oxyCODONE IR (ROXICODONE) half-tab 2.5 mg  2.5 mg Oral Q4H PRN Shirley Lebron PA-C        Or    oxyCODONE (ROXICODONE) tablet 5 mg  5 mg Oral Q4H PRN Shirley Lebron PA-C   5 mg at 05/18/24 2215    prochlorperazine (COMPAZINE) injection 10 mg  10 mg Intravenous Q6H PRN Shirley Lebron PA-C   10 mg at 05/19/24 2151    Or    prochlorperazine (COMPAZINE) tablet 10 mg  10 mg Oral Q6H PRN Shirley Lebron PA-C   10 mg at 05/18/24 0511       Cardiographics:    Telemetry monitoring demonstrates NSR with rates in the 80s per my personal review.    Imaging:  Results for orders placed or performed during the hospital encounter of 05/12/24   US Carotid Bilateral    Impression    IMPRESSION:  1.  Mild plaque formation, velocities consistent with less than 50% stenosis in the right internal carotid artery.  2.  Mild plaque formation, velocities consistent with less than 50% stenosis in the left internal carotid  artery.  3.  Flow within the vertebral arteries is antegrade.   XR Chest Port 1 View    Impression    IMPRESSION: Poststernotomy and coronary artery bypass grafting. Mediastinal and bilateral pleural drains remain in position. Endotracheal tube in satisfactory position terminating 6.8 cm above the marie. Right internal jugular venous [place] catheter   tip in the right ventricular outflow tract/main pulmonary artery. Radiopaque marker for the intra-aortic balloon pump is located in the proximal descending thoracic aorta. Mild left basilar atelectasis. No significant pleural fluid. No visible   pneumothorax. Stable cardiac silhouette. Normal pulmonary vascularity..   XR Chest Port 1 View    Impression    IMPRESSION: Endotracheal tube resides over the mid tracheal air column. Right IJ [place]asmin catheter with the tip over the main pulmonary artery. Bilateral chest tubes and mediastinal drain. No pneumothorax. Linear atelectasis right lung. New sternotomy   wires and surgical clips. Osseous structures otherwise intact.   Echocardiogram Complete   Result Value Ref Range    LVEF  45-50% (mildly reduced)        Labs, personally reviewed.  Hemoglobin   Date Value Ref Range Status   05/18/2024 9.5 (L) 13.3 - 17.7 g/dL Final   05/17/2024 9.0 (L) 13.3 - 17.7 g/dL Final   05/16/2024 10.1 (L) 13.3 - 17.7 g/dL Final     WBC Count   Date Value Ref Range Status   05/18/2024 8.0 4.0 - 11.0 10e3/uL Final   05/17/2024 7.5 4.0 - 11.0 10e3/uL Final   05/16/2024 11.9 (H) 4.0 - 11.0 10e3/uL Final     Platelet Count   Date Value Ref Range Status   05/18/2024 172 150 - 450 10e3/uL Final   05/17/2024 114 (L) 150 - 450 10e3/uL Final   05/16/2024 128 (L) 150 - 450 10e3/uL Final     Creatinine   Date Value Ref Range Status   05/20/2024 1.28 (H) 0.67 - 1.17 mg/dL Final   05/19/2024 1.21 (H) 0.67 - 1.17 mg/dL Final   05/18/2024 1.26 (H) 0.67 - 1.17 mg/dL Final     Potassium   Date Value Ref Range Status   05/20/2024 3.9 3.4 - 5.3 mmol/L Final    05/19/2024 3.8 3.4 - 5.3 mmol/L Final   05/18/2024 4.0 3.4 - 5.3 mmol/L Final   02/18/2023 3.8 3.5 - 5.0 mmol/L Final   02/17/2023 4.0 3.5 - 5.0 mmol/L Final   02/16/2023 4.3 3.5 - 5.0 mmol/L Final     Potassium POCT   Date Value Ref Range Status   05/14/2024 3.6 3.4 - 5.3 mmol/L Final   05/14/2024 4.1 3.4 - 5.3 mmol/L Final   05/14/2024 5.1 3.4 - 5.3 mmol/L Final     Magnesium   Date Value Ref Range Status   05/20/2024 1.9 1.7 - 2.3 mg/dL Final   05/19/2024 1.9 1.7 - 2.3 mg/dL Final   05/18/2024 2.1 1.7 - 2.3 mg/dL Final          I/O:  I/O last 3 completed shifts:  In: 250 [P.O.:250]  Out: 1985 [Urine:1985]       Physical Exam:    General: Patient seen up in chair this AM. Anxious.  HEENT: REBEKA, no sclera icterus, moist mucosa  CV: RRR on monitor. 2+ peripheral pulses in all extremities. Mild edema.   Pulm: Non-labored effort on room air. Incision C/D/I.  Abd: Soft, NT, ND  Ext: Mild pedal edema, SCDs in place, warm, distal pulses intact. IABP site C/D/I w/o hematoma, ecchymosis, or edema.  Neuro: BAZAN, follows commands, A&Ox3      ASSESSMENT/PLAN:    Drew Monsivais is a 53 year old male with a history of CAD, MARY JANE, HTN, HLD, anxiety, and DM2 who is s/p CABG x3 w/ endoscopic radial harvest.    Principal Problem:    NSTEMI (non-ST elevated myocardial infarction) (H)  CAD  HTN  HLD  Type 2 DM  Acute Respiratory Failure  Acute on chronic anxiety      NEURO:   - Scheduled Tylenol/lidocaine patches and PRN Tylenol/oxycodone for pain  - PRN Robaxin  - No toradol given intermittent Cr bumps  - Minimize narcotics as able as patient gets disoriented when receiving multiple medications  - Narcan available PRN  - PTA Prozac, 20mg qd, Buspar 10mg qd, and 10mg qd PRN   - Holding PA Trazodone given drowsiness      CV:   - Pre-op EF 45-50%  - IABP removed POD#1  - Chest tubes/TPW removed 5/17  - Normotensive, intermittent HTN w/ anxiety  - Metoprolol 25 BID  - Norvasc 2.5 mg qday  - ASA 81 mg (decreased due to Plavix)  -  "Plavix per surgeon preference for graft patency in s/o NSTEMI (ASA)  - Atorvastatin 80mg daily     PULM:   - Extubated POD#1  - Maintaining oxygen saturations on room air  - Encourage pulmonary toilet  - CXR this AM for difficulty \"catching breath\"    FEN/GI:  - Continue electrolyte replacement protocol  - Diet: ADAT to Cardiac, <2g Na+, Consistent Carb  - Bowel regimen, LBM 5/19    RENAL:  - Adequate UOP/hr. Continue to monitor closely.  - Cr stable at baseline (1.1-1.4). Monitor w/ BMP  - Intermittent voiding w/ patient also requiring straight cath since ramos for retention removed  - Urology consult, appreciate recs  - Diuresis w/ 20mg IV Lasix TID  - PTA Flomax    HEME:  - Acute blood loss anemia post-op.   - No bleeding concerns. Hep SQ, ASA    ID:  - Jill op ppx complete, afebrile. No concerns for infection at this time.   - Reactive leukocytosis, resolved.    ENDO:   - HbA1c 9.3%  - BG goal < 180 to promote optimal healing  - High dose SSI  - PTA Glipizide     PPx:   - DVT: SCDs, SQ heparin TID, ambulation   - GI: Protonix 40mg PO daily (PTA med)    DISPO:   - Continue general tele care (Transferred POD#2)  - PT/OT recommending home w/ home care  Medically Ready for Discharge: Anticipated Today pending CXR results and nausea      Patient discussed with Dr. Azar.        Christiana Lebron PA-C  [facility] Cardiothoracic Surgery  Pager: [phone]  May 20, 2024          "

## 2024-05-20 NOTE — CONSULTS
[facility] CONSULT - URINARY RETENTION     Type of Consult: inpatient   Place of Service:  [facility]   Reason for Consultation: Post-op urinary retention   Consult Requested by: ROSITA Lebron PA-C    History of present illness:  Drew Monsivais is a 53 year old male with hx of DM, HTN, nephrolithiasis, hx of post-op UR (resolved); Admitted to the hospital for NSTEMI (non-ST elevated myocardial infarction) (H). Urology was consulted for urinary retention. History is obtained from patient and chart review.     S/p CABG 5/14/24. Still catheter was removed on 5/18. Voided in AM 5/19, unable to void overnight that night and required CIC (510 ml) and again 5/20 AM (675 ml). Pt still unable to void this afternoon and planning to discharge.     Denies abdominal and bilateral flank pain, fever, chills, recent burning with urination. Denies hx of UTIs. Notes hx of post-op UR x 1 episode, passed outpatient TOV per patient report.     UA 5/12 benign for infection.     Medications which may be contributing to UR: Buspar (rare), Prozac (rare), Atarax, Ativan, Zofran, oxycodone, metoprolol.     Takes Tamsulosin at home. Tamsulosin not given since 5/18 evening.     Pt denies hx of weak urine stream, hesitancy starting stream, nocturia, urgency, frequency.     No BM since surgery.       Past medical history :  Past Medical History:   Diagnosis Date    Calculus of kidney     at least 4 episodes most recent 2012 once surgically removed Stones present both kidneys      Closed fracture of metatarsal bone(s)          Diabetes (H)     Headache     Frontal-?migraine Triggers: no obvious,  excedrin light senstive Chiro     Hypertension     PONV (postoperative nausea and vomiting)        Past surgical history:   Past Surgical History:   Procedure Laterality Date    CORONARY ARTERY BYPASS GRAFT, WITH ENDOSCOPIC VESSEL PROCUREMENT N/A 5/14/2024    Procedure: CORONARY ARTERY BYPASS GRAFT TIMES THREE, WITH LEFT LEG ENDOSCOPIC  VESSEL PROCUREMENT, LEFT RADIAL ARTERY HARVEST,;  Surgeon: Tuyet Azar MD;  Location: [facility] OR    CV CORONARY ANGIOGRAM N/A 5/13/2024    Procedure: Coronary Angiogram;  Surgeon: Gino Rich MD;  Location: [facility] LAB CV    CV INTRA AORTIC BALLOON N/A 5/14/2024    Procedure: Intra Aortic Balloon Pump Insertion;  Surgeon: Vincent Castañeda MD;  Location: [facility] CV    CV INTRAVASULAR ULTRASOUND N/A 5/14/2024    Procedure: EPIAORTIC ULTRASOUND;  Surgeon: Tuyet Azar MD;  Location: [facility] OR    CV LEFT HEART CATH N/A 5/13/2024    Procedure: Left Heart Catheterization;  Surgeon: Gino Rich MD;  Location: [facility] CV    CV PCI N/A 5/13/2024    Procedure: Percutaneous Coronary Intervention;  Surgeon: Gino Rich MD;  Location: [facility] CV    ESOPHAGOSCOPY, GASTROSCOPY, DUODENOSCOPY (EGD), COMBINED N/A 2/15/2023    Procedure: ESOPHAGOGASTRODUODENOSCOPY with BIOPSY;  Surgeon: Brandt Duong MD;  Location: [facility] OR    TRANSESOPHAGEAL ECHOCARDIOGRAM INTRAOPERATIVE  5/14/2024    Procedure: ANESTHESIA TRANSESOPHAGEAL ECHOCARDIOGRAM,;  Surgeon: Tuyet Azar MD;  Location: [facility]       Social history:  Social History     Socioeconomic History    Marital status:      Spouse name: Not on file    Number of children: 1    Years of education: Not on file    Highest education level: Not on file   Occupational History    Occupation: Unload trucks   Tobacco Use    Smoking status: Former     Types: Cigarettes    Smokeless tobacco: Never    Tobacco comments:     College   Vaping Use    Vaping status: Never Used   Substance and Sexual Activity    Alcohol use: No    Drug use: Yes     Comment: Drug use: Cannabis    Sexual activity: Yes     Partners: Female   Other Topics Concern    Not on file   Social History Narrative    Diet-            Exercise-work is physical          No health insurance     Social Determinants of Health     Financial  Resource Strain: Not on file   Food Insecurity: Not on file   Transportation Needs: Not on file   Physical Activity: Not on file   Stress: Not on file   Social Connections: Not on file   Interpersonal Safety: Not on file   Housing Stability: Not on file       Medications  Current Facility-Administered Medications   Medication Dose Route Frequency Provider Last Rate Last Admin    acetaminophen (TYLENOL) tablet 975 mg  975 mg Oral Q6H Myriam Streeter PA-C   975 mg at 05/20/24 1404    amLODIPine (NORVASC) tablet 2.5 mg  2.5 mg Oral Daily Shirley Lebron PA-C   2.5 mg at 05/20/24 0919    aspirin (ASA) chewable tablet 81 mg  81 mg Oral or NG Tube Daily Shirley Lebron PA-C   81 mg at 05/20/24 0918    atorvastatin (LIPITOR) tablet 80 mg  80 mg Oral QPM Shirley Lebron PA-C   80 mg at 05/18/24 2023    benzocaine-menthol (CHLORASEPTIC) 6-10 MG lozenge 1 lozenge  1 lozenge Buccal Q1H PRN Shirley Lebron PA-C        bisacodyl (DULCOLAX) suppository 10 mg  10 mg Rectal Daily PRN Shirley Lebron PA-C        busPIRone (BUSPAR) tablet 10 mg  10 mg Oral Daily Shirley Lebron PA-C   10 mg at 05/20/24 0919    busPIRone (BUSPAR) tablet 10 mg  10 mg Oral Daily PRN Shirley Lebron PA-C   10 mg at 05/18/24 2023    calcium carbonate (TUMS) chewable tablet 1,000 mg  1,000 mg Oral 4x Daily PRN Shirley Lebron PA-C   1,000 mg at 05/18/24 1018    calcium gluconate 1 g in 50 mL in sodium chloride intermittent infusion  1 g Intravenous Once PRN Shirley Lebron PA-C   1 g at 05/15/24 0550    calcium gluconate 2 g in  mL intermittent infusion  2 g Intravenous Once PRN Shirley Lebron PA-C        calcium gluconate 3 g in sodium chloride 0.9 % 100 mL intermittent infusion  3 g Intravenous Once PRN Shirley Lebron PA-C        clopidogrel (PLAVIX) tablet 75 mg  75 mg Oral Daily Shirley Lebron PA-C   75 mg at 05/20/24 0919    glucose gel 15-30 g  15-30 g Oral Q15  Min PRN Shirley Lebron PA-C        Or    dextrose 50 % injection 25-50 mL  25-50 mL Intravenous Q15 Min PRN Shirley Lebron PA-C        Or    glucagon injection 1 mg  1 mg Subcutaneous Q15 Min PRN Shirley Lebron PA-C        FLUoxetine (PROzac) capsule 20 mg  20 mg Oral Daily Shirley Lebron PA-C   20 mg at 05/20/24 0918    [START ON 5/21/2024] furosemide (LASIX) tablet 20 mg  20 mg Oral Daily Shirley Lebron PA-C        glipiZIDE (GLUCOTROL XL) 24 hr tablet 5 mg  5 mg Oral BID Shirley Lebron PA-C   5 mg at 05/20/24 0919    heparin ANTICOAGULANT injection 5,000 Units  5,000 Units Subcutaneous Q8H Shirley Lebron PA-C   5,000 Units at 05/20/24 1122    hydrALAZINE (APRESOLINE) injection 10 mg  10 mg Intravenous Q30 Min PRN Shirley Lebron PA-C        hydrOXYzine HCl (ATARAX) tablet 25 mg  25 mg Oral Q6H PRN Shirley Lebron PA-C   25 mg at 05/19/24 0844    insulin aspart (NovoLOG) injection (RAPID ACTING)  1-10 Units Subcutaneous TID AC Shirley Lebron PA-C   8 Units at 05/20/24 1123    insulin aspart (NovoLOG) injection (RAPID ACTING)  1-7 Units Subcutaneous At Bedtime Shirley Lebron PA-C        Lidocaine (LIDOCARE) 4 % Patch 1-2 patch  1-2 patch Transdermal Q24H Shirley Lebron PA-C   1 patch at 05/18/24 1737    magnesium hydroxide (MILK OF MAGNESIA) suspension 30 mL  30 mL Oral Daily PRN Shirley Lebron PA-C   30 mL at 05/18/24 0830    methocarbamol (ROBAXIN) tablet 500 mg  500 mg Oral 4x Daily PRN Shirley Lebron PA-C   500 mg at 05/18/24 1730    metoprolol tartrate (LOPRESSOR) tablet 25 mg  25 mg Oral BID Shirley Lebron PA-C   25 mg at 05/20/24 0919    naloxone (NARCAN) injection 0.2 mg  0.2 mg Intravenous Q2 Min PRN Shirley Lebron PA-C        Or    naloxone (NARCAN) injection 0.4 mg  0.4 mg Intravenous Q2 Min PRN Shirley Lebron PA-C        Or    naloxone (NARCAN) injection 0.2 mg  0.2 mg Intramuscular Q2  "Min PRN Shirley Lebron PA-C        Or    naloxone (NARCAN) injection 0.4 mg  0.4 mg Intramuscular Q2 Min PRN Shirley Lebron PA-C        ondansetron (ZOFRAN ODT) ODT tab 4-8 mg  4-8 mg Oral Q6H PRN Myriam Streeter PA-C   4 mg at 05/20/24 1613    oxyCODONE IR (ROXICODONE) half-tab 2.5 mg  2.5 mg Oral Q4H PRN Shirley Lebron PA-C        Or    oxyCODONE (ROXICODONE) tablet 5 mg  5 mg Oral Q4H PRN Shirley Lebron PA-C   5 mg at 05/18/24 2215    pantoprazole (PROTONIX) EC tablet 40 mg  40 mg Oral QAM AC Shirley Lebron PA-C   40 mg at 05/20/24 0703    polyethylene glycol (MIRALAX) Packet 17 g  17 g Oral Daily Shirley Lebron PA-C   17 g at 05/20/24 0926    prochlorperazine (COMPAZINE) injection 10 mg  10 mg Intravenous Q6H PRN Shirley Lebron PA-C   10 mg at 05/19/24 2151    senna-docusate (SENOKOT-S/PERICOLACE) 8.6-50 MG per tablet 1 tablet  1 tablet Oral BID Shirley Lebron PA-C   1 tablet at 05/20/24 0919    tamsulosin (FLOMAX) capsule 0.4 mg  0.4 mg Oral QPM Shirley Lebron PA-C   0.4 mg at 05/18/24 2022    traZODone (DESYREL) tablet 150 mg  150 mg Oral At Bedtime Shirley Lebron PA-C           Allergies  No Known Allergies    Review of systems   12 point review of systems is otherwise negative except what is stated in the HPI.     Physical examinations:  /75 (BP Location: Right arm)   Pulse 88   Temp 98.7  F (37.1  C) (Oral)   Resp 20   Ht 1.702 m (5' 7\")   Wt 76.1 kg (167 lb 11.2 oz)   SpO2 91%   BMI 26.27 kg/m     GENERAL: NAD, alert, cooperative, sitting up in chair.   HEAD: normocephalic/atraumatic   ABDOMEN: soft, non tender, non distended, no suprapubic fulness/tenderness noted, no bilateral CVA tenderness.   : penis and scrotum without erythema, edema, tenderness.   SKIN: no rashes or lesions  MUSCULOSKELETAL: moves all four extremities equally.   PSYCHOLOGICAL: alert and oriented, answers questions appropriately      LABS: "   Lab Results   Component Value Date    WBC 8.0 05/18/2024    HGB 9.5 (L) 05/18/2024    HCT 26.8 (L) 05/18/2024     05/18/2024    CHOL 143 05/13/2024    TRIG 206 (H) 05/13/2024    HDL 37 (L) 05/13/2024    ALT 24 05/15/2024    AST 47 (H) 05/15/2024     05/20/2024    BUN 16.7 05/20/2024    CO2 32 (H) 05/20/2024    TSH 0.83 03/09/2022    INR 1.23 (H) 05/14/2024    MICROALBUR 1.97 06/09/2022     Creatinine   Date Value Ref Range Status   05/20/2024 1.28 (H) 0.67 - 1.17 mg/dL Final     UA RESULTS:  Recent Labs   Lab Test 05/12/24  1036 01/03/23  0955 08/05/22  1117   COLOR Colorless   < > Yellow   APPEARANCE Clear   < > Clear   URINEGLC >1000*   < > >=1000*   URINEBILI Negative   < > Negative   URINEKETONE Negative   < > Negative   SG 1.022   < > 1.020   UBLD Negative   < > Trace*   URINEPH 7.0   < > 6.0   PROTEIN Negative   < > Negative   UROBILINOGEN  --   --  1.0   NITRITE Negative   < > Negative   LEUKEST Negative   < > Negative   RBCU <1   < > 2-5*   WBCU <1   < > 0-5    < > = values in this interval not displayed.       I have personally reviewed these labs.     IMAGING:  No recent pertinent imaging.     I have personally reviewed the imaging reports above.     ASSESSMENT/PLAN:  Drew Monsivais is being seen by [facility] for post-op urinary retention.     - 53 yr old male with hx of post-op UR, nephrolithiasis. Now with post-op urinary retention, unable to void and discharging today. S/p CIC x 2. Recommend ramos placement prior to discharge, maintain at discharge.   - Determined after seeing this patient that his primary Urology team is [facility]. Recommend he arranges TOV with [facility] in 1-2 weeks, updated RN.   - No recent UTI symptoms.   - Recommend trying to decrease or stop medication that may be contributing to retention  - Ensure the patient is on a bowel regimen.   - Creatinine improving.   - RN to teach ramos cares, including changing between night and leg bags.    - Recommend resuming tamsulosin.   - Old records reviewed - [facility], CareEverywhere    Thank you for consulting [facility] regarding this patient's care. Please contact us with questions or concerns.     MAYELA Arellano, CNP  [facility]  [phone]

## 2024-05-20 NOTE — PROGRESS NOTES
"CLINICAL NUTRITION SERVICES - ASSESSMENT NOTE     Nutrition Prescription    RECOMMENDATIONS FOR MDs/PROVIDERS TO ORDER:    Malnutrition Status:    Moderate malnutrition  In Context of:  Acute illness or injury    Recommendations already ordered by Registered Dietitian (RD):  None at this time     Future/Additional Recommendations:  Monitor po intake, weight, BG, I/Os.      REASON FOR ASSESSMENT  Drew Monsivais is a/an 53 year old male assessed by the dietitian for LOS    HPI: Pt with PMH of kidney stones, hepatic steatosis, DM2, hypertension, BPH, GERD, anxiety who presented with chest tightness and elevated troponin     NUTRITION HISTORY  Met with pt at bedside this AM. Pt reports fair appetite and po intakes this AM. Pt with decreased appetite and intakes previously this admit. Pt with nausea and vomiting 5/19. Pt unsure of usual weight, estimated about 170 lbs. Pt denies nausea, vomiting or abd pain this AM.   NKFA     Pt insulin regimen increased this AM with hyperglycemia.     CURRENT NUTRITION ORDERS  Diet: 2 g Sodium, Very High Consistent Carbohydrate, and Low Saturated Fat  Intake/Tolerance:    5/20: 100% breakfast this AM  5/19: 0-50% po with N/V   5/18: 50% po   5/16-5/17: % po   Pt meeting <75% nutrition needs >7 days    LABS  Reviewed  Creat 1.28(H)   -240 last 24 hrs     MEDICATIONS  Reviewed   Scheduled norvasc, lipitor, buspar, plavix, proxac, lasix, glucotrol xl, novolog, Mg oxide, lopressor, protonix, miralax, senna, flomax, desyrel    ANTHROPOMETRICS  Height: 170.2 cm (5' 7\")  Most Recent Weight: 76.1 kg (167 lb 11.2 oz)    IBW: 67.3 kg  BMI: Overweight BMI 25-29.9  Weight History: Pt weight down 1.9% x 1 week  Date/Time Weight Weight Method   05/20/24 0356 76.1 kg (167 lb 11.2 oz) Standing scale   05/19/24 0500 79.6 kg (175 lb 6.4 oz) Standing scale   05/18/24 0516 81.6 kg (179 lb 12.8 oz) Standing scale   05/17/24 0541 81.5 kg (179 lb 9.6 oz) Standing scale   05/16/24 0645 83.4 " kg (183 lb 14.4 oz) --   05/15/24 0000 81.4 kg (179 lb 7.3 oz) Bed scale   05/14/24 0600 79 kg (174 lb 1.6 oz) Standing scale   05/13/24 0612 77.6 kg (171 lb 1.6 oz) Standing scale     Wt Readings from Last 10 Encounters:   05/20/24 76.1 kg (167 lb 11.2 oz)   05/12/24 78.9 kg (174 lb)   02/15/23 72.2 kg (159 lb 2.8 oz)   01/03/23 81.6 kg (180 lb)   12/27/22 79 kg (174 lb 3 oz)   12/01/22 78.9 kg (174 lb)   11/22/22 79.1 kg (174 lb 4.8 oz)   11/10/22 76.2 kg (168 lb)   11/09/22 76.7 kg (169 lb)   09/20/22 78 kg (172 lb)      Dosing Weight: 69.5 kg adjusted wt    ASSESSED NUTRITION NEEDS  Estimated Energy Needs: 7831-9276 kcals/day (25 - 30 kcals/kg)  Justification: Maintenance  Estimated Protein Needs: 70-83 grams protein/day (1 - 1.2 grams of pro/kg)  Justification: Post-op  Estimated Fluid Needs: 2085 mL/day (30 mL/kg)   Justification: Maintenance    PHYSICAL FINDINGS/GI CONCERNS  See malnutrition section below.  Per Flowsheets:   GI: rounded abd, intermittent nausea, dry heaving- improved now per pt report  X5 emesis 5/19  BM: last noted x1 5/19   Skin: Incision/surgical site 5/14- L arm, LLE, sternum  Edema: Trace ankles   I/Os: -6.88 L this admit   UOP: 2.97 L 5/19 + x1 unmeasured, 510 ml this AM    MALNUTRITION:  % Weight Loss:  1-2% in 1 week (moderate malnutrition)- likely related to fluid status  % Intake:  <75% for > 7 days (moderate malnutrition)  Subcutaneous Fat Loss:  None observed  Muscle Loss:  Clavicle bone region mild depletion and Acromion bone region mild depletion  Fluid Retention:  Trace ankles     Malnutrition Diagnosis: Moderate malnutrition  In Context of:  Acute illness or injury    NUTRITION DIAGNOSIS  Malnutrition related to acute illness as evidenced by decreased oral intakes <75% nutrition needs > 7 days, mild muscle loss.      INTERVENTIONS  Implementation  Pt declined nutrition supplements at this time   Education- Pt previously seen for diet education, see RD note 5/17  Emphasized  adequate oral intakes and protein post-op. Reviewed sources of lean proteins.     Goals  Pt to meet >/= 75% nutritional needs   BG < 180      Monitoring/Evaluation  Progress toward goals will be monitored and evaluated per protocol.

## 2024-05-20 NOTE — PLAN OF CARE
sc  Patient Name: Drew Monsivais   MRN: [id number]   Date of Admission: 5/12/2024    Procedure: Procedure(s):  CORONARY ARTERY BYPASS GRAFT TIMES THREE, WITH LEFT LEG ENDOSCOPIC VESSEL PROCUREMENT, LEFT RADIAL ARTERY HARVEST,  ANESTHESIA TRANSESOPHAGEAL ECHOCARDIOGRAM,  EPIAORTIC ULTRASOUND    Post Op day #:6    Subjective (Patient focus/Primary Problem for shift): Ambulating, urine retention, abdominal discomfort, and anxiety.           Pain Goal0 Pain Rating0           Pain Medication/ Regime effective to reduce patient pain Patient has scheduled tylenol, and PRN.  Managed discomfort and anxiety with therapeutic communication, calm environment, and slow breathing technique.     Objective (Physical assessment):           Rhythm: normal sinus rhythm            Bowel Activity: yes if Yes indicate when: 10/19          Bowel Medications: Yes            Incision: healing well          Incentive Spirometry Q 1-2 hour when awake:  yes Volume: 2000          Epicardial Pacing Wires:  no            Patient Activity:           Up to chair for meals: yes          Ambulation with RN x2 (Not including CR): yes            Is patient in home clothes:no             Chest Tubes   Pleural: no Draining: no               Suction: no              Mediastinal: no Draining: no               Suction: no   Dressing Change Daily:no If No, why? Tubes removed                     Urinary Catheter: no           Preventative WOC consult (need MD order): no       Assessment (Nursing primary shift focus): Patient had a BladderScan of over 500 mL, and was straight cathed in the 0600 hour.  Bladder scanned this shift at 11:30 553 ml found, straight cathed at 1216, 675 mL out.  At 1414 bladder scan shows 107 mL.  Urology anticipated to visit patient.     Anxiety managed with breathing and calm environment.    Abdominal discomfort has been managed with sips of water and slow breathing.    Chest XR ordered this shift.    Possible discharge today  "pending on Urology sign off.  Patient may be kept overnight and discharge tomorrow.    Tele: NSR      Plan (Patient Care Plan/focus): Continue medical plan, encourage more ambulation.  Patient's sister is reported to be out of state, patient needs her available for discharge as reported by OT.      Roberto Bejarano RN   5/20/2024   9:56 AM           Problem: Adult Inpatient Plan of Care  Goal: Plan of Care Review  Description: The Plan of Care Review/Shift note should be completed every shift.  The Outcome Evaluation is a brief statement about your assessment that the patient is improving, declining, or no change.  This information will be displayed automatically on your shift  note.  Outcome: Progressing  Goal: Patient-Specific Goal (Individualized)  Description: You can add care plan individualizations to a care plan. Examples of Individualization might be:  \"Parent requests to be called daily at 9am for status\", \"I have a hard time hearing out of my right ear\", or \"Do not touch me to wake me up as it startles  me\".  Outcome: Progressing  Goal: Absence of Hospital-Acquired Illness or Injury  Outcome: Progressing  Intervention: Identify and Manage Fall Risk  Recent Flowsheet Documentation  Taken 5/20/2024 0749 by Roberto Bejarano, RN  Safety Promotion/Fall Prevention:   clutter free environment maintained   increased rounding and observation   lighting adjusted   nonskid shoes/slippers when out of bed   patient and family education   room door open   room near nurse's station   safety round/check completed  Intervention: Prevent and Manage VTE (Venous Thromboembolism) Risk  Recent Flowsheet Documentation  Taken 5/20/2024 0749 by Roberto Bejarano, RN  VTE Prevention/Management: SCDs (sequential compression devices) off  Intervention: Prevent Infection  Recent Flowsheet Documentation  Taken 5/20/2024 0749 by Roberto Bejarano, RN  Infection Prevention:   hand hygiene promoted   rest/sleep promoted  Goal: Optimal " Comfort and Wellbeing  Outcome: Progressing  Intervention: Provide Person-Centered Care  Recent Flowsheet Documentation  Taken 5/20/2024 0749 by Roberto Bejarano RN  Trust Relationship/Rapport: care explained    Corbin Bejarano RN

## 2024-05-20 NOTE — PLAN OF CARE
Problem: Urinary Retention  Goal: Effective Urinary Elimination  Outcome: Progressing     Goal Outcome Evaluation:  A/O x4. Bladder scan 1606 187. Pt attempted to urinate x2  but unable to go. Pt felt uncomfortable, asking for Still catheter to be inserted and wanting to go home with a Still catheter tonight.  Updated Urology Damir Martinez. Still catheter was ordered. Pt will have to follow up with [facility] to arrange for trial void.  16 Fr Coude Still Catheter placed with yellow urine returned.

## 2024-05-20 NOTE — PROGRESS NOTES
"Care Management Follow Up    Length of Stay (days): 8    Expected Discharge Date: 05/20/2024     Concerns to be Addressed:     Care progression   Patient plan of care discussed at interdisciplinary rounds: Yes    Anticipated Discharge Disposition:  Home with outpatient cardiac rehab     Anticipated Discharge Services:  NA  Anticipated Discharge DME:  NA    Patient/family educated on Medicare website which has current facility and service quality ratings:  NA  Education Provided on the Discharge Plan:  Yes per team  Patient/Family in Agreement with the Plan:  NA    Referrals Placed by CM/SW:  NA  Private pay costs discussed: Not applicable    Additional Information:  Rec'd a call from [facility], \"Unfortunately [facility] was unable to place this referral after exhausting sixteen agencies\" due to payor and/or staffing.    Called to update patient's sister, Enid. Discuss TCU placement and Enid stated, \"TCU is not an option.\" Enid would rather do outpatient therapy. Would like patient to have outpatient with  cardiac rehab.   Fax [phone]  HP Phone [phone], Dr. ANMOL Rossi requested for Myriam to call her with updates.    Message sent to Myriam w/cardiothoracic    Met patient at bedside to update on the above and he is in agreement for home with home care RN/PT/OT. No TCU placement.    Social Hx: \"Lives alone in a townhouse with shared custody of his son. Works full time. Independent with ADLs and IADLs. Sister to transport at discharge.\"    RNCM to follow for medical progression, recommendations, and final discharge plan.     Gela Enriquez, RN     1018 Myriam responded she will call Enid Larios2 Enid called she is from [place] and would like the FMLA filled out, so she can care for patient.    Message sent to Myriam     2693 Myriam responded, \"I can get it to our RNCC if you print it.\"    RNCM printed the form  Myriam will  the form  "

## 2024-05-20 NOTE — PROGRESS NOTES
0435: Pt not able to void, bladder scan for 496 ml.     Gave pt some time to void along with some cranberry juice per pt requests, this helps him. no urge to void.    0555: bladder scan 518, again got up to void but not able to.    0645: straight cath per protocol, 510 from bladder.

## 2024-05-20 NOTE — PLAN OF CARE
sc  Patient Name: Drew Monsivais   MRN: [id number]   Date of Admission: 5/12/2024    Procedure: Procedure(s):  CORONARY ARTERY BYPASS GRAFT TIMES THREE, WITH LEFT LEG ENDOSCOPIC VESSEL PROCUREMENT, LEFT RADIAL ARTERY HARVEST,  ANESTHESIA TRANSESOPHAGEAL ECHOCARDIOGRAM,  EPIAORTIC ULTRASOUND    Post Op day #:6    Subjective (Patient focus/Primary Problem for shift): rest and urine          Pain Goal0 Pain Rating0           Pain Medication/ Regime effective to reduce patient pain scheduled tylenol    Objective (Physical assessment):           Rhythm: normal sinus rhythm            Bowel Activity: yes if Yes indicate when: 5/18          Bowel Medications: yes            Incision: healing well          Incentive Spirometry Q 1-2 hour when awake:  yes Volume: 1750          Epicardial Pacing Wires:  no            Patient Activity:           Up to chair for meals: pt sleeping overnight          Ambulation with RN x2 (Not including CR): yes, up to bathroom x3           Is patient in home clothes:no             Chest Tubes   Pleural: no Draining: no               Suction: no              Mediastinal: no Draining: no               Suction: no   Dressing Change Daily:no If No, why? Not due yet                     Urinary Catheter: no           Preventative WOC consult (need MD order): no       Assessment (Nursing primary shift focus): pt reports wants to sleep, cluster cares and allow pt to rest. Monitor urine output.    Plan (Patient Care Plan/focus): Pt slept mostly the night, no nausea. Pt refused 0200 tylenol.  Pt not able to void, bladder x2 and straight cath per protocol.  NSR on tele monitor      Tremayne Montilla RN   5/20/2024   6:52 AM      Goal Outcome Evaluation:

## 2024-05-21 ENCOUNTER — APPOINTMENT (OUTPATIENT)
Dept: OCCUPATIONAL THERAPY | Facility: HOSPITAL | Age: 54
DRG: 233 | End: 2024-05-21
Attending: INTERNAL MEDICINE
Payer: COMMERCIAL

## 2024-05-21 VITALS
DIASTOLIC BLOOD PRESSURE: 79 MMHG | WEIGHT: 168.4 LBS | TEMPERATURE: 98.6 F | HEIGHT: 67 IN | HEART RATE: 89 BPM | RESPIRATION RATE: 18 BRPM | SYSTOLIC BLOOD PRESSURE: 110 MMHG | BODY MASS INDEX: 26.43 KG/M2 | OXYGEN SATURATION: 93 %

## 2024-05-21 LAB
ALBUMIN UR-MCNC: 20 MG/DL
ANION GAP SERPL CALCULATED.3IONS-SCNC: 13 MMOL/L (ref 7–15)
APPEARANCE UR: CLEAR
BACTERIA #/AREA URNS HPF: ABNORMAL /HPF
BILIRUB UR QL STRIP: NEGATIVE
BUN SERPL-MCNC: 18.1 MG/DL (ref 6–20)
CALCIUM SERPL-MCNC: 9 MG/DL (ref 8.6–10)
CHLORIDE SERPL-SCNC: 90 MMOL/L (ref 98–107)
COLOR UR AUTO: YELLOW
CREAT SERPL-MCNC: 1.28 MG/DL (ref 0.67–1.17)
DEPRECATED HCO3 PLAS-SCNC: 30 MMOL/L (ref 22–29)
EGFRCR SERPLBLD CKD-EPI 2021: 66 ML/MIN/1.73M2
GLUCOSE BLDC GLUCOMTR-MCNC: 194 MG/DL (ref 70–99)
GLUCOSE SERPL-MCNC: 261 MG/DL (ref 70–99)
GLUCOSE UR STRIP-MCNC: NEGATIVE MG/DL
HGB UR QL STRIP: ABNORMAL
HYALINE CASTS: 4 /LPF
KETONES UR STRIP-MCNC: NEGATIVE MG/DL
LEUKOCYTE ESTERASE UR QL STRIP: ABNORMAL
MAGNESIUM SERPL-MCNC: 2.2 MG/DL (ref 1.7–2.3)
NITRATE UR QL: NEGATIVE
PH UR STRIP: 6.5 [PH] (ref 5–7)
PHOSPHATE SERPL-MCNC: 4.4 MG/DL (ref 2.5–4.5)
PLATELET # BLD AUTO: 311 10E3/UL (ref 150–450)
POTASSIUM SERPL-SCNC: 3.4 MMOL/L (ref 3.4–5.3)
RBC URINE: 12 /HPF
SODIUM SERPL-SCNC: 133 MMOL/L (ref 135–145)
SP GR UR STRIP: 1.02 (ref 1–1.03)
UROBILINOGEN UR STRIP-MCNC: 2 MG/DL
WBC URINE: 6 /HPF

## 2024-05-21 PROCEDURE — 250N000013 HC RX MED GY IP 250 OP 250 PS 637

## 2024-05-21 PROCEDURE — 250N000013 HC RX MED GY IP 250 OP 250 PS 637: Performed by: STUDENT IN AN ORGANIZED HEALTH CARE EDUCATION/TRAINING PROGRAM

## 2024-05-21 PROCEDURE — 84100 ASSAY OF PHOSPHORUS: CPT | Performed by: STUDENT IN AN ORGANIZED HEALTH CARE EDUCATION/TRAINING PROGRAM

## 2024-05-21 PROCEDURE — 97110 THERAPEUTIC EXERCISES: CPT | Mod: GO

## 2024-05-21 PROCEDURE — 81001 URINALYSIS AUTO W/SCOPE: CPT | Performed by: NURSE PRACTITIONER

## 2024-05-21 PROCEDURE — 36415 COLL VENOUS BLD VENIPUNCTURE: CPT

## 2024-05-21 PROCEDURE — 97535 SELF CARE MNGMENT TRAINING: CPT | Mod: GO

## 2024-05-21 PROCEDURE — 80048 BASIC METABOLIC PNL TOTAL CA: CPT

## 2024-05-21 PROCEDURE — 250N000011 HC RX IP 250 OP 636

## 2024-05-21 PROCEDURE — 250N000013 HC RX MED GY IP 250 OP 250 PS 637: Performed by: PHYSICIAN ASSISTANT

## 2024-05-21 PROCEDURE — 85049 AUTOMATED PLATELET COUNT: CPT

## 2024-05-21 PROCEDURE — 87086 URINE CULTURE/COLONY COUNT: CPT | Performed by: NURSE PRACTITIONER

## 2024-05-21 PROCEDURE — 83735 ASSAY OF MAGNESIUM: CPT | Performed by: STUDENT IN AN ORGANIZED HEALTH CARE EDUCATION/TRAINING PROGRAM

## 2024-05-21 RX ORDER — LIDOCAINE 4 G/G
1-2 PATCH TOPICAL EVERY 24 HOURS
Qty: 10 PATCH | Refills: 0 | Status: SHIPPED | OUTPATIENT
Start: 2024-05-21

## 2024-05-21 RX ORDER — INSULIN ASPART 100 [IU]/ML
1-10 INJECTION, SOLUTION INTRAVENOUS; SUBCUTANEOUS
Qty: 15 ML | Refills: 0 | Status: SHIPPED | OUTPATIENT
Start: 2024-05-21 | End: 2024-06-14

## 2024-05-21 RX ORDER — ASPIRIN 81 MG/1
81 TABLET, CHEWABLE ORAL DAILY
Qty: 90 TABLET | Refills: 3 | Status: SHIPPED | OUTPATIENT
Start: 2024-05-22

## 2024-05-21 RX ORDER — CONTAINER,EMPTY
EACH MISCELLANEOUS
Qty: 1 EACH | Refills: 1 | Status: SHIPPED | OUTPATIENT
Start: 2024-05-21

## 2024-05-21 RX ORDER — ONDANSETRON 4 MG/1
4 TABLET, ORALLY DISINTEGRATING ORAL EVERY 6 HOURS PRN
Qty: 10 TABLET | Refills: 0 | Status: SHIPPED | OUTPATIENT
Start: 2024-05-21

## 2024-05-21 RX ORDER — POLYETHYLENE GLYCOL 3350 17 G/17G
17 POWDER, FOR SOLUTION ORAL DAILY
Qty: 510 G | Refills: 0 | Status: SHIPPED | OUTPATIENT
Start: 2024-05-21 | End: 2024-06-11

## 2024-05-21 RX ORDER — BLOOD PRESSURE TEST KIT
KIT MISCELLANEOUS
Qty: 200 EACH | Refills: 1 | Status: SHIPPED | OUTPATIENT
Start: 2024-05-21

## 2024-05-21 RX ORDER — ATORVASTATIN CALCIUM 80 MG/1
80 TABLET, FILM COATED ORAL EVERY EVENING
Qty: 90 TABLET | Refills: 3 | Status: SHIPPED | OUTPATIENT
Start: 2024-05-21

## 2024-05-21 RX ORDER — AMLODIPINE BESYLATE 2.5 MG/1
2.5 TABLET ORAL DAILY
Qty: 90 TABLET | Refills: 3 | Status: SHIPPED | OUTPATIENT
Start: 2024-05-22

## 2024-05-21 RX ORDER — CLOPIDOGREL BISULFATE 75 MG/1
75 TABLET ORAL DAILY
Qty: 90 TABLET | Refills: 3 | Status: SHIPPED | OUTPATIENT
Start: 2024-05-22

## 2024-05-21 RX ORDER — OXYCODONE HYDROCHLORIDE 5 MG/1
2.5-5 TABLET ORAL EVERY 6 HOURS PRN
Qty: 12 TABLET | Refills: 0 | Status: SHIPPED | OUTPATIENT
Start: 2024-05-21 | End: 2024-05-24

## 2024-05-21 RX ORDER — METOPROLOL TARTRATE 25 MG/1
25 TABLET, FILM COATED ORAL 2 TIMES DAILY
Qty: 180 TABLET | Refills: 3 | Status: SHIPPED | OUTPATIENT
Start: 2024-05-21 | End: 2024-06-14

## 2024-05-21 RX ORDER — POTASSIUM CHLORIDE 1500 MG/1
40 TABLET, EXTENDED RELEASE ORAL ONCE
Status: COMPLETED | OUTPATIENT
Start: 2024-05-21 | End: 2024-05-21

## 2024-05-21 RX ORDER — ACETAMINOPHEN 325 MG/1
650 TABLET ORAL EVERY 6 HOURS PRN
Qty: 60 TABLET | Refills: 0 | Status: SHIPPED | OUTPATIENT
Start: 2024-05-21

## 2024-05-21 RX ADMIN — POLYETHYLENE GLYCOL 3350 17 G: 17 POWDER, FOR SOLUTION ORAL at 08:19

## 2024-05-21 RX ADMIN — METOPROLOL TARTRATE 25 MG: 25 TABLET, FILM COATED ORAL at 08:13

## 2024-05-21 RX ADMIN — AMLODIPINE BESYLATE 2.5 MG: 2.5 TABLET ORAL at 08:13

## 2024-05-21 RX ADMIN — GLIPIZIDE 5 MG: 5 TABLET, FILM COATED, EXTENDED RELEASE ORAL at 08:13

## 2024-05-21 RX ADMIN — OXYCODONE HYDROCHLORIDE 5 MG: 5 TABLET ORAL at 08:00

## 2024-05-21 RX ADMIN — SENNOSIDES AND DOCUSATE SODIUM 1 TABLET: 8.6; 5 TABLET ORAL at 08:13

## 2024-05-21 RX ADMIN — ASPIRIN 81 MG CHEWABLE TABLET 81 MG: 81 TABLET CHEWABLE at 08:13

## 2024-05-21 RX ADMIN — ACETAMINOPHEN 975 MG: 325 TABLET ORAL at 01:36

## 2024-05-21 RX ADMIN — HEPARIN SODIUM 5000 UNITS: 10000 INJECTION, SOLUTION INTRAVENOUS; SUBCUTANEOUS at 04:42

## 2024-05-21 RX ADMIN — CLOPIDOGREL BISULFATE 75 MG: 75 TABLET ORAL at 08:15

## 2024-05-21 RX ADMIN — FLUOXETINE HYDROCHLORIDE 20 MG: 20 CAPSULE ORAL at 08:15

## 2024-05-21 RX ADMIN — POTASSIUM CHLORIDE 40 MEQ: 1500 TABLET, EXTENDED RELEASE ORAL at 06:49

## 2024-05-21 RX ADMIN — OXYCODONE HYDROCHLORIDE 5 MG: 5 TABLET ORAL at 01:36

## 2024-05-21 RX ADMIN — BUSPIRONE HYDROCHLORIDE 10 MG: 10 TABLET ORAL at 08:14

## 2024-05-21 RX ADMIN — PANTOPRAZOLE SODIUM 40 MG: 40 TABLET, DELAYED RELEASE ORAL at 06:49

## 2024-05-21 RX ADMIN — FUROSEMIDE 20 MG: 20 TABLET ORAL at 08:15

## 2024-05-21 ASSESSMENT — ACTIVITIES OF DAILY LIVING (ADL)
ADLS_ACUITY_SCORE: 36
ADLS_ACUITY_SCORE: 36
ADLS_ACUITY_SCORE: 32
ADLS_ACUITY_SCORE: 36

## 2024-05-21 NOTE — PLAN OF CARE
Cardiac Rehab Discharge Summary    Reason for therapy discharge:    Discharged to home with outpatient therapy.    Progress towards therapy goal(s). See goals on Care Plan in Epic electronic health record for goal details.  Goals partially met.  Barriers to achieving goals:   discharge from facility.    Therapy recommendation(s):    Continued therapy is recommended.  Rationale/Recommendations:  Outpt. Cardiac Rehab.

## 2024-05-21 NOTE — PLAN OF CARE
sc  Patient Name: Drew Monsivais   MRN: [id number]   Date of Admission: 5/12/2024    Procedure: Procedure(s):  CORONARY ARTERY BYPASS GRAFT TIMES THREE, WITH LEFT LEG ENDOSCOPIC VESSEL PROCUREMENT, LEFT RADIAL ARTERY HARVEST,  ANESTHESIA TRANSESOPHAGEAL ECHOCARDIOGRAM,  EPIAORTIC ULTRASOUND    Post Op day #:6    Subjective (Patient focus/Primary Problem for shift): Unable to void, Nausea          Pain Goalno pain Pain Ratingzero incisional pain           Pain Medication/ Regime effective to reduce patient painscheduled Tylenol, Lidocaine patch    Objective (Physical assessment):           Rhythm: normal sinus rhythm            Bowel Activity: no if Yes indicate when: n/a          Bowel Medications: yes            Incision: healing well          Incentive Spirometry Q 1-2 hour when awake:  yes Volume: 0993-1514 ml          Epicardial Pacing Wires:  not applicable            Patient Activity:           Up to chair for meals: yes          Ambulation with RN x2 (Not including CR): yes            Is patient in home clothes:no                                Urinary Catheter: yes for urinary retention           Preventative WOC consult (need MD order): no       Assessment (Nursing primary shift focus): A/O x4. C/O Nausea, no vomiting. PRN ODT Zofran effective. Ramos placed, pt is unable to urinate. Several attempts to void on his own is not successful. PRN Oxycodone effective for pain after ramos placement. BP acceptable. BG- 200, 229. Sliding scale insulin with meals and bedtime.     Plan (Patient Care Plan/focus): Pt decided to stay home as pt's sister will not be able to arrive from [facility], leaving him alone at home with no one to help take care of him.  Continue I.S and encourage  to use heart pillow.      Yoselyn Alves RN   5/20/2024   10:45 PM

## 2024-05-21 NOTE — PLAN OF CARE
"  A/O, 5/10 pain at ramos PRN oxy given.  Patient has ambulated, showered, and is going home with family.  Discharge medication given, instructions review.      Cabx3 POD 7    Tele NSR    Problem: Nausea and Vomiting  Goal: Nausea and Vomiting Relief  Outcome: Met  Intervention: Prevent and Manage Nausea and Vomiting  Recent Flowsheet Documentation  Taken 5/21/2024 0745 by Roberto Bejarano, RN  Nausea/Vomiting Interventions:   stimuli minimized   sips of clear liquids given  Environmental Support:   calm environment promoted   rest periods encouraged     Problem: Adult Inpatient Plan of Care  Goal: Plan of Care Review  Description: The Plan of Care Review/Shift note should be completed every shift.  The Outcome Evaluation is a brief statement about your assessment that the patient is improving, declining, or no change.  This information will be displayed automatically on your shift  note.  Outcome: Met  Goal: Patient-Specific Goal (Individualized)  Description: You can add care plan individualizations to a care plan. Examples of Individualization might be:  \"Parent requests to be called daily at 9am for status\", \"I have a hard time hearing out of my right ear\", or \"Do not touch me to wake me up as it startles  me\".  Outcome: Met  Goal: Absence of Hospital-Acquired Illness or Injury  Outcome: Met  Intervention: Identify and Manage Fall Risk  Recent Flowsheet Documentation  Taken 5/21/2024 0745 by Roberto Bejarano, RN  Safety Promotion/Fall Prevention:   activity supervised   assistive device/personal items within reach   clutter free environment maintained   lighting adjusted   nonskid shoes/slippers when out of bed  Intervention: Prevent and Manage VTE (Venous Thromboembolism) Risk  Recent Flowsheet Documentation  Taken 5/21/2024 0745 by Roberto Bejarano, RN  VTE Prevention/Management: SCDs (sequential compression devices) off  Intervention: Prevent Infection  Recent Flowsheet Documentation  Taken 5/21/2024 0745 by " Roberto Bejarano, RN  Infection Prevention:   hand hygiene promoted   rest/sleep promoted  Goal: Optimal Comfort and Wellbeing  Outcome: Met  Intervention: Monitor Pain and Promote Comfort  Recent Flowsheet Documentation  Taken 5/21/2024 0745 by Roberto Bejarano RN  Pain Management Interventions: medication (see MAR)  Intervention: Provide Person-Centered Care  Recent Flowsheet Documentation  Taken 5/21/2024 0745 by Roberto Bejarano RN  Trust Relationship/Rapport: care explained  Goal: Readiness for Transition of Care  Outcome: Met     Problem: Cardiovascular Surgery  Goal: Improved Activity Tolerance  Outcome: Met  Intervention: Optimize Tolerance for Activity  Recent Flowsheet Documentation  Taken 5/21/2024 0745 by Roberto Bejarano RN  Environmental Support:   calm environment promoted   rest periods encouraged  Goal: Optimal Coping with Heart Surgery  Outcome: Met  Intervention: Support Psychosocial Response to Surgery  Recent Flowsheet Documentation  Taken 5/21/2024 0745 by Roberto Bejarano RN  Supportive Measures: active listening utilized  Goal: Absence of Bleeding  Outcome: Met  Goal: Effective Bowel Elimination  Outcome: Met  Goal: Effective Cardiac Function  Outcome: Met  Goal: Optimal Cerebral Tissue Perfusion  Outcome: Met  Intervention: Protect and Optimize Cerebral Perfusion  Recent Flowsheet Documentation  Taken 5/21/2024 0745 by Roberto Bejarano RN  Sensory Stimulation Regulation: care clustered  Goal: Fluid and Electrolyte Balance  Outcome: Met  Goal: Blood Glucose Level Within Targeted Range  Outcome: Met  Goal: Absence of Infection Signs and Symptoms  Outcome: Met  Intervention: Prevent or Manage Infection  Recent Flowsheet Documentation  Taken 5/21/2024 0745 by Roberto Bejarano RN  Infection Prevention:   hand hygiene promoted   rest/sleep promoted  Goal: Anesthesia/Sedation Recovery  Outcome: Met  Intervention: Optimize Anesthesia Recovery  Recent Flowsheet Documentation  Taken 5/21/2024  0745 by Roberto Bejarano, RN  Safety Promotion/Fall Prevention:   activity supervised   assistive device/personal items within reach   clutter free environment maintained   lighting adjusted   nonskid shoes/slippers when out of bed  Reorientation Measures: clock in view  Goal: Acceptable Pain Control  Outcome: Met  Intervention: Prevent or Manage Pain  Recent Flowsheet Documentation  Taken 5/21/2024 0745 by Roberto Bejarano, RN  Pain Management Interventions: medication (see MAR)  Goal: Nausea and Vomiting Relief  Outcome: Met  Intervention: Prevent or Manage Nausea and Vomiting  Recent Flowsheet Documentation  Taken 5/21/2024 0745 by Roberto Bejarano, RN  Nausea/Vomiting Interventions:   stimuli minimized   sips of clear liquids given  Goal: Effective Urinary Elimination  Outcome: Met  Goal: Effective Oxygenation and Ventilation  Outcome: Met  Intervention: Promote Airway Secretion Clearance  Recent Flowsheet Documentation  Taken 5/21/2024 0745 by Roberto Bejarano, RN  Cough And Deep Breathing: done independently per patient     Problem: Urinary Retention  Goal: Effective Urinary Elimination  Outcome: Met    Corbin Bejarano RN

## 2024-05-21 NOTE — PROGRESS NOTES
Care Management Discharge Note    Discharge Date: 05/21/2024       Discharge Disposition: Home, Outpatient Rehab (Cardiac)    Discharge Services: Outpatient Rehab (Cardiac)    Discharge DME: None    Discharge Transportation: family or friend will provide    Private pay costs discussed: Not applicable    Does the patient's insurance plan have a 3 day qualifying hospital stay waiver?  No    PAS Confirmation Code:  NA  Patient/family educated on Medicare website which has current facility and service quality ratings: yes    Education Provided on the Discharge Plan: Yes per team  Persons Notified of Discharge Plans: Patient  Patient/Family in Agreement with the Plan: yes    Handoff Referral Completed: Yes    Additional Information:  Patient discharge to home with Outpatient Rehab (Cardiac). Family will transport.    Orders sent to  Dr. ANMOL Manuel per patient/family request  Fax [phone]  Phone [phone]    Gela Enriquez RN

## 2024-05-21 NOTE — PLAN OF CARE
sc  Patient Name: Drew Monsivais   MRN: [id number]   Date of Admission: 5/12/2024    Procedure: Procedure(s):  CORONARY ARTERY BYPASS GRAFT TIMES THREE, WITH LEFT LEG ENDOSCOPIC VESSEL PROCUREMENT, LEFT RADIAL ARTERY HARVEST,  ANESTHESIA TRANSESOPHAGEAL ECHOCARDIOGRAM,  EPIAORTIC ULTRASOUND    Post Op day #:7    Subjective (Patient focus/Primary Problem for shift): rest and pain control          Pain Goal0 Pain Rating9           Pain Medication/ Regime effective to reduce patient pain prn oxycodone and scheduled tylenol    Objective (Physical assessment):           Rhythm: normal sinus rhythm            Bowel Activity: yes if Yes indicate when: 5/20          Bowel Medications: no            Incision: healing well          Incentive Spirometry Q 1-2 hour when awake:  pt sleeping overnight Volume: 0          Epicardial Pacing Wires:  no            Patient Activity:           Up to chair for meals: pt sleeping overnight          Ambulation with RN x2 (Not including CR): not applicable            Is patient in home clothes:no             Chest Tubes   Pleural: no Draining: no               Suction: no              Mediastinal: no Draining: no               Suction: no   Dressing Change Daily:no If No, why?ALOK                     Urinary Catheter: yes, placed by urologist for urine retention           Preventative WOC consult (need MD order): no       Assessment (Nursing primary shift focus): pt states he wants to sleep for the night and decrease pain where ramos was placed    Plan (Patient Care Plan/focus): clustered cares to promote sleeping. Gave oxycodone 5 mg and scheduled tylenol for chest incision and ramos cath placement with relief. Pt was able to rest for the night.  Pt reports he is ready to go home.         Tremayne Montilla RN   5/21/2024   6:28 AM      Goal Outcome Evaluation:

## 2024-05-23 ENCOUNTER — TELEPHONE (OUTPATIENT)
Dept: CARDIOLOGY | Facility: CLINIC | Age: 54
End: 2024-05-23
Payer: COMMERCIAL

## 2024-05-23 ENCOUNTER — APPOINTMENT (OUTPATIENT)
Dept: CT IMAGING | Facility: CLINIC | Age: 54
End: 2024-05-23
Payer: COMMERCIAL

## 2024-05-23 ENCOUNTER — HOSPITAL ENCOUNTER (EMERGENCY)
Facility: CLINIC | Age: 54
Discharge: HOME | End: 2024-05-23
Attending: EMERGENCY MEDICINE | Admitting: EMERGENCY MEDICINE
Payer: COMMERCIAL

## 2024-05-23 ENCOUNTER — ANCILLARY PROCEDURE (OUTPATIENT)
Dept: ULTRASOUND IMAGING | Facility: CLINIC | Age: 54
End: 2024-05-23
Attending: EMERGENCY MEDICINE
Payer: COMMERCIAL

## 2024-05-23 VITALS
BODY MASS INDEX: 26.06 KG/M2 | HEIGHT: 67 IN | SYSTOLIC BLOOD PRESSURE: 138 MMHG | WEIGHT: 166 LBS | HEART RATE: 78 BPM | RESPIRATION RATE: 20 BRPM | DIASTOLIC BLOOD PRESSURE: 89 MMHG | OXYGEN SATURATION: 98 % | TEMPERATURE: 97.1 F

## 2024-05-23 DIAGNOSIS — N39.0 URINARY TRACT INFECTION ASSOCIATED WITH INDWELLING URETHRAL CATHETER, INITIAL ENCOUNTER (H): ICD-10-CM

## 2024-05-23 DIAGNOSIS — T83.511A URINARY TRACT INFECTION ASSOCIATED WITH INDWELLING URETHRAL CATHETER, INITIAL ENCOUNTER (H): ICD-10-CM

## 2024-05-23 DIAGNOSIS — J18.9 LLL PNEUMONIA: ICD-10-CM

## 2024-05-23 DIAGNOSIS — Z95.1 S/P CABG (CORONARY ARTERY BYPASS GRAFT): ICD-10-CM

## 2024-05-23 LAB
ALBUMIN SERPL BCG-MCNC: 4.4 G/DL (ref 3.5–5.2)
ALBUMIN UR-MCNC: 20 MG/DL
ALP SERPL-CCNC: 126 U/L (ref 40–150)
ALT SERPL W P-5'-P-CCNC: 23 U/L (ref 0–70)
ANION GAP SERPL CALCULATED.3IONS-SCNC: 12 MMOL/L (ref 7–15)
APPEARANCE UR: CLEAR
AST SERPL W P-5'-P-CCNC: 25 U/L (ref 0–45)
ATRIAL RATE - MUSE: 87 BPM
BACTERIA UR CULT: NO GROWTH
BASOPHILS # BLD AUTO: 0.1 10E3/UL (ref 0–0.2)
BASOPHILS NFR BLD AUTO: 1 %
BILIRUB DIRECT SERPL-MCNC: 0.2 MG/DL (ref 0–0.3)
BILIRUB SERPL-MCNC: 0.7 MG/DL
BILIRUB UR QL STRIP: NEGATIVE
BUN SERPL-MCNC: 26.3 MG/DL (ref 6–20)
CALCIUM SERPL-MCNC: 9.5 MG/DL (ref 8.6–10)
CHLORIDE SERPL-SCNC: 91 MMOL/L (ref 98–107)
COLOR UR AUTO: ABNORMAL
CREAT SERPL-MCNC: 1.41 MG/DL (ref 0.67–1.17)
DEPRECATED HCO3 PLAS-SCNC: 30 MMOL/L (ref 22–29)
DIASTOLIC BLOOD PRESSURE - MUSE: NORMAL MMHG
EGFRCR SERPLBLD CKD-EPI 2021: 59 ML/MIN/1.73M2
EOSINOPHIL # BLD AUTO: 0.3 10E3/UL (ref 0–0.7)
EOSINOPHIL NFR BLD AUTO: 2 %
ERYTHROCYTE [DISTWIDTH] IN BLOOD BY AUTOMATED COUNT: 12.3 % (ref 10–15)
GLUCOSE SERPL-MCNC: 201 MG/DL (ref 70–99)
GLUCOSE UR STRIP-MCNC: NEGATIVE MG/DL
HCT VFR BLD AUTO: 35.7 % (ref 40–53)
HGB BLD-MCNC: 12.1 G/DL (ref 13.3–17.7)
HGB UR QL STRIP: ABNORMAL
HOLD SPECIMEN: NORMAL
HOLD SPECIMEN: NORMAL
IMM GRANULOCYTES # BLD: 0.4 10E3/UL
IMM GRANULOCYTES NFR BLD: 3 %
INTERPRETATION ECG - MUSE: NORMAL
KETONES UR STRIP-MCNC: ABNORMAL MG/DL
LEUKOCYTE ESTERASE UR QL STRIP: ABNORMAL
LIPASE SERPL-CCNC: 63 U/L (ref 13–60)
LYMPHOCYTES # BLD AUTO: 2.2 10E3/UL (ref 0.8–5.3)
LYMPHOCYTES NFR BLD AUTO: 14 %
MCH RBC QN AUTO: 28 PG (ref 26.5–33)
MCHC RBC AUTO-ENTMCNC: 33.9 G/DL (ref 31.5–36.5)
MCV RBC AUTO: 83 FL (ref 78–100)
MONOCYTES # BLD AUTO: 1.5 10E3/UL (ref 0–1.3)
MONOCYTES NFR BLD AUTO: 10 %
NEUTROPHILS # BLD AUTO: 10.8 10E3/UL (ref 1.6–8.3)
NEUTROPHILS NFR BLD AUTO: 71 %
NITRATE UR QL: NEGATIVE
NRBC # BLD AUTO: 0 10E3/UL
NRBC BLD AUTO-RTO: 0 /100
NT-PROBNP SERPL-MCNC: 1067 PG/ML (ref 0–900)
P AXIS - MUSE: 18 DEGREES
PH UR STRIP: 7.5 [PH] (ref 5–7)
PLATELET # BLD AUTO: 474 10E3/UL (ref 150–450)
POTASSIUM SERPL-SCNC: 4.4 MMOL/L (ref 3.4–5.3)
PR INTERVAL - MUSE: 140 MS
PROT SERPL-MCNC: 7.7 G/DL (ref 6.4–8.3)
QRS DURATION - MUSE: 96 MS
QT - MUSE: 404 MS
QTC - MUSE: 486 MS
R AXIS - MUSE: -64 DEGREES
RBC # BLD AUTO: 4.32 10E6/UL (ref 4.4–5.9)
RBC URINE: >182 /HPF
SODIUM SERPL-SCNC: 133 MMOL/L (ref 135–145)
SP GR UR STRIP: <1.005 (ref 1–1.03)
SYSTOLIC BLOOD PRESSURE - MUSE: NORMAL MMHG
T AXIS - MUSE: 50 DEGREES
TROPONIN T SERPL HS-MCNC: 53 NG/L
TROPONIN T SERPL HS-MCNC: 60 NG/L
UROBILINOGEN UR STRIP-MCNC: <2 MG/DL
VENTRICULAR RATE- MUSE: 87 BPM
WBC # BLD AUTO: 15.2 10E3/UL (ref 4–11)
WBC CLUMPS #/AREA URNS HPF: PRESENT /HPF
WBC URINE: 55 /HPF

## 2024-05-23 PROCEDURE — 36415 COLL VENOUS BLD VENIPUNCTURE: CPT

## 2024-05-23 PROCEDURE — 83880 ASSAY OF NATRIURETIC PEPTIDE: CPT

## 2024-05-23 PROCEDURE — 99285 EMERGENCY DEPT VISIT HI MDM: CPT | Mod: 25

## 2024-05-23 PROCEDURE — 250N000011 HC RX IP 250 OP 636

## 2024-05-23 PROCEDURE — 250N000011 HC RX IP 250 OP 636: Performed by: EMERGENCY MEDICINE

## 2024-05-23 PROCEDURE — 250N000009 HC RX 250: Performed by: EMERGENCY MEDICINE

## 2024-05-23 PROCEDURE — 87086 URINE CULTURE/COLONY COUNT: CPT

## 2024-05-23 PROCEDURE — 87186 SC STD MICRODIL/AGAR DIL: CPT

## 2024-05-23 PROCEDURE — 71275 CT ANGIOGRAPHY CHEST: CPT

## 2024-05-23 PROCEDURE — 85025 COMPLETE CBC W/AUTO DIFF WBC: CPT

## 2024-05-23 PROCEDURE — 82040 ASSAY OF SERUM ALBUMIN: CPT

## 2024-05-23 PROCEDURE — 84484 ASSAY OF TROPONIN QUANT: CPT

## 2024-05-23 PROCEDURE — 96374 THER/PROPH/DIAG INJ IV PUSH: CPT

## 2024-05-23 PROCEDURE — 82374 ASSAY BLOOD CARBON DIOXIDE: CPT

## 2024-05-23 PROCEDURE — 74177 CT ABD & PELVIS W/CONTRAST: CPT

## 2024-05-23 PROCEDURE — 83690 ASSAY OF LIPASE: CPT

## 2024-05-23 PROCEDURE — 84450 TRANSFERASE (AST) (SGOT): CPT

## 2024-05-23 PROCEDURE — 96375 TX/PRO/DX INJ NEW DRUG ADDON: CPT | Mod: 59

## 2024-05-23 PROCEDURE — 250N000013 HC RX MED GY IP 250 OP 250 PS 637

## 2024-05-23 PROCEDURE — 81001 URINALYSIS AUTO W/SCOPE: CPT

## 2024-05-23 PROCEDURE — 93005 ELECTROCARDIOGRAM TRACING: CPT

## 2024-05-23 RX ORDER — IOPAMIDOL 755 MG/ML
100 INJECTION, SOLUTION INTRAVASCULAR ONCE
Status: COMPLETED | OUTPATIENT
Start: 2024-05-23 | End: 2024-05-23

## 2024-05-23 RX ORDER — LIDOCAINE HYDROCHLORIDE 20 MG/ML
10 JELLY TOPICAL ONCE
Status: COMPLETED | OUTPATIENT
Start: 2024-05-23 | End: 2024-05-23

## 2024-05-23 RX ORDER — LEVOFLOXACIN 750 MG/1
750 TABLET, FILM COATED ORAL DAILY
Qty: 4 TABLET | Refills: 0 | Status: SHIPPED | OUTPATIENT
Start: 2024-05-23 | End: 2024-05-27

## 2024-05-23 RX ORDER — LEVOFLOXACIN 750 MG/1
750 TABLET, FILM COATED ORAL ONCE
Status: COMPLETED | OUTPATIENT
Start: 2024-05-23 | End: 2024-05-23

## 2024-05-23 RX ORDER — HYDROMORPHONE HYDROCHLORIDE 1 MG/ML
0.5 INJECTION, SOLUTION INTRAMUSCULAR; INTRAVENOUS; SUBCUTANEOUS ONCE
Status: COMPLETED | OUTPATIENT
Start: 2024-05-23 | End: 2024-05-23

## 2024-05-23 RX ORDER — ONDANSETRON 2 MG/ML
4 INJECTION INTRAMUSCULAR; INTRAVENOUS ONCE
Status: COMPLETED | OUTPATIENT
Start: 2024-05-23 | End: 2024-05-23

## 2024-05-23 RX ADMIN — FAMOTIDINE 20 MG: 10 INJECTION, SOLUTION INTRAVENOUS at 10:23

## 2024-05-23 RX ADMIN — LIDOCAINE HYDROCHLORIDE 10 ML: 20 JELLY TOPICAL at 12:08

## 2024-05-23 RX ADMIN — HYDROMORPHONE HYDROCHLORIDE 0.5 MG: 1 INJECTION, SOLUTION INTRAMUSCULAR; INTRAVENOUS; SUBCUTANEOUS at 12:13

## 2024-05-23 RX ADMIN — ONDANSETRON 4 MG: 2 INJECTION INTRAMUSCULAR; INTRAVENOUS at 10:24

## 2024-05-23 RX ADMIN — LEVOFLOXACIN 750 MG: 750 TABLET, FILM COATED ORAL at 14:35

## 2024-05-23 RX ADMIN — IOPAMIDOL 90 ML: 755 INJECTION, SOLUTION INTRAVENOUS at 11:33

## 2024-05-23 ASSESSMENT — ACTIVITIES OF DAILY LIVING (ADL)
ADLS_ACUITY_SCORE: 38

## 2024-05-23 ASSESSMENT — COLUMBIA-SUICIDE SEVERITY RATING SCALE - C-SSRS
2. HAVE YOU ACTUALLY HAD ANY THOUGHTS OF KILLING YOURSELF IN THE PAST MONTH?: NO
1. IN THE PAST MONTH, HAVE YOU WISHED YOU WERE DEAD OR WISHED YOU COULD GO TO SLEEP AND NOT WAKE UP?: NO
6. HAVE YOU EVER DONE ANYTHING, STARTED TO DO ANYTHING, OR PREPARED TO DO ANYTHING TO END YOUR LIFE?: NO

## 2024-05-23 NOTE — DISCHARGE INSTRUCTIONS
Start Levofloxacin as instructed tomorrow as you received your first dose today. Please HOLD glipizide while you are on this antibiotic to prevent low blood sugars.     Please check you blood sugars frequently throughout the day as discussed, especially upon waking and before bed.     Please follow-up with your urologist, cardiologist, and primary care provider for reevaluation as discussed.    Return to the ER if any new or worsening symptoms develop including increasing chest pain, shortness of breath, fevers, chills, severe abdominal pain, vomiting, vomiting blood, dark tarry stools, urinary retention, or any other concerns.    Take Care!  -Norma Robles PA-C

## 2024-05-23 NOTE — ED PROVIDER NOTES
IBonnie have reviewed the documentation, personally taken the patient's history, performed an exam and agree with the physical finds, diagnosis and management plan.    HPI: 53-year-old male status post three-vessel cabg 5/15. Post operative urinary retention w ramos on asa/plavix here for complaint of hematuria.  Also n/v, ruq abd pain x several days  Little BM since surgery - small while in hospital but no BM since discharge  On oxy, no stool softners  Freq burping/bleching/hiccups  No new cp    Physical Exam: Uncomfortable, pale, diaphoretic along the hairline.  Normotensive, regular rate, rhythm.  Lungs are clear.  Sternotomy and chest tube incision sites and is well as graft sites from the extremity unremarkable without erythema swelling or warmth.  Patient has frequent hiccups/belching to the point where he cannot even really finish an entire sentence due to symptomatology.  Mild upper abdominal tenderness.  Abdomen slightly rotund.  Indwelling Ramos catheter with cranberry colored darker hematuria    MDM: Catheter associated UTI, gross hematuria.  Concern for pericardial effusion, PE given this pleuritic type pain on exam.  Symptomatic anemia, arrhythmia, ACS less likely.  Given abdominal pain and no BM since surgery concern for constipation, postoperative ileus, bowel obstruction.    ED Course/workup: EKG similar to previous from 5/15.  Bedside ultrasound performed, please see procedure note.  No pericardial effusion.  Patient given the antacids, antiemetics.  CBC, BMP, LFTs, lipase notable for mild MARY JANE with creatinine of 1.4 up from 1.2.  Leukocytosis with WBC of 15.2.  BNP is elevated at 1067, but he does not have any postoperative values to compare.  Troponin elevated in indeterminate range at 60 but on repeat is downtrending to 53 and this is certainly much less than his previous.  His Ramos catheter was removed and exchanged.  Urine sent from new Ramos catheter with leukocyte Estrace, red cells,  white cells suspicious for infection.  CT PE study, CT abdomen pelvis negative for PE but does show postoperative changes with anticipated scattered gas/fluid in the anterior mediastinum.  Small pleural effusion.  Radiology reads this as compressive atelectasis and dependent in the left lung but I think this looks more like genuine infiltrate and with his associated leukocytosis will cover with antibiotics for pneumonia.  Abdomen shows gallbladder up upper limit of normal in size but with LFTs reassuring would not evaluate this further.  Case was discussed with CV surgery who is comfortable with plan for oral antibiotics at home.  Will cover with antibiotics for both pneumonia as well as catheter associated UTI.  Encouraged close follow-up with his postoperative team.      ED Course as of 05/23/24 1407   Thu May 23, 2024   0949 Informed charge nurse and they want patient in the room and a stat EKG   1013 Actively vomiting    1103 Hemoglobin(!): 12.1  Up from postop   1118 Urea Nitrogen(!): 26.3  Up from 15 baseline   1119 Creatinine(!): 1.41  Up from 1.2   1119 N-Terminal Pro BNP Inpatient(!): 1,067   1120 Troponin T, High Sensitivity(!): 60   1139 CT Chest Pulmonary Embolism w Contrast  CTPA independently interpreted by myself.  No visualized PE.  Anticipated postoperative bilateral pleural effusion and left lower lobe pneumonia   1240 Spoke w Dr. Jerson Pickard, cv surg.    1331 Troponin T, High Sensitivity(!): 53  down   1359 WBC Clumps(!): Present  Appears to be catheter associated UTI       EKG:  EKG individually read and interpreted by myself:  Sinus rhythm, 87 BPM, left axis deviation with prolonged QT. When compared with ECG of 5/16/24, V2 and V3 ST abnormality is more prominent but similar to 5/15/24 05:24 ECG.    Final Diagnosis:     ICD-10-CM    1. Urinary tract infection associated with indwelling urethral catheter, initial encounter  (H24)  T83.511A     N39.0         POC US ECHO LIMITED    Date/Time:  6/10/2024 3:19 PM    Performed by: Bonnie Koroma MD  Authorized by: Bonnie Koroma MD    Comments:      Limited bedside echocardiogram performed.  No evidence of pericardial effusion.          I personally saw the patient and performed a substantive portion of the visit including all aspects of the medical decision making.  I personally made/approved the management plan and take responsibility for the patient management.     MD aG Lau Zabrina N, MD  05/23/24 1233       Bonnie Koroma MD  06/10/24 8163

## 2024-05-23 NOTE — ED TRIAGE NOTES
Patient presents to ED with blood to catheter that was noted first last night, urology cannot get patient in until tomorrow and told to come here, patient had a CABG on the 14th this month and that is when the catheter was placed, patient has constant belching ans hiccupping in triage, which per pt and sister is baseline.  Maida Lyle RN.......5/23/2024 9:17 AM     Triage Assessment (Adult)       Row Name 05/23/24 0915          Triage Assessment    Airway WDL WDL        Respiratory WDL    Respiratory WDL WDL        Skin Circulation/Temperature WDL    Skin Circulation/Temperature WDL WDL        Cardiac WDL    Cardiac WDL WDL        Peripheral/Neurovascular WDL    Peripheral Neurovascular WDL WDL        Cognitive/Neuro/Behavioral WDL    Cognitive/Neuro/Behavioral WDL WDL                     
---

## 2024-05-23 NOTE — ED PROVIDER NOTES
EMERGENCY DEPARTMENT ENCOUNTER      NAME: Drew Monsivais  AGE: 53 year old male  YOB: 1970  MRN: [id number]  EVALUATION DATE & TIME: No admission date for patient encounter.    PCP: Per [facility]    ED PROVIDER: Norma Robles PA-C      Chief Complaint   Patient presents with    Hematuria         FINAL IMPRESSION:  1. Urinary tract infection associated with indwelling urethral catheter, initial encounter  (H24)    2. LLL pneumonia    3. S/P CABG (coronary artery bypass graft)          ED COURSE & MEDICAL DECISION MAKIN:10 AM Met with patient for initial interview. Plan for care discussed.  9:31 AM Staffed patient with Dr. Koroma.  12:10 PM Rediscussed case with Dr. Koroma.  12:42 PM Dr. Koroma spoke with Dr. Arthur of CV surgery who is okay with oral antibiotics for home.  1:50 PM Spoke with pharmacy regarding antibiotic options in setting of pneumonia and UTI as well as diabetes and concern for possible hypoglycemia on glipizide. Pharmacy agrees with levofloxacin and recommends holding glipizide for the next 4 days while on Levofloxacin.   2:00 PM Reevaluated and updated patient. I discussed the plan for discharge with the patient, and patient is agreeable. We discussed supportive cares at home and reasons for return to the ER including new or worsening symptoms. All questions and concerns addressed. Patient to be discharged by RN.    53 year old male with a history of DM II, HTN, CAD s/p CABG (2024) who presents to this ED for evaluation of blood in ramos catheter.  Per chart review, patient was hospitalized from -24 in setting of NSTEMI.  He had undergone CABG x 3 on 2024.  Ramos catheter was placed in setting of urinary retention.  Plan to follow-up with urology 1 to 2 weeks after discharge for void trial.  Upon exam, patient is afebrile, hemodynamically stable, appears anxious and belching throughout exam. RUQ and epigastric tenderness to palpation  without guarding, rebound or peritoneal signs. No CVA tenderness to percussion. No peripheral edema. Daily weights have been stable per patient. Surgical incisions clean dry and intact without evidence of cellulitis or abscess. Differential diagnosis includes but not limited to gallbladder pathology, gastritis, PUD, GERD, pancreatitis, hepatitis, bowel obstruction, colitis, diverticulitis, appendicitis, pyelonephritis, catheter-associated cystitis, nephrolithiasis/renal colic, IBS, gastroenteritis, intestinal colic/gas pains, electrolyte abnormality, anemia, dehydration, cardiac arrhythmia, ACS, CHF exacerbation. Low suspicion for mesenteric ischemia as no significant risk factors for ischemia and pain not out of proportion to exam findings. Based on patient's presenting symptoms, laboratories and imaging were ordered.    CBC with leukocytosis at 15.2 and mild anemia at 12.1. BMP with mild decreased sodium at 133, increased CO2 at 30 without anion gap, as well as elevated Cr at 1.41, which is similar to previous, has ranged between 1.21 and 1.40 in the last 6 days. Lipase mildly elevated at 63, not in range for acute pancreatitis and no LUQ abdominal tenderness to suggest acute infection. BNP elevated at 1067. Initial troponin elevated at 60, repeat 53. HFP WNL. UA concerning for infection, culture pending. CT revealed recent postoperative changes of coronary artery bypass surgery with scattered gas and minimal fluid or blood in the anterior mediastinum as well as bilateral small pleural effusions; however, upon my interpretation with Dr. Koroma, LLL appears more consistent with infiltrates.     Patient was treated with Zofran and Pepcid upon arrival with moderate improvement in symptoms. Still catheter was replaced in the ED and irrigated without evidence of recurrent bleeding or clots to initiate CBI. Still output clear at time of discharge. Symptoms and workup most consistent with catheter-associated UTI and LLL  pneumonia in setting of recent hospitalization. Spoke with pharmacy regarding antibiotic options in setting of pneumonia and UTI as well as diabetes and concern for possible hypoglycemia on glipizide. Pharmacy agrees with levofloxacin and recommends holding glipizide for the next 4 days while on Levofloxacin. Patient treated with first dose levofloxacin in the ED. Patient and patient's sister were made aware of the above findings. Discussed options with patient and patient's sister including admission for observation vs discharge home and patient elects discharge home. Plan to discharge patient home with prescription levofloxacin, strict return precautions, and close follow up with their PCP, [facility], and Cardiology for reevaluation and ongoing management as scheduled. The patient was stable and well appearing upon discharge. The patient was advised to return to the ER if any new or worsening symptoms develop. The patient verbalizes understanding and agrees with the plan.     Medical Decision Making  Obtained supplemental history:Supplemental history obtained?: Documented in chart and Family Member/Significant Other  Reviewed external records: External records reviewed?: Documented in chart  Care impacted by chronic illness:Heart Disease and Hypertension  Care significantly affected by social determinants of health:N/A  Did you consider but not order tests?: Work up considered but not performed and documented in chart, if applicable  Did you interpret images independently?: Independent interpretation of ECG and images noted in documentation, when applicable.  Consultation discussion with other provider:Did you involve another provider (consultant, MH, pharmacy, etc.)?: I discussed the care with another health care provider, see documentation for details.  Discharge. I prescribed additional prescription strength medication(s) as charted. See documentation for any additional details.    MEDICATIONS GIVEN IN THE  EMERGENCY:  Medications   ondansetron (ZOFRAN) injection 4 mg (4 mg Intravenous $Given 5/23/24 1024)   famotidine (PEPCID) injection 20 mg (20 mg Intravenous $Given 5/23/24 1023)   lidocaine (XYLOCAINE) 2 % external gel 10 mL (10 mLs Urethral $Given 5/23/24 1208)   iopamidol (ISOVUE-370) solution 100 mL (90 mLs Intravenous $Given 5/23/24 1133)   HYDROmorphone (PF) (DILAUDID) injection 0.5 mg (0.5 mg Intravenous $Given 5/23/24 1213)   levofloxacin (LEVAQUIN) tablet 750 mg (750 mg Oral $Given 5/23/24 1435)       NEW PRESCRIPTIONS STARTED AT TODAY'S ER VISIT  New Prescriptions    LEVOFLOXACIN (LEVAQUIN) 750 MG TABLET    Take 1 tablet (750 mg) by mouth daily for 4 days          =================================================================    HPI    Patient information was obtained from: patient    Use of : N/A       Drew Monsivais is a 53 year old male with a pertinent history of DM II, HTN, CAD s/p CABG (5/14/2024) who presents to this ED for evaluation of blood in ramos catheter.  Per chart review, patient was hospitalized from 5/12-5/21/24 in setting of NSTEMI.  He had undergone CABG x 3 on 5/14/2024.  Ramos catheter was placed in setting of urinary retention.  Plan to follow-up with urology 1 to 2 weeks after discharge for void trial.  Patient reports that he had noticed blood in his Ramos catheter starting yesterday.  He reports he was also seen a few clots passed.  He reports Ramos was initially placed for urinary retention and he has a void trial scheduled tomorrow with [facility].  They had called the clinic today to see if they could get in sooner, but were unable to and advised to come to the ER for further evaluation.  He is accompanied by his sister who reports he is also been having nausea and vomiting.  Patient confirms he has been vomiting about 2 times per day over the last couple of days.  He denies any associated blood or bile in the vomit.  He reports he is feeling short of  breath, but feels similar shortness of breath over the last few days since discharge.  He denies any new chest pain, but notes that he has had pain since his surgery.  He denies any fevers or chills, URI-like symptoms, cough.  He denies associated abdominal pain, flank pain, lightheadedness or syncopal episodes.  His sister states that he does have a [facility] follow-up appointment scheduled tomorrow.  He also is following up with his cardiologist on Friday.  He reports he has been constipated since his surgery, noting that he was told he had a little bit of stool while in the hospital, but states he has not had a stool since discharge.  He denies history of abdominal surgeries in the past.  He notes that he is passing gas, but this is decreased.  He denies any lower extremity swelling or pain.  He denies any weight gain.  He notes that he has been taking daily weights and they have been stable.      REVIEW OF SYSTEMS   Review of Systems see HPI    PAST MEDICAL HISTORY:  Past Medical History:   Diagnosis Date    Calculus of kidney     at least 4 episodes most recent 2012 once surgically removed Stones present both kidneys      Closed fracture of metatarsal bone(s)          Diabetes (H)     Headache     Frontal-?migraine Triggers: no obvious,  excedrin light senstive Chiro     Hypertension     PONV (postoperative nausea and vomiting)        PAST SURGICAL HISTORY:  Past Surgical History:   Procedure Laterality Date    CORONARY ARTERY BYPASS GRAFT, WITH ENDOSCOPIC VESSEL PROCUREMENT N/A 5/14/2024    Procedure: CORONARY ARTERY BYPASS GRAFT TIMES THREE, WITH LEFT LEG ENDOSCOPIC VESSEL PROCUREMENT, LEFT RADIAL ARTERY HARVEST,;  Surgeon: Tuyet Azar MD;  Location: [facility] Main OR    CV CORONARY ANGIOGRAM N/A 5/13/2024    Procedure: Coronary Angiogram;  Surgeon: Gino Rich MD;  Location: [facility] CATH LAB CV    CV INTRA AORTIC BALLOON N/A 5/14/2024    Procedure: Intra Aortic Balloon Pump Insertion;   Surgeon: Vincent Castañeda MD;  Location: [facility] LAB CV    CV INTRAVASULAR ULTRASOUND N/A 5/14/2024    Procedure: EPIAORTIC ULTRASOUND;  Surgeon: Tuyet Azar MD;  Location: [facility] OR    CV LEFT HEART CATH N/A 5/13/2024    Procedure: Left Heart Catheterization;  Surgeon: Gino Rich MD;  Location: [facility] LAB CV    CV PCI N/A 5/13/2024    Procedure: Percutaneous Coronary Intervention;  Surgeon: Gino Rich MD;  Location: [facility] LAB CV    ESOPHAGOSCOPY, GASTROSCOPY, DUODENOSCOPY (EGD), COMBINED N/A 2/15/2023    Procedure: ESOPHAGOGASTRODUODENOSCOPY with BIOPSY;  Surgeon: Brandt Duong MD;  Location: [facility] OR    TRANSESOPHAGEAL ECHOCARDIOGRAM INTRAOPERATIVE  5/14/2024    Procedure: ANESTHESIA TRANSESOPHAGEAL ECHOCARDIOGRAM,;  Surgeon: Tuyet Azar MD;  Location: [facility] OR           CURRENT MEDICATIONS:    levofloxacin (LEVAQUIN) 750 MG tablet  acetaminophen (TYLENOL) 325 MG tablet  Alcohol Swabs PADS  amLODIPine (NORVASC) 2.5 MG tablet  aspirin (ASA) 81 MG chewable tablet  atorvastatin (LIPITOR) 80 MG tablet  blood glucose (NO BRAND SPECIFIED) lancets standard  blood glucose (NO BRAND SPECIFIED) test strip  blood glucose calibration (NO BRAND SPECIFIED) solution  blood glucose monitoring (NO BRAND SPECIFIED) meter device kit  busPIRone (BUSPAR) 10 MG tablet  busPIRone (BUSPAR) 10 MG tablet  clopidogrel (PLAVIX) 75 MG tablet  FLUoxetine (PROZAC) 20 MG capsule  glipiZIDE (GLUCOTROL XL) 5 MG 24 hr tablet  glucose (BD GLUCOSE) 4 g chewable tablet  Insulin Aspart FlexPen 100 UNIT/ML SOPN  insulin pen needle (32G X 4 MM) 32G X 4 MM miscellaneous  Lidocaine (LIDOCARE) 4 % Patch  metoprolol tartrate (LOPRESSOR) 25 MG tablet  ondansetron (ZOFRAN ODT) 4 MG ODT tab  oxyCODONE (ROXICODONE) 5 MG tablet  pantoprazole (PROTONIX) 40 MG EC tablet  polyethylene glycol (MIRALAX) 17 GM/Dose powder  Sharps Container MISC  tamsulosin (FLOMAX) 0.4 MG capsule  traZODone (DESYREL) 50  "MG tablet        ALLERGIES:  No Known Allergies    FAMILY HISTORY:  Family History   Problem Relation Age of Onset    Cerebrovascular Disease Mother     Aneurysm Father         cerebral       SOCIAL HISTORY:   Social History     Socioeconomic History    Marital status:     Number of children: 1   Occupational History    Occupation: Unload trucks   Tobacco Use    Smoking status: Former     Types: Cigarettes    Smokeless tobacco: Never    Tobacco comments:     College   Vaping Use    Vaping status: Never Used   Substance and Sexual Activity    Alcohol use: No    Drug use: Yes     Comment: Drug use: Cannabis    Sexual activity: Yes     Partners: Female   Social History Narrative    Diet-            Exercise-work is physical          No health insurance       VITALS:  /80   Pulse 86   Temp 97.1  F (36.2  C) (Temporal)   Resp 16   Ht 1.702 m (5' 7\")   Wt 75.3 kg (166 lb)   SpO2 99%   BMI 26.00 kg/m      PHYSICAL EXAM    Constitutional:  Alert, appears uncomfortable, diaphoretic. Cooperative.  EYES: Conjunctivae clear.   HENT:  Atraumatic, normocephalic.  Respiratory:  Respirations even, conversational dyspnea. Lungs clear to auscultation bilaterally without wheeze, rhonchi, or rales.   Cardiovascular:  Regular rate and rhythm, good peripheral perfusion. No peripheral edema or calf tenderness to palpation. No chest wall tenderness. Symmetrical 2+ radial pulses. No JVD.   GI: Soft, flat, non-distended. Bowel sounds normal. RUQ and epigastric tenderness to palpation. No guarding, rebound, or other peritoneal signs. No CVA tenderness to percussion.   Musculoskeletal:  No edema. No cyanosis. Range of motion major extremities intact.    Integument: Diaphoretic. Surgical incisions chest, abdomen, extremity clean dry and intact without evidence of cellulitis or abscess.  Neurologic:  Alert & oriented. No focal deficits noted.   Psych: Normal mood and affect.      LAB:  All pertinent labs reviewed and " interpreted.  Results for orders placed or performed during the hospital encounter of 05/23/24   CT Abdomen Pelvis w Contrast    Impression    IMPRESSION:   1.  Gallbladder is at the upper limits of normal for size . Otherwise, no evidence for an etiology for the patient's right upper quadrant abdominal pain. No calcified gallstones or biliary dilatation.  2.  Bilateral renal cysts. No follow-up needed.  3.  Nonspecific nonobstructive bowel gas pattern.  4.  Tiny amount of free fluid in the pelvic cul-de-sac.  5.  Scattered colonic diverticula without evidence for diverticulitis.  6.  Moderate vascular calcification abdominal aorta and common iliac arteries.  7.  Small bilateral pleural effusions with moderate compressive or dependent atelectasis left lung base.     CT Chest Pulmonary Embolism w Contrast    Impression    IMPRESSION:  1.  No evidence of pulmonary embolus.  2.   Recent postoperative changes of coronary artery bypass surgery with scattered gas and minimal fluid or blood in the anterior mediastinum.  3.  Small bilateral pleural effusions with moderate compressive or dependent atelectasis in the left lung base compared to the right lung base.     POC US ECHO LIMITED    Impression    Limited bedside echocardiogram performed.  No evidence of pericardial effusion.   Result Value Ref Range    Troponin T, High Sensitivity 60 (H) <=22 ng/L   Basic metabolic panel   Result Value Ref Range    Sodium 133 (L) 135 - 145 mmol/L    Potassium 4.4 3.4 - 5.3 mmol/L    Chloride 91 (L) 98 - 107 mmol/L    Carbon Dioxide (CO2) 30 (H) 22 - 29 mmol/L    Anion Gap 12 7 - 15 mmol/L    Urea Nitrogen 26.3 (H) 6.0 - 20.0 mg/dL    Creatinine 1.41 (H) 0.67 - 1.17 mg/dL    GFR Estimate 59 (L) >60 mL/min/1.73m2    Calcium 9.5 8.6 - 10.0 mg/dL    Glucose 201 (H) 70 - 99 mg/dL   Hepatic function panel   Result Value Ref Range    Protein Total 7.7 6.4 - 8.3 g/dL    Albumin 4.4 3.5 - 5.2 g/dL    Bilirubin Total 0.7 <=1.2 mg/dL    Alkaline  Phosphatase 126 40 - 150 U/L    AST 25 0 - 45 U/L    ALT 23 0 - 70 U/L    Bilirubin Direct 0.20 0.00 - 0.30 mg/dL   Result Value Ref Range    Lipase 63 (H) 13 - 60 U/L   UA with Microscopic reflex to Culture    Specimen: Urine, Midstream   Result Value Ref Range    Color Urine Light Yellow Colorless, Straw, Light Yellow, Yellow    Appearance Urine Clear Clear    Glucose Urine Negative Negative mg/dL    Bilirubin Urine Negative Negative    Ketones Urine Trace (A) Negative mg/dL    Specific Gravity Urine <1.005 1.003 - 1.035    Blood Urine 0.2 mg/dL (A) Negative    pH Urine 7.5 (H) 5.0 - 7.0    Protein Albumin Urine 20 (A) Negative mg/dL    Urobilinogen Urine <2.0 <2.0 mg/dL    Nitrite Urine Negative Negative    Leukocyte Esterase Urine 250 Guilherme/uL (A) Negative    WBC Clumps Urine Present (A) None Seen /HPF    RBC Urine >182 (H) <=2 /HPF    WBC Urine 55 (H) <=5 /HPF   Nt probnp inpatient (BNP)   Result Value Ref Range    N terminal Pro BNP Inpatient 1,067 (H) 0 - 900 pg/mL   Extra Blue Top Tube   Result Value Ref Range    Hold Specimen JIC    Extra Red Top Tube   Result Value Ref Range    Hold Specimen JIC    CBC with platelets and differential   Result Value Ref Range    WBC Count 15.2 (H) 4.0 - 11.0 10e3/uL    RBC Count 4.32 (L) 4.40 - 5.90 10e6/uL    Hemoglobin 12.1 (L) 13.3 - 17.7 g/dL    Hematocrit 35.7 (L) 40.0 - 53.0 %    MCV 83 78 - 100 fL    MCH 28.0 26.5 - 33.0 pg    MCHC 33.9 31.5 - 36.5 g/dL    RDW 12.3 10.0 - 15.0 %    Platelet Count 474 (H) 150 - 450 10e3/uL    % Neutrophils 71 %    % Lymphocytes 14 %    % Monocytes 10 %    % Eosinophils 2 %    % Basophils 1 %    % Immature Granulocytes 3 %    NRBCs per 100 WBC 0 <1 /100    Absolute Neutrophils 10.8 (H) 1.6 - 8.3 10e3/uL    Absolute Lymphocytes 2.2 0.8 - 5.3 10e3/uL    Absolute Monocytes 1.5 (H) 0.0 - 1.3 10e3/uL    Absolute Eosinophils 0.3 0.0 - 0.7 10e3/uL    Absolute Basophils 0.1 0.0 - 0.2 10e3/uL    Absolute Immature Granulocytes 0.4 <=0.4 10e3/uL     Absolute NRBCs 0.0 10e3/uL   Result Value Ref Range    Troponin T, High Sensitivity 53 (H) <=22 ng/L   ECG 12-LEAD WITH MUSE (LHE)   Result Value Ref Range    Systolic Blood Pressure  mmHg    Diastolic Blood Pressure  mmHg    Ventricular Rate 87 BPM    Atrial Rate 87 BPM    MT Interval 140 ms    QRS Duration 96 ms     ms    QTc 486 ms    P Axis 18 degrees    R AXIS -64 degrees    T Axis 50 degrees    Interpretation ECG       Sinus rhythm  Left axis deviation  Acute pericarditis  Prolonged QT  Abnormal ECG  When compared with ECG of 16-MAY-2024 02:19,  Incomplete right bundle branch block is no longer Present  ST elevation now present in Inferior leads  Confirmed by SEE ED PROVIDER NOTE FOR, ECG INTERPRETATION (4000),  CIARA MONTIEL ([id number]) on 5/23/2024 2:04:04 PM         RADIOLOGY:  Reviewed all pertinent imaging. Please see official radiology report.  CT Chest Pulmonary Embolism w Contrast   Final Result   IMPRESSION:   1.  No evidence of pulmonary embolus.   2.   Recent postoperative changes of coronary artery bypass surgery with scattered gas and minimal fluid or blood in the anterior mediastinum.   3.  Small bilateral pleural effusions with moderate compressive or dependent atelectasis in the left lung base compared to the right lung base.         CT Abdomen Pelvis w Contrast   Final Result   IMPRESSION:    1.  Gallbladder is at the upper limits of normal for size . Otherwise, no evidence for an etiology for the patient's right upper quadrant abdominal pain. No calcified gallstones or biliary dilatation.   2.  Bilateral renal cysts. No follow-up needed.   3.  Nonspecific nonobstructive bowel gas pattern.   4.  Tiny amount of free fluid in the pelvic cul-de-sac.   5.  Scattered colonic diverticula without evidence for diverticulitis.   6.  Moderate vascular calcification abdominal aorta and common iliac arteries.   7.  Small bilateral pleural effusions with moderate compressive or dependent  atelectasis left lung base.         POC US ECHO LIMITED   ED Interpretation   Limited bedside echocardiogram performed.  No evidence of pericardial effusion.          EKG:    Dr. Koroma and I have independently reviewed and interpreted the EKG(s) documented above.    Norma Robles PA-C  [facility] EMERGENCY ROOM  [address] [address] [phone]      Norma Robles PA-C  05/23/24 [id number]

## 2024-05-23 NOTE — TELEPHONE ENCOUNTER
Urology and cardiology appt tomorrow, pt is currently in ED for blood in his urine/catheter.    Called pt and sister answered phone. Will call again tomorrow to follow up.    ----- Message from Kimberlee Aj RN sent at 5/22/2024  7:52 AM CDT -----  Regarding: FW: Discharge  POC 5/23  ----- Message -----  From: Shirley Lebron PA-C  Sent: 5/21/2024   5:47 PM CDT  To: Kimberlee Aj RN  Subject: Discharge                                        Patient discharged to home today.    - Patient is below preop weight w/ normal LV function so will not be sent w/ lasix or K+  - Patient gets very drowsy w/ multiple pain medications, so only sent w/ tramadol  - IABP placed preop which was removed POD#1 w/o issue.   - Patient is on amlodipine for radial graft protection. This should be maintained a minimum of 6 months, ideally 1 year if BP will tolerate.   - Plavix 75 mg qday to be maintained x1 year postop per surgeon preference for graft patency. ASA should be 81 mg for this duration. When plavix is stopped, ASA should be increased to 162 mg qday indefinitely.   - Patient w/ significant anxiety postop relieved w/ restarting PTA meds and breathing exercises  - Retention after ramos removed POD#2. Ramos replaced POD#3 to POD#5. Required straight cath x3 again so ramos was replaced and urology consulted. Plan for outpt TOV w/ urology in 1-2 weeks  - Insulin initiated as inpatient. He will require close follow-up w/ PCP for DM management

## 2024-05-24 ENCOUNTER — TELEPHONE (OUTPATIENT)
Dept: CARDIOLOGY | Facility: CLINIC | Age: 54
End: 2024-05-24
Payer: COMMERCIAL

## 2024-05-24 LAB — BACTERIA UR CULT: ABNORMAL

## 2024-05-24 NOTE — TELEPHONE ENCOUNTER
Cardiovascular Surgery  [facility]    DISCHARGE FOLLOW UP PHONE CALL      POST OP MONITORING  How is your pain on a 0-10 scale, how are you managing your pain? Pt was diagnosed with pneumonia yesterday in ED. He has been taking oxycodone and tylenol appropriately for his pain management    ACTIVITY  How is your activity tolerance? Up and walking but very fatigued.  Are you still doing sternal precautions? Yes.  Do you hear any clicking when you are moving or taking a deep breath? No.    Are you weighing yourself daily? Weight has been stable.    SIGNS AND SYMPTOMS OF INFECTION  INCREASE IN PAIN - No  FEVER - No  DRAINAGE - No If so, color: NA  REDNESS - No  SWELLING - No    ASSISTANCE  Do you have someone at home to assist you with your daily activities? Sister Enid is coordinating his care.    MEDICATIONS  Is someone helping you to set up your medications? Enid.  Do you have any questions about your medications? No.    Are you on a blood thinner? Plavix.  Who is managing your INRs? NA    FOLLOW UP  You are scheduled to see your primary care physician on -not yet scheduled.  You are scheduled to see our surgery advanced practive provider for post operative follow up on 6/11.    You are scheduled for cardiac rehab on - not yet scheduled.  You are scheduled to see your cardiologist on 7/1.  Urology appt today 5/24.    CONTACT INFORMATION  Please feel free to call us with any questions or symptoms that are concerning for you at [phone]. If it is after 4:00 in the afternoon, or a weekend please call [phone] and ask for the on call specialist. We want to do everything we can to help prevent you needing to return to the ED, so please do not hesitate to call us.    Kimberlee Aj RN  Cardiovascular Surgery  [phone]  May 24, 2024 9:54 AM        ----- Message from Kimberlee Aj RN sent at 5/22/2024  7:52 AM CDT -----  Regarding: FW: Discharge  POC 5/23    ----- Message -----  From:  Shirley Lebron PA-C  Sent: 5/21/2024   5:47 PM CDT  To: Kimberlee Aj RN  Subject: Discharge                                        Patient discharged to home today.    - Patient is below preop weight w/ normal LV function so will not be sent w/ lasix or K+  - Patient gets very drowsy w/ multiple pain medications, so only sent w/ tramadol  - IABP placed preop which was removed POD#1 w/o issue.   - Patient is on amlodipine for radial graft protection. This should be maintained a minimum of 6 months, ideally 1 year if BP will tolerate.   - Plavix 75 mg qday to be maintained x1 year postop per surgeon preference for graft patency. ASA should be 81 mg for this duration. When plavix is stopped, ASA should be increased to 162 mg qday indefinitely.   - Patient w/ significant anxiety postop relieved w/ restarting PTA meds and breathing exercises  - Retention after ramos removed POD#2. Ramos replaced POD#3 to POD#5. Required straight cath x3 again so ramos was replaced and urology consulted. Plan for outpt TOV w/ urology in 1-2 weeks  - Insulin initiated as inpatient. He will require close follow-up w/ PCP for DM management

## 2024-05-25 ENCOUNTER — TELEPHONE (OUTPATIENT)
Dept: NURSING | Facility: CLINIC | Age: 54
End: 2024-05-25
Payer: COMMERCIAL

## 2024-05-25 NOTE — TELEPHONE ENCOUNTER
[facility]     Reason for call: Lab Result Notification     Lab Result (including Rx patient on, if applicable).  If culture, copy of lab report at bottom.  Lab Result: See below    Prescribed levofloxacin (LEVAQUIN) 750 MG tablet every day x 4 days    Creatinine Level (mg/dl)   Creatinine   Date Value Ref Range Status   05/23/2024 1.41 (H) 0.67 - 1.17 mg/dL Final    Creatinine clearance (ml/min), if applicable    Serum creatinine: 1.41 mg/dL (H) 05/23/24 1021  Estimated creatinine clearance: 63 mL/min (A)     Patient's current Symptoms:   Dark urine but patient states he was not drinking a lot of fluids last night.  Today he states his urine is light yellow. Patient feels like he is getting better.     RN Recommendations/Instructions per [place] ED lab result protocol:   [facility] ED lab result protocol utilized: Urine culture    Patient/care giver notified to contact your PCP clinic or return to the Emergency department if your:  Symptoms do not resolve after completing antibiotic.  Symptoms worsen or other concerning symptoms.    ALYSIA CRUZ RN

## 2024-05-26 LAB
ATRIAL RATE - MUSE: 81 BPM
DIASTOLIC BLOOD PRESSURE - MUSE: 93 MMHG
INTERPRETATION ECG - MUSE: NORMAL
P AXIS - MUSE: 44 DEGREES
PR INTERVAL - MUSE: 186 MS
QRS DURATION - MUSE: 98 MS
QT - MUSE: 372 MS
QTC - MUSE: 432 MS
R AXIS - MUSE: -78 DEGREES
SYSTOLIC BLOOD PRESSURE - MUSE: 164 MMHG
T AXIS - MUSE: 69 DEGREES
VENTRICULAR RATE- MUSE: 81 BPM

## 2024-05-28 ENCOUNTER — HOSPITAL ENCOUNTER (OUTPATIENT)
Dept: CARDIAC REHAB | Facility: CLINIC | Age: 54
Discharge: HOME | End: 2024-05-28
Payer: COMMERCIAL

## 2024-05-28 DIAGNOSIS — Z95.1 S/P CABG (CORONARY ARTERY BYPASS GRAFT): ICD-10-CM

## 2024-05-28 LAB — GLUCOSE BLDC GLUCOMTR-MCNC: 271 MG/DL (ref 70–99)

## 2024-05-28 PROCEDURE — 93798 PHYS/QHP OP CAR RHAB W/ECG: CPT

## 2024-05-28 PROCEDURE — 93797 PHYS/QHP OP CAR RHAB WO ECG: CPT | Mod: 59

## 2024-06-05 ENCOUNTER — HOSPITAL ENCOUNTER (OUTPATIENT)
Dept: CARDIAC REHAB | Facility: CLINIC | Age: 54
Discharge: HOME | End: 2024-06-05
Payer: COMMERCIAL

## 2024-06-05 LAB
GLUCOSE BLDC GLUCOMTR-MCNC: 207 MG/DL (ref 70–99)
GLUCOSE BLDC GLUCOMTR-MCNC: 234 MG/DL (ref 70–99)

## 2024-06-05 PROCEDURE — 93798 PHYS/QHP OP CAR RHAB W/ECG: CPT

## 2024-06-06 ENCOUNTER — HOSPITAL ENCOUNTER (OUTPATIENT)
Dept: CARDIAC REHAB | Facility: CLINIC | Age: 54
Discharge: HOME | End: 2024-06-06
Payer: COMMERCIAL

## 2024-06-06 ENCOUNTER — TRANSCRIBE ORDERS (OUTPATIENT)
Dept: OTHER | Age: 54
End: 2024-06-06

## 2024-06-06 LAB
GLUCOSE BLDC GLUCOMTR-MCNC: 132 MG/DL (ref 70–99)
GLUCOSE BLDC GLUCOMTR-MCNC: 170 MG/DL (ref 70–99)

## 2024-06-06 PROCEDURE — 93798 PHYS/QHP OP CAR RHAB W/ECG: CPT

## 2024-06-11 ENCOUNTER — TELEPHONE (OUTPATIENT)
Dept: CARDIOLOGY | Facility: CLINIC | Age: 54
End: 2024-06-11

## 2024-06-11 ENCOUNTER — OFFICE VISIT (OUTPATIENT)
Dept: CARDIOLOGY | Facility: CLINIC | Age: 54
End: 2024-06-11
Payer: COMMERCIAL

## 2024-06-11 ENCOUNTER — HOSPITAL ENCOUNTER (OUTPATIENT)
Dept: CARDIAC REHAB | Facility: CLINIC | Age: 54
Discharge: HOME | End: 2024-06-11
Payer: COMMERCIAL

## 2024-06-11 VITALS
RESPIRATION RATE: 16 BRPM | HEART RATE: 64 BPM | BODY MASS INDEX: 25.53 KG/M2 | OXYGEN SATURATION: 98 % | SYSTOLIC BLOOD PRESSURE: 112 MMHG | DIASTOLIC BLOOD PRESSURE: 66 MMHG | WEIGHT: 163 LBS

## 2024-06-11 DIAGNOSIS — Z95.1 S/P CABG (CORONARY ARTERY BYPASS GRAFT): Primary | ICD-10-CM

## 2024-06-11 LAB
GLUCOSE BLDC GLUCOMTR-MCNC: 118 MG/DL (ref 70–99)
GLUCOSE BLDC GLUCOMTR-MCNC: 130 MG/DL (ref 70–99)

## 2024-06-11 PROCEDURE — 93798 PHYS/QHP OP CAR RHAB W/ECG: CPT

## 2024-06-11 PROCEDURE — 99024 POSTOP FOLLOW-UP VISIT: CPT

## 2024-06-11 RX ORDER — GABAPENTIN 100 MG/1
CAPSULE ORAL
Qty: 8 CAPSULE | Refills: 0 | Status: SHIPPED | OUTPATIENT
Start: 2024-06-11 | End: 2024-06-14

## 2024-06-11 NOTE — PATIENT INSTRUCTIONS
- Surgically doing well. Incisions are healing well with no signs of infection.  - Hemodynamics are stable. No medication changes were needed today.  - Follow up with your cardiologist as scheduled (7/1/2024 at 3:50pm w/ Dr. Wilhelm)  - Follow up w/ your PCP as scheduled (6/13/2024 at 11am w/ Dr. Melara)  - Follow up w/ urology as scheduled (8/8/2024 at 2:20pm w/ [facility])  - Continue Cardiac Rehab until completed.   - May start driving 6/11/2024, today, (4 weeks post-op) if not using narcotic pain medications.  - Continue strict sternal precautions until 7/9/2024. No lifting >10bs; may gradually increase at this point (8 weeks post-op).   - No need for further follow-up with CV surgery unless concerns. Feel free to call our office with questions. [phone]  - Gabapentin for nerve pain was sent to your pharmacy. Do not take w/in 8 hours of trazadone.

## 2024-06-11 NOTE — PROGRESS NOTES
CARDIOTHORACIC SURGERY FOLLOW-UP VISIT     Drew Monsivais   1970   [id number]      Reason for visit: Post operative clinic visit. Patient underwent CABG x3 (LIMA to LAD, left RA to OM, rSVG to RPDA), endoscopic left radial artery harvest, LLE EVH) in s/o NSTEMI with Dr. Azar on 5/14/2024.    HPI: Drew Monsivais is a 53 year old year old male seen in clinic for a routine follow-up appointment after surgery. Patient has past medical history as below. Hospital course was remarkable for preop IABP placement, significant postop anxiety, and urinary retention requiring ramos to remain at discharge and outpt TOV w/ urology. He was also initiated on insulin while inpatient. Patient is on amlodipine for radial graft protection. This should be maintained a minimum of 6 months, ideally 1 year if BP will tolerate. Plavix 75 mg qday to be maintained x1 year postop per surgeon preference for graft patency in s/o NSTEMI. ASA should be 81 mg for this duration. When plavix is stopped, ASA should be increased to 162 mg qday indefinitely.     Patient was discharged to home on POD#7.    Patient has been doing fair since discharge. Was seen in the ED on 5/23/2024 and diagnosed w/ UTI and questionable pneumonia on CT. He was sent w/ a course of levaquin which he completed. Patient did  follow-up with primary care since leaving the hospital. He followed w/ urology for TOV and gross hematuria, now voiding w/o issue. Reports that incision is healing well. Denies fevers, peripheral edema. Appetite is improving and patient is voiding without difficulty. Weight has been stable. Pain management at this point with tylenol, endorses nerve pain in left hand (radial artery harvest site). Patient has been attending cardiac rehab and this is going well. Patient is not on anti-coagulation but is on plavix as above.     PAST MEDICAL HISTORY:  Past Medical History:   Diagnosis Date    Calculus of kidney     at least 4 episodes most recent  2012 once surgically removed Stones present both kidneys      Closed fracture of metatarsal bone(s)          Diabetes (H)     Headache     Frontal-?migraine Triggers: no obvious,  excedrin light senstive Chiro     Hypertension     PONV (postoperative nausea and vomiting)        PAST SURGICAL HISTORY:  Past Surgical History:   Procedure Laterality Date    CORONARY ARTERY BYPASS GRAFT, WITH ENDOSCOPIC VESSEL PROCUREMENT N/A 5/14/2024    Procedure: CORONARY ARTERY BYPASS GRAFT TIMES THREE, WITH LEFT LEG ENDOSCOPIC VESSEL PROCUREMENT, LEFT RADIAL ARTERY HARVEST,;  Surgeon: Tuyet Azar MD;  Location: [facility] [place] OR    CV CORONARY ANGIOGRAM N/A 5/13/2024    Procedure: Coronary Angiogram;  Surgeon: Gino Rich MD;  Location: [facility] LAB CV    CV INTRA AORTIC BALLOON N/A 5/14/2024    Procedure: Intra Aortic Balloon Pump Insertion;  Surgeon: Vincent Castañeda MD;  Location: [facility] CV    CV INTRAVASULAR ULTRASOUND N/A 5/14/2024    Procedure: EPIAORTIC ULTRASOUND;  Surgeon: Tuyet Azar MD;  Location: [facility] [place] OR    CV LEFT HEART CATH N/A 5/13/2024    Procedure: Left Heart Catheterization;  Surgeon: Gino Rich MD;  Location: [facility] LAB     CV PCI N/A 5/13/2024    Procedure: Percutaneous Coronary Intervention;  Surgeon: Gino Rich MD;  Location: [facility] CV    ESOPHAGOSCOPY, GASTROSCOPY, DUODENOSCOPY (EGD), COMBINED N/A 2/15/2023    Procedure: ESOPHAGOGASTRODUODENOSCOPY with BIOPSY;  Surgeon: Brandt Duong MD;  Location: [facility] OR    TRANSESOPHAGEAL ECHOCARDIOGRAM INTRAOPERATIVE  5/14/2024    Procedure: ANESTHESIA TRANSESOPHAGEAL ECHOCARDIOGRAM,;  Surgeon: Tuyet Azar MD;  Location: [facility] [place] OR       CURRENT MEDICATIONS:     Current Outpatient Medications:     acetaminophen (TYLENOL) 325 MG tablet, Take 2 tablets (650 mg) by mouth every 6 hours as needed for mild pain, Disp: 60 tablet, Rfl: 0    Alcohol Swabs PADS, Use to swab the area of the  injection or марина as directed Per insurance coverage, Disp: 200 each, Rfl: 1    amLODIPine (NORVASC) 2.5 MG tablet, Take 1 tablet (2.5 mg) by mouth daily, Disp: 90 tablet, Rfl: 3    aspirin (ASA) 81 MG chewable tablet, 1 tablet (81 mg) by Oral or NG Tube route daily, Disp: 90 tablet, Rfl: 3    atorvastatin (LIPITOR) 80 MG tablet, Take 1 tablet (80 mg) by mouth every evening, Disp: 90 tablet, Rfl: 3    blood glucose (NO BRAND SPECIFIED) lancets standard, To use to test glucose level in the blood Use to test blood sugar  3  times daily as directed. To accompany glucose monitor brands per insurance coverage., Disp: 150 each, Rfl: 0    blood glucose (NO BRAND SPECIFIED) test strip, To use to test glucose level in the blood. Use to test blood sugar  3 times daily as directed. To accompany glucose monitor brands per insurance coverage., Disp: 150 strip, Rfl: 0    blood glucose calibration (NO BRAND SPECIFIED) solution, Used to calibrate the blood glucose monitor as needed and as directed.  To accompany  blood glucose brands per insurance coverage, Disp: 1 each, Rfl: 0    blood glucose monitoring (NO BRAND SPECIFIED) meter device kit, Use as directed Per insurance coverage, Disp: 1 kit, Rfl: 0    busPIRone (BUSPAR) 10 MG tablet, Take 10 mg by mouth daily, Disp: , Rfl:     busPIRone (BUSPAR) 10 MG tablet, Take 10 mg by mouth daily as needed (anxiety) For afternoon/evening if needed., Disp: , Rfl:     clopidogrel (PLAVIX) 75 MG tablet, Take 1 tablet (75 mg) by mouth daily, Disp: 90 tablet, Rfl: 3    FLUoxetine (PROZAC) 20 MG capsule, Take 20 mg by mouth daily, Disp: , Rfl:     gabapentin (NEURONTIN) 100 MG capsule, Every 8 hours as needed for neuralgia (nerve pain). Do not take w/in 8 hours of trazadone to avoid oversedation., Disp: 8 capsule, Rfl: 0    glipiZIDE (GLUCOTROL XL) 5 MG 24 hr tablet, Take 1 tablet (5 mg) by mouth 2 times daily Take 1 tablet twice daily for diabetes, Disp: 180 tablet, Rfl: 0    glucose (BD  GLUCOSE) 4 g chewable tablet, Take 4 tablets by mouth every 15 minutes as needed for low blood sugar, Disp: 50 tablet, Rfl: 0    Insulin Aspart FlexPen 100 UNIT/ML SOPN, Inject 1-10 Units Subcutaneous 3 times daily (with meals), Disp: 15 mL, Rfl: 0    insulin pen needle (32G X 4 MM) 32G X 4 MM miscellaneous, Use as directed by provider Per insurance coverage, Disp: 150 each, Rfl: 0    Lidocaine (LIDOCARE) 4 % Patch, Place 1-2 patches onto the skin every 24 hours To prevent lidocaine toxicity, patient should be patch free for 12 hrs daily., Disp: 10 patch, Rfl: 0    metoprolol tartrate (LOPRESSOR) 25 MG tablet, Take 1 tablet (25 mg) by mouth 2 times daily, Disp: 180 tablet, Rfl: 3    ondansetron (ZOFRAN ODT) 4 MG ODT tab, Take 1 tablet (4 mg) by mouth every 6 hours as needed for nausea or vomiting, Disp: 10 tablet, Rfl: 0    pantoprazole (PROTONIX) 40 MG EC tablet, Take 40 mg by mouth daily, Disp: , Rfl:     Sharps Container MISC, Use as directed to dispose of needles, lancets and other sharps, Disp: 1 each, Rfl: 1    traZODone (DESYREL) 50 MG tablet, Take 3 tablets (150 mg) by mouth At Bedtime, Disp: 40 tablet, Rfl: 0    tamsulosin (FLOMAX) 0.4 MG capsule, TAKE 1 CAPSULE BY MOUTH EVERY DAY (Patient not taking: Reported on 6/11/2024), Disp: 90 capsule, Rfl: 0    ALLERGIES:      Allergies   Allergen Reactions    Amoxicillin GI Disturbance       ROS:  Gen: No fevers, weight loss/gain  CV: Denies chest pain, peripheral edema  Pulm: Denies SOB  GI/: Voiding without problems, appetite improving.     LABS:  None    IMAGING:  None    PHYSICAL EXAM:  /66 (BP Location: Right arm, Patient Position: Sitting, Cuff Size: Adult Regular)   Pulse 64   Resp 16   Wt 73.9 kg (163 lb)   SpO2 98%   BMI 25.53 kg/m    General: Alert and oriented, pleasant, no acute distress.  CV:  No peripheral edema.  Pulm: Easy work of breathing on room air.   GI: Soft, non-tender, and non-distended  Incision: Chest and leg incisions clean  dry and intact without erythema, swelling or drainage  Neuro: CNs grossly intact.      ASSESSMENT/PLAN:  Drew Monsivais is a 53 year old year old male status post CABGx3 w/ radial harvest in s/o NSTEMI who returns to clinic for postop visit.     - Surgically doing well. Incisions are healing well with no signs of infection.  - Hemodynamics are stable. No medication changes were needed today.  - Follow up with your cardiologist as scheduled (7/1/2024 at 3:50pm w/ Dr. Wilhelm)  - Follow up w/ your PCP as scheduled (6/13/2024 at 11am w/ Dr. Melara)  - Follow up w/ urology as scheduled (8/8/2024 at 2:20pm w/ [facility])  - Continue Cardiac Rehab until completed.   - May start driving 6/11/2024, today, (4 weeks post-op) if not using narcotic pain medications.  - Continue strict sternal precautions until 7/9/2024. No lifting >10bs; may gradually increase at this point (8 weeks post-op).   - No need for further follow-up with CV surgery unless concerns. Feel free to call our office with questions. [phone]  - Plavix 75 mg qday to be maintained x1 year postop per surgeon preference for graft patency. ASA should be 81 mg for this duration. When plavix is stopped, ASA should be increased to 162 mg qday indefinitely.   - Patient is on amlodipine for radial graft protection. This should be maintained a minimum of 6 months, ideally 1 year if BP will tolerate.     Christiana Lebron PA-C  [facility] Cardiothoracic Surgery  Pager: [phone]  June 11, 2024

## 2024-06-12 ENCOUNTER — HOSPITAL ENCOUNTER (OUTPATIENT)
Dept: CARDIAC REHAB | Facility: CLINIC | Age: 54
Discharge: HOME | End: 2024-06-12
Payer: COMMERCIAL

## 2024-06-12 PROCEDURE — 93798 PHYS/QHP OP CAR RHAB W/ECG: CPT

## 2024-06-14 ENCOUNTER — HOSPITAL ENCOUNTER (EMERGENCY)
Facility: CLINIC | Age: 54
Discharge: HOME | End: 2024-06-14
Attending: EMERGENCY MEDICINE | Admitting: EMERGENCY MEDICINE
Payer: COMMERCIAL

## 2024-06-14 ENCOUNTER — APPOINTMENT (OUTPATIENT)
Dept: CT IMAGING | Facility: CLINIC | Age: 54
End: 2024-06-14
Attending: EMERGENCY MEDICINE
Payer: COMMERCIAL

## 2024-06-14 ENCOUNTER — TELEPHONE (OUTPATIENT)
Dept: CARDIOLOGY | Facility: CLINIC | Age: 54
End: 2024-06-14

## 2024-06-14 ENCOUNTER — HOSPITAL ENCOUNTER (OUTPATIENT)
Dept: CARDIAC REHAB | Facility: CLINIC | Age: 54
Discharge: HOME | End: 2024-06-14
Payer: COMMERCIAL

## 2024-06-14 VITALS
OXYGEN SATURATION: 100 % | TEMPERATURE: 98.1 F | HEART RATE: 60 BPM | SYSTOLIC BLOOD PRESSURE: 141 MMHG | RESPIRATION RATE: 18 BRPM | DIASTOLIC BLOOD PRESSURE: 85 MMHG

## 2024-06-14 DIAGNOSIS — Z95.1 STATUS POST AORTO-CORONARY ARTERY BYPASS GRAFT: ICD-10-CM

## 2024-06-14 DIAGNOSIS — R55 PRE-SYNCOPE: ICD-10-CM

## 2024-06-14 LAB
ALBUMIN SERPL BCG-MCNC: 4.3 G/DL (ref 3.5–5.2)
ALBUMIN UR-MCNC: NEGATIVE MG/DL
ALP SERPL-CCNC: 119 U/L (ref 40–150)
ALT SERPL W P-5'-P-CCNC: 14 U/L (ref 0–70)
ANION GAP SERPL CALCULATED.3IONS-SCNC: 11 MMOL/L (ref 7–15)
APPEARANCE UR: CLEAR
AST SERPL W P-5'-P-CCNC: 17 U/L (ref 0–45)
ATRIAL RATE - MUSE: 59 BPM
B-OH-BUTYR SERPL-SCNC: <0.18 MMOL/L
BASE EXCESS BLDV CALC-SCNC: 4.1 MMOL/L (ref -3–3)
BASOPHILS # BLD AUTO: 0 10E3/UL (ref 0–0.2)
BASOPHILS NFR BLD AUTO: 1 %
BILIRUB SERPL-MCNC: 0.4 MG/DL
BILIRUB UR QL STRIP: NEGATIVE
BUN SERPL-MCNC: 15.3 MG/DL (ref 6–20)
CALCIUM SERPL-MCNC: 9.2 MG/DL (ref 8.6–10)
CHLORIDE SERPL-SCNC: 96 MMOL/L (ref 98–107)
COLOR UR AUTO: COLORLESS
CREAT SERPL-MCNC: 1.22 MG/DL (ref 0.67–1.17)
DEPRECATED HCO3 PLAS-SCNC: 26 MMOL/L (ref 22–29)
DIASTOLIC BLOOD PRESSURE - MUSE: 92 MMHG
EGFRCR SERPLBLD CKD-EPI 2021: 70 ML/MIN/1.73M2
EOSINOPHIL # BLD AUTO: 0.4 10E3/UL (ref 0–0.7)
EOSINOPHIL NFR BLD AUTO: 6 %
ERYTHROCYTE [DISTWIDTH] IN BLOOD BY AUTOMATED COUNT: 13.2 % (ref 10–15)
GLUCOSE SERPL-MCNC: 289 MG/DL (ref 70–99)
GLUCOSE UR STRIP-MCNC: 50 MG/DL
HCO3 BLDV-SCNC: 29 MMOL/L (ref 21–28)
HCT VFR BLD AUTO: 38.3 % (ref 40–53)
HGB BLD-MCNC: 12.9 G/DL (ref 13.3–17.7)
HGB UR QL STRIP: NEGATIVE
HOLD SPECIMEN: NORMAL
HOLD SPECIMEN: NORMAL
IMM GRANULOCYTES # BLD: 0 10E3/UL
IMM GRANULOCYTES NFR BLD: 1 %
INTERPRETATION ECG - MUSE: NORMAL
KETONES UR STRIP-MCNC: NEGATIVE MG/DL
LEUKOCYTE ESTERASE UR QL STRIP: NEGATIVE
LYMPHOCYTES # BLD AUTO: 1.4 10E3/UL (ref 0.8–5.3)
LYMPHOCYTES NFR BLD AUTO: 24 %
MAGNESIUM SERPL-MCNC: 2.1 MG/DL (ref 1.7–2.3)
MCH RBC QN AUTO: 27.3 PG (ref 26.5–33)
MCHC RBC AUTO-ENTMCNC: 33.7 G/DL (ref 31.5–36.5)
MCV RBC AUTO: 81 FL (ref 78–100)
MONOCYTES # BLD AUTO: 0.5 10E3/UL (ref 0–1.3)
MONOCYTES NFR BLD AUTO: 9 %
NEUTROPHILS # BLD AUTO: 3.6 10E3/UL (ref 1.6–8.3)
NEUTROPHILS NFR BLD AUTO: 60 %
NITRATE UR QL: NEGATIVE
NRBC # BLD AUTO: 0 10E3/UL
NRBC BLD AUTO-RTO: 0 /100
NT-PROBNP SERPL-MCNC: 358 PG/ML (ref 0–900)
O2/TOTAL GAS SETTING VFR VENT: 21 %
OXYHGB MFR BLDV: 22 % (ref 70–75)
P AXIS - MUSE: 21 DEGREES
PCO2 BLDV: 49 MM HG (ref 40–50)
PH BLDV: 7.38 [PH] (ref 7.32–7.43)
PH UR STRIP: 7.5 [PH] (ref 5–7)
PLATELET # BLD AUTO: 232 10E3/UL (ref 150–450)
PO2 BLDV: 17 MM HG (ref 25–47)
POTASSIUM SERPL-SCNC: 4.6 MMOL/L (ref 3.4–5.3)
PR INTERVAL - MUSE: 186 MS
PROT SERPL-MCNC: 7.4 G/DL (ref 6.4–8.3)
QRS DURATION - MUSE: 88 MS
QT - MUSE: 412 MS
QTC - MUSE: 407 MS
R AXIS - MUSE: -64 DEGREES
RBC # BLD AUTO: 4.72 10E6/UL (ref 4.4–5.9)
RBC URINE: 0 /HPF
SAO2 % BLDV: 22.3 % (ref 70–75)
SODIUM SERPL-SCNC: 133 MMOL/L (ref 135–145)
SP GR UR STRIP: 1.03 (ref 1–1.03)
SYSTOLIC BLOOD PRESSURE - MUSE: 150 MMHG
T AXIS - MUSE: 96 DEGREES
TROPONIN T SERPL HS-MCNC: 28 NG/L
TROPONIN T SERPL HS-MCNC: 28 NG/L
TSH SERPL DL<=0.005 MIU/L-ACNC: 1.08 UIU/ML (ref 0.3–4.2)
UROBILINOGEN UR STRIP-MCNC: <2 MG/DL
VENTRICULAR RATE- MUSE: 59 BPM
WBC # BLD AUTO: 5.9 10E3/UL (ref 4–11)
WBC URINE: <1 /HPF

## 2024-06-14 PROCEDURE — 250N000011 HC RX IP 250 OP 636: Mod: JZ | Performed by: PHYSICIAN ASSISTANT

## 2024-06-14 PROCEDURE — 82805 BLOOD GASES W/O2 SATURATION: CPT | Performed by: EMERGENCY MEDICINE

## 2024-06-14 PROCEDURE — 258N000003 HC RX IP 258 OP 636: Mod: JZ | Performed by: EMERGENCY MEDICINE

## 2024-06-14 PROCEDURE — 81001 URINALYSIS AUTO W/SCOPE: CPT | Performed by: EMERGENCY MEDICINE

## 2024-06-14 PROCEDURE — 83735 ASSAY OF MAGNESIUM: CPT | Performed by: EMERGENCY MEDICINE

## 2024-06-14 PROCEDURE — 84443 ASSAY THYROID STIM HORMONE: CPT | Performed by: EMERGENCY MEDICINE

## 2024-06-14 PROCEDURE — 250N000013 HC RX MED GY IP 250 OP 250 PS 637: Performed by: EMERGENCY MEDICINE

## 2024-06-14 PROCEDURE — 84450 TRANSFERASE (AST) (SGOT): CPT | Performed by: EMERGENCY MEDICINE

## 2024-06-14 PROCEDURE — 85025 COMPLETE CBC W/AUTO DIFF WBC: CPT | Performed by: EMERGENCY MEDICINE

## 2024-06-14 PROCEDURE — 71275 CT ANGIOGRAPHY CHEST: CPT

## 2024-06-14 PROCEDURE — 84075 ASSAY ALKALINE PHOSPHATASE: CPT | Performed by: EMERGENCY MEDICINE

## 2024-06-14 PROCEDURE — 36415 COLL VENOUS BLD VENIPUNCTURE: CPT | Performed by: EMERGENCY MEDICINE

## 2024-06-14 PROCEDURE — 96360 HYDRATION IV INFUSION INIT: CPT | Mod: 59

## 2024-06-14 PROCEDURE — 82010 KETONE BODYS QUAN: CPT | Performed by: EMERGENCY MEDICINE

## 2024-06-14 PROCEDURE — 83880 ASSAY OF NATRIURETIC PEPTIDE: CPT | Performed by: EMERGENCY MEDICINE

## 2024-06-14 PROCEDURE — 84484 ASSAY OF TROPONIN QUANT: CPT | Mod: 91 | Performed by: EMERGENCY MEDICINE

## 2024-06-14 PROCEDURE — 93005 ELECTROCARDIOGRAM TRACING: CPT | Performed by: EMERGENCY MEDICINE

## 2024-06-14 PROCEDURE — 93798 PHYS/QHP OP CAR RHAB W/ECG: CPT

## 2024-06-14 PROCEDURE — 99285 EMERGENCY DEPT VISIT HI MDM: CPT | Mod: 25

## 2024-06-14 RX ORDER — IOPAMIDOL 755 MG/ML
75 INJECTION, SOLUTION INTRAVASCULAR ONCE
Status: COMPLETED | OUTPATIENT
Start: 2024-06-14 | End: 2024-06-14

## 2024-06-14 RX ORDER — METOPROLOL TARTRATE 25 MG/1
12.5 TABLET, FILM COATED ORAL 2 TIMES DAILY
Qty: 30 TABLET | Refills: 0 | Status: SHIPPED | OUTPATIENT
Start: 2024-06-14 | End: 2024-07-14

## 2024-06-14 RX ORDER — GABAPENTIN 100 MG/1
100 CAPSULE ORAL ONCE
Status: COMPLETED | OUTPATIENT
Start: 2024-06-14 | End: 2024-06-14

## 2024-06-14 RX ORDER — INSULIN ASPART 100 [IU]/ML
1-2 INJECTION, SOLUTION INTRAVENOUS; SUBCUTANEOUS
COMMUNITY

## 2024-06-14 RX ORDER — GABAPENTIN 100 MG/1
100 CAPSULE ORAL
COMMUNITY

## 2024-06-14 RX ORDER — TAMSULOSIN HYDROCHLORIDE 0.4 MG/1
0.4 CAPSULE ORAL DAILY
COMMUNITY

## 2024-06-14 RX ADMIN — GABAPENTIN 100 MG: 100 CAPSULE ORAL at 12:34

## 2024-06-14 RX ADMIN — SODIUM CHLORIDE 1000 ML: 9 INJECTION, SOLUTION INTRAVENOUS at 11:35

## 2024-06-14 RX ADMIN — IOPAMIDOL 75 ML: 755 INJECTION, SOLUTION INTRAVENOUS at 10:25

## 2024-06-14 ASSESSMENT — ACTIVITIES OF DAILY LIVING (ADL)
ADLS_ACUITY_SCORE: 38

## 2024-06-14 ASSESSMENT — ENCOUNTER SYMPTOMS
COUGH: 0
DIARRHEA: 0
VOMITING: 0
CHILLS: 0
ABDOMINAL PAIN: 0
SHORTNESS OF BREATH: 0
LIGHT-HEADEDNESS: 1
FEVER: 0
APPETITE CHANGE: 0
DIZZINESS: 0

## 2024-06-14 NOTE — ED PROVIDER NOTES
EMERGENCY DEPARTMENT ENCOUNTER      NAME: Drew Monsivais  AGE: 53 year old male  YOB: 1970  MRN: [id number]  EVALUATION DATE & TIME: 6/14/2024  9:08 AM    PCP: Jailyn Manuel    ED PROVIDER: Kelsie Jiang PA-C      Chief Complaint   Patient presents with    Dizziness         FINAL IMPRESSION:  1. Pre-syncope    2. Status post aorto-coronary artery bypass graft          ED COURSE & MEDICAL DECISION MAKING:    Pertinent Labs & Imaging studies reviewed. (See chart for details)    53 year old male presents to the Emergency Department for evaluation of lightheadedness.    Physical exam is remarkable for a generally well-appearing male who is in no acute distress.  Heart and lung sounds are clear diffusely throughout.  He has a midline anterior chest wall incision consistent with recent CABG, it is not significantly red, warm, or tender to palpation.  Abdomen is soft and nontender.  No focal neurologic deficits noted, patient moves all extremities without difficulty.  Cranial nerves III through XII appear grossly intact.  Vital signs are stable and he is afebrile.    CBC is remarkable for mild anemia with hemoglobin of 12.9, actually significantly improved when compared to his hospital stay; no leukocytosis.  CMP is remarkable for hyperglycemia with glucose of 289, normal anion gap and BUN; mild hyponatremia with sodium of 133.  Creatinine mildly elevated at 1.22 but normal GFR and consistent with his baseline.  No evidence of liver dysfunction.  Venous blood gases showed normal pH and very mildly increased bicarb.  TSH within normal limits.  Magnesium within normal limits.  Beta hydroxybutyrate is undetectable.  BNP within normal limits.  Urinalysis without evidence of blood or infection.  Troponin elevated at 28, 2-hour delta troponin remained stable at 28.  EKG with some ST changes in the anterolateral leads however appears improved when compared to most recent EKG.  CTA of the chest was  obtained which again shows atelectasis in the left lower lung but no acute PE or pneumonia.    The patient was given IV fluids here for treatment of mild dehydration.  I do not think any further emergent labs or imaging are indicated at this time.  The patient is overall well-appearing and hemodynamically stable.  He did not have any hypotension here and had no further recurrence of his symptoms.  He denies any chest pain at this time his cardiac workup today is reassuring.  I did discuss his presentation with on-call cardiology, they recommend decreasing his metoprolol to 12.5 mg twice daily and outpatient follow-up.  New prescription was sent to the patient's pharmacy and we discussed return precautions.  The patient is agreeable with this treatment plan and verbalized understanding.  Care discussed with staff physician Dr. Nicky Lamb who is in agreement with the treatment plan.    Medical Decision Making    History:  Supplemental history from: Family Member/Significant Other  External Record(s) reviewed: Outpatient Record: Recent cardiology clinic visits, hospitalization resulting in CABG    Work Up:  Chart documentation includes differential considered and any EKGs or imaging independently interpreted by provider, where specified.  In additional to work up documented, I considered the following work up: Documented in chart, if applicable.    External consultation:  Discussion of management with another provider: Cardiology    Complicating factors:  Care impacted by chronic illness: Diabetes, Heart Disease, and Hypertension  Care affected by social determinants of health: Access to Affordable Health Care    Disposition considerations: Discharge. I prescribed additional prescription strength medication(s) as charted. I considered admission, but ultimately discharged patient after reassuring workup.    ED Course   9:12 AM Performed my initial history and physical exam. Discussed workup in the emergency department,  "management of symptoms, and likely disposition.   9:39 AM Staffed with Dr. Nicky Lamb.   12:23 PM Discussed with Dr. Le with cardiology.  12:40 PM I discussed the plan for discharge with the patient or family and they are agreeable.. We discussed supportive cares at home and reasons for return to the ER including new or worsening symptoms - all questions and concerns addressed. Patient to be discharged by RN.    At the conclusion of the encounter I discussed the results of all of the tests and the disposition. The questions were answered. The patient or family acknowledged understanding and was agreeable with the care plan.     Voice recognition software was used in the creation of this note. Any grammatical or nonsensical errors are due to inherent errors with the software and are not the intention of the writer.     MEDICATIONS GIVEN IN THE EMERGENCY:  Medications   iopamidol (ISOVUE-370) solution 75 mL (75 mLs Intravenous $Given 6/14/24 1025)   sodium chloride 0.9% BOLUS 1,000 mL (0 mLs Intravenous Stopped 6/14/24 1235)   gabapentin (NEURONTIN) capsule 100 mg (100 mg Oral $Given 6/14/24 1234)       NEW PRESCRIPTIONS STARTED AT TODAY'S ER VISIT  Discharge Medication List as of 6/14/2024 12:57 PM               =================================================================    HPI    Patient information was obtained from: Patient    Use of : N/A       Drew Monsivais is a 53 year old male with PMH of DM2, HTN, CAD s/p CABG 05//15/2024 who presents to the ED from cardiac rehab for evaluation of a pre-syncopal episode.     The patient was at cardiac rehab this morning using the treadmill when he began to have lightheadedness.  He was able to complete the 5 minutes on the treadmill but notes that when he got off, \"they knew something was off.\"  He notes that he felt \"just off\" this morning and his blood sugars were in the 200s which is higher than usual.  He did recently start insulin during his " hospitalization after his CABG.  He has some tingling sensations underneath his sternotomy scar but denies any other chest pain. He reports his appetite has been good, he had breakfast this morning before cardiac rehab.    He denies any fevers, chills, difficulty breathing, abdominal pain, room spinning dizziness, headache, or syncope.      REVIEW OF SYSTEMS   Review of Systems   Constitutional:  Negative for appetite change, chills and fever.   Eyes:  Negative for visual disturbance.   Respiratory:  Negative for cough and shortness of breath.    Cardiovascular:  Negative for chest pain (No new chest pain).   Gastrointestinal:  Negative for abdominal pain, diarrhea and vomiting.   Neurological:  Positive for light-headedness. Negative for dizziness and syncope.       All other systems reviewed and are negative unless noted in HPI.      PAST MEDICAL HISTORY:  Past Medical History:   Diagnosis Date    Calculus of kidney     at least 4 episodes most recent 2012 once surgically removed Stones present both kidneys      Closed fracture of metatarsal bone(s)          Diabetes (H)     Headache     Frontal-?migraine Triggers: no obvious,  excedrin light senstive Chiro     Hypertension     PONV (postoperative nausea and vomiting)        PAST SURGICAL HISTORY:  Past Surgical History:   Procedure Laterality Date    CORONARY ARTERY BYPASS GRAFT, WITH ENDOSCOPIC VESSEL PROCUREMENT N/A 5/14/2024    Procedure: CORONARY ARTERY BYPASS GRAFT TIMES THREE, WITH LEFT LEG ENDOSCOPIC VESSEL PROCUREMENT, LEFT RADIAL ARTERY HARVEST,;  Surgeon: Tuyet Azar MD;  Location: [facility] Main OR    CV CORONARY ANGIOGRAM N/A 5/13/2024    Procedure: Coronary Angiogram;  Surgeon: Gino Rich MD;  Location: [facility] CATH LAB CV    CV INTRA AORTIC BALLOON N/A 5/14/2024    Procedure: Intra Aortic Balloon Pump Insertion;  Surgeon: Vincent Castañeda MD;  Location: [facility] CATH LAB CV    CV INTRAVASULAR ULTRASOUND N/A 5/14/2024    Procedure:  EPIAORTIC ULTRASOUND;  Surgeon: Tuyet Azar MD;  Location: [facility] OR    CV LEFT HEART CATH N/A 5/13/2024    Procedure: Left Heart Catheterization;  Surgeon: Gino Rich MD;  Location: [facility] LAB CV    CV PCI N/A 5/13/2024    Procedure: Percutaneous Coronary Intervention;  Surgeon: Gino Rich MD;  Location: [facility] LAB CV    ESOPHAGOSCOPY, GASTROSCOPY, DUODENOSCOPY (EGD), COMBINED N/A 2/15/2023    Procedure: ESOPHAGOGASTRODUODENOSCOPY with BIOPSY;  Surgeon: Brandt Duong MD;  Location: [facility] OR    TRANSESOPHAGEAL ECHOCARDIOGRAM INTRAOPERATIVE  5/14/2024    Procedure: ANESTHESIA TRANSESOPHAGEAL ECHOCARDIOGRAM,;  Surgeon: Tuyet Azar MD;  Location: [facility] OR       CURRENT MEDICATIONS:    acetaminophen (TYLENOL) 325 MG tablet  amLODIPine (NORVASC) 2.5 MG tablet  aspirin (ASA) 81 MG chewable tablet  atorvastatin (LIPITOR) 80 MG tablet  busPIRone (BUSPAR) 10 MG tablet  busPIRone (BUSPAR) 10 MG tablet  clopidogrel (PLAVIX) 75 MG tablet  FLUoxetine (PROZAC) 20 MG capsule  gabapentin (NEURONTIN) 100 MG capsule  glipiZIDE (GLUCOTROL XL) 5 MG 24 hr tablet  glucose (BD GLUCOSE) 4 g chewable tablet  insulin aspart (NOVOLOG FLEXPEN) 100 UNIT/ML pen  Lidocaine (LIDOCARE) 4 % Patch  metoprolol tartrate (LOPRESSOR) 25 MG tablet  ondansetron (ZOFRAN ODT) 4 MG ODT tab  pantoprazole (PROTONIX) 40 MG EC tablet  tamsulosin (FLOMAX) 0.4 MG capsule  traZODone (DESYREL) 50 MG tablet  Alcohol Swabs PADS  blood glucose (NO BRAND SPECIFIED) lancets standard  blood glucose (NO BRAND SPECIFIED) test strip  blood glucose calibration (NO BRAND SPECIFIED) solution  blood glucose monitoring (NO BRAND SPECIFIED) meter device kit  insulin pen needle (32G X 4 MM) 32G X 4 MM miscellaneous  Sharps Container MISC        ALLERGIES:  Allergies   Allergen Reactions    Amoxicillin GI Disturbance       FAMILY HISTORY:  Family History   Problem Relation Age of Onset    Cerebrovascular Disease Mother      Aneurysm Father         cerebral       SOCIAL HISTORY:   Social History     Socioeconomic History    Marital status:     Number of children: 1   Occupational History    Occupation: Unload trucks   Tobacco Use    Smoking status: Former     Types: Cigarettes    Smokeless tobacco: Never    Tobacco comments:     College   Vaping Use    Vaping status: Never Used   Substance and Sexual Activity    Alcohol use: No    Drug use: Yes     Comment: Drug use: Cannabis    Sexual activity: Yes     Partners: Female   Social History Narrative    Diet-            Exercise-work is physical          No health insurance     Social Determinants of Health     Financial Resource Strain: Not At Risk (6/11/2024)    Received from [facility]    Financial Resource Strain     Is it hard for you to pay for the very basics like food, housing, medical care or heating?: No   Food Insecurity: Not At Risk (6/11/2024)    Received from [facility]    Food Insecurity     Does your food run out before you have the money to buy more?: No   Transportation Needs: Not At Risk (6/11/2024)    Received from [facility]    Transportation Needs     Does a lack of transportation keep you from your medical appointments or from getting your medications?: No       VITALS:  Patient Vitals for the past 24 hrs:   BP Temp Temp src Pulse Resp SpO2   06/14/24 1259 -- -- -- 60 -- 100 %   06/14/24 1207 -- -- -- 58 18 100 %   06/14/24 1200 (!) 141/85 -- -- 59 20 100 %   06/14/24 1134 131/79 -- -- 58 18 100 %   06/14/24 1004 -- -- -- -- 19 --   06/14/24 1000 134/79 -- -- 60 -- 100 %   06/14/24 0945 (!) 142/76 -- -- 59 18 100 %   06/14/24 0937 -- 98.1  F (36.7  C) Oral -- -- --   06/14/24 0933 138/74 -- -- 56 -- 100 %   06/14/24 0916 138/80 -- -- -- -- --   06/14/24 0915 (!) 150/92 -- -- -- -- --   06/14/24 0913 -- -- -- 60 17 99 %       PHYSICAL EXAM    VITAL SIGNS: BP (!) 141/85   Pulse 60   Temp 98.1  F (36.7  C) (Oral)   Resp 18   SpO2 100%   General  Appearance: Alert, cooperative, normal speech and facial symmetry, appears stated age, the patient does not appear in distress  Head:  Normocephalic, without obvious abnormality, atraumatic  Eyes: Conjunctiva/corneas clear, EOM's intact, no nystagmus, PERRL  ENT:  Lips, mucosa, and tongue normal; teeth and gums normal, no pharyngeal inflammation, no dysphonia or difficulty swallowing, membranes are moist without pallor  Cardio:  Regular rate and rhythm, S1 and S2 normal, no murmur, rub    or gallop, 2+ pulses symmetric in all extremities  Pulm:  Clear to auscultation bilaterally, respirations unlabored with no accessory muscle use  Chest: Midline sternotomy scar without significant erythema, warmth, drainage, or tenderness to palpation  Abdomen:  Abdomen is soft, non-distended with no tenderness to palpation, rebound tenderness, or guarding.   Extremities:  Extremities normal, there is no tenderness to palpation, atraumatic, no cyanosis or edema, full function and range of motion, pulses equal in all extremities, normal cap refill, no joint swelling  Skin: Skin is warm and dry, no rashes or wounds  Neuro: Patient is awake, alert, and responsive to voice. No gross motor weaknesses or sensory loss; moves all extremities. Cranial Nerves:  CN2: No funduscopic exam performed. CN3,4 & 6: Pupillary light response, lateral and vertical gaze normal.  No nystagmus.  CN7: No facial weakness, smile, facial symmetry intact. CN8: Intact to spoken voice. CN9&10: Gag reflex, uvula midline, palate rises with phonation. CN11: Shoulder shrug 5/5 intact bilaterally. CN12: Tongue midline and moves freely from side to side.      LAB:  All pertinent labs reviewed and interpreted.  Labs Ordered and Resulted from Time of ED Arrival to Time of ED Departure   COMPREHENSIVE METABOLIC PANEL - Abnormal       Result Value    Sodium 133 (*)     Potassium 4.6      Carbon Dioxide (CO2) 26      Anion Gap 11      Urea Nitrogen 15.3      Creatinine  1.22 (*)     GFR Estimate 70      Calcium 9.2      Chloride 96 (*)     Glucose 289 (*)     Alkaline Phosphatase 119      AST 17      ALT 14      Protein Total 7.4      Albumin 4.3      Bilirubin Total 0.4     TROPONIN T, HIGH SENSITIVITY - Abnormal    Troponin T, High Sensitivity 28 (*)    ROUTINE UA WITH MICROSCOPIC REFLEX TO CULTURE - Abnormal    Color Urine Colorless      Appearance Urine Clear      Glucose Urine 50 (*)     Bilirubin Urine Negative      Ketones Urine Negative      Specific Gravity Urine 1.035 (*)     Blood Urine Negative      pH Urine 7.5 (*)     Protein Albumin Urine Negative      Urobilinogen Urine <2.0      Nitrite Urine Negative      Leukocyte Esterase Urine Negative      RBC Urine 0      WBC Urine <1     BLOOD GAS VENOUS - Abnormal    pH Venous 7.38      pCO2 Venous 49      pO2 Venous 17 (*)     Bicarbonate Venous 29 (*)     Base Excess/Deficit Venous 4.1 (*)     FIO2 21      Oxyhemoglobin Venous 22 (*)     O2 Sat, Venous 22.3 (*)    CBC WITH PLATELETS AND DIFFERENTIAL - Abnormal    WBC Count 5.9      RBC Count 4.72      Hemoglobin 12.9 (*)     Hematocrit 38.3 (*)     MCV 81      MCH 27.3      MCHC 33.7      RDW 13.2      Platelet Count 232      % Neutrophils 60      % Lymphocytes 24      % Monocytes 9      % Eosinophils 6      % Basophils 1      % Immature Granulocytes 1      NRBCs per 100 WBC 0      Absolute Neutrophils 3.6      Absolute Lymphocytes 1.4      Absolute Monocytes 0.5      Absolute Eosinophils 0.4      Absolute Basophils 0.0      Absolute Immature Granulocytes 0.0      Absolute NRBCs 0.0     TROPONIN T, HIGH SENSITIVITY - Abnormal    Troponin T, High Sensitivity 28 (*)    MAGNESIUM - Normal    Magnesium 2.1     TSH WITH FREE T4 REFLEX - Normal    TSH 1.08     NT PROBNP INPATIENT - Normal    N terminal Pro BNP Inpatient 358     KETONE BETA-HYDROXYBUTYRATE QUANTITATIVE, RAPID - Normal    Ketone (Beta-Hydroxybutyrate) Quantitative <0.18         RADIOLOGY:  Reviewed all pertinent  imaging. Please see official radiology report.  CT Chest Pulmonary Embolism w Contrast   Final Result   IMPRESSION:      1.  No pulmonary embolism.      2.  Redemonstrated surgical changes of sternotomy and CABG. Improved but minimally persistent anterior mediastinal stranding and minimal thickening or fluid. No mediastinal gas or abscess.      3.  No significant change in the appearance of the bony sternotomy. No dehiscence.      4.  Improved but persistent moderate left lower lobe predominant atelectasis.       5.  Improved but persistent small left and tiny right pleural effusions.              EKG:    Performed at: 09:13    I have independently reviewed and interpreted the EKG, along with the final read. EKG also reviewed by Dr. Nicky Lamb.    Ventricular rate 59 bpm  NE interval 186 ms  QRS duration 88 ms  QT/QTc 412/407 ms  P-R-T axes 21 -64 96    Impression: Sinus bradycardia; nonspecific ST changes in leads V2-V4        Kelsie Jiang PA-C  Emergency Medicine  [facility] EMERGENCY ROOM  [address] [address] [phone]  Dept: [phone]       Kelsie Jiang PA-C  06/14/24 2480

## 2024-06-14 NOTE — DISCHARGE INSTRUCTIONS
You were seen here today for evaluation of lightheadedness during cardiac rehab.  Your workup today is very reassuring without evidence of further heart damage, heart failure, or blood clots.  Make sure that you are drinking plenty of fluid as you were mildly dehydrated today.    Decrease your metoprolol from 25 mg daily to 12.5 mg daily.    Follow-up with your cardiologist as soon as possible.  Return to the emergency department for any new or worsening symptoms including recurrent episodes of lightheadedness, passing out, fevers, chest pain, difficulty breathing, or any other symptoms that concern you.

## 2024-06-14 NOTE — PHARMACY-ADMISSION MEDICATION HISTORY
Pharmacist Admission Medication History    Admission medication history is complete. The information provided in this note is only as accurate as the sources available at the time of the update.    Information Source(s): Patient and Family member via in-person    Pertinent Information:       Changes made to PTA medication list:  Added: flomax  Deleted: None  Changed: None    Allergies reviewed with patient and updates made in EHR: yes    Medication History Completed By: Ankit Barragan RPH 6/14/2024 11:06 AM    PTA Med List   Medication Sig Last Dose    acetaminophen (TYLENOL) 325 MG tablet Take 2 tablets (650 mg) by mouth every 6 hours as needed for mild pain Unknown    amLODIPine (NORVASC) 2.5 MG tablet Take 1 tablet (2.5 mg) by mouth daily 6/14/2024    aspirin (ASA) 81 MG chewable tablet 1 tablet (81 mg) by Oral or NG Tube route daily 6/14/2024    atorvastatin (LIPITOR) 80 MG tablet Take 1 tablet (80 mg) by mouth every evening 6/13/2024    busPIRone (BUSPAR) 10 MG tablet Take 10 mg by mouth daily 6/14/2024    busPIRone (BUSPAR) 10 MG tablet Take 10 mg by mouth daily as needed (anxiety) For afternoon/evening if needed. Unknown    clopidogrel (PLAVIX) 75 MG tablet Take 1 tablet (75 mg) by mouth daily 6/14/2024    FLUoxetine (PROZAC) 20 MG capsule Take 20 mg by mouth daily 6/14/2024    gabapentin (NEURONTIN) 100 MG capsule Take 100 mg by mouth daily (with lunch) May increase to bid if needed 6/13/2024    glipiZIDE (GLUCOTROL XL) 5 MG 24 hr tablet Take 1 tablet (5 mg) by mouth 2 times daily Take 1 tablet twice daily for diabetes 6/14/2024    glucose (BD GLUCOSE) 4 g chewable tablet Take 4 tablets by mouth every 15 minutes as needed for low blood sugar Unknown    insulin aspart (NOVOLOG FLEXPEN) 100 UNIT/ML pen Inject 1-2 Units Subcutaneous 3 times daily (with meals) One unit if preprandial bg </= 150; two units if bg >150 6/14/2024 at 2 units    Lidocaine (LIDOCARE) 4 % Patch Place 1-2 patches onto the skin every  24 hours To prevent lidocaine toxicity, patient should be patch free for 12 hrs daily. 6/14/2024    metoprolol tartrate (LOPRESSOR) 25 MG tablet Take 1 tablet (25 mg) by mouth 2 times daily 6/14/2024    ondansetron (ZOFRAN ODT) 4 MG ODT tab Take 1 tablet (4 mg) by mouth every 6 hours as needed for nausea or vomiting Unknown    pantoprazole (PROTONIX) 40 MG EC tablet Take 40 mg by mouth daily 6/14/2024    tamsulosin (FLOMAX) 0.4 MG capsule Take 0.4 mg by mouth daily 6/14/2024    traZODone (DESYREL) 50 MG tablet Take 3 tablets (150 mg) by mouth At Bedtime 6/13/2024

## 2024-06-14 NOTE — ED NOTES
Discharge instructions reviewed with patient and spouse, verbalized understanding of instructions.

## 2024-06-14 NOTE — ED TRIAGE NOTES
Patient presents to ED as a rapid response from cardiac rehab. Patient was walking on the treadmill and became lightheaded/dizzy and found to have a BP of 78/48. Also c/o slight headache. Denies chest pain or sob. History of NSTEMI on May 12th, had a CABGx3 done on May 14th. Has been going to cardiac rehab for 3 weeks and has been feeling ok up until episode today. Patient also reports he has a left leg hematoma, ultrasound was done yesterday and negative for blood clot. Not on blood thinners. Patient also noted that he had a blood sugar of 355 this morning. Alert and oriented x4. Vitals stable on arrival to ED.

## 2024-06-14 NOTE — ED PROVIDER NOTES
"Emergency Department Staff Physician Note     I had a face to face encounter with this patient seen by the Advanced Practice Provider (CHRIS).  I have seen, examined, and discussed the patient with the CHRIS and agree with their assessment and plan of management.    Relevant HPI:     Drew Monsivais is a 53 year old male who presents to the Emergency Department for evaluation of dizziness. The patient was hospitalized in May for CABG. When the patient went to cardiac rehab this morning, he felt lightheaded within a few minutes on the treadmill. He had similar lightheadedness last week during rehab, but it was less severe. In the ED, he is feeling back to his normal baseline. The patient felt \"off\" when he woke this morning and his blood sugar was abnormally high in the 200s at home. He has some tingling sensations near his incision today. No new chest pain or SOB. He is not anticoagulated. He has a left leg hematoma that was negative for DVT on US yesterday. No other reported symptoms.     I, Ganesh Soni, am serving as a scribe to document services personally performed by Nicky Lamb MD, based on my observations and the provider's statements to me.   I, Nicky Lamb MD, attest that Ganesh Soni was acting in a scribe capacity, has observed my performance of the services and has documented them in accordance with my direction.    ED Course:  9:37 AM I received the patient report from the CHRIS, Kelsie Trammell. I agree with their assessment and plan of management, and I will see the patient.  *** I met with the patient to introduce myself, gather additional history, perform my initial exam, and discuss the plan.     Brief Physical Exam:  VITAL SIGNS: /79   Pulse 60   Temp 98.1  F (36.7  C) (Oral)   Resp 19   SpO2 100%   Generalized: does not appear toxic, interactive and in no apparent distress.   HEENT: No eye discharge or redness, no nasal drainage or bleeding.   Resp: Equal work of breathing with " bilateral chest rise present.  CV: Warm extremities, regular rate.  Neuro: Interactive, no aphasia or dysarthria, no facial droop.  MSK: Moving extremities spontaneously. No focal deformity or trauma noted to extremities.  Psych: Cooperative, does not appear to be hallucinating or responding to internal stimuli.     Impression / ED Plan:  I had a face to face encounter with this patient seen by the Advanced Practice Provider (CHRIS).  I have seen, examined, and discussed the patient with the CHRIS and agree with their assessment and plan of management. I personally saw the patient and performed a substantive portion of the visit including all aspects of the medical decision making.    ***      No diagnosis found.    Nicky Lamb MD  Staff Physician  Emergency Department

## 2024-06-17 ENCOUNTER — HOSPITAL ENCOUNTER (OUTPATIENT)
Dept: CARDIAC REHAB | Facility: CLINIC | Age: 54
Discharge: HOME | End: 2024-06-17
Payer: COMMERCIAL

## 2024-06-17 PROCEDURE — 93798 PHYS/QHP OP CAR RHAB W/ECG: CPT

## 2024-06-18 ENCOUNTER — HOSPITAL ENCOUNTER (OUTPATIENT)
Dept: CARDIAC REHAB | Facility: CLINIC | Age: 54
Discharge: HOME | End: 2024-06-18
Payer: COMMERCIAL

## 2024-06-18 PROCEDURE — 93798 PHYS/QHP OP CAR RHAB W/ECG: CPT

## 2024-06-20 ENCOUNTER — HOSPITAL ENCOUNTER (OUTPATIENT)
Dept: CARDIAC REHAB | Facility: CLINIC | Age: 54
Discharge: HOME | End: 2024-06-20
Payer: COMMERCIAL

## 2024-06-20 PROCEDURE — 93798 PHYS/QHP OP CAR RHAB W/ECG: CPT

## 2024-06-24 ENCOUNTER — HOSPITAL ENCOUNTER (OUTPATIENT)
Dept: CARDIAC REHAB | Facility: CLINIC | Age: 54
Discharge: HOME | End: 2024-06-24
Payer: COMMERCIAL

## 2024-06-24 PROCEDURE — 93798 PHYS/QHP OP CAR RHAB W/ECG: CPT

## 2024-06-25 ENCOUNTER — HOSPITAL ENCOUNTER (OUTPATIENT)
Dept: CARDIAC REHAB | Facility: CLINIC | Age: 54
Discharge: HOME | End: 2024-06-25
Payer: COMMERCIAL

## 2024-06-25 PROCEDURE — 93798 PHYS/QHP OP CAR RHAB W/ECG: CPT

## 2024-06-28 ENCOUNTER — HOSPITAL ENCOUNTER (OUTPATIENT)
Dept: CARDIAC REHAB | Facility: CLINIC | Age: 54
Discharge: HOME | End: 2024-06-28
Payer: COMMERCIAL

## 2024-06-28 PROCEDURE — 93798 PHYS/QHP OP CAR RHAB W/ECG: CPT

## 2024-07-01 ENCOUNTER — HOSPITAL ENCOUNTER (OUTPATIENT)
Dept: CARDIAC REHAB | Facility: CLINIC | Age: 54
Discharge: HOME | End: 2024-07-01
Payer: COMMERCIAL

## 2024-07-01 PROCEDURE — 93798 PHYS/QHP OP CAR RHAB W/ECG: CPT

## 2024-07-02 ENCOUNTER — HOSPITAL ENCOUNTER (OUTPATIENT)
Dept: CARDIAC REHAB | Facility: CLINIC | Age: 54
Discharge: HOME | End: 2024-07-02
Payer: COMMERCIAL

## 2024-07-02 PROCEDURE — 93798 PHYS/QHP OP CAR RHAB W/ECG: CPT

## 2024-07-08 ENCOUNTER — HOSPITAL ENCOUNTER (OUTPATIENT)
Dept: CARDIAC REHAB | Facility: CLINIC | Age: 54
Discharge: HOME | End: 2024-07-08
Payer: COMMERCIAL

## 2024-07-08 PROCEDURE — 93798 PHYS/QHP OP CAR RHAB W/ECG: CPT

## 2024-07-11 ENCOUNTER — HOSPITAL ENCOUNTER (OUTPATIENT)
Dept: CARDIAC REHAB | Facility: CLINIC | Age: 54
Discharge: HOME | End: 2024-07-11
Payer: COMMERCIAL

## 2024-07-11 PROCEDURE — 93798 PHYS/QHP OP CAR RHAB W/ECG: CPT

## 2024-07-12 ENCOUNTER — HOSPITAL ENCOUNTER (OUTPATIENT)
Dept: CARDIAC REHAB | Facility: CLINIC | Age: 54
Discharge: HOME | End: 2024-07-12
Payer: COMMERCIAL

## 2024-07-12 PROCEDURE — 93798 PHYS/QHP OP CAR RHAB W/ECG: CPT

## 2024-07-15 ENCOUNTER — HOSPITAL ENCOUNTER (OUTPATIENT)
Dept: CARDIAC REHAB | Facility: CLINIC | Age: 54
Discharge: HOME | End: 2024-07-15
Payer: COMMERCIAL

## 2024-07-15 PROCEDURE — 93798 PHYS/QHP OP CAR RHAB W/ECG: CPT

## 2024-07-17 ENCOUNTER — HOSPITAL ENCOUNTER (OUTPATIENT)
Dept: CARDIAC REHAB | Facility: CLINIC | Age: 54
Discharge: HOME | End: 2024-07-17
Payer: COMMERCIAL

## 2024-07-17 PROCEDURE — 93798 PHYS/QHP OP CAR RHAB W/ECG: CPT

## 2024-07-19 ENCOUNTER — HOSPITAL ENCOUNTER (OUTPATIENT)
Dept: CARDIAC REHAB | Facility: CLINIC | Age: 54
Discharge: HOME | End: 2024-07-19
Payer: COMMERCIAL

## 2024-07-19 PROCEDURE — 93798 PHYS/QHP OP CAR RHAB W/ECG: CPT

## 2024-07-22 ENCOUNTER — HOSPITAL ENCOUNTER (OUTPATIENT)
Dept: CARDIAC REHAB | Facility: CLINIC | Age: 54
Discharge: HOME | End: 2024-07-22
Payer: COMMERCIAL

## 2024-07-22 PROCEDURE — 93798 PHYS/QHP OP CAR RHAB W/ECG: CPT | Performed by: OCCUPATIONAL THERAPIST

## 2024-07-24 ENCOUNTER — HOSPITAL ENCOUNTER (OUTPATIENT)
Dept: CARDIAC REHAB | Facility: CLINIC | Age: 54
Discharge: HOME | End: 2024-07-24
Payer: COMMERCIAL

## 2024-07-24 PROCEDURE — 93798 PHYS/QHP OP CAR RHAB W/ECG: CPT

## 2024-07-26 ENCOUNTER — HOSPITAL ENCOUNTER (OUTPATIENT)
Dept: CARDIAC REHAB | Facility: CLINIC | Age: 54
Discharge: HOME | End: 2024-07-26
Payer: COMMERCIAL

## 2024-07-26 PROCEDURE — 93798 PHYS/QHP OP CAR RHAB W/ECG: CPT

## 2024-07-30 ENCOUNTER — HOSPITAL ENCOUNTER (OUTPATIENT)
Dept: CARDIAC REHAB | Facility: CLINIC | Age: 54
Discharge: HOME | End: 2024-07-30
Payer: COMMERCIAL

## 2024-07-30 PROCEDURE — 93798 PHYS/QHP OP CAR RHAB W/ECG: CPT

## 2024-08-05 ENCOUNTER — HOSPITAL ENCOUNTER (OUTPATIENT)
Dept: CARDIAC REHAB | Facility: CLINIC | Age: 54
Discharge: HOME | End: 2024-08-05
Payer: COMMERCIAL

## 2024-08-05 PROCEDURE — 93798 PHYS/QHP OP CAR RHAB W/ECG: CPT

## 2024-08-07 ENCOUNTER — HOSPITAL ENCOUNTER (OUTPATIENT)
Dept: CARDIAC REHAB | Facility: CLINIC | Age: 54
Discharge: HOME | End: 2024-08-07
Payer: COMMERCIAL

## 2024-08-07 PROCEDURE — 93798 PHYS/QHP OP CAR RHAB W/ECG: CPT

## 2024-08-09 ENCOUNTER — HOSPITAL ENCOUNTER (OUTPATIENT)
Dept: CARDIAC REHAB | Facility: CLINIC | Age: 54
Discharge: HOME | End: 2024-08-09
Payer: COMMERCIAL

## 2024-08-09 PROCEDURE — 93798 PHYS/QHP OP CAR RHAB W/ECG: CPT

## 2024-08-12 ENCOUNTER — HOSPITAL ENCOUNTER (OUTPATIENT)
Dept: CARDIAC REHAB | Facility: CLINIC | Age: 54
Discharge: HOME | End: 2024-08-12
Payer: COMMERCIAL

## 2024-08-12 PROCEDURE — 93798 PHYS/QHP OP CAR RHAB W/ECG: CPT

## 2024-08-14 ENCOUNTER — HOSPITAL ENCOUNTER (OUTPATIENT)
Dept: CARDIAC REHAB | Facility: CLINIC | Age: 54
Discharge: HOME | End: 2024-08-14
Payer: COMMERCIAL

## 2024-08-14 PROCEDURE — 93798 PHYS/QHP OP CAR RHAB W/ECG: CPT

## 2024-08-16 ENCOUNTER — HOSPITAL ENCOUNTER (OUTPATIENT)
Dept: CARDIAC REHAB | Facility: CLINIC | Age: 54
Discharge: HOME | End: 2024-08-16
Payer: COMMERCIAL

## 2024-08-16 PROCEDURE — 93798 PHYS/QHP OP CAR RHAB W/ECG: CPT

## 2024-08-21 ENCOUNTER — HOSPITAL ENCOUNTER (OUTPATIENT)
Dept: CARDIAC REHAB | Facility: CLINIC | Age: 54
Discharge: HOME | End: 2024-08-21
Payer: COMMERCIAL

## 2024-08-21 LAB
GLUCOSE BLDC GLUCOMTR-MCNC: 169 MG/DL (ref 70–99)
GLUCOSE BLDC GLUCOMTR-MCNC: 208 MG/DL (ref 70–99)

## 2024-08-21 PROCEDURE — 93798 PHYS/QHP OP CAR RHAB W/ECG: CPT

## 2024-08-21 PROCEDURE — 93797 PHYS/QHP OP CAR RHAB WO ECG: CPT | Mod: 59

## 2024-09-28 ENCOUNTER — HEALTH MAINTENANCE LETTER (OUTPATIENT)
Age: 54
End: 2024-09-28

## 2025-01-18 ENCOUNTER — HEALTH MAINTENANCE LETTER (OUTPATIENT)
Age: 55
End: 2025-01-18

## 2025-04-26 ENCOUNTER — HEALTH MAINTENANCE LETTER (OUTPATIENT)
Age: 55
End: 2025-04-26

## 2025-08-09 ENCOUNTER — HEALTH MAINTENANCE LETTER (OUTPATIENT)
Age: 55
End: 2025-08-09

## (undated) DEVICE — ELECTRODE DEFIB CADENCE 22550R

## (undated) DEVICE — TRANSDUCER W/MONITORING TRAY 42632-05

## (undated) DEVICE — KIT ENDO VASOVIEW HEMOPRO 2 VH-4000

## (undated) DEVICE — SOL WATER IRRIG 1000ML BOTTLE 2F7114

## (undated) DEVICE — SU STERNAL WIRE SINGLE #6 046-032

## (undated) DEVICE — ESU PENCIL SMOKE EVAC W/ROCKER SWITCH 0703-047-000

## (undated) DEVICE — CLIP APPLIER 11" MED LIGACLIP MCM20

## (undated) DEVICE — DRSG DRAIN 4X4" 7086

## (undated) DEVICE — TUBING SUCTION MEDI-VAC 1/4"X20' N620A

## (undated) DEVICE — SUCTION DRY CHEST DRAIN OASIS 3600-100

## (undated) DEVICE — CLIP HORIZON MULTI SM YELLOW 001204

## (undated) DEVICE — SU PROLENE 5-0 C-1 36" 2ARM MONOFILAMENT M8720

## (undated) DEVICE — SU PLEDGET SOFT TFE 3/8"X3/26"X1/16" PCP40

## (undated) DEVICE — BLANKET BAIR ADLT PLYMR 60X36IN 63000

## (undated) DEVICE — CATH BALLOON IABP 7.5FRX40ML INTRA-AORTIC 0684-00-0568-01

## (undated) DEVICE — PREP CHLORAPREP 26ML TINTED HI-LITE ORANGE 930815

## (undated) DEVICE — SLEEVE TR BAND RADIAL COMPRESSION DEVICE 24CM TRB24-REG

## (undated) DEVICE — DRSG TEGADERM 4X4 3/4" 1626W

## (undated) DEVICE — SU PDS II 3-0 SH 27" Z316H

## (undated) DEVICE — GLOVE BIOGEL 6 LATEX

## (undated) DEVICE — RX VISTASEAL FIBRIN SEALANT W/THROMBIN 10ML VST10

## (undated) DEVICE — PITCHER STERILE 1000ML  SSK9004A

## (undated) DEVICE — PACK MAJOR BASIN 673

## (undated) DEVICE — TUBING SUCTION MEDI-VAC 1/4"X20' N620A - HE

## (undated) DEVICE — ESU GROUND PAD ADULT REM W/15' CORD E7507DB

## (undated) DEVICE — CELL SAVER

## (undated) DEVICE — CONNECTOR Y 1/2 X 3/8 X 3/8 STRL C440

## (undated) DEVICE — SU STRATAFIX MONOCRYL 4-0 SPIRAL PS-2 SXMP1B118

## (undated) DEVICE — SU STRATAFIX PDS PLUS 2-0 SPIRAL CT-1 30CM SXPP1B410

## (undated) DEVICE — FORCEP BIOPSY DISP 000386

## (undated) DEVICE — CATH THORACIC STRAIGHT CLOTSTOP 32FR

## (undated) DEVICE — SYR ANGIOGRAPHY MULTIUSE KIT ACIST 014612

## (undated) DEVICE — SU PDS II 2-0 CT 36"  Z357H

## (undated) DEVICE — SUTURE PDS 0 27IN CT1 + VIOLET PDP340H

## (undated) DEVICE — CLIP SPRING FOGARTY SOFTJAW CSOFT6

## (undated) DEVICE — MANIFOLD KIT ANGIO AUTOMATED 014613

## (undated) DEVICE — TUBING INSUFFLATION W/FILTER 28-0207

## (undated) DEVICE — SOL NACL 0.9% IRRIG 1000ML BOTTLE 2F7124

## (undated) DEVICE — DEVICE TISSUE STABILIZATION OCTOBASE 28707

## (undated) DEVICE — CATH DIAGNOSTIC RADIAL 5FR TIG 4.0

## (undated) DEVICE — SUCTION MANIFOLD NEPTUNE 2 SYS 1 PORT 702-025-000

## (undated) DEVICE — CABLE MYO/LEAD PACING WHITE DISP 019-530

## (undated) DEVICE — SU PROLENE 7-0 BV-1DA 4X30" M8703

## (undated) DEVICE — SURGICEL POWDER ABSORBABLE HEMOSTAT 3GM 3013SP

## (undated) DEVICE — CUSTOM PACK CORONARY SAN5BCRHEA

## (undated) DEVICE — SU STRATAFIX PDS PLUS 0 CT 45CM SXPP1A406

## (undated) DEVICE — LEAD PACER MYOCARDIAL BIPOLAR TEMPORARY 53CM 6495F

## (undated) DEVICE — SU ETHIBOND 2-0 SHDA 30" X563H

## (undated) DEVICE — SU ETHIBOND 3-0 BBDA 36" X588H

## (undated) DEVICE — Device

## (undated) DEVICE — SU STEEL MYO/WIRE #6 BE-2 30" 046-121

## (undated) DEVICE — SHTH INTRO 0.021IN ID 6FR DIA

## (undated) DEVICE — RX SURGIFLO HEMOSTATIC MATRIX W/THROMBIN 8ML 2994

## (undated) DEVICE — INTRO MICRO MINI STICK 4FR STIFF NITINOL 45-753

## (undated) DEVICE — PROTECTOR ARM STANDARD ONE STEP

## (undated) DEVICE — KIT HAND CONTROL ACIST 014644 AR-P54

## (undated) DEVICE — CUSTOM PACK CV ST JOES SCV5BCVHEA

## (undated) DEVICE — CLIP HORIZON MULTI MED BLUE 002204

## (undated) DEVICE — SU ETHIBOND 0 CT-1 CR 8X18" CX21D

## (undated) DEVICE — SU PROLENE 8-0 BV130-5DA 24" 8732H

## (undated) DEVICE — SU DERMABOND ADVANCED .7ML DNX12

## (undated) DEVICE — HEMOSTASIS BONE OSTENE 2.5G SYNTHETIC 1503832

## (undated) DEVICE — CONNECTOR CARDIO BLAKE DRAIN BCC2

## (undated) DEVICE — CUSTOM PACK CAB SCV5BCBHEA

## (undated) DEVICE — SUCTION CANISTER MEDIVAC LINER 3000ML W/LID 65651-530

## (undated) RX ORDER — FENTANYL CITRATE 50 UG/ML
INJECTION, SOLUTION INTRAMUSCULAR; INTRAVENOUS
Status: DISPENSED
Start: 2024-05-14

## (undated) RX ORDER — IBUPROFEN 200 MG
TABLET ORAL
Status: DISPENSED
Start: 2022-12-01

## (undated) RX ORDER — ETOMIDATE 2 MG/ML
INJECTION INTRAVENOUS
Status: DISPENSED
Start: 2024-05-14

## (undated) RX ORDER — FENTANYL CITRATE-0.9 % NACL/PF 10 MCG/ML
PLASTIC BAG, INJECTION (ML) INTRAVENOUS
Status: DISPENSED
Start: 2024-05-13

## (undated) RX ORDER — CEFAZOLIN SODIUM 1 G/3ML
INJECTION, POWDER, FOR SOLUTION INTRAMUSCULAR; INTRAVENOUS
Status: DISPENSED
Start: 2024-05-14

## (undated) RX ORDER — PROPOFOL 10 MG/ML
INJECTION, EMULSION INTRAVENOUS
Status: DISPENSED
Start: 2024-05-14

## (undated) RX ORDER — LIDOCAINE HYDROCHLORIDE 10 MG/ML
INJECTION, SOLUTION EPIDURAL; INFILTRATION; INTRACAUDAL; PERINEURAL
Status: DISPENSED
Start: 2024-05-14

## (undated) RX ORDER — GLYCOPYRROLATE 0.2 MG/ML
INJECTION, SOLUTION INTRAMUSCULAR; INTRAVENOUS
Status: DISPENSED
Start: 2024-05-14

## (undated) RX ORDER — ONDANSETRON 2 MG/ML
INJECTION INTRAMUSCULAR; INTRAVENOUS
Status: DISPENSED
Start: 2024-05-14

## (undated) RX ORDER — FENTANYL CITRATE 50 UG/ML
INJECTION, SOLUTION INTRAMUSCULAR; INTRAVENOUS
Status: DISPENSED
Start: 2024-05-13